# Patient Record
Sex: FEMALE | Race: WHITE | NOT HISPANIC OR LATINO | Employment: OTHER | URBAN - NONMETROPOLITAN AREA
[De-identification: names, ages, dates, MRNs, and addresses within clinical notes are randomized per-mention and may not be internally consistent; named-entity substitution may affect disease eponyms.]

---

## 2017-01-18 ENCOUNTER — OFFICE VISIT (OUTPATIENT)
Dept: FAMILY MEDICINE CLINIC | Facility: CLINIC | Age: 37
End: 2017-01-18

## 2017-01-18 VITALS — OXYGEN SATURATION: 97 % | SYSTOLIC BLOOD PRESSURE: 115 MMHG | HEART RATE: 100 BPM | DIASTOLIC BLOOD PRESSURE: 80 MMHG

## 2017-01-18 DIAGNOSIS — K21.9 GASTROESOPHAGEAL REFLUX DISEASE WITHOUT ESOPHAGITIS: ICD-10-CM

## 2017-01-18 DIAGNOSIS — G80.0 SPASTIC QUADRIPLEGIC CEREBRAL PALSY (HCC): ICD-10-CM

## 2017-01-18 DIAGNOSIS — E55.9 VITAMIN D DEFICIENCY: ICD-10-CM

## 2017-01-18 DIAGNOSIS — Z00.00 ANNUAL PHYSICAL EXAM: ICD-10-CM

## 2017-01-18 DIAGNOSIS — F32.A DEPRESSIVE DISORDER: Primary | ICD-10-CM

## 2017-01-18 PROCEDURE — 99213 OFFICE O/P EST LOW 20 MIN: CPT | Performed by: GENERAL PRACTICE

## 2017-01-18 NOTE — PROGRESS NOTES
Subjective   Kiley Ledesma is a 36 y.o. female.     Chief Complaint   Patient presents with   • Follow-up   • Depression   • Heartburn     History of Present Illness     For review and evaluation of management of chronic medical problems. Has a red bump on forehead, worried might be infected. Depression stable. GERD stable.    Patient Active Problem List   Diagnosis   • Cerebral palsy   • Depressive disorder   • Gastroesophageal reflux disease   • Spastic quadriplegic cerebral palsy      The following portions of the patient's history were reviewed and updated as appropriate: allergies, current medications, past social history and problem list.    Current Outpatient Prescriptions:   •  acetaminophen (TYLENOL) 325 MG tablet, Take 1 or 2 tablet by mouth every 4 hours as needed, Disp: , Rfl:   •  albuterol (PROVENTIL) (2.5 MG/3ML) 0.083% nebulizer solution, Use 1 vial 3 times a day as needed for wheezing, Disp: , Rfl:   •  BACLOFEN IT, Take as directed, Disp: , Rfl:   •  Cholecalciferol 1000 UNITS capsule, Take 1,000 Units by mouth Daily., Disp: , Rfl:   •  cholestyramine light 4 G packet, DISSOLVE ONE PACKET IN 8OZ WATER & GIVE BY MOUTH TWICE DAILY AT 9AM &5PM FOR CONSTIPATION - NO OTHER MEDICATIONS FOR 1 HOUR, Disp: 60 packet, Rfl: 5  •  diphenhydrAMINE (BENADRYL ALLERGY) 25 MG tablet, Take 25 mg by mouth 3 (Three) Times a Day As Needed (for lip swelling)., Disp: , Rfl:   •  estradiol (ESTRACE) 1 MG tablet, Take 1 mg by mouth Daily., Disp: , Rfl:   •  FLUoxetine (PROzac) 40 MG capsule, Take 40 mg by mouth Daily., Disp: , Rfl:   •  fluticasone (FLONASE) 50 MCG/ACT nasal spray, 2 sprays into each nostril. As directed, Disp: , Rfl:   •  guaiFENesin (MUCINEX) 600 MG 12 hr tablet, Take 600 mg by mouth 2 (Two) Times a Day As Needed., Disp: , Rfl:   •  metoclopramide (REGLAN) 10 MG tablet, Take 10 mg by mouth Daily., Disp: , Rfl:   •  Misc. Devices (WHEELCHAIR) misc, As directed, Disp: , Rfl:   •  nystatin (MYCOSTATIN)  289151 UNIT/GM cream, Apply  topically 3 (Three) Times a Day. To affected area(s), Disp: , Rfl:   •  omeprazole (PriLOSEC) 20 MG capsule, Take 20 mg by mouth Daily., Disp: , Rfl:   •  ondansetron ODT (ZOFRAN-ODT) 8 MG disintegrating tablet, Take 8 mg by mouth Every 8 (Eight) Hours As Needed., Disp: , Rfl:   •  solifenacin (VESICARE) 10 MG tablet, Take 10 mg by mouth Daily., Disp: , Rfl:   •  Vitamins A & D (VITAMIN A & D) ointment, Apply  topically As Needed., Disp: , Rfl:     Review of Systems   Constitutional: Negative.  Negative for activity change, appetite change, chills, fatigue, fever and unexpected weight change.   HENT: Negative.  Negative for congestion, ear pain, hearing loss, nosebleeds, rhinorrhea, sinus pressure, sneezing, sore throat, tinnitus and trouble swallowing.    Eyes: Negative.  Negative for pain, discharge, redness, itching and visual disturbance.   Respiratory: Negative.  Negative for apnea, cough, chest tightness, shortness of breath and wheezing.    Cardiovascular: Negative.  Negative for chest pain, palpitations and leg swelling.   Gastrointestinal: Negative.  Negative for abdominal distention, abdominal pain, constipation, diarrhea, nausea and vomiting.   Endocrine: Negative.    Genitourinary: Negative.  Negative for dysuria, frequency and urgency.   Musculoskeletal: Negative.  Negative for arthralgias, back pain, gait problem, joint swelling, myalgias, neck pain and neck stiffness.   Skin: Negative.  Negative for color change and rash.   Allergic/Immunologic: Negative.    Neurological: Negative.  Negative for dizziness, weakness, light-headedness, numbness and headaches.   Hematological: Negative.  Negative for adenopathy.   Psychiatric/Behavioral: Negative.  Negative for dysphoric mood and sleep disturbance. The patient is not nervous/anxious.        Objective                                                                                                                                                                                                                                                                   Visit Vitals   • /80 (BP Location: Left arm, Patient Position: Sitting, Cuff Size: Adult)   • Pulse 100   • SpO2 97%       Physical Exam   Constitutional: She is oriented to person, place, and time. She appears well-developed and well-nourished. No distress.   HENT:   Head: Normocephalic and atraumatic.       Nose: Nose normal.   Mouth/Throat: Oropharynx is clear and moist.   Eyes: Conjunctivae and EOM are normal. Pupils are equal, round, and reactive to light. Right eye exhibits no discharge. Left eye exhibits no discharge.   Neck: No thyromegaly present.   Cardiovascular: Normal rate, regular rhythm, normal heart sounds and intact distal pulses.    Pulmonary/Chest: Effort normal and breath sounds normal.   Lymphadenopathy:     She has no cervical adenopathy.   Neurological: She is alert and oriented to person, place, and time.   Skin: Skin is warm and dry.   Psychiatric: She has a normal mood and affect.   Nursing note and vitals reviewed.    Assessment/Plan     Problem List Items Addressed This Visit        Digestive    Gastroesophageal reflux disease       Nervous and Auditory    Cerebral palsy       Other    Depressive disorder - Primary      Other Visit Diagnoses     Annual physical exam        Relevant Orders    Vitamin D 25 Hydroxy    Comprehensive Metabolic Panel    LDL Cholesterol, Direct    Vitamin D deficiency        Relevant Orders    Vitamin D 25 Hydroxy        Continue current treatment.     No orders of the defined types were placed in this encounter.

## 2017-07-03 ENCOUNTER — OFFICE VISIT (OUTPATIENT)
Dept: OPHTHALMOLOGY | Facility: CLINIC | Age: 37
End: 2017-07-03

## 2017-07-03 DIAGNOSIS — H52.203 ASTIGMATISM, BILATERAL: ICD-10-CM

## 2017-07-03 DIAGNOSIS — H52.13 MYOPIA, BILATERAL: Primary | ICD-10-CM

## 2017-07-03 PROCEDURE — 92014 COMPRE OPH EXAM EST PT 1/>: CPT | Performed by: OPHTHALMOLOGY

## 2017-07-03 NOTE — PROGRESS NOTES
Subjective   Kiley Ledesma is a 36 y.o. female.   Chief Complaint   Patient presents with   • Eye Exam   • Astigmatism   • Myopia     HPI     Eye Exam   Laterality: both eyes       Last edited by Deya Sher MA on 7/3/2017  9:25 AM. (History)          Review of Systems    Objective   Base Eye Exam     Visual Acuity (Snellen - Linear)      Right Left   Near cc J1 J1       Correction:  Glasses      Tonometry (Palpation, 10:15 AM)      Right Left   Pressure soft soft         Pupils      Pupils   Right PERRL   Left PERRL         Visual Fields      Left Right   Result Full Full         Extraocular Movement      Right Left   Result Full Full         Dilation     Both eyes:  1.0% Mydriacyl, 2.5% Jae Synephrine @ 9:44 AM            Slit Lamp and Fundus Exam     External Exam      Right Left    External Normal Normal      Slit Lamp Exam      Right Left    Lids/Lashes Normal Normal    Conjunctiva/Sclera White and quiet White and quiet    Cornea Clear Clear    Anterior Chamber Deep and quiet Deep and quiet    Iris Round and reactive Round and reactive    Lens Clear Clear    Vitreous Normal Normal      Fundus Exam      Right Left    Disc Normal Normal    Macula Normal Normal    Vessels Normal Normal    Periphery Normal Normal            Refraction     Wearing Rx      Sphere Cylinder Axis   Right -5.25 +2.50 120   Left -6.50 +3.25 093         Cycloplegic Refraction     OR pls OU                Assessment/Plan   Diagnoses and all orders for this visit:    Myopia, bilateral    Astigmatism, bilateral      Plan:     Cont current Rx  F/u prn

## 2017-07-07 DIAGNOSIS — N39.0 UTI (URINARY TRACT INFECTION), UNCOMPLICATED: Primary | ICD-10-CM

## 2017-07-07 PROCEDURE — 81001 URINALYSIS AUTO W/SCOPE: CPT | Performed by: GENERAL PRACTICE

## 2017-07-07 PROCEDURE — 87086 URINE CULTURE/COLONY COUNT: CPT | Performed by: GENERAL PRACTICE

## 2017-07-12 ENCOUNTER — TELEPHONE (OUTPATIENT)
Dept: FAMILY MEDICINE CLINIC | Facility: CLINIC | Age: 37
End: 2017-07-12

## 2017-07-12 NOTE — TELEPHONE ENCOUNTER
Pr Dr. Stoddard, Ms. Ledesma's caregiver Kelsey has been called with her recent lab results & recommendations.  Continue her current medications and follow-up as planned or sooner if any problems.      ----- Message from Greta Stoddard MD sent at 7/12/2017  5:21 PM CDT -----  Call and tell no urine infection

## 2017-08-24 ENCOUNTER — OFFICE VISIT (OUTPATIENT)
Dept: FAMILY MEDICINE CLINIC | Facility: CLINIC | Age: 37
End: 2017-08-24

## 2017-08-24 ENCOUNTER — LAB (OUTPATIENT)
Dept: LAB | Facility: HOSPITAL | Age: 37
End: 2017-08-24

## 2017-08-24 VITALS
HEART RATE: 78 BPM | HEIGHT: 60 IN | WEIGHT: 130 LBS | OXYGEN SATURATION: 98 % | DIASTOLIC BLOOD PRESSURE: 78 MMHG | BODY MASS INDEX: 25.52 KG/M2 | SYSTOLIC BLOOD PRESSURE: 104 MMHG

## 2017-08-24 DIAGNOSIS — G80.0 SPASTIC QUADRIPLEGIC CEREBRAL PALSY (HCC): ICD-10-CM

## 2017-08-24 DIAGNOSIS — E55.9 VITAMIN D DEFICIENCY: ICD-10-CM

## 2017-08-24 DIAGNOSIS — E55.9 VITAMIN D DEFICIENCY: Primary | ICD-10-CM

## 2017-08-24 DIAGNOSIS — E78.5 HYPERLIPIDEMIA, UNSPECIFIED HYPERLIPIDEMIA TYPE: ICD-10-CM

## 2017-08-24 DIAGNOSIS — Z00.00 ANNUAL PHYSICAL EXAM: Primary | ICD-10-CM

## 2017-08-24 LAB
25(OH)D3 SERPL-MCNC: 42.4 NG/ML (ref 30–100)
ALBUMIN SERPL-MCNC: 4.3 G/DL (ref 3.4–4.8)
ALBUMIN/GLOB SERPL: 1.4 G/DL (ref 1.1–1.8)
ALP SERPL-CCNC: 72 U/L (ref 38–126)
ALT SERPL W P-5'-P-CCNC: 20 U/L (ref 9–52)
ANION GAP SERPL CALCULATED.3IONS-SCNC: 11 MMOL/L (ref 5–15)
ARTICHOKE IGE QN: 79 MG/DL (ref 1–129)
AST SERPL-CCNC: 39 U/L (ref 14–36)
BILIRUB SERPL-MCNC: 0.4 MG/DL (ref 0.2–1.3)
BUN BLD-MCNC: 16 MG/DL (ref 7–21)
BUN/CREAT SERPL: 30.8 (ref 7–25)
CALCIUM SPEC-SCNC: 9.4 MG/DL (ref 8.4–10.2)
CHLORIDE SERPL-SCNC: 100 MMOL/L (ref 95–110)
CO2 SERPL-SCNC: 25 MMOL/L (ref 22–31)
CREAT BLD-MCNC: 0.52 MG/DL (ref 0.5–1)
GFR SERPL CREATININE-BSD FRML MDRD: 133 ML/MIN/1.73 (ref 64–149)
GLOBULIN UR ELPH-MCNC: 3.1 GM/DL (ref 2.3–3.5)
GLUCOSE BLD-MCNC: 92 MG/DL (ref 60–100)
POTASSIUM BLD-SCNC: 4.2 MMOL/L (ref 3.5–5.1)
PROT SERPL-MCNC: 7.4 G/DL (ref 6.3–8.6)
SODIUM BLD-SCNC: 136 MMOL/L (ref 137–145)

## 2017-08-24 PROCEDURE — 80053 COMPREHEN METABOLIC PANEL: CPT | Performed by: GENERAL PRACTICE

## 2017-08-24 PROCEDURE — 36415 COLL VENOUS BLD VENIPUNCTURE: CPT | Performed by: GENERAL PRACTICE

## 2017-08-24 PROCEDURE — 83721 ASSAY OF BLOOD LIPOPROTEIN: CPT | Performed by: GENERAL PRACTICE

## 2017-08-24 PROCEDURE — 82306 VITAMIN D 25 HYDROXY: CPT | Performed by: GENERAL PRACTICE

## 2017-08-24 PROCEDURE — 99395 PREV VISIT EST AGE 18-39: CPT | Performed by: GENERAL PRACTICE

## 2017-08-24 NOTE — PROGRESS NOTES
Subjective   Kiley Ledesma is a 36 y.o. female.     Chief Complaint   Patient presents with   • Annual Exam   • Depression       History of Present Illness     For annual wellness exam.  Labs pending. No complaints.     The following portions of the patient's history were reviewed and updated as appropriate: allergies, current medications, past family and social history and problem list.    Outpatient Medications Prior to Visit   Medication Sig Dispense Refill   • acetaminophen (TYLENOL) 325 MG tablet Take 1 or 2 tablet by mouth every 4 hours as needed     • albuterol (PROVENTIL) (2.5 MG/3ML) 0.083% nebulizer solution Use 1 vial 3 times a day as needed for wheezing     • BACLOFEN IT Take as directed     • Cholecalciferol 1000 UNITS capsule Take 1,000 Units by mouth Daily.     • cholestyramine light 4 G packet DISSOLVE ONE PACKET IN 8OZ WATER & GIVE BY MOUTH TWICE DAILY AT 9AM &5PM FOR CONSTIPATION - NO OTHER MEDICATIONS FOR 1 HOUR 60 packet 5   • diphenhydrAMINE (BENADRYL ALLERGY) 25 MG tablet Take 25 mg by mouth 3 (Three) Times a Day As Needed (for lip swelling).     • estradiol (ESTRACE) 1 MG tablet Take 1 mg by mouth Daily.     • FLUoxetine (PROzac) 40 MG capsule Take 40 mg by mouth Daily.     • fluticasone (FLONASE) 50 MCG/ACT nasal spray 2 sprays into each nostril. As directed     • guaiFENesin (MUCINEX) 600 MG 12 hr tablet Take 600 mg by mouth 2 (Two) Times a Day As Needed.     • metoclopramide (REGLAN) 10 MG tablet Take 10 mg by mouth Daily.     • Misc. Devices (WHEELCHAIR) misc As directed     • nystatin (MYCOSTATIN) 395193 UNIT/GM cream Apply  topically 3 (Three) Times a Day. To affected area(s)     • omeprazole (PriLOSEC) 20 MG capsule Take 20 mg by mouth Daily.     • ondansetron ODT (ZOFRAN-ODT) 8 MG disintegrating tablet Take 8 mg by mouth Every 8 (Eight) Hours As Needed.     • solifenacin (VESICARE) 10 MG tablet Take 10 mg by mouth Daily.     • Vitamins A & D (VITAMIN A & D) ointment Apply   "topically As Needed.       No facility-administered medications prior to visit.        Review of Systems   Constitutional: Negative for chills, fatigue, fever and unexpected weight change.   HENT: Negative.  Negative for congestion, ear pain, hearing loss, nosebleeds, rhinorrhea, sneezing, sore throat and tinnitus.    Eyes: Negative.  Negative for discharge.   Respiratory: Negative.  Negative for cough, shortness of breath and wheezing.    Cardiovascular: Negative.  Negative for chest pain and palpitations.   Gastrointestinal: Negative.  Negative for abdominal pain, constipation, diarrhea, nausea and vomiting.   Endocrine: Negative.    Genitourinary: Negative.  Negative for dysuria, frequency and urgency.   Musculoskeletal: Negative.  Negative for arthralgias, back pain, joint swelling, myalgias and neck pain.   Skin: Negative.  Negative for rash.   Allergic/Immunologic: Negative.    Neurological: Positive for weakness. Negative for dizziness, numbness and headaches.   Hematological: Negative.  Negative for adenopathy.   Psychiatric/Behavioral: Negative.  Negative for dysphoric mood and sleep disturbance. The patient is not nervous/anxious.        Objective     Visit Vitals   • /78   • Pulse 78   • Ht 60\" (152.4 cm)   • Wt 130 lb (59 kg)   • SpO2 98%   • BMI 25.39 kg/m2     Physical Exam   Constitutional: She is oriented to person, place, and time. She appears well-developed and well-nourished. No distress.   HENT:   Head: Normocephalic and atraumatic.   Nose: Nose normal.   Mouth/Throat: Oropharynx is clear and moist.   Eyes: Conjunctivae and EOM are normal. Pupils are equal, round, and reactive to light. Right eye exhibits no discharge. Left eye exhibits no discharge.   Neck: No tracheal deviation present. No thyromegaly present.   Cardiovascular: Normal rate, regular rhythm, normal heart sounds and intact distal pulses.    No murmur heard.  Pulmonary/Chest: Effort normal and breath sounds normal. No " respiratory distress. She has no wheezes. She has no rales. She exhibits no tenderness. Right breast exhibits no inverted nipple, no mass, no nipple discharge, no skin change and no tenderness. Left breast exhibits no inverted nipple, no mass, no nipple discharge, no skin change and no tenderness.   Abdominal: Soft. Bowel sounds are normal. She exhibits no distension and no mass. There is no tenderness. No hernia.   Musculoskeletal: Normal range of motion. She exhibits no deformity.   Lymphadenopathy:     She has no cervical adenopathy.   Neurological: She is alert and oriented to person, place, and time. No cranial nerve deficit or sensory deficit. She exhibits abnormal muscle tone. Coordination and gait abnormal.   Reflex Scores:       Patellar reflexes are 3+ on the right side and 3+ on the left side.  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   Psychiatric: She has a normal mood and affect. Her behavior is normal. Judgment and thought content normal.   Nursing note and vitals reviewed.     Assessment/Plan   Problem List Items Addressed This Visit     None          Continue current treatment. Will notify regarding results.     No orders of the defined types were placed in this encounter.    Return in about 6 months (around 2/24/2018) for Recheck.

## 2017-08-30 ENCOUNTER — OFFICE VISIT (OUTPATIENT)
Dept: GASTROENTEROLOGY | Facility: CLINIC | Age: 37
End: 2017-08-30

## 2017-08-30 VITALS — SYSTOLIC BLOOD PRESSURE: 148 MMHG | HEART RATE: 77 BPM | DIASTOLIC BLOOD PRESSURE: 87 MMHG

## 2017-08-30 DIAGNOSIS — K52.9 COLITIS: ICD-10-CM

## 2017-08-30 DIAGNOSIS — G89.29 CHRONIC ABDOMINAL PAIN: ICD-10-CM

## 2017-08-30 DIAGNOSIS — K21.00 GASTROESOPHAGEAL REFLUX DISEASE WITH ESOPHAGITIS: Primary | ICD-10-CM

## 2017-08-30 DIAGNOSIS — R10.9 CHRONIC ABDOMINAL PAIN: ICD-10-CM

## 2017-08-30 DIAGNOSIS — R19.7 DIARRHEA, UNSPECIFIED TYPE: ICD-10-CM

## 2017-08-30 PROCEDURE — 99213 OFFICE O/P EST LOW 20 MIN: CPT | Performed by: PHYSICIAN ASSISTANT

## 2017-08-30 RX ORDER — CHOLESTYRAMINE LIGHT 4 G/5.7G
POWDER, FOR SUSPENSION ORAL
Qty: 60 PACKET | Refills: 11 | Status: SHIPPED | OUTPATIENT
Start: 2017-08-30 | End: 2019-03-06 | Stop reason: SDUPTHER

## 2017-10-19 ENCOUNTER — APPOINTMENT (OUTPATIENT)
Dept: LAB | Facility: HOSPITAL | Age: 37
End: 2017-10-19

## 2017-10-19 ENCOUNTER — OFFICE VISIT (OUTPATIENT)
Dept: GASTROENTEROLOGY | Facility: CLINIC | Age: 37
End: 2017-10-19

## 2017-10-19 VITALS — HEART RATE: 85 BPM | DIASTOLIC BLOOD PRESSURE: 82 MMHG | SYSTOLIC BLOOD PRESSURE: 147 MMHG

## 2017-10-19 DIAGNOSIS — G89.29 CHRONIC ABDOMINAL PAIN: ICD-10-CM

## 2017-10-19 DIAGNOSIS — R10.9 CHRONIC ABDOMINAL PAIN: ICD-10-CM

## 2017-10-19 DIAGNOSIS — K21.00 GASTROESOPHAGEAL REFLUX DISEASE WITH ESOPHAGITIS: Primary | ICD-10-CM

## 2017-10-19 DIAGNOSIS — R19.7 DIARRHEA, UNSPECIFIED TYPE: ICD-10-CM

## 2017-10-19 DIAGNOSIS — K52.9 COLITIS: ICD-10-CM

## 2017-10-19 LAB
ALBUMIN SERPL-MCNC: 4.2 G/DL (ref 3.4–4.8)
ALBUMIN/GLOB SERPL: 1.3 G/DL (ref 1.1–1.8)
ALP SERPL-CCNC: 73 U/L (ref 38–126)
ALT SERPL W P-5'-P-CCNC: 22 U/L (ref 9–52)
ANION GAP SERPL CALCULATED.3IONS-SCNC: 13 MMOL/L (ref 5–15)
AST SERPL-CCNC: 23 U/L (ref 14–36)
BASOPHILS # BLD AUTO: 0.03 10*3/MM3 (ref 0–0.2)
BASOPHILS NFR BLD AUTO: 0.4 % (ref 0–2)
BILIRUB SERPL-MCNC: 0.3 MG/DL (ref 0.2–1.3)
BUN BLD-MCNC: 10 MG/DL (ref 7–21)
BUN/CREAT SERPL: 20.4 (ref 7–25)
CALCIUM SPEC-SCNC: 9.5 MG/DL (ref 8.4–10.2)
CHLORIDE SERPL-SCNC: 102 MMOL/L (ref 95–110)
CO2 SERPL-SCNC: 25 MMOL/L (ref 22–31)
CREAT BLD-MCNC: 0.49 MG/DL (ref 0.5–1)
DEPRECATED RDW RBC AUTO: 43.9 FL (ref 36.4–46.3)
EOSINOPHIL # BLD AUTO: 0.34 10*3/MM3 (ref 0–0.7)
EOSINOPHIL NFR BLD AUTO: 5 % (ref 0–7)
ERYTHROCYTE [DISTWIDTH] IN BLOOD BY AUTOMATED COUNT: 13.5 % (ref 11.5–14.5)
ERYTHROCYTE [SEDIMENTATION RATE] IN BLOOD: 1 MM/HR (ref 0–20)
GFR SERPL CREATININE-BSD FRML MDRD: 142 ML/MIN/1.73 (ref 64–149)
GLOBULIN UR ELPH-MCNC: 3.2 GM/DL (ref 2.3–3.5)
GLUCOSE BLD-MCNC: 82 MG/DL (ref 60–100)
HCT VFR BLD AUTO: 40.6 % (ref 35–45)
HGB BLD-MCNC: 12.9 G/DL (ref 12–15.5)
IMM GRANULOCYTES # BLD: 0.01 10*3/MM3 (ref 0–0.02)
IMM GRANULOCYTES NFR BLD: 0.1 % (ref 0–0.5)
LYMPHOCYTES # BLD AUTO: 1.53 10*3/MM3 (ref 0.6–4.2)
LYMPHOCYTES NFR BLD AUTO: 22.3 % (ref 10–50)
MCH RBC QN AUTO: 28.1 PG (ref 26.5–34)
MCHC RBC AUTO-ENTMCNC: 31.8 G/DL (ref 31.4–36)
MCV RBC AUTO: 88.5 FL (ref 80–98)
MONOCYTES # BLD AUTO: 0.72 10*3/MM3 (ref 0–0.9)
MONOCYTES NFR BLD AUTO: 10.5 % (ref 0–12)
NEUTROPHILS # BLD AUTO: 4.23 10*3/MM3 (ref 2–8.6)
NEUTROPHILS NFR BLD AUTO: 61.7 % (ref 37–80)
PLATELET # BLD AUTO: 224 10*3/MM3 (ref 150–450)
PMV BLD AUTO: 9.4 FL (ref 8–12)
POTASSIUM BLD-SCNC: 4 MMOL/L (ref 3.5–5.1)
PROT SERPL-MCNC: 7.4 G/DL (ref 6.3–8.6)
RBC # BLD AUTO: 4.59 10*6/MM3 (ref 3.77–5.16)
SODIUM BLD-SCNC: 140 MMOL/L (ref 137–145)
WBC NRBC COR # BLD: 6.86 10*3/MM3 (ref 3.2–9.8)

## 2017-10-19 PROCEDURE — 86003 ALLG SPEC IGE CRUDE XTRC EA: CPT | Performed by: PHYSICIAN ASSISTANT

## 2017-10-19 PROCEDURE — 83516 IMMUNOASSAY NONANTIBODY: CPT | Performed by: PHYSICIAN ASSISTANT

## 2017-10-19 PROCEDURE — 36415 COLL VENOUS BLD VENIPUNCTURE: CPT | Performed by: PHYSICIAN ASSISTANT

## 2017-10-19 PROCEDURE — 99214 OFFICE O/P EST MOD 30 MIN: CPT | Performed by: PHYSICIAN ASSISTANT

## 2017-10-19 PROCEDURE — 85651 RBC SED RATE NONAUTOMATED: CPT | Performed by: PHYSICIAN ASSISTANT

## 2017-10-19 PROCEDURE — 80053 COMPREHEN METABOLIC PANEL: CPT | Performed by: PHYSICIAN ASSISTANT

## 2017-10-19 PROCEDURE — 85025 COMPLETE CBC W/AUTO DIFF WBC: CPT | Performed by: PHYSICIAN ASSISTANT

## 2017-10-19 RX ORDER — POLYETHYLENE GLYCOL 3350 17 G/17G
POWDER, FOR SOLUTION ORAL
Qty: 255 G | Refills: 0 | Status: SHIPPED | OUTPATIENT
Start: 2017-10-19 | End: 2018-02-26 | Stop reason: ALTCHOICE

## 2017-10-19 RX ORDER — DEXTROSE AND SODIUM CHLORIDE 5; .45 G/100ML; G/100ML
30 INJECTION, SOLUTION INTRAVENOUS CONTINUOUS PRN
Status: CANCELLED | OUTPATIENT
Start: 2017-11-03

## 2017-10-19 NOTE — PROGRESS NOTES
Chief Complaint   Patient presents with   • Abdominal Pain   • Diarrhea       ENDO PROCEDURE ORDERED: COLON, bx, hx colitis    Subjective    Kiley Ledesma is a 36 y.o. female. she is here today for follow-up.    History of Present Illness    Patient seen on a recheck of her history of colitis, abdominal pain, diarrhea.  Last seen 8/30/17, at that time she was doing well.  She went to urgent care for abdominal pain on 10/10/17, they didn't know laboratories her x-ray studies.  She is accompanied by someone from her home, they state she has had much more severe diarrhea it is been going on for several weeks.  She's had no noticeable fever.  She's had a lot of cramping periumbilically.  Stools are very yellow, sometimes white but no mucus.  She complains of 10 out of 10 midabdominal pain today.  She denied nausea, vomiting, dysphagia.  She has not seen any blood in her stools.  Her weight is unable to be obtained because of her motorized wheelchair, but she is not clearly lost a significant amount of weight with this.  She had a previous EGD/colonoscopy on 3/28/12 with gastritis and hemorrhoids.  She has been well controlled for some time on the Questran.  But it has not helped with her current diarrhea and she is still taking the medication.    A/P: Severe acute diarrhea with previous colitis, possible infectious etiology.  She denies any recent antibiotics.  No one else at her home has been ill.  I recommended CBC, CMP, sedimentation rate, amylase lipase, GI pathogen panel.  Given her long-standing history recommend colonoscopy with biopsies as well.  See her in follow-up after the above, further pending clinical course and the results of the above.     The following portions of the patient's history were reviewed and updated as appropriate:   Past Medical History:   Diagnosis Date   • Acute vulvitis     Candida   • Allergy     Unspecified, initial encounter   • Amblyopia     OS   • Astigmatism    • Cerebral  palsy    • Cheilosis    • Common cold    • Contusion    • Depressive disorder    • Diarrhea    • Encounter for general adult medical examination without abnormal findings    • Encounter for routine adult health examination     Adult health examination      • Fever    • Gastroesophageal reflux disease    • Generalized abdominal pain    • Indeterminate colitis    • Infected insect bite    • Myopia    • Spastic quadriplegic cerebral palsy    • Trouble walking     Walking disability      • Urinary incontinence    • Urinary tract infectious disease    • Worried well      Past Surgical History:   Procedure Laterality Date   • COLONOSCOPY N/A 03/28/2012    hemorrhoids   • ENDOSCOPY N/A 03/28/2012    gastritis   • INJECTION OF MEDICATION  02/23/2016    Kenalog   • TOTAL ABDOMINAL HYSTERECTOMY WITH SALPINGO OOPHORECTOMY N/A 03/24/2005   • UPPER GASTROINTESTINAL ENDOSCOPY  03/28/2012     Family History   Problem Relation Age of Onset   • Coronary artery disease Other    • Diabetes Other    • Hypertension Other    • Stroke Other      OB History     No data available        Allergies   Allergen Reactions   • Atropine    • Sulfa Antibiotics Itching     Social History     Social History   • Marital status: Single     Spouse name: N/A   • Number of children: N/A   • Years of education: N/A     Social History Main Topics   • Smoking status: Never Smoker   • Smokeless tobacco: Never Used   • Alcohol use No   • Drug use: No   • Sexual activity: Not Asked     Other Topics Concern   • None     Social History Narrative       Current Outpatient Prescriptions:   •  acetaminophen (TYLENOL) 325 MG tablet, Take 1 or 2 tablet by mouth every 4 hours as needed, Disp: , Rfl:   •  albuterol (PROVENTIL) (2.5 MG/3ML) 0.083% nebulizer solution, Use 1 vial 3 times a day as needed for wheezing, Disp: , Rfl:   •  BACLOFEN IT, Take as directed, Disp: , Rfl:   •  Cholecalciferol 1000 UNITS capsule, Take 1,000 Units by mouth Daily., Disp: , Rfl:   •   cholestyramine light 4 g packet, DISSOLVE ONE PACKET IN 8OZ WATER & GIVE BY MOUTH TWICE DAILY AT 9AM &5PM FOR CONSTIPATION - NO OTHER MEDICATIONS FOR 1 HOUR, Disp: 60 packet, Rfl: 11  •  diphenhydrAMINE (BENADRYL ALLERGY) 25 MG tablet, Take 25 mg by mouth 3 (Three) Times a Day As Needed (for lip swelling)., Disp: , Rfl:   •  estradiol (ESTRACE) 1 MG tablet, Take 1 mg by mouth Daily., Disp: , Rfl:   •  FLUoxetine (PROzac) 40 MG capsule, Take 40 mg by mouth Daily., Disp: , Rfl:   •  fluticasone (FLONASE) 50 MCG/ACT nasal spray, 2 sprays into each nostril. As directed, Disp: , Rfl:   •  guaiFENesin (MUCINEX) 600 MG 12 hr tablet, Take 600 mg by mouth 2 (Two) Times a Day As Needed., Disp: , Rfl:   •  metoclopramide (REGLAN) 10 MG tablet, Take 10 mg by mouth Daily., Disp: , Rfl:   •  Misc. Devices (WHEELCHAIR) mis, As directed, Disp: , Rfl:   •  nystatin (MYCOSTATIN) 457666 UNIT/GM cream, Apply  topically 3 (Three) Times a Day. To affected area(s), Disp: , Rfl:   •  omeprazole (PriLOSEC) 20 MG capsule, Take 20 mg by mouth Daily., Disp: , Rfl:   •  ondansetron ODT (ZOFRAN-ODT) 8 MG disintegrating tablet, Take 8 mg by mouth Every 8 (Eight) Hours As Needed., Disp: , Rfl:   •  solifenacin (VESICARE) 10 MG tablet, Take 10 mg by mouth Daily., Disp: , Rfl:   •  Vitamins A & D (VITAMIN A & D) ointment, Apply  topically As Needed., Disp: , Rfl:   •  polyethylene glycol (MIRALAX) powder, As directed per instruction sheet for colonoscopy, Disp: 255 g, Rfl: 0  Review of Systems  Review of Systems       Objective    /82 (BP Location: Left arm)  Pulse 85  Physical Exam   Constitutional: She is oriented to person, place, and time. She appears well-developed and well-nourished. No distress.   [facility] – [place]   HENT:   Head: Normocephalic and atraumatic.   Eyes: EOM are normal. Pupils are equal, round, and reactive to light.   Neck: Normal range of motion.   Cardiovascular: Normal rate, regular rhythm and normal heart sounds.     Pulmonary/Chest: Effort normal and breath sounds normal.   Abdominal: Soft. Bowel sounds are normal. She exhibits no shifting dullness, no distension, no abdominal bruit, no ascites and no mass. There is no hepatosplenomegaly. There is tenderness. There is no rigidity, no rebound, no guarding and no CVA tenderness. No hernia. Hernia confirmed negative in the ventral area.   Musculoskeletal: Normal range of motion.   Neurological: She is alert and oriented to person, place, and time.   Skin: Skin is warm and dry.   Psychiatric: She has a normal mood and affect. Her behavior is normal. Judgment and thought content normal.   Nursing note and vitals reviewed.    Assessment/Plan      1. Gastroesophageal reflux disease with esophagitis    2. Chronic abdominal pain    3. Colitis    4. Diarrhea, unspecified type    .   Kiley was seen today for abdominal pain and diarrhea.    Diagnoses and all orders for this visit:    Gastroesophageal reflux disease with esophagitis  -     CBC Auto Differential  -     Comprehensive Metabolic Panel  -     Recurrent Gastrointestinal Distress  -     Sedimentation Rate  -     Gastrointestinal Panel, PCR - Stool, Per Rectum  -     Allergens (12) Foods  -     Glia(IgA / G) & TTG(IgA / G)    Chronic abdominal pain  -     CBC Auto Differential  -     Comprehensive Metabolic Panel  -     Recurrent Gastrointestinal Distress  -     Sedimentation Rate  -     Gastrointestinal Panel, PCR - Stool, Per Rectum  -     Case Request; Standing  -     dextrose 5 % and sodium chloride 0.45 % infusion; Infuse 30 mL/hr into a venous catheter Continuous As Needed (Start Prior to Procedure).  -     Case Request  -     Allergens (12) Foods  -     Glia(IgA / G) & TTG(IgA / G)    Colitis  -     CBC Auto Differential  -     Comprehensive Metabolic Panel  -     Recurrent Gastrointestinal Distress  -     Sedimentation Rate  -     Gastrointestinal Panel, PCR - Stool, Per Rectum  -     Case Request; Standing  -      dextrose 5 % and sodium chloride 0.45 % infusion; Infuse 30 mL/hr into a venous catheter Continuous As Needed (Start Prior to Procedure).  -     Case Request  -     Allergens (12) Foods  -     Glia(IgA / G) & TTG(IgA / G)    Diarrhea, unspecified type  -     CBC Auto Differential  -     Comprehensive Metabolic Panel  -     Recurrent Gastrointestinal Distress  -     Sedimentation Rate  -     Gastrointestinal Panel, PCR - Stool, Per Rectum  -     Case Request; Standing  -     dextrose 5 % and sodium chloride 0.45 % infusion; Infuse 30 mL/hr into a venous catheter Continuous As Needed (Start Prior to Procedure).  -     Case Request  -     Allergens (12) Foods  -     Glia(IgA / G) & TTG(IgA / G)    Other orders  -     Obtain Informed Consent; Standing  -     POC Glucose Fingerstick; Standing  -     polyethylene glycol (MIRALAX) powder; As directed per instruction sheet for colonoscopy        Orders placed during this encounter include:  Orders Placed This Encounter   Procedures   • Gastrointestinal Panel, PCR - Stool, Per Rectum   • CBC Auto Differential   • Comprehensive Metabolic Panel   • Recurrent Gastrointestinal Distress   • Sedimentation Rate   • Allergens (12) Foods   • Glia(IgA / G) & TTG(IgA / G)       Medications prescribed:  New Medications Ordered This Visit   Medications   • polyethylene glycol (MIRALAX) powder     Sig: As directed per instruction sheet for colonoscopy     Dispense:  255 g     Refill:  0       Requested Prescriptions     Signed Prescriptions Disp Refills   • polyethylene glycol (MIRALAX) powder 255 g 0     Sig: As directed per instruction sheet for colonoscopy       Review and/or summary of lab tests, radiology, procedures, medications. Review and summary of old records and obtaining of history. The risks and benefits of my recommendations, as well as other treatment options were discussed with the patient today. Questions were answered.    Follow-up: Return if symptoms worsen or fail to  improve, for After the above.     COLONOSCOPY--biopsies (N/A)      This document has been electronically signed by Bro Lott PA-C on October 30, 2017 6:25 PM      Results for orders placed or performed in visit on 10/19/17   Gastrointestinal Panel, PCR - Stool, Per Rectum   Result Value Ref Range    Campylobacter Not Detected Not Detected    Clostridium difficile (toxin A/B) Not Detected Not Detected, NA formed stool, C diff not applicable on patient less than one year of age    Plesiomonas shigelloides Not Detected Not Detected    Salmonella Not Detected Not Detected    Vibrio Not Detected Not Detected    Vibrio cholerae Not Detected Not Detected    Yersinia enterocolitica Not Detected Not Detected    Enteroaggregative E. coli (EAEC) Not Detected Not Detected    Enteropathogenic E. coli (EPEC) Not Detected Not Detected    Enterotoxigenic E. coli (ETEC) lt/st Not Detected Not Detected    Shiga-like toxin-producing E. coli (STEC) stx1/stx2 Not Detected Not Detected    E. coli O157 Not Detected Not Detected    Shigella/Enteroinvasive E. coli (EIEC) Not Detected Not Detected    Cryptosporidium Not Detected Not Detected    Cyclospora cayetanensis Not Detected Not Detected    Entamoeba histolytica Not Detected Not Detected    Giardia lamblia Not Detected Not Detected    Adenovirus F40/41 Not Detected Not Detected    Astrovirus Not Detected Not Detected    Norovirus GI/GII Not Detected Not Detected    Rotavirus A Not Detected Not Detected    Sapovirus (I, II, IV or V) Not Detected Not Detected   Glia(IgA / G) & TTG(IgA / G)   Result Value Ref Range    Gliadin Deamidated Peptide Ab, IgA 3 0 - 19 units    Deaminated Gliadin Ab IgG 4 0 - 19 units    Tissue Transglutaminase IgA <2 0 - 3 U/mL    Tissue Transglutaminase IgG <2 0 - 5 U/mL   Allergens (12) Foods   Result Value Ref Range    Class Description Comment     Egg White <0.10 Class 0 kU/L    Milk, Cow's <0.10 Class 0 kU/L    CodFish <0.10 Class 0 kU/L    [place]  Seed <0.10 Class 0 kU/L    Peanut <0.10 Class 0 kU/L    Soybean <0.10 Class 0 kU/L    Hazelnut <0.10 Class 0 kU/L    Shrimp <0.10 Class 0 kU/L    Scallop <0.10 Class 0 kU/L    Gluten <0.10 Class 0 kU/L    [place] <0.10 Class 0 kU/L    Wheat <0.10 Class 0 kU/L   CBC Auto Differential   Result Value Ref Range    WBC 6.86 3.20 - 9.80 10*3/mm3    RBC 4.59 3.77 - 5.16 10*6/mm3    Hemoglobin 12.9 12.0 - 15.5 g/dL    Hematocrit 40.6 35.0 - 45.0 %    MCV 88.5 80.0 - 98.0 fL    MCH 28.1 26.5 - 34.0 pg    MCHC 31.8 31.4 - 36.0 g/dL    RDW 13.5 11.5 - 14.5 %    RDW-SD 43.9 36.4 - 46.3 fl    MPV 9.4 8.0 - 12.0 fL    Platelets 224 150 - 450 10*3/mm3    Neutrophil % 61.7 37.0 - 80.0 %    Lymphocyte % 22.3 10.0 - 50.0 %    Monocyte % 10.5 0.0 - 12.0 %    Eosinophil % 5.0 0.0 - 7.0 %    Basophil % 0.4 0.0 - 2.0 %    Immature Grans % 0.1 0.0 - 0.5 %    Neutrophils, Absolute 4.23 2.00 - 8.60 10*3/mm3    Lymphocytes, Absolute 1.53 0.60 - 4.20 10*3/mm3    Monocytes, Absolute 0.72 0.00 - 0.90 10*3/mm3    Eosinophils, Absolute 0.34 0.00 - 0.70 10*3/mm3    Basophils, Absolute 0.03 0.00 - 0.20 10*3/mm3    Immature Grans, Absolute 0.01 0.00 - 0.02 10*3/mm3   Sedimentation Rate   Result Value Ref Range    Sed Rate 1 0 - 20 mm/hr   Comprehensive Metabolic Panel   Result Value Ref Range    Glucose 82 60 - 100 mg/dL    BUN 10 7 - 21 mg/dL    Creatinine 0.49 (L) 0.50 - 1.00 mg/dL    Sodium 140 137 - 145 mmol/L    Potassium 4.0 3.5 - 5.1 mmol/L    Chloride 102 95 - 110 mmol/L    CO2 25.0 22.0 - 31.0 mmol/L    Calcium 9.5 8.4 - 10.2 mg/dL    Total Protein 7.4 6.3 - 8.6 g/dL    Albumin 4.20 3.40 - 4.80 g/dL    ALT (SGPT) 22 9 - 52 U/L    AST (SGOT) 23 14 - 36 U/L    Alkaline Phosphatase 73 38 - 126 U/L    Total Bilirubin 0.3 0.2 - 1.3 mg/dL    eGFR Non  Amer 142 64 - 149 mL/min/1.73    Globulin 3.2 2.3 - 3.5 gm/dL    A/G Ratio 1.3 1.1 - 1.8 g/dL    BUN/Creatinine Ratio 20.4 7.0 - 25.0    Anion Gap 13.0 5.0 - 15.0 mmol/L   Results for orders  placed or performed in visit on 08/24/17   LDL Cholesterol, Direct   Result Value Ref Range    LDL Cholesterol  79 1 - 129 mg/dL   Comprehensive Metabolic Panel   Result Value Ref Range    Glucose 92 60 - 100 mg/dL    BUN 16 7 - 21 mg/dL    Creatinine 0.52 0.50 - 1.00 mg/dL    Sodium 136 (L) 137 - 145 mmol/L    Potassium 4.2 3.5 - 5.1 mmol/L    Chloride 100 95 - 110 mmol/L    CO2 25.0 22.0 - 31.0 mmol/L    Calcium 9.4 8.4 - 10.2 mg/dL    Total Protein 7.4 6.3 - 8.6 g/dL    Albumin 4.30 3.40 - 4.80 g/dL    ALT (SGPT) 20 9 - 52 U/L    AST (SGOT) 39 (H) 14 - 36 U/L    Alkaline Phosphatase 72 38 - 126 U/L    Total Bilirubin 0.4 0.2 - 1.3 mg/dL    eGFR Non  Amer 133 64 - 149 mL/min/1.73    Globulin 3.1 2.3 - 3.5 gm/dL    A/G Ratio 1.4 1.1 - 1.8 g/dL    BUN/Creatinine Ratio 30.8 (H) 7.0 - 25.0    Anion Gap 11.0 5.0 - 15.0 mmol/L     *Note: Due to a large number of results and/or encounters for the requested time period, some results have not been displayed. A complete set of results can be found in Results Review.       Some portions of this note have been dictated using voice recognition software and may contain errors and/or omissions.

## 2017-10-20 LAB
GLIADIN PEPTIDE IGA SER-ACNC: 3 UNITS (ref 0–19)
GLIADIN PEPTIDE IGG SER-ACNC: 4 UNITS (ref 0–19)
TTG IGA SER-ACNC: <2 U/ML (ref 0–3)
TTG IGG SER-ACNC: <2 U/ML (ref 0–5)

## 2017-10-22 LAB
CALIF WALNUT POLN IGE QN: <0.1 KU/L
CODFISH IGE QN: <0.1 KU/L
CONV CLASS DESCRIPTION: NORMAL
COW MILK IGE QN: <0.1 KU/L
EGG WHITE IGE QN: <0.1 KU/L
GLUTEN IGE QN: <0.1 KU/L
HAZELNUT IGE QN: <0.1 KU/L
PEANUT IGE QN: <0.1 KU/L
SCALLOP IGE QN: <0.1 KU/L
SESAME SEED IGE: <0.1 KU/L
SHRIMP IGE: <0.1 KU/L
SOYBEAN IGE QN: <0.1 KU/L
WHEAT IGE QN: <0.1 KU/L

## 2017-10-23 ENCOUNTER — APPOINTMENT (OUTPATIENT)
Dept: LAB | Facility: HOSPITAL | Age: 37
End: 2017-10-23

## 2017-10-23 ENCOUNTER — TELEPHONE (OUTPATIENT)
Dept: GASTROENTEROLOGY | Facility: CLINIC | Age: 37
End: 2017-10-23

## 2017-10-23 LAB
ADV 40+41 DNA STL QL NAA+NON-PROBE: NOT DETECTED
ASTRO TYP 1-8 RNA STL QL NAA+NON-PROBE: NOT DETECTED
C CAYETANENSIS DNA STL QL NAA+NON-PROBE: NOT DETECTED
C DIFF TOX GENS STL QL NAA+PROBE: NOT DETECTED
CAMPY SP DNA.DIARRHEA STL QL NAA+PROBE: NOT DETECTED
CRYPTOSP STL CULT: NOT DETECTED
E COLI DNA SPEC QL NAA+PROBE: NOT DETECTED
E HISTOLYT AG STL-ACNC: NOT DETECTED
EAEC PAA PLAS AGGR+AATA ST NAA+NON-PRB: NOT DETECTED
EC STX1+STX2 GENES STL QL NAA+NON-PROBE: NOT DETECTED
EPEC EAE GENE STL QL NAA+NON-PROBE: NOT DETECTED
ETEC LTA+ST1A+ST1B TOX ST NAA+NON-PROBE: NOT DETECTED
G LAMBLIA DNA SPEC QL NAA+PROBE: NOT DETECTED
NOROVIRUS GI+II RNA STL QL NAA+NON-PROBE: NOT DETECTED
P SHIGELLOIDES DNA STL QL NAA+NON-PROBE: NOT DETECTED
RV RNA STL NAA+PROBE: NOT DETECTED
SALMONELLA DNA SPEC QL NAA+PROBE: NOT DETECTED
SAPO I+II+IV+V RNA STL QL NAA+NON-PROBE: NOT DETECTED
SHIGELLA SP+EIEC IPAH ST NAA+NON-PROBE: NOT DETECTED
V CHOLERAE DNA SPEC QL NAA+PROBE: NOT DETECTED
VIBRIO DNA SPEC NAA+PROBE: NOT DETECTED
YERSINIA STL CULT: NOT DETECTED

## 2017-10-23 PROCEDURE — 87507 IADNA-DNA/RNA PROBE TQ 12-25: CPT | Performed by: PHYSICIAN ASSISTANT

## 2017-10-23 NOTE — TELEPHONE ENCOUNTER
----- Message from Elana Cedeno sent at 10/23/2017  8:50 AM CDT -----  Please call ann Galloway at [phone]

## 2017-11-09 ENCOUNTER — APPOINTMENT (OUTPATIENT)
Dept: CT IMAGING | Facility: HOSPITAL | Age: 37
End: 2017-11-09

## 2017-11-09 ENCOUNTER — HOSPITAL ENCOUNTER (EMERGENCY)
Facility: HOSPITAL | Age: 37
Discharge: HOME | End: 2017-11-09
Attending: FAMILY MEDICINE | Admitting: FAMILY MEDICINE

## 2017-11-09 VITALS
HEIGHT: 62 IN | OXYGEN SATURATION: 99 % | SYSTOLIC BLOOD PRESSURE: 132 MMHG | WEIGHT: 130 LBS | HEART RATE: 100 BPM | TEMPERATURE: 98.2 F | RESPIRATION RATE: 18 BRPM | BODY MASS INDEX: 23.92 KG/M2 | DIASTOLIC BLOOD PRESSURE: 86 MMHG
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
## 2017-11-09 DIAGNOSIS — M54.2 NECK PAIN: Primary | ICD-10-CM

## 2017-11-09 PROCEDURE — 72125 CT NECK SPINE W/O DYE: CPT

## 2017-11-09 PROCEDURE — 99283 EMERGENCY DEPT VISIT LOW MDM: CPT

## 2017-11-09 PROCEDURE — 70450 CT HEAD/BRAIN W/O DYE: CPT

## 2017-11-09 RX ORDER — ACETAMINOPHEN 160 MG/5ML
325 SUSPENSION, ORAL (FINAL DOSE FORM) ORAL EVERY 4 HOURS PRN
Qty: 355 ML | Refills: 0 | Status: SHIPPED | OUTPATIENT
Start: 2017-11-09 | End: 2017-11-14

## 2017-11-09 RX ADMIN — IBUPROFEN 600 MG: 100 SUSPENSION ORAL at 11:45

## 2017-11-09 NOTE — ED PROVIDER NOTES
Subjective   HPI Comments: Patient was in her wheel chair going up ramp at [employer] and her wheel ran off the ramp a little and her chair tipped over. She was strapped into her wheel chair so she just hit her head. Patient is complaining of neck pain. No loss of consciousness. Remembers the event and coming to the hospital.     Patient is a 36 y.o. female presenting with fall.   History provided by:  Patient and caregiver   used: No    Fall   Mechanism of injury: fall    Injury location:  Head/neck  Head/neck injury location:  Head and R neck  Incident location: [employer] parking lot.  Time since incident:  1 hour  Arrived directly from scene: yes    Fall:     Impact surface:  Fischer    Point of impact: side of wheel chair took most impact but bumped head on conrete.    Entrapped after fall: no    Suspicion of alcohol use: no    Suspicion of drug use: no    Tetanus status:  Up to date  Prior to arrival data:     Patient ambulatory at scene: patient is wheel chair bound.      Blood loss:  None    Responsiveness at scene:  Alert    Orientation at scene:  Person, place, situation and time    Loss of consciousness: no      Amnesic to event: no      Airway interventions:  None    Breathing interventions:  None    IV access status:  None    IO access:  None    Fluids administered:  None    Cardiac interventions:  None    Medications administered:  None    Immobilization:  C-collar  Associated symptoms: neck pain    Associated symptoms: no abdominal pain, no back pain, no blindness, no chest pain, no difficulty breathing, no headaches, no hearing loss, no loss of consciousness, no nausea, no seizures and no vomiting    Risk factors: no AICD, no anticoagulation therapy, no asthma, no beta blocker therapy, no CABG, no CAD, no CHF, no COPD, no diabetes, no dialysis, no hemophilia, no kidney disease, no pacemaker, no past MI, not pregnant and no steroid use        Review of Systems   Constitutional:  Negative.    HENT: Negative.  Negative for hearing loss.    Eyes: Negative.  Negative for blindness.   Respiratory: Negative.    Cardiovascular: Negative.  Negative for chest pain.   Gastrointestinal: Negative.  Negative for abdominal pain, nausea and vomiting.   Endocrine: Negative.    Genitourinary: Negative.    Musculoskeletal: Positive for neck pain. Negative for arthralgias, back pain, gait problem, joint swelling, myalgias and neck stiffness.   Skin: Negative.    Allergic/Immunologic: Negative.    Neurological: Negative.  Negative for seizures, loss of consciousness and headaches.   Hematological: Negative.    Psychiatric/Behavioral: Negative.        Past Medical History:   Diagnosis Date   • Acute vulvitis     Candida   • Allergy     Unspecified, initial encounter   • Amblyopia     OS   • Astigmatism    • Cerebral palsy    • Cheilosis    • Common cold    • Contusion    • Depressive disorder    • Diarrhea    • Encounter for general adult medical examination without abnormal findings    • Encounter for routine adult health examination     Adult health examination      • Fever    • Gastroesophageal reflux disease    • Generalized abdominal pain    • Indeterminate colitis    • Infected insect bite    • Myopia    • Spastic quadriplegic cerebral palsy    • Trouble walking     Walking disability      • Urinary incontinence    • Urinary tract infectious disease    • Worried well        Allergies   Allergen Reactions   • Atropine    • Sulfa Antibiotics Itching       Past Surgical History:   Procedure Laterality Date   • COLONOSCOPY N/A 03/28/2012    hemorrhoids   • ENDOSCOPY N/A 03/28/2012    gastritis   • INJECTION OF MEDICATION  02/23/2016    Kenalog   • TOTAL ABDOMINAL HYSTERECTOMY WITH SALPINGO OOPHORECTOMY N/A 03/24/2005   • UPPER GASTROINTESTINAL ENDOSCOPY  03/28/2012       Family History   Problem Relation Age of Onset   • Coronary artery disease Other    • Diabetes Other    • Hypertension Other    • Stroke  Other        Social History     Social History   • Marital status: Single     Spouse name: N/A   • Number of children: N/A   • Years of education: N/A     Social History Main Topics   • Smoking status: Never Smoker   • Smokeless tobacco: Never Used   • Alcohol use No   • Drug use: No   • Sexual activity: Not Asked     Other Topics Concern   • None     Social History Narrative           Objective   Physical Exam   Constitutional: She is oriented to person, place, and time. She appears well-developed and well-nourished. No distress.   HENT:   Head: Normocephalic and atraumatic.   Eyes: Conjunctivae and EOM are normal. Pupils are equal, round, and reactive to light. Right eye exhibits no discharge. Left eye exhibits no discharge. No scleral icterus.   Neck: Normal range of motion. Neck supple. No JVD present. No tracheal deviation present. No thyromegaly present.   Cardiovascular: Normal rate, regular rhythm, normal heart sounds and intact distal pulses.  Exam reveals no gallop and no friction rub.    No murmur heard.  Pulmonary/Chest: Effort normal and breath sounds normal. No stridor. No respiratory distress. She has no wheezes. She has no rales. She exhibits no tenderness.   Abdominal: Soft. Bowel sounds are normal. She exhibits no distension and no mass. There is no tenderness. There is no rebound and no guarding. No hernia.   Musculoskeletal: Normal range of motion. She exhibits no edema, tenderness or deformity.   Lymphadenopathy:     She has no cervical adenopathy.   Neurological: She is alert and oriented to person, place, and time. She has normal reflexes.   Skin: Skin is warm and dry. No rash noted. She is not diaphoretic. No erythema. No pallor.   Psychiatric: She has a normal mood and affect. Her behavior is normal. Judgment and thought content normal.   Nursing note and vitals reviewed.      Procedures         ED Course  ED Course      Ct Head Without Contrast    Result Date: 11/9/2017  Narrative: EXAM  DESCRIPTION: CT HEAD WO CONTRAST (accession [id number]) CLINICAL HISTORY:  36-year-old female with history given for status post trauma, falling out of wheelchair striking right side of head.   COMPARISON: 9/26/2013 DOSE LENGTH PRODUCT: 1115.90 CONTRAST: None TECHNIQUE:  Axial images from skull base to vertex.  This exam was performed according to our departmental dose optimization program, which includes automated exposure control, adjustment of the mA and/or KV according to patient size and/or use of iterative reconstruction technique. FINDINGS: The craniovertebral junction is unremarkable. No Chiari malformation. Extra-axial spaces: Within limits of normal.  Intracranial hemorrhage: No evidence of intracranial hemorrhage or suspicious extra-axial fluid collections. Ventricular system: Stable size and morphology. No hydrocephalus. Basal cisterns: Normal. Cerebral parenchyma: No evidence of edema, midline shift, or mass effect. Gray-white differentiation is maintained. There is similar minimal periventricular low attenuation change..  Midline shift: None.  Cerebellum: No acute or suspicious interval change. Brainstem unremarkable CT appearance. Calvarium no calvarial fracture identified.  Vascular system: Unremarkable noncontrast appearance.  Paranasal sinuses and mastoid air cells: Included paranasal sinuses are unremarkable for air-fluid level or significant mucosal thickening. Mastoid air cells and middle ear cavities are clear..  Visualized orbits: No acute findings.  Visualized upper cervical spine: Unremarkable. Sella: Unremarkable.  Skull base: Unremarkable. ADDITIONAL FINDINGS: No significant scalp hematoma identified. Calcified right frontal scalp nodule likely represents a subdermal cyst. EXAM DESCRIPTION: CT CERVICAL SPINE WO CONTRAST (accession [id number]) CLINICAL HISTORY: 36-year-old female with history given for status post trauma, falling out of wheelchair striking right side of head. COMPARISON:  9/26/2013 TECHNIQUE: Multiple contiguous noncontrast axial images are obtained of the cervical spine. Coronal and sagittal multiplanar reformatted images are also reviewed. This exam was performed according to our departmental dose optimization program, which includes automated exposure control, adjustment of the mA and/or KV according to patient size and/or use of iterative reconstruction technique. DLP: 1115.90 FINDINGS: No evidence of prevertebral thickening or suspicious fluid collections. The included upper lung zones are unremarkable. There is anatomic alignment of the cervical and included thoracic vertebra. There appears to be levoscoliotic curvature of the thoracic spine. No compression fracture or subluxation deformity. The mineralization is normal. The craniocervical articulations are intact. No widening of the atlantodental interval. The lateral masses are symmetrically positioned. No fracture of the dens identified. Disc spaces are maintained. Posterior facets demonstrate appropriate alignment without fracture or dislocation. No central spinal canal or foraminal stenosis.     Impression: CT HEAD IMPRESSION: No CT evidence of intracranial hemorrhage, mass effect, acute large vessel distribution infarct, or calvarial fracture. CT CERVICAL SPINE IMPRESSION: No evidence of cervical spine fracture or subluxation. COMMENT: PLEASE NOTE THAT MULTIPLE STUDIES ARE INCLUDED IN THIS REPORT (CT HEAD AND CERVICAL SPINE). Electronically signed by:  Zach Palomino MD  11/9/2017 12:13 PM CST Workstation: [id number]    Ct Cervical Spine Without Contrast    Result Date: 11/9/2017  Narrative: EXAM DESCRIPTION: CT HEAD WO CONTRAST (accession [id number]) CLINICAL HISTORY:  36-year-old female with history given for status post trauma, falling out of wheelchair striking right side of head.   COMPARISON: 9/26/2013 DOSE LENGTH PRODUCT: 1115.90 CONTRAST: None TECHNIQUE:  Axial images from skull base to vertex.  This exam was  performed according to our departmental dose optimization program, which includes automated exposure control, adjustment of the mA and/or KV according to patient size and/or use of iterative reconstruction technique. FINDINGS: The craniovertebral junction is unremarkable. No Chiari malformation. Extra-axial spaces: Within limits of normal.  Intracranial hemorrhage: No evidence of intracranial hemorrhage or suspicious extra-axial fluid collections. Ventricular system: Stable size and morphology. No hydrocephalus. Basal cisterns: Normal. Cerebral parenchyma: No evidence of edema, midline shift, or mass effect. Gray-white differentiation is maintained. There is similar minimal periventricular low attenuation change..  Midline shift: None.  Cerebellum: No acute or suspicious interval change. Brainstem unremarkable CT appearance. Calvarium no calvarial fracture identified.  Vascular system: Unremarkable noncontrast appearance.  Paranasal sinuses and mastoid air cells: Included paranasal sinuses are unremarkable for air-fluid level or significant mucosal thickening. Mastoid air cells and middle ear cavities are clear..  Visualized orbits: No acute findings.  Visualized upper cervical spine: Unremarkable. Sella: Unremarkable.  Skull base: Unremarkable. ADDITIONAL FINDINGS: No significant scalp hematoma identified. Calcified right frontal scalp nodule likely represents a subdermal cyst. EXAM DESCRIPTION: CT CERVICAL SPINE WO CONTRAST (accession [id number]) CLINICAL HISTORY: 36-year-old female with history given for status post trauma, falling out of wheelchair striking right side of head. COMPARISON: 9/26/2013 TECHNIQUE: Multiple contiguous noncontrast axial images are obtained of the cervical spine. Coronal and sagittal multiplanar reformatted images are also reviewed. This exam was performed according to our departmental dose optimization program, which includes automated exposure control, adjustment of the mA and/or KV  according to patient size and/or use of iterative reconstruction technique. DLP: 1115.90 FINDINGS: No evidence of prevertebral thickening or suspicious fluid collections. The included upper lung zones are unremarkable. There is anatomic alignment of the cervical and included thoracic vertebra. There appears to be levoscoliotic curvature of the thoracic spine. No compression fracture or subluxation deformity. The mineralization is normal. The craniocervical articulations are intact. No widening of the atlantodental interval. The lateral masses are symmetrically positioned. No fracture of the dens identified. Disc spaces are maintained. Posterior facets demonstrate appropriate alignment without fracture or dislocation. No central spinal canal or foraminal stenosis.     Impression: CT HEAD IMPRESSION: No CT evidence of intracranial hemorrhage, mass effect, acute large vessel distribution infarct, or calvarial fracture. CT CERVICAL SPINE IMPRESSION: No evidence of cervical spine fracture or subluxation. COMMENT: PLEASE NOTE THAT MULTIPLE STUDIES ARE INCLUDED IN THIS REPORT (CT HEAD AND CERVICAL SPINE). Electronically signed by:  Zach Palomino MD  11/9/2017 12:13 PM [facility] Workstation: [id number]                MDM  Number of Diagnoses or Management Options  Fall (on)(from) sidewalk curb, initial encounter:   Neck pain:       Final diagnoses:   Neck pain   Fall (on)(from) sidewalk curb, initial encounter            GERALDO Forrest  11/09/17 [id number]

## 2017-11-10 ENCOUNTER — OFFICE VISIT (OUTPATIENT)
Dept: FAMILY MEDICINE CLINIC | Facility: CLINIC | Age: 37
End: 2017-11-10

## 2017-11-10 VITALS — DIASTOLIC BLOOD PRESSURE: 82 MMHG | SYSTOLIC BLOOD PRESSURE: 140 MMHG | OXYGEN SATURATION: 99 % | HEART RATE: 86 BPM

## 2017-11-10 DIAGNOSIS — G80.0 SPASTIC QUADRIPLEGIC CEREBRAL PALSY (HCC): Primary | ICD-10-CM

## 2017-11-10 PROCEDURE — 99213 OFFICE O/P EST LOW 20 MIN: CPT | Performed by: GENERAL PRACTICE

## 2017-11-10 NOTE — PROGRESS NOTES
Subjective   Kiley Ledesma is a 37 y.o. female.   Chief Complaint   Patient presents with   • Follow-up     eval for geovanna lift     History of Present Illness     Needs a new Geovanna lift, hers broke. Uses this for all transfers. Does not do any manual transferring. Has cerebral palsy and is non-ambulatory.    The following portions of the patient's history were reviewed and updated as appropriate: allergies, current medications, past social history and problem list.    Outpatient Medications Prior to Visit   Medication Sig Dispense Refill   • acetaminophen (TYLENOL) 160 MG/5ML suspension Take 10.2 mL by mouth Every 4 (Four) Hours As Needed for Mild Pain  for up to 5 days. 355 mL 0   • acetaminophen (TYLENOL) 325 MG tablet Take 1 or 2 tablet by mouth every 4 hours as needed     • albuterol (PROVENTIL) (2.5 MG/3ML) 0.083% nebulizer solution Use 1 vial 3 times a day as needed for wheezing     • baclofen 5 mg/mL in simple syrup Take 0.67 mL by mouth 3 (Three) Times a Day for 3 days. 10 mL 0   • BACLOFEN IT Take as directed     • Cholecalciferol 1000 UNITS capsule Take 1,000 Units by mouth Daily.     • cholestyramine light 4 g packet DISSOLVE ONE PACKET IN 8OZ WATER & GIVE BY MOUTH TWICE DAILY AT 9AM &5PM FOR CONSTIPATION - NO OTHER MEDICATIONS FOR 1 HOUR 60 packet 11   • diphenhydrAMINE (BENADRYL ALLERGY) 25 MG tablet Take 25 mg by mouth 3 (Three) Times a Day As Needed (for lip swelling).     • estradiol (ESTRACE) 1 MG tablet Take 1 mg by mouth Daily.     • FLUoxetine (PROzac) 40 MG capsule Take 40 mg by mouth Daily.     • fluticasone (FLONASE) 50 MCG/ACT nasal spray 2 sprays into each nostril. As directed     • guaiFENesin (MUCINEX) 600 MG 12 hr tablet Take 600 mg by mouth 2 (Two) Times a Day As Needed.     • metoclopramide (REGLAN) 10 MG tablet Take 10 mg by mouth Daily.     • Misc. Devices (WHEELCHAIR) misc As directed     • nystatin (MYCOSTATIN) 802224 UNIT/GM cream Apply  topically 3 (Three) Times a Day. To  affected area(s)     • omeprazole (PriLOSEC) 20 MG capsule Take 20 mg by mouth Daily.     • ondansetron ODT (ZOFRAN-ODT) 8 MG disintegrating tablet Take 8 mg by mouth Every 8 (Eight) Hours As Needed.     • polyethylene glycol (MIRALAX) powder As directed per instruction sheet for colonoscopy 255 g 0   • solifenacin (VESICARE) 10 MG tablet Take 10 mg by mouth Daily.     • Vitamins A & D (VITAMIN A & D) ointment Apply  topically As Needed.       No facility-administered medications prior to visit.        Review of Systems   Constitutional: Negative for chills, fatigue, fever and unexpected weight change.   HENT: Negative.  Negative for congestion, ear pain, hearing loss, nosebleeds, rhinorrhea, sneezing, sore throat and tinnitus.    Eyes: Negative.  Negative for discharge.   Respiratory: Negative.  Negative for cough, shortness of breath and wheezing.    Cardiovascular: Negative.  Negative for chest pain and palpitations.   Gastrointestinal: Negative.  Negative for abdominal pain, constipation, diarrhea, nausea and vomiting.   Endocrine: Negative.    Genitourinary: Negative.  Negative for dysuria, frequency and urgency.   Musculoskeletal: Negative.  Negative for arthralgias, back pain, joint swelling, myalgias and neck pain.   Skin: Negative.  Negative for rash.   Allergic/Immunologic: Negative.    Neurological: Positive for weakness. Negative for dizziness, numbness and headaches.   Hematological: Negative.  Negative for adenopathy.   Psychiatric/Behavioral: Negative.  Negative for dysphoric mood and sleep disturbance. The patient is not nervous/anxious.        Objective   Visit Vitals   • /82   • Pulse 86   • SpO2 99%     Physical Exam   Constitutional: She is oriented to person, place, and time. She appears well-developed and well-nourished. No distress.   HENT:   Head: Normocephalic and atraumatic.   Nose: Nose normal.   Mouth/Throat: Oropharynx is clear and moist.   Eyes: Conjunctivae and EOM are normal.  Pupils are equal, round, and reactive to light. Right eye exhibits no discharge. Left eye exhibits no discharge.   Neck: No tracheal deviation present. No thyromegaly present.   Cardiovascular: Normal rate, regular rhythm, normal heart sounds and intact distal pulses.    No murmur heard.  Pulmonary/Chest: Effort normal and breath sounds normal. No respiratory distress. She has no wheezes. She has no rales. She exhibits no tenderness. Right breast exhibits no inverted nipple, no mass, no nipple discharge, no skin change and no tenderness. Left breast exhibits no inverted nipple, no mass, no nipple discharge, no skin change and no tenderness.   Abdominal: Soft. Bowel sounds are normal. She exhibits no distension and no mass. There is no tenderness. No hernia.   Musculoskeletal: Normal range of motion. She exhibits no deformity.   Lymphadenopathy:     She has no cervical adenopathy.   Neurological: She is alert and oriented to person, place, and time. No cranial nerve deficit or sensory deficit. She exhibits abnormal muscle tone. Coordination and gait abnormal.   Reflex Scores:       Patellar reflexes are 3+ on the right side and 3+ on the left side.  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   Psychiatric: She has a normal mood and affect. Her behavior is normal. Judgment and thought content normal.   Nursing note and vitals reviewed.    Assessment/Plan   Problem List Items Addressed This Visit        Nervous and Auditory    Spastic quadriplegic cerebral palsy - Primary         Prescription sent for Geovanna lift.     No orders of the defined types were placed in this encounter.    Return for Next scheduled follow up.

## 2018-02-06 ENCOUNTER — OFFICE VISIT (OUTPATIENT)
Dept: PODIATRY | Facility: CLINIC | Age: 38
End: 2018-02-06

## 2018-02-06 VITALS — HEART RATE: 89 BPM | HEIGHT: 62 IN | OXYGEN SATURATION: 98 %

## 2018-02-06 DIAGNOSIS — L60.2 ONYCHOGRYPHOSIS: ICD-10-CM

## 2018-02-06 DIAGNOSIS — M79.674 CHRONIC TOE PAIN, BILATERAL: ICD-10-CM

## 2018-02-06 DIAGNOSIS — S99.921A TOE INJURY, RIGHT, INITIAL ENCOUNTER: Primary | ICD-10-CM

## 2018-02-06 DIAGNOSIS — G89.29 CHRONIC TOE PAIN, BILATERAL: ICD-10-CM

## 2018-02-06 DIAGNOSIS — M79.675 CHRONIC TOE PAIN, BILATERAL: ICD-10-CM

## 2018-02-06 DIAGNOSIS — G80.0 SPASTIC QUADRIPLEGIC CEREBRAL PALSY (HCC): ICD-10-CM

## 2018-02-06 PROCEDURE — 99203 OFFICE O/P NEW LOW 30 MIN: CPT | Performed by: PODIATRIST

## 2018-02-06 PROCEDURE — 11721 DEBRIDE NAIL 6 OR MORE: CPT | Performed by: PODIATRIST

## 2018-02-06 NOTE — PROGRESS NOTES
Kiley Ledesma  1980  37 y.o. female     Patient presents today with a caregiver to have her right 5th toe looked at and to have nail care.    02/06/2018    Chief Complaint   Patient presents with   • Left Foot - nail care   • Right Foot - nail care           History of Present Illness    Kiley Ledesma is a 37 y.o.female who presents today accompanied by her caregiver with chief complaint of right fifth toe pain.  She states that approximately 1 week ago she injured her right fifth toe.  It was swollen and had a dark spot.  She was taken to the urgent care by her caregiver where x-rays were taken.  No acute osseous abnormality noted.  Her caregiver has been putting antibiotic cream on it and the toe is improving.  It is less painful today.  She also complains of painful, elongated and thickened toenails.  Her caregiver is  unable to trim nails.  Patient has cerebral palsy and is unable to trim the nails herself.  She has no other pedal complaints.          Past Medical History:   Diagnosis Date   • Acute vulvitis     Candida   • Allergy     Unspecified, initial encounter   • Amblyopia     OS   • Astigmatism    • Cerebral palsy    • Cheilosis    • Common cold    • Contusion    • Depressive disorder    • Diarrhea    • Encounter for general adult medical examination without abnormal findings    • Encounter for routine adult health examination     Adult health examination      • Fever    • Gastroesophageal reflux disease    • Generalized abdominal pain    • Indeterminate colitis    • Infected insect bite    • Myopia    • Spastic quadriplegic cerebral palsy    • Trouble walking     Walking disability      • Urinary incontinence    • Urinary tract infectious disease    • Worried well          Past Surgical History:   Procedure Laterality Date   • COLONOSCOPY N/A 03/28/2012    hemorrhoids   • ENDOSCOPY N/A 03/28/2012    gastritis   • INJECTION OF MEDICATION  02/23/2016    Kenalog   • TOTAL ABDOMINAL  HYSTERECTOMY WITH SALPINGO OOPHORECTOMY N/A 03/24/2005   • UPPER GASTROINTESTINAL ENDOSCOPY  03/28/2012         Family History   Problem Relation Age of Onset   • Coronary artery disease Other    • Diabetes Other    • Hypertension Other    • Stroke Other        Allergies   Allergen Reactions   • Atropine    • Sulfa Antibiotics Itching       Social History     Social History   • Marital status: Single     Spouse name: N/A   • Number of children: N/A   • Years of education: N/A     Occupational History   • Not on file.     Social History Main Topics   • Smoking status: Never Smoker   • Smokeless tobacco: Never Used   • Alcohol use No   • Drug use: No   • Sexual activity: Not on file     Other Topics Concern   • Not on file     Social History Narrative         Current Outpatient Prescriptions   Medication Sig Dispense Refill   • acetaminophen (TYLENOL) 325 MG tablet Take 1 or 2 tablet by mouth every 4 hours as needed     • albuterol (PROVENTIL) (2.5 MG/3ML) 0.083% nebulizer solution Use 1 vial 3 times a day as needed for wheezing     • amoxicillin (AMOXIL) 250 MG/5ML suspension 10ml by mouth 3 times daily for 10 days 300 mL 0   • BACLOFEN IT Take as directed     • Cholecalciferol 1000 UNITS capsule Take 1,000 Units by mouth Daily.     • cholestyramine light 4 g packet DISSOLVE ONE PACKET IN 8OZ WATER & GIVE BY MOUTH TWICE DAILY AT 9AM &5PM FOR CONSTIPATION - NO OTHER MEDICATIONS FOR 1 HOUR 60 packet 11   • estradiol (ESTRACE) 1 MG tablet Take 1 mg by mouth Daily.     • FLUoxetine (PROzac) 40 MG capsule Take 40 mg by mouth Daily.     • guaiFENesin (MUCINEX) 600 MG 12 hr tablet Take 600 mg by mouth 2 (Two) Times a Day As Needed.     • metoclopramide (REGLAN) 10 MG tablet Take 10 mg by mouth Daily.     • Misc. Devices (WHEELCHAIR) misc As directed     • mupirocin (BACTROBAN) 2 % ointment Apply  topically 3 (Three) Times a Day. 22 g 0   • nystatin (MYCOSTATIN) 958158 UNIT/GM cream Apply  topically 3 (Three) Times a Day. To  "affected area(s)     • omeprazole (PriLOSEC) 20 MG capsule Take 20 mg by mouth Daily.     • ondansetron ODT (ZOFRAN-ODT) 8 MG disintegrating tablet Take 8 mg by mouth Every 8 (Eight) Hours As Needed.     • polyethylene glycol (MIRALAX) powder As directed per instruction sheet for colonoscopy 255 g 0   • solifenacin (VESICARE) 10 MG tablet Take 10 mg by mouth Daily.     • Vitamins A & D (VITAMIN A & D) ointment Apply  topically As Needed.       No current facility-administered medications for this visit.          OBJECTIVE    Pulse 89  Ht 157.5 cm (62\")  SpO2 98%      Review of Systems   Constitutional: Negative.  Negative for activity change.   HENT: Negative.  Negative for congestion.    Eyes: Negative.  Negative for discharge.   Respiratory: Negative.  Negative for apnea.    Cardiovascular: Negative.  Negative for chest pain.   Gastrointestinal: Negative.  Negative for abdominal distention.   Endocrine: Negative.  Negative for cold intolerance.   Genitourinary: Negative.  Negative for difficulty urinating.   Musculoskeletal: Negative.  Negative for arthralgias.   Skin:        Right 5th toe scrape   Allergic/Immunologic: Negative.  Negative for environmental allergies.   Neurological: Negative.  Negative for dizziness.   Hematological: Negative.  Negative for adenopathy.   Psychiatric/Behavioral: Negative.  Negative for agitation.         Constitutional: well developed, well nourished    HEENT: Normocephalic and atraumatic, normal hearing    Respiratory: Non labored respirations noted    Cardiovascular:    DP/PT pulses palpable    CFT brisk  to all digits  Skin temp is warm to warm from proximal tibia to distal digits  Pedal hair growth present.   No erythema  noted   Edema noted to b/l LEs    Musculoskeletal:  Muscle strength is 1-2/5 for all muscle groups tested   ROM of the 1st MTP is full without pain or crepitus  ROM of the ankle joint is full without pain or crepitus       Dermatological:   Nails 1-5 are " thickened, discolored and elongated bilateral.  there is tenderness to the nail plates  Skin is warm, dry and intact    Webspaces 1-4 bilateral are clean, dry and intact.   No subcutaneous nodules or masses noted    Small resolving hematoma noted to the distal lateral tip of the right fifth digit    Neurological:   Sensation intact to light touch      Psychiatric: A&O x 3 with normal mood and affect. NAD.         Procedures        ASSESSMENT AND PLAN    Kiley was seen today for nail care and nail care.    Diagnoses and all orders for this visit:    Toe injury, right, initial encounter    Onychogryphosis    Chronic toe pain, bilateral    Spastic quadriplegic cerebral palsy    - Comprehensive foot and ankle exam performed.   - X-rays reviewed taken in urgent care.  No acute osseous abnormalities noted.  - Caregiver can continue to apply antibiotic cream as needed  - Nails 1-5 bilateral were debrided in length and thickness with nail nipper and electric  to decrease fungal load and risk of infection.   - All questions were answered to the patients satisfaction.  - RTC 3 months          This document has been electronically signed by Harpreet Duran DPM on February 6, 2018 1:41 PM     2/6/2018  1:41 PM

## 2018-02-26 ENCOUNTER — OFFICE VISIT (OUTPATIENT)
Dept: GASTROENTEROLOGY | Facility: CLINIC | Age: 38
End: 2018-02-26

## 2018-02-26 ENCOUNTER — OFFICE VISIT (OUTPATIENT)
Dept: FAMILY MEDICINE CLINIC | Facility: CLINIC | Age: 38
End: 2018-02-26

## 2018-02-26 VITALS — DIASTOLIC BLOOD PRESSURE: 78 MMHG | SYSTOLIC BLOOD PRESSURE: 126 MMHG | HEART RATE: 81 BPM

## 2018-02-26 VITALS — DIASTOLIC BLOOD PRESSURE: 70 MMHG | OXYGEN SATURATION: 99 % | HEART RATE: 79 BPM | SYSTOLIC BLOOD PRESSURE: 110 MMHG

## 2018-02-26 DIAGNOSIS — G80.0 SPASTIC QUADRIPLEGIC CEREBRAL PALSY (HCC): Primary | ICD-10-CM

## 2018-02-26 DIAGNOSIS — F32.A DEPRESSIVE DISORDER: ICD-10-CM

## 2018-02-26 DIAGNOSIS — R19.7 DIARRHEA, UNSPECIFIED TYPE: ICD-10-CM

## 2018-02-26 DIAGNOSIS — Z00.00 ANNUAL PHYSICAL EXAM: ICD-10-CM

## 2018-02-26 DIAGNOSIS — G89.29 CHRONIC ABDOMINAL PAIN: ICD-10-CM

## 2018-02-26 DIAGNOSIS — R10.9 CHRONIC ABDOMINAL PAIN: ICD-10-CM

## 2018-02-26 DIAGNOSIS — K52.9 COLITIS: ICD-10-CM

## 2018-02-26 DIAGNOSIS — K21.00 GASTROESOPHAGEAL REFLUX DISEASE WITH ESOPHAGITIS: Primary | ICD-10-CM

## 2018-02-26 PROCEDURE — 99213 OFFICE O/P EST LOW 20 MIN: CPT | Performed by: GENERAL PRACTICE

## 2018-02-26 PROCEDURE — 99214 OFFICE O/P EST MOD 30 MIN: CPT | Performed by: PHYSICIAN ASSISTANT

## 2018-02-26 RX ORDER — SODIUM, POTASSIUM,MAG SULFATES 17.5-3.13G
SOLUTION, RECONSTITUTED, ORAL ORAL
Qty: 1 BOTTLE | Refills: 0 | Status: SHIPPED | OUTPATIENT
Start: 2018-02-26 | End: 2018-04-30

## 2018-02-26 RX ORDER — FLUTICASONE PROPIONATE 50 MCG
2 SPRAY, SUSPENSION (ML) NASAL DAILY
COMMUNITY
End: 2020-03-20 | Stop reason: SDUPTHER

## 2018-02-26 RX ORDER — SODIUM, POTASSIUM,MAG SULFATES 17.5-3.13G
1 SOLUTION, RECONSTITUTED, ORAL ORAL ONCE
Qty: 1 BOTTLE | Refills: 0 | Status: SHIPPED | OUTPATIENT
Start: 2018-02-26 | End: 2018-02-26 | Stop reason: SDUPTHER

## 2018-02-26 RX ORDER — DEXTROSE AND SODIUM CHLORIDE 5; .45 G/100ML; G/100ML
30 INJECTION, SOLUTION INTRAVENOUS CONTINUOUS PRN
Status: CANCELLED | OUTPATIENT
Start: 2018-04-23

## 2018-02-26 NOTE — PROGRESS NOTES
Chief Complaint   Patient presents with   • Heartburn   • Abdominal Pain   • Colitis   • Diarrhea       ENDO PROCEDURE ORDERED: COLON, hx colitis, abd pain, diarrhea    Subjective    Kiley Ledesma is a 37 y.o. female. she is here today for follow-up.    History of Present Illness    HISTORY: Patient is seen on a recheck of her GERD, abdominal pain, previous colitis, and diarrhea. Last seen 10/19/2017. The patient returns. She had been scheduled for a colonoscopy but this was never done. She was having severe diarrhea. She canceled several followups. Laboratory from that visit showed normal CBC, CMP, sedimentation rate, negative recurrent GI distress panel, negative GI pathogen panel. She had been scheduled for the colonoscopy on 11/03/2017 but it was canceled. Prior to this she had EGD/colonoscopy on 03/28/2012 that showed gastritis and hemorrhoids. She has been having a lot of cramping in her abdomen. She has not really tried any medication. She denies much diarrhea. She is on Questran and that has previously controlled her diarrhea. She does take Prilosec and Reglan for chronic GERD. She denied nausea, vomiting, or dysphagia. She complains of 10 out of 10 lower abdominal pain. She denied urinary symptoms.     ASSESSMENT/PLAN: Patient with previous colitis and diarrhea with abdominal pain. Recommend colonoscopy with biopsies given her history of abdominal pain, diarrhea, history of colitis. Would check a urine if her symptoms persist. We will plan followup after the above, further pending clinical course and the results of the above.        The following portions of the patient's history were reviewed and updated as appropriate:   Past Medical History:   Diagnosis Date   • Acute vulvitis     Candida   • Allergy     Unspecified, initial encounter   • Amblyopia     OS   • Astigmatism    • Cerebral palsy    • Cheilosis    • Common cold    • Contusion    • Depressive disorder    • Diarrhea    • Encounter for  general adult medical examination without abnormal findings    • Encounter for routine adult health examination     Adult health examination      • Fever    • Gastroesophageal reflux disease    • Generalized abdominal pain    • Indeterminate colitis    • Infected insect bite    • Myopia    • Spastic quadriplegic cerebral palsy    • Trouble walking     Walking disability      • Urinary incontinence    • Urinary tract infectious disease    • Worried well      Past Surgical History:   Procedure Laterality Date   • COLONOSCOPY N/A 03/28/2012    hemorrhoids   • ENDOSCOPY N/A 03/28/2012    gastritis   • INJECTION OF MEDICATION  02/23/2016    Kenalog   • TOTAL ABDOMINAL HYSTERECTOMY WITH SALPINGO OOPHORECTOMY N/A 03/24/2005   • UPPER GASTROINTESTINAL ENDOSCOPY  03/28/2012     Family History   Problem Relation Age of Onset   • Coronary artery disease Other    • Diabetes Other    • Hypertension Other    • Stroke Other      OB History     No data available        Allergies   Allergen Reactions   • Atropine Unknown (See Comments)     Unknown reaction per patient   • Sulfa Antibiotics Itching     Social History     Social History   • Marital status: Single     Spouse name: N/A   • Number of children: N/A   • Years of education: N/A     Social History Main Topics   • Smoking status: Never Smoker   • Smokeless tobacco: Never Used   • Alcohol use No   • Drug use: No   • Sexual activity: Not Asked     Other Topics Concern   • None     Social History Narrative       Current Outpatient Prescriptions:   •  acetaminophen (TYLENOL) 325 MG tablet, Take 1 or 2 tablet by mouth every 4 hours as needed, Disp: , Rfl:   •  albuterol (PROVENTIL) (2.5 MG/3ML) 0.083% nebulizer solution, Use 1 vial 3 times a day as needed for wheezing, Disp: , Rfl:   •  Cholecalciferol 1000 UNITS capsule, Take 1,000 Units by mouth Daily., Disp: , Rfl:   •  cholestyramine light 4 g packet, DISSOLVE ONE PACKET IN 8OZ WATER & GIVE BY MOUTH TWICE DAILY AT 9AM &5PM FOR  CONSTIPATION - NO OTHER MEDICATIONS FOR 1 HOUR, Disp: 60 packet, Rfl: 11  •  estradiol (ESTRACE) 1 MG tablet, Take 1 mg by mouth Daily., Disp: , Rfl:   •  FLUoxetine (PROzac) 40 MG capsule, Take 40 mg by mouth Daily., Disp: , Rfl:   •  fluticasone (FLONASE) 50 MCG/ACT nasal spray, 2 sprays into each nostril Daily., Disp: , Rfl:   •  guaiFENesin (MUCINEX) 600 MG 12 hr tablet, Take 600 mg by mouth 2 (Two) Times a Day As Needed., Disp: , Rfl:   •  metoclopramide (REGLAN) 10 MG tablet, Take 10 mg by mouth Daily., Disp: , Rfl:   •  Misc. Devices (WHEELCHAIR) misc, As directed, Disp: , Rfl:   •  omeprazole (PriLOSEC) 20 MG capsule, Take 20 mg by mouth Daily., Disp: , Rfl:   •  ondansetron ODT (ZOFRAN-ODT) 8 MG disintegrating tablet, Take 8 mg by mouth Every 8 (Eight) Hours As Needed., Disp: , Rfl:   •  solifenacin (VESICARE) 10 MG tablet, Take 10 mg by mouth Daily., Disp: , Rfl:   •  Vitamins A & D (VITAMIN A & D) ointment, Apply  topically As Needed., Disp: , Rfl:   •  BACLOFEN IT, Take as directed, Disp: , Rfl:   •  mupirocin (BACTROBAN) 2 % ointment, Apply  topically 3 (Three) Times a Day., Disp: 22 g, Rfl: 0  •  nystatin (MYCOSTATIN) 402465 UNIT/GM cream, Apply  topically 3 (Three) Times a Day. To affected area(s), Disp: , Rfl:   •  sodium-potassium-magnesium sulfates (SUPREP BOWEL PREP KIT) 17.5-3.13-1.6 GM/180ML solution oral solution, As directed, Disp: 1 bottle, Rfl: 0  Review of Systems  Review of Systems       Objective    /78 (BP Location: Left arm)  Pulse 81  Physical Exam   Constitutional: She is oriented to person, place, and time. She appears well-developed and well-nourished. No distress.   [facility]   HENT:   Head: Normocephalic and atraumatic.   Eyes: EOM are normal. Pupils are equal, round, and reactive to light.   Neck: Normal range of motion.   Cardiovascular: Normal rate, regular rhythm and normal heart sounds.    Pulmonary/Chest: Effort normal and breath sounds normal.   Abdominal: Soft. Bowel  sounds are normal. She exhibits no shifting dullness, no distension, no abdominal bruit, no ascites and no mass. There is no hepatosplenomegaly. There is tenderness. There is no rigidity, no rebound, no guarding and no CVA tenderness. No hernia. Hernia confirmed negative in the ventral area.   Diffuse, pain pump in RLQ   Musculoskeletal: Normal range of motion.   Neurological: She is alert and oriented to person, place, and time.   Skin: Skin is warm and dry.   Psychiatric: She has a normal mood and affect. Her behavior is normal. Judgment and thought content normal.   Nursing note and vitals reviewed.    Assessment/Plan      1. Gastroesophageal reflux disease with esophagitis    2. Chronic abdominal pain    3. Diarrhea, unspecified type    4. Colitis    .   Kiley was seen today for heartburn, abdominal pain, colitis and diarrhea.    Diagnoses and all orders for this visit:    Gastroesophageal reflux disease with esophagitis  -     Case Request; Standing  -     dextrose 5 % and sodium chloride 0.45 % infusion; Infuse 30 mL/hr into a venous catheter Continuous As Needed (Start Prior to Procedure).  -     Case Request    Chronic abdominal pain  -     Case Request; Standing  -     dextrose 5 % and sodium chloride 0.45 % infusion; Infuse 30 mL/hr into a venous catheter Continuous As Needed (Start Prior to Procedure).  -     Case Request    Diarrhea, unspecified type  -     Case Request; Standing  -     dextrose 5 % and sodium chloride 0.45 % infusion; Infuse 30 mL/hr into a venous catheter Continuous As Needed (Start Prior to Procedure).  -     Case Request    Colitis  -     Case Request; Standing  -     dextrose 5 % and sodium chloride 0.45 % infusion; Infuse 30 mL/hr into a venous catheter Continuous As Needed (Start Prior to Procedure).  -     Case Request    Other orders  -     Obtain Informed Consent; Standing  -     POC Glucose Once; Standing  -     Discontinue: sodium-potassium-magnesium sulfates (SUPREP BOWEL  PREP KIT) 17.5-3.13-1.6 GM/180ML solution oral solution; Take 1 bottle by mouth 1 (One) Time for 1 dose. Take as per instruction sheet for colonoscopy prep.  -     sodium-potassium-magnesium sulfates (SUPREP BOWEL PREP KIT) 17.5-3.13-1.6 GM/180ML solution oral solution; As directed        Orders placed during this encounter include:  No orders of the defined types were placed in this encounter.      Medications prescribed:  New Medications Ordered This Visit   Medications   • sodium-potassium-magnesium sulfates (SUPREP BOWEL PREP KIT) 17.5-3.13-1.6 GM/180ML solution oral solution     Sig: As directed     Dispense:  1 bottle     Refill:  0     Discontinued Medications       Reason for Discontinue    amoxicillin (AMOXIL) 250 MG/5ML suspension Discontinued by another clinician    polyethylene glycol (MIRALAX) powder Discontinued by another clinician        Requested Prescriptions     Signed Prescriptions Disp Refills   • sodium-potassium-magnesium sulfates (SUPREP BOWEL PREP KIT) 17.5-3.13-1.6 GM/180ML solution oral solution 1 bottle 0     Sig: As directed       Review and/or summary of lab tests, radiology, procedures, medications. Review and summary of old records and obtaining of history. The risks and benefits of my recommendations, as well as other treatment options were discussed with the patient today. Questions were answered.    Follow-up: Return if symptoms worsen or fail to improve, for After the above.     COLONOSCOPY--look TI, bx, hx colitis (N/A)      This document has been electronically signed by Bro Lott PA-C on February 26, 2018 6:14 PM      Results for orders placed or performed during the hospital encounter of 12/15/17   POCT Influenza A/B   Result Value Ref Range    Rapid Influenza A Ag neg     Rapid Influenza B Ag neg     Internal Control Passed Passed    Lot Number [id number]     Expiration Date 2/19    Results for orders placed or performed in visit on 10/19/17   Gastrointestinal Panel, PCR -  Stool, Per Rectum   Result Value Ref Range    Campylobacter Not Detected Not Detected    Clostridium difficile (toxin A/B) Not Detected Not Detected, NA formed stool, C diff not applicable on patient less than one year of age    Plesiomonas shigelloides Not Detected Not Detected    Salmonella Not Detected Not Detected    Vibrio Not Detected Not Detected    Vibrio cholerae Not Detected Not Detected    Yersinia enterocolitica Not Detected Not Detected    Enteroaggregative E. coli (EAEC) Not Detected Not Detected    Enteropathogenic E. coli (EPEC) Not Detected Not Detected    Enterotoxigenic E. coli (ETEC) lt/st Not Detected Not Detected    Shiga-like toxin-producing E. coli (STEC) stx1/stx2 Not Detected Not Detected    E. coli O157 Not Detected Not Detected    Shigella/Enteroinvasive E. coli (EIEC) Not Detected Not Detected    Cryptosporidium Not Detected Not Detected    Cyclospora cayetanensis Not Detected Not Detected    Entamoeba histolytica Not Detected Not Detected    Giardia lamblia Not Detected Not Detected    Adenovirus F40/41 Not Detected Not Detected    Astrovirus Not Detected Not Detected    Norovirus GI/GII Not Detected Not Detected    Rotavirus A Not Detected Not Detected    Sapovirus (I, II, IV or V) Not Detected Not Detected   Glia(IgA / G) & TTG(IgA / G)   Result Value Ref Range    Gliadin Deamidated Peptide Ab, IgA 3 0 - 19 units    Deaminated Gliadin Ab IgG 4 0 - 19 units    Tissue Transglutaminase IgA <2 0 - 3 U/mL    Tissue Transglutaminase IgG <2 0 - 5 U/mL   Allergens (12) Foods   Result Value Ref Range    Class Description Comment     Egg White <0.10 Class 0 kU/L    Milk, Cow's <0.10 Class 0 kU/L    CodFish <0.10 Class 0 kU/L    Sesame Seed <0.10 Class 0 kU/L    Peanut <0.10 Class 0 kU/L    Soybean <0.10 Class 0 kU/L    Hazelnut <0.10 Class 0 kU/L    Shrimp <0.10 Class 0 kU/L    Scallop <0.10 Class 0 kU/L    Gluten <0.10 Class 0 kU/L    Balmorhea <0.10 Class 0 kU/L    Wheat <0.10 Class 0 kU/L   CBC  Auto Differential   Result Value Ref Range    WBC 6.86 3.20 - 9.80 10*3/mm3    RBC 4.59 3.77 - 5.16 10*6/mm3    Hemoglobin 12.9 12.0 - 15.5 g/dL    Hematocrit 40.6 35.0 - 45.0 %    MCV 88.5 80.0 - 98.0 fL    MCH 28.1 26.5 - 34.0 pg    MCHC 31.8 31.4 - 36.0 g/dL    RDW 13.5 11.5 - 14.5 %    RDW-SD 43.9 36.4 - 46.3 fl    MPV 9.4 8.0 - 12.0 fL    Platelets 224 150 - 450 10*3/mm3    Neutrophil % 61.7 37.0 - 80.0 %    Lymphocyte % 22.3 10.0 - 50.0 %    Monocyte % 10.5 0.0 - 12.0 %    Eosinophil % 5.0 0.0 - 7.0 %    Basophil % 0.4 0.0 - 2.0 %    Immature Grans % 0.1 0.0 - 0.5 %    Neutrophils, Absolute 4.23 2.00 - 8.60 10*3/mm3    Lymphocytes, Absolute 1.53 0.60 - 4.20 10*3/mm3    Monocytes, Absolute 0.72 0.00 - 0.90 10*3/mm3    Eosinophils, Absolute 0.34 0.00 - 0.70 10*3/mm3    Basophils, Absolute 0.03 0.00 - 0.20 10*3/mm3    Immature Grans, Absolute 0.01 0.00 - 0.02 10*3/mm3   Sedimentation Rate   Result Value Ref Range    Sed Rate 1 0 - 20 mm/hr   Comprehensive Metabolic Panel   Result Value Ref Range    Glucose 82 60 - 100 mg/dL    BUN 10 7 - 21 mg/dL    Creatinine 0.49 (L) 0.50 - 1.00 mg/dL    Sodium 140 137 - 145 mmol/L    Potassium 4.0 3.5 - 5.1 mmol/L    Chloride 102 95 - 110 mmol/L    CO2 25.0 22.0 - 31.0 mmol/L    Calcium 9.5 8.4 - 10.2 mg/dL    Total Protein 7.4 6.3 - 8.6 g/dL    Albumin 4.20 3.40 - 4.80 g/dL    ALT (SGPT) 22 9 - 52 U/L    AST (SGOT) 23 14 - 36 U/L    Alkaline Phosphatase 73 38 - 126 U/L    Total Bilirubin 0.3 0.2 - 1.3 mg/dL    eGFR Non  Amer 142 64 - 149 mL/min/1.73    Globulin 3.2 2.3 - 3.5 gm/dL    A/G Ratio 1.3 1.1 - 1.8 g/dL    BUN/Creatinine Ratio 20.4 7.0 - 25.0    Anion Gap 13.0 5.0 - 15.0 mmol/L   Results for orders placed or performed in visit on 08/24/17   LDL Cholesterol, Direct   Result Value Ref Range    LDL Cholesterol  79 1 - 129 mg/dL   Comprehensive Metabolic Panel   Result Value Ref Range    Glucose 92 60 - 100 mg/dL    BUN 16 7 - 21 mg/dL    Creatinine 0.52 0.50 -  1.00 mg/dL    Sodium 136 (L) 137 - 145 mmol/L    Potassium 4.2 3.5 - 5.1 mmol/L    Chloride 100 95 - 110 mmol/L    CO2 25.0 22.0 - 31.0 mmol/L    Calcium 9.4 8.4 - 10.2 mg/dL    Total Protein 7.4 6.3 - 8.6 g/dL    Albumin 4.30 3.40 - 4.80 g/dL    ALT (SGPT) 20 9 - 52 U/L    AST (SGOT) 39 (H) 14 - 36 U/L    Alkaline Phosphatase 72 38 - 126 U/L    Total Bilirubin 0.4 0.2 - 1.3 mg/dL    eGFR Non  Amer 133 64 - 149 mL/min/1.73    Globulin 3.1 2.3 - 3.5 gm/dL    A/G Ratio 1.4 1.1 - 1.8 g/dL    BUN/Creatinine Ratio 30.8 (H) 7.0 - 25.0    Anion Gap 11.0 5.0 - 15.0 mmol/L     *Note: Due to a large number of results and/or encounters for the requested time period, some results have not been displayed. A complete set of results can be found in Results Review.       Some portions of this note have been dictated using voice recognition software and may contain errors and/or omissions.

## 2018-03-08 RX ORDER — METOCLOPRAMIDE 10 MG/1
TABLET ORAL
Qty: 31 TABLET | Refills: 11 | Status: SHIPPED | OUTPATIENT
Start: 2018-03-08 | End: 2019-03-06 | Stop reason: SDUPTHER

## 2018-03-08 RX ORDER — SOLIFENACIN SUCCINATE 10 MG/1
TABLET, FILM COATED ORAL
Qty: 31 TABLET | Refills: 11 | Status: SHIPPED | OUTPATIENT
Start: 2018-03-08 | End: 2019-03-06 | Stop reason: SDUPTHER

## 2018-03-08 RX ORDER — OMEPRAZOLE 20 MG/1
CAPSULE, DELAYED RELEASE ORAL
Qty: 31 CAPSULE | Refills: 11 | Status: SHIPPED | OUTPATIENT
Start: 2018-03-08 | End: 2019-03-06 | Stop reason: SDUPTHER

## 2018-03-08 RX ORDER — FLUOXETINE HYDROCHLORIDE 40 MG/1
CAPSULE ORAL
Qty: 31 CAPSULE | Refills: 11 | Status: SHIPPED | OUTPATIENT
Start: 2018-03-08 | End: 2019-03-06 | Stop reason: SDUPTHER

## 2018-03-08 RX ORDER — MELATONIN
Qty: 31 TABLET | Refills: 11 | Status: SHIPPED | OUTPATIENT
Start: 2018-03-08 | End: 2019-03-06 | Stop reason: SDUPTHER

## 2018-03-08 RX ORDER — ESTRADIOL 1 MG/1
TABLET ORAL
Qty: 31 TABLET | Refills: 11 | Status: SHIPPED | OUTPATIENT
Start: 2018-03-08 | End: 2019-03-06 | Stop reason: SDUPTHER

## 2018-04-23 ENCOUNTER — ANESTHESIA (OUTPATIENT)
Dept: GASTROENTEROLOGY | Facility: HOSPITAL | Age: 38
End: 2018-04-23

## 2018-04-23 ENCOUNTER — ANESTHESIA EVENT (OUTPATIENT)
Dept: GASTROENTEROLOGY | Facility: HOSPITAL | Age: 38
End: 2018-04-23

## 2018-04-23 ENCOUNTER — HOSPITAL ENCOUNTER (OUTPATIENT)
Facility: HOSPITAL | Age: 38
Setting detail: HOSPITAL OUTPATIENT SURGERY
Discharge: HOME | End: 2018-04-23
Attending: INTERNAL MEDICINE | Admitting: INTERNAL MEDICINE

## 2018-04-23 VITALS
HEART RATE: 75 BPM | RESPIRATION RATE: 20 BRPM | OXYGEN SATURATION: 97 % | DIASTOLIC BLOOD PRESSURE: 54 MMHG | TEMPERATURE: 97 F | SYSTOLIC BLOOD PRESSURE: 95 MMHG

## 2018-04-23 DIAGNOSIS — K21.00 GASTROESOPHAGEAL REFLUX DISEASE WITH ESOPHAGITIS: ICD-10-CM

## 2018-04-23 DIAGNOSIS — R10.9 CHRONIC ABDOMINAL PAIN: ICD-10-CM

## 2018-04-23 DIAGNOSIS — G89.29 CHRONIC ABDOMINAL PAIN: ICD-10-CM

## 2018-04-23 DIAGNOSIS — R19.7 DIARRHEA, UNSPECIFIED TYPE: ICD-10-CM

## 2018-04-23 DIAGNOSIS — K52.9 COLITIS: ICD-10-CM

## 2018-04-23 PROCEDURE — 88305 TISSUE EXAM BY PATHOLOGIST: CPT | Performed by: PATHOLOGY

## 2018-04-23 PROCEDURE — 88305 TISSUE EXAM BY PATHOLOGIST: CPT | Performed by: INTERNAL MEDICINE

## 2018-04-23 PROCEDURE — 25010000002 PROPOFOL 10 MG/ML EMULSION: Performed by: NURSE ANESTHETIST, CERTIFIED REGISTERED

## 2018-04-23 PROCEDURE — 45380 COLONOSCOPY AND BIOPSY: CPT | Performed by: INTERNAL MEDICINE

## 2018-04-23 RX ORDER — DEXTROSE AND SODIUM CHLORIDE 5; .45 G/100ML; G/100ML
30 INJECTION, SOLUTION INTRAVENOUS CONTINUOUS PRN
Status: DISCONTINUED | OUTPATIENT
Start: 2018-04-23 | End: 2018-04-23 | Stop reason: HOSPADM

## 2018-04-23 RX ORDER — LIDOCAINE HYDROCHLORIDE 10 MG/ML
INJECTION, SOLUTION INFILTRATION; PERINEURAL AS NEEDED
Status: DISCONTINUED | OUTPATIENT
Start: 2018-04-23 | End: 2018-04-23 | Stop reason: SURG

## 2018-04-23 RX ORDER — PROPOFOL 10 MG/ML
VIAL (ML) INTRAVENOUS AS NEEDED
Status: DISCONTINUED | OUTPATIENT
Start: 2018-04-23 | End: 2018-04-23 | Stop reason: SURG

## 2018-04-23 RX ORDER — LIDOCAINE HYDROCHLORIDE 10 MG/ML
INJECTION, SOLUTION INFILTRATION; PERINEURAL AS NEEDED
Status: DISCONTINUED | OUTPATIENT
Start: 2018-04-23 | End: 2018-04-23

## 2018-04-23 RX ADMIN — DEXTROSE AND SODIUM CHLORIDE 30 ML/HR: 5; 450 INJECTION, SOLUTION INTRAVENOUS at 13:11

## 2018-04-23 RX ADMIN — PROPOFOL 40 MG: 10 INJECTION, EMULSION INTRAVENOUS at 13:21

## 2018-04-23 RX ADMIN — PROPOFOL 40 MG: 10 INJECTION, EMULSION INTRAVENOUS at 13:23

## 2018-04-23 RX ADMIN — PROPOFOL 70 MG: 10 INJECTION, EMULSION INTRAVENOUS at 13:19

## 2018-04-23 RX ADMIN — LIDOCAINE HYDROCHLORIDE 50 MG: 10 INJECTION, SOLUTION INFILTRATION; PERINEURAL at 13:19

## 2018-04-23 RX ADMIN — PROPOFOL 50 MG: 10 INJECTION, EMULSION INTRAVENOUS at 13:25

## 2018-04-23 RX ADMIN — PROPOFOL 50 MG: 10 INJECTION, EMULSION INTRAVENOUS at 13:28

## 2018-04-23 RX ADMIN — PROPOFOL 50 MG: 10 INJECTION, EMULSION INTRAVENOUS at 13:32

## 2018-04-23 NOTE — H&P
Progress Notes  Encounter Date: 4/23/2018  Sreedhar kc   Gastroenterology   Expand All Collapse All    []Manual[]Template  []Copied      Chief Complaint   Patient presents with   • Heartburn   • Abdominal Pain   • Colitis   • Diarrhea         ENDO PROCEDURE ORDERED: COLON, hx colitis, abd pain, diarrhea        Subjective       Kiley Ledesma is a 37 y.o. female. she is here today for follow-up.     History of Present Illness     HISTORY: Patient is seen on a recheck of her GERD, abdominal pain, previous colitis, and diarrhea. Last seen 10/19/2017. The patient returns. She had been scheduled for a colonoscopy but this was never done. She was having severe diarrhea. She canceled several followups. Laboratory from that visit showed normal CBC, CMP, sedimentation rate, negative recurrent GI distress panel, negative GI pathogen panel. She had been scheduled for the colonoscopy on 11/03/2017 but it was canceled. Prior to this she had EGD/colonoscopy on 03/28/2012 that showed gastritis and hemorrhoids. She has been having a lot of cramping in her abdomen. She has not really tried any medication. She denies much diarrhea. She is on Questran and that has previously controlled her diarrhea. She does take Prilosec and Reglan for chronic GERD. She denied nausea, vomiting, or dysphagia. She complains of 10 out of 10 lower abdominal pain. She denied urinary symptoms.      ASSESSMENT/PLAN: Patient with previous colitis and diarrhea with abdominal pain. Recommend colonoscopy with biopsies given her history of abdominal pain, diarrhea, history of colitis. Would check a urine if her symptoms persist. We will plan followup after the above, further pending clinical course and the results of the above.      The following portions of the patient's history were reviewed and updated as appropriate:   Medical History        Past Medical History:   Diagnosis Date   • Acute vulvitis       Candida   • Allergy       Unspecified, initial  encounter   • Amblyopia       OS   • Astigmatism     • Cerebral palsy     • Cheilosis     • Common cold     • Contusion     • Depressive disorder     • Diarrhea     • Encounter for general adult medical examination without abnormal findings     • Encounter for routine adult health examination       Adult health examination      • Fever     • Gastroesophageal reflux disease     • Generalized abdominal pain     • Indeterminate colitis     • Infected insect bite     • Myopia     • Spastic quadriplegic cerebral palsy     • Trouble walking       Walking disability      • Urinary incontinence     • Urinary tract infectious disease     • Worried well           Surgical History         Past Surgical History:   Procedure Laterality Date   • COLONOSCOPY N/A 03/28/2012     hemorrhoids   • ENDOSCOPY N/A 03/28/2012     gastritis   • INJECTION OF MEDICATION   02/23/2016     Kenalog   • TOTAL ABDOMINAL HYSTERECTOMY WITH SALPINGO OOPHORECTOMY N/A 03/24/2005   • UPPER GASTROINTESTINAL ENDOSCOPY   03/28/2012               Family History   Problem Relation Age of Onset   • Coronary artery disease Other     • Diabetes Other     • Hypertension Other     • Stroke Other            OB History      No data available                Allergies   Allergen Reactions   • Atropine Unknown (See Comments)       Unknown reaction per patient   • Sulfa Antibiotics Itching      Social History   Social History            Social History   • Marital status: Single       Spouse name: N/A   • Number of children: N/A   • Years of education: N/A           Social History Main Topics   • Smoking status: Never Smoker   • Smokeless tobacco: Never Used   • Alcohol use No   • Drug use: No   • Sexual activity: Not Asked           Other Topics Concern   • None      Social History Narrative            Current Outpatient Prescriptions:   •  acetaminophen (TYLENOL) 325 MG tablet, Take 1 or 2 tablet by mouth every 4 hours as needed, Disp: , Rfl:   •  albuterol (PROVENTIL)  (2.5 MG/3ML) 0.083% nebulizer solution, Use 1 vial 3 times a day as needed for wheezing, Disp: , Rfl:   •  Cholecalciferol 1000 UNITS capsule, Take 1,000 Units by mouth Daily., Disp: , Rfl:   •  cholestyramine light 4 g packet, DISSOLVE ONE PACKET IN 8OZ WATER & GIVE BY MOUTH TWICE DAILY AT 9AM &5PM FOR CONSTIPATION - NO OTHER MEDICATIONS FOR 1 HOUR, Disp: 60 packet, Rfl: 11  •  estradiol (ESTRACE) 1 MG tablet, Take 1 mg by mouth Daily., Disp: , Rfl:   •  FLUoxetine (PROzac) 40 MG capsule, Take 40 mg by mouth Daily., Disp: , Rfl:   •  fluticasone (FLONASE) 50 MCG/ACT nasal spray, 2 sprays into each nostril Daily., Disp: , Rfl:   •  guaiFENesin (MUCINEX) 600 MG 12 hr tablet, Take 600 mg by mouth 2 (Two) Times a Day As Needed., Disp: , Rfl:   •  metoclopramide (REGLAN) 10 MG tablet, Take 10 mg by mouth Daily., Disp: , Rfl:   •  Misc. Devices (WHEELCHAIR) misc, As directed, Disp: , Rfl:   •  omeprazole (PriLOSEC) 20 MG capsule, Take 20 mg by mouth Daily., Disp: , Rfl:   •  ondansetron ODT (ZOFRAN-ODT) 8 MG disintegrating tablet, Take 8 mg by mouth Every 8 (Eight) Hours As Needed., Disp: , Rfl:   •  solifenacin (VESICARE) 10 MG tablet, Take 10 mg by mouth Daily., Disp: , Rfl:   •  Vitamins A & D (VITAMIN A & D) ointment, Apply  topically As Needed., Disp: , Rfl:   •  BACLOFEN IT, Take as directed, Disp: , Rfl:   •  mupirocin (BACTROBAN) 2 % ointment, Apply  topically 3 (Three) Times a Day., Disp: 22 g, Rfl: 0  •  nystatin (MYCOSTATIN) 005563 UNIT/GM cream, Apply  topically 3 (Three) Times a Day. To affected area(s), Disp: , Rfl:   •  sodium-potassium-magnesium sulfates (SUPREP BOWEL PREP KIT) 17.5-3.13-1.6 GM/180ML solution oral solution, As directed, Disp: 1 bottle, Rfl: 0  Review of Systems  Review of Systems              Objective       /78 (BP Location: Left arm)  Pulse 81  Physical Exam   Constitutional: She is oriented to person, place, and time. She appears well-developed and well-nourished. No distress.    [place]   HENT:   Head: Normocephalic and atraumatic.   Eyes: EOM are normal. Pupils are equal, round, and reactive to light.   Neck: Normal range of motion.   Cardiovascular: Normal rate, regular rhythm and normal heart sounds.    Pulmonary/Chest: Effort normal and breath sounds normal.   Abdominal: Soft. Bowel sounds are normal. She exhibits no shifting dullness, no distension, no abdominal bruit, no ascites and no mass. There is no hepatosplenomegaly. There is tenderness. There is no rigidity, no rebound, no guarding and no CVA tenderness. No hernia. Hernia confirmed negative in the ventral area.   Diffuse, pain pump in RLQ   Musculoskeletal: Normal range of motion.   Neurological: She is alert and oriented to person, place, and time.   Skin: Skin is warm and dry.   Psychiatric: She has a normal mood and affect. Her behavior is normal. Judgment and thought content normal.   Nursing note and vitals reviewed.        Assessment/Plan          1. Gastroesophageal reflux disease with esophagitis    2. Chronic abdominal pain    3. Diarrhea, unspecified type    4. Colitis    .   Kiley was seen today for heartburn, abdominal pain, colitis and diarrhea.     Diagnoses and all orders for this visit:     Gastroesophageal reflux disease with esophagitis  -     Case Request; Standing  -     dextrose 5 % and sodium chloride 0.45 % infusion; Infuse 30 mL/hr into a venous catheter Continuous As Needed (Start Prior to Procedure).  -     Case Request     Chronic abdominal pain  -     Case Request; Standing  -     dextrose 5 % and sodium chloride 0.45 % infusion; Infuse 30 mL/hr into a venous catheter Continuous As Needed (Start Prior to Procedure).  -     Case Request     Diarrhea, unspecified type  -     Case Request; Standing  -     dextrose 5 % and sodium chloride 0.45 % infusion; Infuse 30 mL/hr into a venous catheter Continuous As Needed (Start Prior to Procedure).  -     Case Request     Colitis  -     Case Request; Standing  -      dextrose 5 % and sodium chloride 0.45 % infusion; Infuse 30 mL/hr into a venous catheter Continuous As Needed (Start Prior to Procedure).  -     Case Request     Other orders  -     Obtain Informed Consent; Standing  -     POC Glucose Once; Standing  -     Discontinue: sodium-potassium-magnesium sulfates (SUPREP BOWEL PREP KIT) 17.5-3.13-1.6 GM/180ML solution oral solution; Take 1 bottle by mouth 1 (One) Time for 1 dose. Take as per instruction sheet for colonoscopy prep.  -     sodium-potassium-magnesium sulfates (SUPREP BOWEL PREP KIT) 17.5-3.13-1.6 GM/180ML solution oral solution; As directed           Orders placed during this encounter include:  No orders of the defined types were placed in this encounter.        Medications prescribed:       New Medications Ordered This Visit   Medications   • sodium-potassium-magnesium sulfates (SUPREP BOWEL PREP KIT) 17.5-3.13-1.6 GM/180ML solution oral solution       Sig: As directed       Dispense:  1 bottle       Refill:  0      Discontinued Medications        Reason for Discontinue     amoxicillin (AMOXIL) 250 MG/5ML suspension Discontinued by another clinician     polyethylene glycol (MIRALAX) powder Discontinued by another clinician          Requested Prescriptions             Signed Prescriptions Disp Refills   • sodium-potassium-magnesium sulfates (SUPREP BOWEL PREP KIT) 17.5-3.13-1.6 GM/180ML solution oral solution 1 bottle 0       Sig: As directed         Review and/or summary of lab tests, radiology, procedures, medications. Review and summary of old records and obtaining of history. The risks and benefits of my recommendations, as well as other treatment options were discussed with the patient today. Questions were answered.     Follow-up: Return if symptoms worsen or fail to improve, for After the above.     COLONOSCOPY--look TI, bx, hx colitis (N/A)        This document has been electronically signed by Sreedhar Kirkpatrick MD on April 23, 2018 12:41 PM             Results for orders placed or performed during the hospital encounter of 12/15/17   POCT Influenza A/B   Result Value Ref Range     Rapid Influenza A Ag neg       Rapid Influenza B Ag neg       Internal Control Passed Passed     Lot Number [id number]       Expiration Date 2/19     Results for orders placed or performed in visit on 10/19/17   Gastrointestinal Panel, PCR - Stool, Per Rectum   Result Value Ref Range     Campylobacter Not Detected Not Detected     Clostridium difficile (toxin A/B) Not Detected Not Detected, NA formed stool, C diff not applicable on patient less than one year of age     Plesiomonas shigelloides Not Detected Not Detected     Salmonella Not Detected Not Detected     Vibrio Not Detected Not Detected     Vibrio cholerae Not Detected Not Detected     Yersinia enterocolitica Not Detected Not Detected     Enteroaggregative E. coli (EAEC) Not Detected Not Detected     Enteropathogenic E. coli (EPEC) Not Detected Not Detected     Enterotoxigenic E. coli (ETEC) lt/st Not Detected Not Detected     Shiga-like toxin-producing E. coli (STEC) stx1/stx2 Not Detected Not Detected     E. coli O157 Not Detected Not Detected     Shigella/Enteroinvasive E. coli (EIEC) Not Detected Not Detected     Cryptosporidium Not Detected Not Detected     Cyclospora cayetanensis Not Detected Not Detected     Entamoeba histolytica Not Detected Not Detected     Giardia lamblia Not Detected Not Detected     Adenovirus F40/41 Not Detected Not Detected     Astrovirus Not Detected Not Detected     Norovirus GI/GII Not Detected Not Detected     Rotavirus A Not Detected Not Detected     Sapovirus (I, II, IV or V) Not Detected Not Detected   Glia(IgA / G) & TTG(IgA / G)   Result Value Ref Range     Gliadin Deamidated Peptide Ab, IgA 3 0 - 19 units     Deaminated Gliadin Ab IgG 4 0 - 19 units     Tissue Transglutaminase IgA <2 0 - 3 U/mL     Tissue Transglutaminase IgG <2 0 - 5 U/mL   Allergens (12) Foods   Result Value Ref Range      Class Description Comment       Egg White <0.10 Class 0 kU/L     Milk, Cow's <0.10 Class 0 kU/L     CodFish <0.10 Class 0 kU/L     Sesame Seed <0.10 Class 0 kU/L     Peanut <0.10 Class 0 kU/L     Soybean <0.10 Class 0 kU/L     Hazelnut <0.10 Class 0 kU/L     Shrimp <0.10 Class 0 kU/L     Scallop <0.10 Class 0 kU/L     Gluten <0.10 Class 0 kU/L     [place] <0.10 Class 0 kU/L     Wheat <0.10 Class 0 kU/L   CBC Auto Differential   Result Value Ref Range     WBC 6.86 3.20 - 9.80 10*3/mm3     RBC 4.59 3.77 - 5.16 10*6/mm3     Hemoglobin 12.9 12.0 - 15.5 g/dL     Hematocrit 40.6 35.0 - 45.0 %     MCV 88.5 80.0 - 98.0 fL     MCH 28.1 26.5 - 34.0 pg     MCHC 31.8 31.4 - 36.0 g/dL     RDW 13.5 11.5 - 14.5 %     RDW-SD 43.9 36.4 - 46.3 fl     MPV 9.4 8.0 - 12.0 fL     Platelets 224 150 - 450 10*3/mm3     Neutrophil % 61.7 37.0 - 80.0 %     Lymphocyte % 22.3 10.0 - 50.0 %     Monocyte % 10.5 0.0 - 12.0 %     Eosinophil % 5.0 0.0 - 7.0 %     Basophil % 0.4 0.0 - 2.0 %     Immature Grans % 0.1 0.0 - 0.5 %     Neutrophils, Absolute 4.23 2.00 - 8.60 10*3/mm3     Lymphocytes, Absolute 1.53 0.60 - 4.20 10*3/mm3     Monocytes, Absolute 0.72 0.00 - 0.90 10*3/mm3     Eosinophils, Absolute 0.34 0.00 - 0.70 10*3/mm3     Basophils, Absolute 0.03 0.00 - 0.20 10*3/mm3     Immature Grans, Absolute 0.01 0.00 - 0.02 10*3/mm3   Sedimentation Rate   Result Value Ref Range     Sed Rate 1 0 - 20 mm/hr   Comprehensive Metabolic Panel   Result Value Ref Range     Glucose 82 60 - 100 mg/dL     BUN 10 7 - 21 mg/dL     Creatinine 0.49 (L) 0.50 - 1.00 mg/dL     Sodium 140 137 - 145 mmol/L     Potassium 4.0 3.5 - 5.1 mmol/L     Chloride 102 95 - 110 mmol/L     CO2 25.0 22.0 - 31.0 mmol/L     Calcium 9.5 8.4 - 10.2 mg/dL     Total Protein 7.4 6.3 - 8.6 g/dL     Albumin 4.20 3.40 - 4.80 g/dL     ALT (SGPT) 22 9 - 52 U/L     AST (SGOT) 23 14 - 36 U/L     Alkaline Phosphatase 73 38 - 126 U/L     Total Bilirubin 0.3 0.2 - 1.3 mg/dL     eGFR Non African Amer  142 64 - 149 mL/min/1.73     Globulin 3.2 2.3 - 3.5 gm/dL     A/G Ratio 1.3 1.1 - 1.8 g/dL     BUN/Creatinine Ratio 20.4 7.0 - 25.0     Anion Gap 13.0 5.0 - 15.0 mmol/L   Results for orders placed or performed in visit on 08/24/17   LDL Cholesterol, Direct   Result Value Ref Range     LDL Cholesterol  79 1 - 129 mg/dL   Comprehensive Metabolic Panel   Result Value Ref Range     Glucose 92 60 - 100 mg/dL     BUN 16 7 - 21 mg/dL     Creatinine 0.52 0.50 - 1.00 mg/dL     Sodium 136 (L) 137 - 145 mmol/L     Potassium 4.2 3.5 - 5.1 mmol/L     Chloride 100 95 - 110 mmol/L     CO2 25.0 22.0 - 31.0 mmol/L     Calcium 9.4 8.4 - 10.2 mg/dL     Total Protein 7.4 6.3 - 8.6 g/dL     Albumin 4.30 3.40 - 4.80 g/dL     ALT (SGPT) 20 9 - 52 U/L     AST (SGOT) 39 (H) 14 - 36 U/L     Alkaline Phosphatase 72 38 - 126 U/L     Total Bilirubin 0.4 0.2 - 1.3 mg/dL     eGFR Non  Amer 133 64 - 149 mL/min/1.73     Globulin 3.1 2.3 - 3.5 gm/dL     A/G Ratio 1.4 1.1 - 1.8 g/dL     BUN/Creatinine Ratio 30.8 (H) 7.0 - 25.0     Anion Gap 11.0 5.0 - 15.0 mmol/L      *Note: Due to a large number of results and/or encounters for the requested time period, some results have not been displayed. A complete set of results can be found in Results Review.         Some portions of this note have been dictated using voice recognition software and may contain errors and/or omissions.           Office Visit on 2/26/2018            Revision History            Detailed Report

## 2018-04-23 NOTE — ANESTHESIA PREPROCEDURE EVALUATION
Anesthesia Evaluation     NPO Solid Status: > 8 hours  NPO Liquid Status: > 4 hours           Airway   Mallampati: II  TM distance: >3 FB  Neck ROM: full  Possible difficult intubation  Dental - normal exam     Pulmonary - normal exam   (+) recent URI,   Cardiovascular - negative cardio ROS and normal exam        Neuro/Psych  (+) psychiatric history Depression,       ROS Comment: Cerebral palsy  GI/Hepatic/Renal/Endo    (+)  GERD,      Musculoskeletal (-) negative ROS    Abdominal    Substance History - negative use     OB/GYN          Other                        Anesthesia Plan    ASA 3     MAC     intravenous induction   Anesthetic plan and risks discussed with patient.

## 2018-04-24 LAB
LAB AP CASE REPORT: NORMAL
Lab: NORMAL
PATH REPORT.FINAL DX SPEC: NORMAL
PATH REPORT.GROSS SPEC: NORMAL

## 2018-04-30 ENCOUNTER — OFFICE VISIT (OUTPATIENT)
Dept: GASTROENTEROLOGY | Facility: CLINIC | Age: 38
End: 2018-04-30

## 2018-04-30 VITALS — HEART RATE: 104 BPM | DIASTOLIC BLOOD PRESSURE: 68 MMHG | SYSTOLIC BLOOD PRESSURE: 124 MMHG

## 2018-04-30 DIAGNOSIS — R10.9 CHRONIC ABDOMINAL PAIN: ICD-10-CM

## 2018-04-30 DIAGNOSIS — R19.7 DIARRHEA, UNSPECIFIED TYPE: ICD-10-CM

## 2018-04-30 DIAGNOSIS — G89.29 CHRONIC ABDOMINAL PAIN: ICD-10-CM

## 2018-04-30 DIAGNOSIS — K21.00 GASTROESOPHAGEAL REFLUX DISEASE WITH ESOPHAGITIS: Primary | ICD-10-CM

## 2018-04-30 DIAGNOSIS — Z87.19 HISTORY OF COLITIS: ICD-10-CM

## 2018-04-30 PROCEDURE — 99213 OFFICE O/P EST LOW 20 MIN: CPT | Performed by: PHYSICIAN ASSISTANT

## 2018-08-15 ENCOUNTER — HOSPITAL ENCOUNTER (EMERGENCY)
Facility: HOSPITAL | Age: 38
Discharge: HOME | End: 2018-08-15
Attending: EMERGENCY MEDICINE | Admitting: EMERGENCY MEDICINE

## 2018-08-15 ENCOUNTER — APPOINTMENT (OUTPATIENT)
Dept: CT IMAGING | Facility: HOSPITAL | Age: 38
End: 2018-08-15

## 2018-08-15 VITALS
DIASTOLIC BLOOD PRESSURE: 70 MMHG | HEART RATE: 96 BPM | TEMPERATURE: 97.3 F | RESPIRATION RATE: 20 BRPM | HEIGHT: 59 IN | OXYGEN SATURATION: 96 % | SYSTOLIC BLOOD PRESSURE: 120 MMHG

## 2018-08-15 DIAGNOSIS — G80.0 SPASTIC QUADRIPLEGIC CEREBRAL PALSY (HCC): ICD-10-CM

## 2018-08-15 DIAGNOSIS — R56.9 SEIZURE (HCC): Primary | ICD-10-CM

## 2018-08-15 LAB
ALBUMIN SERPL-MCNC: 4.2 G/DL (ref 3.4–4.8)
ALBUMIN/GLOB SERPL: 1.3 G/DL (ref 1.1–1.8)
ALP SERPL-CCNC: 88 U/L (ref 38–126)
ALT SERPL W P-5'-P-CCNC: 13 U/L (ref 9–52)
ANION GAP SERPL CALCULATED.3IONS-SCNC: 10 MMOL/L (ref 5–15)
AST SERPL-CCNC: 19 U/L (ref 14–36)
BASOPHILS # BLD AUTO: 0.05 10*3/MM3 (ref 0–0.2)
BASOPHILS NFR BLD AUTO: 0.6 % (ref 0–2)
BILIRUB SERPL-MCNC: 0.3 MG/DL (ref 0.2–1.3)
BUN BLD-MCNC: 11 MG/DL (ref 7–21)
BUN/CREAT SERPL: 26.2 (ref 7–25)
CALCIUM SPEC-SCNC: 8.5 MG/DL (ref 8.4–10.2)
CHLORIDE SERPL-SCNC: 104 MMOL/L (ref 95–110)
CO2 SERPL-SCNC: 25 MMOL/L (ref 22–31)
CREAT BLD-MCNC: 0.42 MG/DL (ref 0.5–1)
DEPRECATED RDW RBC AUTO: 44.6 FL (ref 36.4–46.3)
EOSINOPHIL # BLD AUTO: 0.23 10*3/MM3 (ref 0–0.7)
EOSINOPHIL NFR BLD AUTO: 2.8 % (ref 0–7)
ERYTHROCYTE [DISTWIDTH] IN BLOOD BY AUTOMATED COUNT: 13.3 % (ref 11.5–14.5)
GFR SERPL CREATININE-BSD FRML MDRD: 169 ML/MIN/1.73 (ref 64–149)
GLOBULIN UR ELPH-MCNC: 3.3 GM/DL (ref 2.3–3.5)
GLUCOSE BLD-MCNC: 81 MG/DL (ref 60–100)
HCT VFR BLD AUTO: 39.3 % (ref 35–45)
HGB BLD-MCNC: 12.5 G/DL (ref 12–15.5)
IMM GRANULOCYTES # BLD: 0.03 10*3/MM3 (ref 0–0.02)
IMM GRANULOCYTES NFR BLD: 0.4 % (ref 0–0.5)
LYMPHOCYTES # BLD AUTO: 1.65 10*3/MM3 (ref 0.6–4.2)
LYMPHOCYTES NFR BLD AUTO: 20.2 % (ref 10–50)
MCH RBC QN AUTO: 29.1 PG (ref 26.5–34)
MCHC RBC AUTO-ENTMCNC: 31.8 G/DL (ref 31.4–36)
MCV RBC AUTO: 91.4 FL (ref 80–98)
MONOCYTES # BLD AUTO: 0.64 10*3/MM3 (ref 0–0.9)
MONOCYTES NFR BLD AUTO: 7.8 % (ref 0–12)
NEUTROPHILS # BLD AUTO: 5.57 10*3/MM3 (ref 2–8.6)
NEUTROPHILS NFR BLD AUTO: 68.2 % (ref 37–80)
NRBC BLD MANUAL-RTO: 0 /100 WBC (ref 0–0)
PLATELET # BLD AUTO: 233 10*3/MM3 (ref 150–450)
PMV BLD AUTO: 8.9 FL (ref 8–12)
POTASSIUM BLD-SCNC: 4.5 MMOL/L (ref 3.5–5.1)
PROT SERPL-MCNC: 7.5 G/DL (ref 6.3–8.6)
RBC # BLD AUTO: 4.3 10*6/MM3 (ref 3.77–5.16)
SODIUM BLD-SCNC: 139 MMOL/L (ref 137–145)
WBC NRBC COR # BLD: 8.17 10*3/MM3 (ref 3.2–9.8)

## 2018-08-15 PROCEDURE — 99283 EMERGENCY DEPT VISIT LOW MDM: CPT

## 2018-08-15 PROCEDURE — 80053 COMPREHEN METABOLIC PANEL: CPT | Performed by: PHYSICIAN ASSISTANT

## 2018-08-15 PROCEDURE — 85025 COMPLETE CBC W/AUTO DIFF WBC: CPT | Performed by: PHYSICIAN ASSISTANT

## 2018-08-15 PROCEDURE — 70450 CT HEAD/BRAIN W/O DYE: CPT

## 2018-08-15 RX ORDER — GUAIFENESIN 600 MG/1
600 TABLET, EXTENDED RELEASE ORAL 2 TIMES DAILY PRN
COMMUNITY
End: 2019-06-14

## 2018-08-15 NOTE — ED PROVIDER NOTES
Subjective   Patient presents to emergency department for 1 episode described as staring off and would not respond or answer questions lasting 45 seconds.  No LOC.  Patient brought in by caregiver.          History provided by:  Patient   used: No    Seizures   Seizure activity on arrival: no    Seizure type: absence.  Initial focality:  None  Episode characteristics: unresponsiveness    Postictal symptoms: somnolence    Return to baseline: yes    Duration:  45 seconds  Timing:  Once  Progression:  Resolved  Context: cerebral palsy    Recent head injury:  No recent head injuries  PTA treatment:  None  History of seizures: yes        Review of Systems   Constitutional: Negative for chills and fever.   HENT: Negative for sore throat and trouble swallowing.    Eyes: Negative for visual disturbance.   Respiratory: Negative for shortness of breath.    Cardiovascular: Negative for chest pain.   Gastrointestinal: Negative for abdominal pain.   Genitourinary: Negative for dysuria and flank pain.   Musculoskeletal: Negative for arthralgias.   Skin: Negative for color change.   Allergic/Immunologic: Negative for immunocompromised state.   Neurological: Positive for seizures.   Hematological: Does not bruise/bleed easily.   Psychiatric/Behavioral: Negative for confusion.       Past Medical History:   Diagnosis Date   • Acute vulvitis     Candida   • Allergy     Unspecified, initial encounter   • Amblyopia     OS   • Astigmatism    • Cerebral palsy (CMS/HCC)    • Cheilosis    • Common cold    • Contusion    • Depressive disorder    • Diarrhea    • Encounter for general adult medical examination without abnormal findings    • Encounter for routine adult health examination     Adult health examination      • Fever    • Gastroesophageal reflux disease    • Generalized abdominal pain    • Indeterminate colitis    • Infected insect bite    • Myopia    • Spastic quadriplegic cerebral palsy (CMS/HCC)    • Trouble  "walking     Walking disability      • Urinary incontinence    • Urinary tract infectious disease    • Worried well        Allergies   Allergen Reactions   • Atropine Unknown (See Comments)     Unknown reaction per patient   • Sulfa Antibiotics Itching       Past Surgical History:   Procedure Laterality Date   • COLONOSCOPY N/A 03/28/2012    hemorrhoids   • COLONOSCOPY N/A 4/23/2018    Procedure: COLONOSCOPY--look TI, bx, hx colitis;  Surgeon: Sreedhar Kirkpatrick MD;  Location: [facility] ENDOSCOPY;  Service: Gastroenterology   • ENDOSCOPY N/A 03/28/2012    gastritis   • INJECTION OF MEDICATION  02/23/2016    Kenalog   • TOTAL ABDOMINAL HYSTERECTOMY WITH SALPINGO OOPHORECTOMY N/A 03/24/2005   • UPPER GASTROINTESTINAL ENDOSCOPY  03/28/2012       Family History   Problem Relation Age of Onset   • Coronary artery disease Other    • Diabetes Other    • Hypertension Other    • Stroke Other        Social History     Social History   • Marital status: Single     Social History Main Topics   • Smoking status: Never Smoker   • Smokeless tobacco: Never Used   • Alcohol use No   • Drug use: No     Other Topics Concern   • Not on file           Objective      /70   Pulse 96   Temp 97.3 °F (36.3 °C) (Temporal Artery )   Resp 20   Ht 149.9 cm (59\")   SpO2 96%     Physical Exam   Constitutional: She is oriented to person, place, and time. She appears well-developed and well-nourished. No distress.   Patient has CP with quadriplegia   HENT:   Head: Normocephalic and atraumatic.   Eyes: Conjunctivae are normal.   Cardiovascular: Normal rate, regular rhythm, normal heart sounds and intact distal pulses.    Pulmonary/Chest: Effort normal and breath sounds normal. No respiratory distress. She has no wheezes.   Abdominal: Soft. Bowel sounds are normal. She exhibits no distension. There is no tenderness.   Neurological: She is alert and oriented to person, place, and time.   Skin: Skin is warm. Capillary refill takes less than 2 " seconds.   Psychiatric: She has a normal mood and affect. Her behavior is normal. Thought content normal.   Nursing note and vitals reviewed.      Procedures           ED Course  ED Course as of Aug 15 1756   Wed Aug 15, 2018   1755 No current symptoms.  CT negative for anything acute.  Hx of previous seizures.  Follow up with PCP for further management/referral.    [NAMITA]      ED Course User Index  [NAMITA] Ricardo Smith PA-C      Results for orders placed or performed during the hospital encounter of 08/15/18   Comprehensive Metabolic Panel   Result Value Ref Range    Glucose 81 60 - 100 mg/dL    BUN 11 7 - 21 mg/dL    Creatinine 0.42 (L) 0.50 - 1.00 mg/dL    Sodium 139 137 - 145 mmol/L    Potassium 4.5 3.5 - 5.1 mmol/L    Chloride 104 95 - 110 mmol/L    CO2 25.0 22.0 - 31.0 mmol/L    Calcium 8.5 8.4 - 10.2 mg/dL    Total Protein 7.5 6.3 - 8.6 g/dL    Albumin 4.20 3.40 - 4.80 g/dL    ALT (SGPT) 13 9 - 52 U/L    AST (SGOT) 19 14 - 36 U/L    Alkaline Phosphatase 88 38 - 126 U/L    Total Bilirubin 0.3 0.2 - 1.3 mg/dL    eGFR Non  Amer 169 (H) 64 - 149 mL/min/1.73    Globulin 3.3 2.3 - 3.5 gm/dL    A/G Ratio 1.3 1.1 - 1.8 g/dL    BUN/Creatinine Ratio 26.2 (H) 7.0 - 25.0    Anion Gap 10.0 5.0 - 15.0 mmol/L   CBC Auto Differential   Result Value Ref Range    WBC 8.17 3.20 - 9.80 10*3/mm3    RBC 4.30 3.77 - 5.16 10*6/mm3    Hemoglobin 12.5 12.0 - 15.5 g/dL    Hematocrit 39.3 35.0 - 45.0 %    MCV 91.4 80.0 - 98.0 fL    MCH 29.1 26.5 - 34.0 pg    MCHC 31.8 31.4 - 36.0 g/dL    RDW 13.3 11.5 - 14.5 %    RDW-SD 44.6 36.4 - 46.3 fl    MPV 8.9 8.0 - 12.0 fL    Platelets 233 150 - 450 10*3/mm3    Neutrophil % 68.2 37.0 - 80.0 %    Lymphocyte % 20.2 10.0 - 50.0 %    Monocyte % 7.8 0.0 - 12.0 %    Eosinophil % 2.8 0.0 - 7.0 %    Basophil % 0.6 0.0 - 2.0 %    Immature Grans % 0.4 0.0 - 0.5 %    Neutrophils, Absolute 5.57 2.00 - 8.60 10*3/mm3    Lymphocytes, Absolute 1.65 0.60 - 4.20 10*3/mm3    Monocytes, Absolute 0.64  0.00 - 0.90 10*3/mm3    Eosinophils, Absolute 0.23 0.00 - 0.70 10*3/mm3    Basophils, Absolute 0.05 0.00 - 0.20 10*3/mm3    Immature Grans, Absolute 0.03 (H) 0.00 - 0.02 10*3/mm3    nRBC 0.0 0.0 - 0.0 /100 WBC     Ct Head Without Contrast    Result Date: 8/15/2018  Narrative: .    EXAMINATION:  Computed Tomography    REGION:  Head         INDICATION:  staring off and unresponsive for 45 sec, No LOC, has Cerebral Palsy  HISTORY: CORRELATIVE IMAGING:    none  TECHNIQUE:  iv contrast:  no  This exam was performed according to the departmental dose-optimization program which includes automated exposure control, adjustment of the mA and/or kV according to patient size and/or use of iterative reconstruction technique.          COMMENTS:            - atrophy:              wnl for age   - cortex:                wnl for age   - deep white mat:  wnl for age   - hemorrhage:       none     - fluid collection: no intra/extra axial fluid collection   - mass / lesion:     no focal parenchymal lesion(s)     - gray/white jxn:   borders preserved       - brain stem:         wnl     - cerebellum:        wnl     - globes / retro:     wnl     - ventricles:          normal size / configuration     - midline shift:      no     - sinuses:              wnl     - mastoids:           wnl      - osseous:             wnl     - misc.: .       Impression: CONCLUSION:  1.  Negative examination for acute intracranial pathology.       If signs or symptoms persist beyond reasonable expectations, a MRI examination is suggested as is deemed clinically appropriate.        Electronically signed by:  SAJAN Bueno MD  8/15/2018 2:15 PM CDT Workstation: [id number]                [facility]      Final diagnoses:   Seizure (CMS/HCC)   Spastic quadriplegic cerebral palsy (CMS/HCC)            Ricardo Smith PA-C  08/15/18 [id number]

## 2018-08-20 RX ORDER — ALBUTEROL SULFATE 2.5 MG/3ML
2.5 SOLUTION RESPIRATORY (INHALATION)
Qty: 720 ML | Refills: 3 | Status: SHIPPED | OUTPATIENT
Start: 2018-08-20 | End: 2019-04-25 | Stop reason: SDUPTHER

## 2018-08-20 RX ORDER — ONDANSETRON 8 MG/1
8 TABLET, ORALLY DISINTEGRATING ORAL EVERY 8 HOURS PRN
Qty: 30 TABLET | Refills: 5 | Status: SHIPPED | OUTPATIENT
Start: 2018-08-20 | End: 2019-11-08 | Stop reason: SDUPTHER

## 2018-08-20 RX ORDER — ACETAMINOPHEN 325 MG/1
TABLET ORAL
Qty: 240 TABLET | Refills: 3 | Status: SHIPPED | OUTPATIENT
Start: 2018-08-20 | End: 2019-04-17

## 2018-08-28 ENCOUNTER — OFFICE VISIT (OUTPATIENT)
Dept: FAMILY MEDICINE CLINIC | Facility: CLINIC | Age: 38
End: 2018-08-28

## 2018-08-28 VITALS
DIASTOLIC BLOOD PRESSURE: 80 MMHG | OXYGEN SATURATION: 99 % | SYSTOLIC BLOOD PRESSURE: 115 MMHG | HEART RATE: 85 BPM | HEIGHT: 59 IN

## 2018-08-28 DIAGNOSIS — Z00.00 ANNUAL PHYSICAL EXAM: Primary | ICD-10-CM

## 2018-08-28 DIAGNOSIS — F32.A DEPRESSIVE DISORDER: Chronic | ICD-10-CM

## 2018-08-28 DIAGNOSIS — G80.0 SPASTIC QUADRIPLEGIC CEREBRAL PALSY (HCC): Chronic | ICD-10-CM

## 2018-08-28 PROCEDURE — 99395 PREV VISIT EST AGE 18-39: CPT | Performed by: GENERAL PRACTICE

## 2018-10-08 ENCOUNTER — HOSPITAL ENCOUNTER (EMERGENCY)
Facility: HOSPITAL | Age: 38
Discharge: HOME | End: 2018-10-08
Attending: EMERGENCY MEDICINE | Admitting: EMERGENCY MEDICINE

## 2018-10-08 ENCOUNTER — APPOINTMENT (OUTPATIENT)
Dept: GENERAL RADIOLOGY | Facility: HOSPITAL | Age: 38
End: 2018-10-08

## 2018-10-08 VITALS
HEART RATE: 114 BPM | WEIGHT: 150 LBS | RESPIRATION RATE: 20 BRPM | OXYGEN SATURATION: 98 % | DIASTOLIC BLOOD PRESSURE: 85 MMHG | BODY MASS INDEX: 30.3 KG/M2 | TEMPERATURE: 97.7 F | SYSTOLIC BLOOD PRESSURE: 145 MMHG

## 2018-10-08 DIAGNOSIS — S92.302A MULTIPLE CLOSED FRACTURES OF METATARSAL BONE OF LEFT FOOT, INITIAL ENCOUNTER: Primary | ICD-10-CM

## 2018-10-08 PROCEDURE — 99283 EMERGENCY DEPT VISIT LOW MDM: CPT

## 2018-10-08 PROCEDURE — 73630 X-RAY EXAM OF FOOT: CPT

## 2018-10-08 PROCEDURE — 73610 X-RAY EXAM OF ANKLE: CPT

## 2018-10-09 NOTE — ED PROVIDER NOTES
Subjective   Patient presents to emergency room for left foot/ankle pain after fall from wheelchair.  Wheelchair bound due to cerebral palsy.  States she had a waist restraint on and slipped out of her wheelchair.          History provided by:  Patient   used: No    Fall   Mechanism of injury: fall    Injury location:  Foot  Foot injury location:  L ankle and L foot  Incident location:  Home  Arrived directly from scene: yes    Fall:     Fall occurred: from wheelchair.    Impact surface:  Hard floor  Associated symptoms: no abdominal pain, no back pain, no chest pain, no headaches and no neck pain    Ankle Pain   Location:  Ankle  Ankle location:  L ankle  Pain details:     Quality:  Aching  Associated symptoms: no back pain, no fatigue and no neck pain        Review of Systems   Constitutional: Negative for chills and fatigue.   Eyes: Negative for visual disturbance.   Respiratory: Negative for shortness of breath.    Cardiovascular: Negative for chest pain.   Gastrointestinal: Negative for abdominal pain.   Genitourinary: Negative for dysuria and flank pain.   Musculoskeletal: Positive for arthralgias. Negative for back pain and neck pain.   Skin: Negative for color change.   Allergic/Immunologic: Negative for immunocompromised state.   Neurological: Negative for syncope and headaches.   Hematological: Does not bruise/bleed easily.   Psychiatric/Behavioral: Negative for confusion.       Past Medical History:   Diagnosis Date   • Acute vulvitis     Candida   • Allergy     Unspecified, initial encounter   • Amblyopia     OS   • Astigmatism    • Cerebral palsy (CMS/HCC)    • Cheilosis    • Common cold    • Contusion    • Depressive disorder    • Diarrhea    • Encounter for general adult medical examination without abnormal findings    • Encounter for routine adult health examination     Adult health examination      • Fever    • Gastroesophageal reflux disease    • Generalized abdominal pain    •  Indeterminate colitis    • Infected insect bite    • Myopia    • Spastic quadriplegic cerebral palsy (CMS/HCC)    • Trouble walking     Walking disability      • Urinary incontinence    • Urinary tract infectious disease    • Worried well        Allergies   Allergen Reactions   • Atropine Unknown (See Comments)     Unknown reaction per patient   • Sulfa Antibiotics Itching       Past Surgical History:   Procedure Laterality Date   • COLONOSCOPY N/A 03/28/2012    hemorrhoids   • COLONOSCOPY N/A 4/23/2018    Procedure: COLONOSCOPY--look TI, bx, hx colitis;  Surgeon: Sreedhar Kirkpatrick MD;  Location: [facility] ENDOSCOPY;  Service: Gastroenterology   • ENDOSCOPY N/A 03/28/2012    gastritis   • INJECTION OF MEDICATION  02/23/2016    Kenalog   • TOTAL ABDOMINAL HYSTERECTOMY WITH SALPINGO OOPHORECTOMY N/A 03/24/2005   • UPPER GASTROINTESTINAL ENDOSCOPY  03/28/2012       Family History   Problem Relation Age of Onset   • Coronary artery disease Other    • Diabetes Other    • Hypertension Other    • Stroke Other        Social History     Social History   • Marital status: Single     Social History Main Topics   • Smoking status: Never Smoker   • Smokeless tobacco: Never Used   • Alcohol use No   • Drug use: No     Other Topics Concern   • Not on file           Objective      /92   Pulse 92   Temp 97.7 °F (36.5 °C) (Oral)   Resp 18   Wt 68 kg (150 lb)   SpO2 96%   BMI 30.30 kg/m²       Physical Exam   Constitutional: She appears well-developed and well-nourished. No distress.   HENT:   Head: Normocephalic and atraumatic.   Eyes: Conjunctivae are normal.   Cardiovascular: Normal rate, regular rhythm, normal heart sounds and intact distal pulses.    Pulmonary/Chest: Effort normal and breath sounds normal. No respiratory distress. She has no wheezes.   Musculoskeletal: She exhibits tenderness (bimalleolar TTP left ankle, TTP metatarsals of left foot). She exhibits no edema or deformity.   Neurological: She is alert.    Skin: Skin is warm. Capillary refill takes less than 2 seconds. No erythema.   Psychiatric: She has a normal mood and affect. Her behavior is normal. Thought content normal.   Nursing note and vitals reviewed.      Procedures           ED Course  ED Course as of Oct 08 2119   Mon Oct 08, 2018   2114 No ankle fracture multiple metatarsal head fractures noted by radiologist.  Patient is nonambulatory.  We will put Ace wrap and post op shoe on left foot.  Patient will call podiatry and scheduled appointment.  [NAMITA]      ED Course User Index  [NAMITA] Ricardo Smith PA-C      Xr Ankle 3+ View Left    Result Date: 10/8/2018  Narrative: Left foot three view and left ankle three view on 10/8/2018 CLINICAL INDICATION: Fall from wheelchair, left ankle and foot pain COMPARISON: None FINDINGS: Left foot: There is diffuse osteopenia. Pes planus deformity of the foot is noted. There are acute minimally displaced fractures of the distal second, third and fifth metatarsal necks with slight lateral displacement and lateral angulation of the distal fracture fragments. No other acute fracture line is noted. There is no dislocation. Left ankle: There is diffuse osteopenia. There is no dislocation. There are no fractures.     Impression: Acute fractures of the distal second, third and fifth metatarsal necks as above. Electronically signed by:  Shiva Steele  10/8/2018 9:07 PM CDT Workstation: [id number]    Xr Foot 3+ View Left    Result Date: 10/8/2018  Narrative: Left foot three view and left ankle three view on 10/8/2018 CLINICAL INDICATION: Fall from wheelchair, left ankle and foot pain COMPARISON: None FINDINGS: Left foot: There is diffuse osteopenia. Pes planus deformity of the foot is noted. There are acute minimally displaced fractures of the distal second, third and fifth metatarsal necks with slight lateral displacement and lateral angulation of the distal fracture fragments. No other acute fracture line is noted.  There is no dislocation. Left ankle: There is diffuse osteopenia. There is no dislocation. There are no fractures.     Impression: Acute fractures of the distal second, third and fifth metatarsal necks as above. Electronically signed by:  Shiva Steele  10/8/2018 9:07 PM CDT Workstation: [id number]                [facility]      Final diagnoses:   Multiple closed fractures of metatarsal bone of left foot, initial encounter            Ricardo Smith PA-C  10/08/18 [id number]

## 2018-10-29 ENCOUNTER — OFFICE VISIT (OUTPATIENT)
Dept: GASTROENTEROLOGY | Facility: CLINIC | Age: 38
End: 2018-10-29

## 2018-10-29 VITALS — SYSTOLIC BLOOD PRESSURE: 140 MMHG | DIASTOLIC BLOOD PRESSURE: 80 MMHG | HEIGHT: 59 IN | HEART RATE: 91 BPM

## 2018-10-29 DIAGNOSIS — G89.29 CHRONIC ABDOMINAL PAIN: ICD-10-CM

## 2018-10-29 DIAGNOSIS — R19.7 DIARRHEA, UNSPECIFIED TYPE: ICD-10-CM

## 2018-10-29 DIAGNOSIS — K21.00 GASTROESOPHAGEAL REFLUX DISEASE WITH ESOPHAGITIS: Primary | ICD-10-CM

## 2018-10-29 DIAGNOSIS — R10.9 CHRONIC ABDOMINAL PAIN: ICD-10-CM

## 2018-10-29 PROCEDURE — 99213 OFFICE O/P EST LOW 20 MIN: CPT | Performed by: PHYSICIAN ASSISTANT

## 2018-10-29 NOTE — PATIENT INSTRUCTIONS

## 2018-10-29 NOTE — PROGRESS NOTES
Chief Complaint   Patient presents with   • Diarrhea   • GERD with esophageal       ENDO PROCEDURE ORDERED:    Subjective    Kiley Ledesma is a 37 y.o. female. she is here today for follow-up.    History of Present Illness    Patient is seen on a recheck of her GERD, abdominal pain, IBS, and history of colitis.  Last seen 04/30/2018.  She was accompanied by an aide, but there were not particularly helpful with her history or her medication.  They did not bring any of her medication list from her facility.  She had a previous negative colonoscopy on 04/23/2018.  She has been taking her cholestyramine.  Apparently, she has switched more to having diarrhea now rather than her constipation.  This seems to occur particularly if she has any dairy products or eats spaghetti according to the aide.  Otherwise, her bowel movements are generally fairly regular.  She is on Prilosec 20 mg daily as well as Reglan p.r.n.  She denied nausea, vomiting, or dysphagia.  Her weight appears to be stable.      Patient had mild UTI on the laboratory on 7/30/2018.  More recent laboratories on 08/15/2018 showed fairly normal CBC and  CMP.     A/P:  Patient with chronic GERD and IBS with history of colitis, appears stable on current regimen.  I have suggested that avoid spaghetti and dairy products since these exacerbate her symptoms.  Otherwise may continue current regimen.  Will plan follow up in 6 months, sooner if needed.                   The following portions of the patient's history were reviewed and updated as appropriate:   Past Medical History:   Diagnosis Date   • Acute vulvitis     Candida   • Allergy     Unspecified, initial encounter   • Amblyopia     OS   • Astigmatism    • Cerebral palsy (CMS/HCC)    • Cheilosis    • Common cold    • Contusion    • Depressive disorder    • Diarrhea    • Encounter for general adult medical examination without abnormal findings    • Encounter for routine adult health examination     Adult  health examination      • Fever    • Gastroesophageal reflux disease    • Generalized abdominal pain    • Indeterminate colitis    • Infected insect bite    • Myopia    • Spastic quadriplegic cerebral palsy (CMS/HCC)    • Trouble walking     Walking disability      • Urinary incontinence    • Urinary tract infectious disease    • Worried well      Past Surgical History:   Procedure Laterality Date   • COLONOSCOPY N/A 03/28/2012    hemorrhoids   • COLONOSCOPY N/A 4/23/2018    Procedure: COLONOSCOPY--look TI, bx, hx colitis;  Surgeon: Sreedhar Kirkpatrick MD;  Location: [facility] ENDOSCOPY;  Service: Gastroenterology   • ENDOSCOPY N/A 03/28/2012    gastritis   • INJECTION OF MEDICATION  02/23/2016    Kenalog   • TOTAL ABDOMINAL HYSTERECTOMY WITH SALPINGO OOPHORECTOMY N/A 03/24/2005   • UPPER GASTROINTESTINAL ENDOSCOPY  03/28/2012     Family History   Problem Relation Age of Onset   • Coronary artery disease Other    • Diabetes Other    • Hypertension Other    • Stroke Other      OB History     No data available        Allergies   Allergen Reactions   • Atropine Unknown (See Comments)     Unknown reaction per patient   • Sulfa Antibiotics Itching     Social History     Social History   • Marital status: Single     Social History Main Topics   • Smoking status: Never Smoker   • Smokeless tobacco: Never Used   • Alcohol use No   • Drug use: No   • Sexual activity: Defer     Other Topics Concern   • Not on file       Current Outpatient Prescriptions:   •  acetaminophen (TYLENOL) 325 MG tablet, Take 1 or 2 tablet by mouth every 6 hours as needed for pain. Max of 8 tablets every 24 hours, Disp: 240 tablet, Rfl: 3  •  albuterol (PROVENTIL) (2.5 MG/3ML) 0.083% nebulizer solution, Take 2.5 mg by nebulization Every 3 (Three) Hours As Needed for Wheezing. Use 1 vial 3 times a day as needed for wheezing, Disp: 720 mL, Rfl: 3  •  BACLOFEN IT, Take as directed, Disp: , Rfl:   •  cholecalciferol (VITAMIN D3) 1000 units tablet, TAKE 1 TABLET  "BY MOUTH ONCE DAILY, Disp: 31 tablet, Rfl: 11  •  cholestyramine light 4 g packet, DISSOLVE ONE PACKET IN 8OZ WATER & GIVE BY MOUTH TWICE DAILY AT 9AM &5PM FOR CONSTIPATION - NO OTHER MEDICATIONS FOR 1 HOUR, Disp: 60 packet, Rfl: 11  •  estradiol (ESTRACE) 1 MG tablet, GIVE ONE TABLET BY MOUTH ONCE DAILY FOR HORMONE REPLACEMENT, Disp: 31 tablet, Rfl: 11  •  FLUoxetine (PROzac) 40 MG capsule, GIVE ONE CAPSULE BY MOUTH ONCE DAILY FOR ANXIETY DISORDER, Disp: 31 capsule, Rfl: 11  •  fluticasone (FLONASE) 50 MCG/ACT nasal spray, 2 sprays into each nostril Daily., Disp: , Rfl:   •  guaiFENesin (MUCINEX) 600 MG 12 hr tablet, Take 1,200 mg by mouth 2 (Two) Times a Day., Disp: , Rfl:   •  metoclopramide (REGLAN) 10 MG tablet, GIVE ONE TABLET BY MOUTH ONCE DAILY FOR GASTROESOPHAGEAL REFLUX, Disp: 31 tablet, Rfl: 11  •  Misc. Devices (WHEELCHAIR) misc, As directed, Disp: , Rfl:   •  mupirocin (BACTROBAN) 2 % ointment, Apply  topically 3 (Three) Times a Day., Disp: 22 g, Rfl: 0  •  nystatin (MYCOSTATIN) 969725 UNIT/GM cream, Apply  topically 3 (Three) Times a Day. To affected area(s), Disp: , Rfl:   •  omeprazole (priLOSEC) 20 MG capsule, GIVE ONE CAPSULE BY MOUTH ONCE DAILY FOR STOMACH ACID, Disp: 31 capsule, Rfl: 11  •  ondansetron ODT (ZOFRAN-ODT) 8 MG disintegrating tablet, Take 1 tablet by mouth Every 8 (Eight) Hours As Needed for Nausea., Disp: 30 tablet, Rfl: 5  •  VESICARE 10 MG tablet, GIVE ONE TABLET BY MOUTH ONCE DAILY FOR MUSCLE SPASMS RELATED TO OVERACTIVE BLADDER, Disp: 31 tablet, Rfl: 11  •  Vitamins A & D (VITAMIN A & D) ointment, Apply  topically As Needed., Disp: , Rfl:   Review of Systems  Review of Systems       Objective    /80 (BP Location: Left arm, Patient Position: Sitting)   Pulse 91   Ht 149.9 cm (59\") Comment: est. patient unable to stand  Physical Exam   Constitutional: She is oriented to person, place, and time. She appears well-developed and well-nourished. No distress.   [facility]   HENT: "   Head: Normocephalic and atraumatic.   Eyes: Pupils are equal, round, and reactive to light. EOM are normal.   Neck: Normal range of motion.   Cardiovascular: Normal rate, regular rhythm and normal heart sounds.    Pulmonary/Chest: Effort normal and breath sounds normal.   Abdominal: Soft. Bowel sounds are normal. She exhibits no shifting dullness, no distension, no abdominal bruit, no ascites and no mass. There is no hepatosplenomegaly. There is tenderness. There is no rigidity, no rebound, no guarding and no CVA tenderness. No hernia. Hernia confirmed negative in the ventral area.   Diffuse, pain pump in RLQ   Musculoskeletal: Normal range of motion.   Neurological: She is alert and oriented to person, place, and time.   Skin: Skin is warm and dry.   Psychiatric: She has a normal mood and affect. Her behavior is normal. Judgment and thought content normal.   Nursing note and vitals reviewed.    Assessment/Plan      1. Gastroesophageal reflux disease with esophagitis    2. Chronic abdominal pain    3. Diarrhea, unspecified type    .   Kiley was seen today for diarrhea and gerd with esophageal.    Diagnoses and all orders for this visit:    Gastroesophageal reflux disease with esophagitis    Chronic abdominal pain    Diarrhea, unspecified type        Orders placed during this encounter include:  No orders of the defined types were placed in this encounter.      Medications prescribed:  No orders of the defined types were placed in this encounter.      Requested Prescriptions      No prescriptions requested or ordered in this encounter       Review and/or summary of lab tests, radiology, procedures, medications. Review and summary of old records and obtaining of history. The risks and benefits of my recommendations, as well as other treatment options were discussed with the patient today. Questions were answered.    Follow-up: Return in about 6 months (around 4/29/2019), or if symptoms worsen or fail to improve.      * Surgery not found *      This document has been electronically signed by Bro Lott PA-C on October 29, 2018 6:17 PM      Results for orders placed or performed during the hospital encounter of 08/15/18   CBC Auto Differential   Result Value Ref Range    WBC 8.17 3.20 - 9.80 10*3/mm3    RBC 4.30 3.77 - 5.16 10*6/mm3    Hemoglobin 12.5 12.0 - 15.5 g/dL    Hematocrit 39.3 35.0 - 45.0 %    MCV 91.4 80.0 - 98.0 fL    MCH 29.1 26.5 - 34.0 pg    MCHC 31.8 31.4 - 36.0 g/dL    RDW 13.3 11.5 - 14.5 %    RDW-SD 44.6 36.4 - 46.3 fl    MPV 8.9 8.0 - 12.0 fL    Platelets 233 150 - 450 10*3/mm3    Neutrophil % 68.2 37.0 - 80.0 %    Lymphocyte % 20.2 10.0 - 50.0 %    Monocyte % 7.8 0.0 - 12.0 %    Eosinophil % 2.8 0.0 - 7.0 %    Basophil % 0.6 0.0 - 2.0 %    Immature Grans % 0.4 0.0 - 0.5 %    Neutrophils, Absolute 5.57 2.00 - 8.60 10*3/mm3    Lymphocytes, Absolute 1.65 0.60 - 4.20 10*3/mm3    Monocytes, Absolute 0.64 0.00 - 0.90 10*3/mm3    Eosinophils, Absolute 0.23 0.00 - 0.70 10*3/mm3    Basophils, Absolute 0.05 0.00 - 0.20 10*3/mm3    Immature Grans, Absolute 0.03 (H) 0.00 - 0.02 10*3/mm3    nRBC 0.0 0.0 - 0.0 /100 WBC   Comprehensive Metabolic Panel   Result Value Ref Range    Glucose 81 60 - 100 mg/dL    BUN 11 7 - 21 mg/dL    Creatinine 0.42 (L) 0.50 - 1.00 mg/dL    Sodium 139 137 - 145 mmol/L    Potassium 4.5 3.5 - 5.1 mmol/L    Chloride 104 95 - 110 mmol/L    CO2 25.0 22.0 - 31.0 mmol/L    Calcium 8.5 8.4 - 10.2 mg/dL    Total Protein 7.5 6.3 - 8.6 g/dL    Albumin 4.20 3.40 - 4.80 g/dL    ALT (SGPT) 13 9 - 52 U/L    AST (SGOT) 19 14 - 36 U/L    Alkaline Phosphatase 88 38 - 126 U/L    Total Bilirubin 0.3 0.2 - 1.3 mg/dL    eGFR Non  Amer 169 (H) 64 - 149 mL/min/1.73    Globulin 3.3 2.3 - 3.5 gm/dL    A/G Ratio 1.3 1.1 - 1.8 g/dL    BUN/Creatinine Ratio 26.2 (H) 7.0 - 25.0    Anion Gap 10.0 5.0 - 15.0 mmol/L   Results for orders placed or performed during the hospital encounter of 07/30/18   POCT Urine  Micro   Result Value Ref Range    Leukocytes, UA      Blood, UA  Negative    Bacteria, UA     POC Urinalysis Dipstick, Multipro (Automated dipstick)   Result Value Ref Range    Color Yellow Yellow, Straw, Dark Yellow, Keli    Clarity, UA Cloudy (A) Clear    Glucose, UA Negative Negative, 1000 mg/dL (3+) mg/dL    Bilirubin Negative Negative    Ketones, UA Negative Negative    Specific Gravity  1.015 1.005 - 1.030    Blood, UA Negative Negative    pH, Urine 6.0 5.0 - 8.0    Protein, POC Negative Negative mg/dL    Urobilinogen, UA Normal Normal    Nitrite, UA Negative Negative    Leukocytes Large (3+) (A) Negative   Results for orders placed or performed during the hospital encounter of 04/23/18   Tissue Pathology Exam   Result Value Ref Range    Case Report       Surgical Pathology Report                         Case: [id number]                                  Authorizing Provider:  Sreedhar Kirkpatrick MD         Collected:           04/23/2018 01:39 PM          Ordering Location:     [facility]             Received:            04/23/2018 02:17 PM                                 [place] [facility]                                                     Pathologist:           Yoandy Anand MD                                                         Specimens:   1) - Small Intestine, Ileum, TI                                                                     2) - Large Intestine, colonic mucosa                                                       Final Diagnosis       1.  TERMINAL ILEUM, MUCOSAL BIOPSY:  NO SIGNIFICANT PATHOLOGIC DIAGNOSIS.    2.  COLONIC MUCOSAL BIOPSY, RANDOM:  NO SIGNIFICANT PATHOLOGIC DIAGNOSIS.        Gross Description       In 2 containers, each of these show mucosal biopsies measuring up to 0.3 cm in greatest dimension.  Embedded accordingly.  1A terminal ileum; 2A colonic mucosa.      Embedded Images     Results for orders placed or performed during the hospital encounter of 12/15/17    POCT Influenza A/B   Result Value Ref Range    Rapid Influenza A Ag neg     Rapid Influenza B Ag neg     Internal Control Passed Passed    Lot Number [id number]     Expiration Date 2/19    Results for orders placed or performed in visit on 10/19/17   Gastrointestinal Panel, PCR - Stool, Per Rectum   Result Value Ref Range    Campylobacter Not Detected Not Detected    Clostridium difficile (toxin A/B) Not Detected Not Detected, NA formed stool, C diff not applicable on patient less than one year of age    Plesiomonas shigelloides Not Detected Not Detected    Salmonella Not Detected Not Detected    Vibrio Not Detected Not Detected    Vibrio cholerae Not Detected Not Detected    Yersinia enterocolitica Not Detected Not Detected    Enteroaggregative E. coli (EAEC) Not Detected Not Detected    Enteropathogenic E. coli (EPEC) Not Detected Not Detected    Enterotoxigenic E. coli (ETEC) lt/st Not Detected Not Detected    Shiga-like toxin-producing E. coli (STEC) stx1/stx2 Not Detected Not Detected    E. coli O157 Not Detected Not Detected    Shigella/Enteroinvasive E. coli (EIEC) Not Detected Not Detected    Cryptosporidium Not Detected Not Detected    Cyclospora cayetanensis Not Detected Not Detected    Entamoeba histolytica Not Detected Not Detected    Giardia lamblia Not Detected Not Detected    Adenovirus F40/41 Not Detected Not Detected    Astrovirus Not Detected Not Detected    Norovirus GI/GII Not Detected Not Detected    Rotavirus A Not Detected Not Detected    Sapovirus (I, II, IV or V) Not Detected Not Detected   Glia(IgA / G) & TTG(IgA / G)   Result Value Ref Range    Gliadin Deamidated Peptide Ab, IgA 3 0 - 19 units    Deaminated Gliadin Ab IgG 4 0 - 19 units    Tissue Transglutaminase IgA <2 0 - 3 U/mL    Tissue Transglutaminase IgG <2 0 - 5 U/mL   Allergens (12) Foods   Result Value Ref Range    Class Description Comment     Egg White <0.10 Class 0 kU/L    Milk, Cow's <0.10 Class 0 kU/L    CodFish <0.10 Class 0  kU/L    Sesame Seed <0.10 Class 0 kU/L    Peanut <0.10 Class 0 kU/L    Soybean <0.10 Class 0 kU/L    Hazelnut <0.10 Class 0 kU/L    Shrimp <0.10 Class 0 kU/L    Scallop <0.10 Class 0 kU/L    Gluten <0.10 Class 0 kU/L    Comer <0.10 Class 0 kU/L    Wheat <0.10 Class 0 kU/L     *Note: Due to a large number of results and/or encounters for the requested time period, some results have not been displayed. A complete set of results can be found in Results Review.       Some portions of this note have been dictated using voice recognition software and may contain errors and/or omissions.

## 2019-01-09 ENCOUNTER — TELEPHONE (OUTPATIENT)
Dept: FAMILY MEDICINE CLINIC | Facility: CLINIC | Age: 39
End: 2019-01-09

## 2019-01-09 NOTE — TELEPHONE ENCOUNTER
Bere Archibald with [employer]  Called about this patient-said was seen in Aug -had absent seizure and is to come back in feb for sandra. She said she had another seizure Dec 31 and thinks she needs to be seen sooner. She wants to know if can be seen sometime this week or soon.Bere can be reached at [phone].

## 2019-02-21 ENCOUNTER — APPOINTMENT (OUTPATIENT)
Dept: LAB | Facility: HOSPITAL | Age: 39
End: 2019-02-21

## 2019-02-21 ENCOUNTER — OFFICE VISIT (OUTPATIENT)
Dept: GASTROENTEROLOGY | Facility: CLINIC | Age: 39
End: 2019-02-21

## 2019-02-21 VITALS — DIASTOLIC BLOOD PRESSURE: 68 MMHG | SYSTOLIC BLOOD PRESSURE: 112 MMHG | HEART RATE: 83 BPM

## 2019-02-21 DIAGNOSIS — R15.9 INCONTINENCE OF FECES WITH FECAL URGENCY: ICD-10-CM

## 2019-02-21 DIAGNOSIS — K21.00 GASTROESOPHAGEAL REFLUX DISEASE WITH ESOPHAGITIS: ICD-10-CM

## 2019-02-21 DIAGNOSIS — R10.9 CHRONIC ABDOMINAL PAIN: ICD-10-CM

## 2019-02-21 DIAGNOSIS — Z87.19 HISTORY OF COLITIS: ICD-10-CM

## 2019-02-21 DIAGNOSIS — R19.7 DIARRHEA, UNSPECIFIED TYPE: Primary | ICD-10-CM

## 2019-02-21 DIAGNOSIS — R15.2 INCONTINENCE OF FECES WITH FECAL URGENCY: ICD-10-CM

## 2019-02-21 DIAGNOSIS — G89.29 CHRONIC ABDOMINAL PAIN: ICD-10-CM

## 2019-02-21 LAB
ALBUMIN SERPL-MCNC: 4.3 G/DL (ref 3.4–4.8)
ALBUMIN/GLOB SERPL: 1.3 G/DL (ref 1.1–1.8)
ALP SERPL-CCNC: 86 U/L (ref 38–126)
ALT SERPL W P-5'-P-CCNC: 18 U/L (ref 9–52)
ANION GAP SERPL CALCULATED.3IONS-SCNC: 7 MMOL/L (ref 5–15)
AST SERPL-CCNC: 21 U/L (ref 14–36)
BACTERIA UR QL AUTO: ABNORMAL /HPF
BASOPHILS # BLD AUTO: 0.05 10*3/MM3 (ref 0–0.2)
BASOPHILS NFR BLD AUTO: 0.8 % (ref 0–1.5)
BILIRUB SERPL-MCNC: 0.3 MG/DL (ref 0.2–1.3)
BILIRUB UR QL STRIP: NEGATIVE
BUN BLD-MCNC: 11 MG/DL (ref 7–21)
BUN/CREAT SERPL: 27.5 (ref 7–25)
CALCIUM SPEC-SCNC: 8.9 MG/DL (ref 8.4–10.2)
CHLORIDE SERPL-SCNC: 99 MMOL/L (ref 95–110)
CLARITY UR: CLEAR
CO2 SERPL-SCNC: 29 MMOL/L (ref 22–31)
COLOR UR: YELLOW
CREAT BLD-MCNC: 0.4 MG/DL (ref 0.5–1)
DEPRECATED RDW RBC AUTO: 42.5 FL (ref 37–54)
EOSINOPHIL # BLD AUTO: 0.23 10*3/MM3 (ref 0–0.4)
EOSINOPHIL NFR BLD AUTO: 3.5 % (ref 0.3–6.2)
ERYTHROCYTE [DISTWIDTH] IN BLOOD BY AUTOMATED COUNT: 12.9 % (ref 12.3–15.4)
GFR SERPL CREATININE-BSD FRML MDRD: 178 ML/MIN/1.73 (ref 64–149)
GLOBULIN UR ELPH-MCNC: 3.2 GM/DL (ref 2.3–3.5)
GLUCOSE BLD-MCNC: 82 MG/DL (ref 60–100)
GLUCOSE UR STRIP-MCNC: NEGATIVE MG/DL
HCT VFR BLD AUTO: 41.1 % (ref 34–46.6)
HGB BLD-MCNC: 13 G/DL (ref 12–15.9)
HGB UR QL STRIP.AUTO: NEGATIVE
HYALINE CASTS UR QL AUTO: ABNORMAL /LPF
IMM GRANULOCYTES # BLD AUTO: 0.02 10*3/MM3 (ref 0–0.05)
IMM GRANULOCYTES NFR BLD AUTO: 0.3 % (ref 0–0.5)
KETONES UR QL STRIP: NEGATIVE
LEUKOCYTE ESTERASE UR QL STRIP.AUTO: ABNORMAL
LYMPHOCYTES # BLD AUTO: 1.49 10*3/MM3 (ref 0.7–3.1)
LYMPHOCYTES NFR BLD AUTO: 22.4 % (ref 19.6–45.3)
MCH RBC QN AUTO: 28.6 PG (ref 26.6–33)
MCHC RBC AUTO-ENTMCNC: 31.6 G/DL (ref 31.5–35.7)
MCV RBC AUTO: 90.5 FL (ref 79–97)
MONOCYTES # BLD AUTO: 0.56 10*3/MM3 (ref 0.1–0.9)
MONOCYTES NFR BLD AUTO: 8.4 % (ref 5–12)
NEUTROPHILS # BLD AUTO: 4.29 10*3/MM3 (ref 1.4–7)
NEUTROPHILS NFR BLD AUTO: 64.6 % (ref 42.7–76)
NITRITE UR QL STRIP: NEGATIVE
NRBC BLD AUTO-RTO: 0 /100 WBC (ref 0–0)
PH UR STRIP.AUTO: 6.5 [PH] (ref 5–9)
PLATELET # BLD AUTO: 237 10*3/MM3 (ref 140–450)
PMV BLD AUTO: 9.3 FL (ref 6–12)
POTASSIUM BLD-SCNC: 3.9 MMOL/L (ref 3.5–5.1)
PROT SERPL-MCNC: 7.5 G/DL (ref 6.3–8.6)
PROT UR QL STRIP: NEGATIVE
RBC # BLD AUTO: 4.54 10*6/MM3 (ref 3.77–5.28)
RBC # UR: ABNORMAL /HPF
REF LAB TEST METHOD: ABNORMAL
SODIUM BLD-SCNC: 135 MMOL/L (ref 137–145)
SP GR UR STRIP: 1.01 (ref 1–1.03)
SQUAMOUS #/AREA URNS HPF: ABNORMAL /HPF
UROBILINOGEN UR QL STRIP: ABNORMAL
WBC NRBC COR # BLD: 6.64 10*3/MM3 (ref 3.4–10.8)
WBC UR QL AUTO: ABNORMAL /HPF

## 2019-02-21 PROCEDURE — 85025 COMPLETE CBC W/AUTO DIFF WBC: CPT | Performed by: PHYSICIAN ASSISTANT

## 2019-02-21 PROCEDURE — 99214 OFFICE O/P EST MOD 30 MIN: CPT | Performed by: PHYSICIAN ASSISTANT

## 2019-02-21 PROCEDURE — 81001 URINALYSIS AUTO W/SCOPE: CPT | Performed by: PHYSICIAN ASSISTANT

## 2019-02-21 PROCEDURE — 36415 COLL VENOUS BLD VENIPUNCTURE: CPT | Performed by: PHYSICIAN ASSISTANT

## 2019-02-21 PROCEDURE — 80053 COMPREHEN METABOLIC PANEL: CPT | Performed by: PHYSICIAN ASSISTANT

## 2019-02-21 PROCEDURE — 87086 URINE CULTURE/COLONY COUNT: CPT | Performed by: PHYSICIAN ASSISTANT

## 2019-02-21 RX ORDER — DICYCLOMINE HCL 20 MG
20 TABLET ORAL 2 TIMES DAILY
Qty: 60 TABLET | Refills: 5 | Status: SHIPPED | OUTPATIENT
Start: 2019-02-21 | End: 2019-03-25

## 2019-02-21 NOTE — PROGRESS NOTES
Chief Complaint   Patient presents with   • Diarrhea       ENDO PROCEDURE ORDERED:    Subjective    Kiley Ledesma is a 38 y.o. female. she is here today for follow-up.    History of Present Illness    Patient presents for evaluation for increasing fecal incontinence.  Last seen 10/29/18.  She is accompanied by a caretaker.  For the past at least month, she will wake up from sleep with an urge to have a bowel movement.  She states she cannot hold the stool before she is incontinent in the bed, as she cannot get help to get to the commode fast enough.  She is a paraplegic with limited motion due to cerebral palsy.  She states she is having incontinence of urine as well.  She will have to strain before she moves her urine or bowel, but states she is not having a hard time passing these things.  She was put on cholestyramine some time ago because of persistent diarrhea.  She states the stools are formed, but they are not watery, they are not loose, but she cannot hold them.  She is on Reglan and Prilosec for chronic heartburn.  She denies nausea, vomiting, or dysphagia.  Weight appears to be stable, although she cannot stand on the scale.  She has a history of colitis with last colonoscopy on 04/23/18.                    A/P:  Patient with increasing abdominal discomfort, history of colitis, fecal incontinence, possibly related to pelvic floor dysfunction, possible rectal outlet dysfunction.  Would consider recurrent colitis.  I did suggest a trial on Bentyl 20 mg b.i.d. to help with her urgency.  Will also check a urinalysis with micro and culture to make sure she does not have a urinary tract infection with her urinary incontinence.  Will check CBC and CMP.  I suggested physical therapy evaluation and treat at [facility] for possible pelvic floor dysfunction.  Will see her in follow-up after the above, further pending clinical course and the results of the above.            The following portions of the  patient's history were reviewed and updated as appropriate:   Past Medical History:   Diagnosis Date   • Acute vulvitis     Candida   • Allergy     Unspecified, initial encounter   • Amblyopia     OS   • Astigmatism    • Cerebral palsy (CMS/HCC)    • Cheilosis    • Common cold    • Contusion    • Depressive disorder    • Diarrhea    • Encounter for general adult medical examination without abnormal findings    • Encounter for routine adult health examination     Adult health examination      • Fever    • Gastroesophageal reflux disease    • Generalized abdominal pain    • Indeterminate colitis    • Infected insect bite    • Myopia    • Spastic quadriplegic cerebral palsy (CMS/HCC)    • Trouble walking     Walking disability      • Urinary incontinence    • Urinary tract infectious disease    • Worried well      Past Surgical History:   Procedure Laterality Date   • COLONOSCOPY N/A 03/28/2012    hemorrhoids   • COLONOSCOPY N/A 4/23/2018    Procedure: COLONOSCOPY--look TI, bx, hx colitis;  Surgeon: Sreedhar Kirkpatrick MD;  Location: [facility] ENDOSCOPY;  Service: Gastroenterology   • ENDOSCOPY N/A 03/28/2012    gastritis   • INJECTION OF MEDICATION  02/23/2016    Kenalog   • TOTAL ABDOMINAL HYSTERECTOMY WITH SALPINGO OOPHORECTOMY N/A 03/24/2005   • UPPER GASTROINTESTINAL ENDOSCOPY  03/28/2012     Family History   Problem Relation Age of Onset   • Coronary artery disease Other    • Diabetes Other    • Hypertension Other    • Stroke Other      OB History     No data available        Allergies   Allergen Reactions   • Atropine Unknown (See Comments)     Unknown reaction per patient   • Sulfa Antibiotics Itching     Social History     Socioeconomic History   • Marital status: Single     Spouse name: Not on file   • Number of children: Not on file   • Years of education: Not on file   • Highest education level: Not on file   Tobacco Use   • Smoking status: Never Smoker   • Smokeless tobacco: Never Used   Substance and Sexual  Activity   • Alcohol use: No   • Drug use: No   • Sexual activity: Defer       Current Outpatient Medications:   •  acetaminophen (TYLENOL) 325 MG tablet, Take 1 or 2 tablet by mouth every 6 hours as needed for pain. Max of 8 tablets every 24 hours, Disp: 240 tablet, Rfl: 3  •  albuterol (PROVENTIL) (2.5 MG/3ML) 0.083% nebulizer solution, Take 2.5 mg by nebulization Every 3 (Three) Hours As Needed for Wheezing. Use 1 vial 3 times a day as needed for wheezing, Disp: 720 mL, Rfl: 3  •  cholecalciferol (VITAMIN D3) 1000 units tablet, TAKE 1 TABLET BY MOUTH ONCE DAILY, Disp: 31 tablet, Rfl: 11  •  cholestyramine light 4 g packet, DISSOLVE ONE PACKET IN 8OZ WATER & GIVE BY MOUTH TWICE DAILY AT 9AM &5PM FOR CONSTIPATION - NO OTHER MEDICATIONS FOR 1 HOUR, Disp: 60 packet, Rfl: 11  •  estradiol (ESTRACE) 1 MG tablet, GIVE ONE TABLET BY MOUTH ONCE DAILY FOR HORMONE REPLACEMENT, Disp: 31 tablet, Rfl: 11  •  FLUoxetine (PROzac) 40 MG capsule, GIVE ONE CAPSULE BY MOUTH ONCE DAILY FOR ANXIETY DISORDER, Disp: 31 capsule, Rfl: 11  •  fluticasone (FLONASE) 50 MCG/ACT nasal spray, 2 sprays into each nostril Daily., Disp: , Rfl:   •  guaiFENesin (MUCINEX) 600 MG 12 hr tablet, Take 1,200 mg by mouth 2 (Two) Times a Day., Disp: , Rfl:   •  metoclopramide (REGLAN) 10 MG tablet, GIVE ONE TABLET BY MOUTH ONCE DAILY FOR GASTROESOPHAGEAL REFLUX, Disp: 31 tablet, Rfl: 11  •  Misc. Devices (WHEELCHAIR) mis, As directed, Disp: , Rfl:   •  mupirocin (BACTROBAN) 2 % ointment, Apply  topically 3 (Three) Times a Day., Disp: 22 g, Rfl: 0  •  nystatin (MYCOSTATIN) 327634 UNIT/GM cream, Apply  topically 3 (Three) Times a Day. To affected area(s), Disp: , Rfl:   •  omeprazole (priLOSEC) 20 MG capsule, GIVE ONE CAPSULE BY MOUTH ONCE DAILY FOR STOMACH ACID, Disp: 31 capsule, Rfl: 11  •  ondansetron ODT (ZOFRAN-ODT) 8 MG disintegrating tablet, Take 1 tablet by mouth Every 8 (Eight) Hours As Needed for Nausea., Disp: 30 tablet, Rfl: 5  •  VESICARE 10 MG  tablet, GIVE ONE TABLET BY MOUTH ONCE DAILY FOR MUSCLE SPASMS RELATED TO OVERACTIVE BLADDER, Disp: 31 tablet, Rfl: 11  •  Vitamins A & D (VITAMIN A & D) ointment, Apply  topically As Needed., Disp: , Rfl:   •  BACLOFEN IT, Take as directed, Disp: , Rfl:   •  benzonatate (TESSALON) 200 MG capsule, Take 1 capsule by mouth 3 (Three) Times a Day As Needed for Cough., Disp: 30 capsule, Rfl: 0  •  dicyclomine (BENTYL) 20 MG tablet, Take 1 tablet by mouth 2 (Two) Times a Day., Disp: 60 tablet, Rfl: 5  Review of Systems  Review of Systems       Objective    /68 (BP Location: Right arm)   Pulse 83   LMP  (LMP Unknown)   Physical Exam   Constitutional: She is oriented to person, place, and time. She appears well-developed and well-nourished. No distress.   [facility] [place]:   Head: Normocephalic and atraumatic.   Eyes: EOM are normal. Pupils are equal, round, and reactive to light.   Neck: Normal range of motion.   Cardiovascular: Normal rate, regular rhythm and normal heart sounds.   Pulmonary/Chest: Effort normal and breath sounds normal.   Abdominal: Soft. Bowel sounds are normal. She exhibits no shifting dullness, no distension, no abdominal bruit, no ascites and no mass. There is no hepatosplenomegaly. There is tenderness. There is no rigidity, no rebound, no guarding and no CVA tenderness. No hernia. Hernia confirmed negative in the ventral area.   Diffuse, pain pump in RLQ   Musculoskeletal: Normal range of motion.   Neurological: She is alert and oriented to person, place, and time.   Skin: Skin is warm and dry.   Psychiatric: She has a normal mood and affect. Her behavior is normal. Judgment and thought content normal.   Nursing note and vitals reviewed.    Assessment/Plan      1. Diarrhea, unspecified type    2. Chronic abdominal pain    3. History of colitis    4. Gastroesophageal reflux disease with esophagitis    5. Incontinence of feces with fecal urgency    .   Kiley was seen today for diarrhea.    Diagnoses  and all orders for this visit:    Diarrhea, unspecified type  -     CBC & Differential  -     Comprehensive Metabolic Panel  -     Urine Culture - Urine, Urine, Clean Catch  -     Urinalysis With Microscopic - Urine, Clean Catch  -     CBC Auto Differential  -     Urinalysis without microscopic (no culture) - Urine, Clean Catch  -     Urinalysis, Microscopic Only - Urine, Clean Catch    Chronic abdominal pain  -     CBC & Differential  -     Comprehensive Metabolic Panel  -     Urine Culture - Urine, Urine, Clean Catch  -     Urinalysis With Microscopic - Urine, Clean Catch  -     CBC Auto Differential  -     Urinalysis without microscopic (no culture) - Urine, Clean Catch  -     Urinalysis, Microscopic Only - Urine, Clean Catch    History of colitis  -     CBC & Differential  -     Comprehensive Metabolic Panel  -     Urine Culture - Urine, Urine, Clean Catch  -     Urinalysis With Microscopic - Urine, Clean Catch  -     CBC Auto Differential  -     Urinalysis without microscopic (no culture) - Urine, Clean Catch  -     Urinalysis, Microscopic Only - Urine, Clean Catch    Gastroesophageal reflux disease with esophagitis    Incontinence of feces with fecal urgency  -     CBC & Differential  -     Comprehensive Metabolic Panel  -     Urine Culture - Urine, Urine, Clean Catch  -     Urinalysis With Microscopic - Urine, Clean Catch  -     Ambulatory Referral to Physical Therapy Evaluate and treat, [facility], Pelvic Floor  -     CBC Auto Differential  -     Urinalysis without microscopic (no culture) - Urine, Clean Catch  -     Urinalysis, Microscopic Only - Urine, Clean Catch    Other orders  -     dicyclomine (BENTYL) 20 MG tablet; Take 1 tablet by mouth 2 (Two) Times a Day.        Orders placed during this encounter include:  Orders Placed This Encounter   Procedures   • Urine Culture - Urine, Urine, Clean Catch   • Comprehensive Metabolic Panel   • CBC Auto Differential   • Urinalysis without microscopic (no  culture) - Urine, Clean Catch   • Urinalysis, Microscopic Only - Urine, Clean Catch   • Ambulatory Referral to Physical Therapy Evaluate and treat, [facility], Pelvic Floor     Referral Priority:   Routine     Referral Type:   Therapy     Referral Reason:   Specialty Services Required     Requested Specialty:   Physical Therapy     Number of Visits Requested:   1   • CBC & Differential     Order Specific Question:   Manual Differential     Answer:   No   • Urinalysis With Microscopic - Urine, Clean Catch       Medications prescribed:  New Medications Ordered This Visit   Medications   • dicyclomine (BENTYL) 20 MG tablet     Sig: Take 1 tablet by mouth 2 (Two) Times a Day.     Dispense:  60 tablet     Refill:  5       Requested Prescriptions     Signed Prescriptions Disp Refills   • dicyclomine (BENTYL) 20 MG tablet 60 tablet 5     Sig: Take 1 tablet by mouth 2 (Two) Times a Day.       Review and/or summary of lab tests, radiology, procedures, medications. Review and summary of old records and obtaining of history. The risks and benefits of my recommendations, as well as other treatment options were discussed with the patient today. Questions were answered.    Follow-up: Return in about 1 month (around 3/21/2019), or if symptoms worsen or fail to improve.     * Surgery not found *      This document has been electronically signed by Bro Lott PA-C on February 25, 2019 5:02 PM      Results for orders placed or performed in visit on 02/21/19   Urinalysis, Microscopic Only - Urine, Clean Catch   Result Value Ref Range    RBC, UA None Seen None Seen /HPF    WBC, UA 6-12 (A) None Seen, 0-2, 3-5 /HPF    Bacteria, UA None Seen None Seen /HPF    Squamous Epithelial Cells, UA 6-12 (A) None Seen, 0-2 /HPF    Hyaline Casts, UA 3-6 None Seen /LPF    Methodology Automated Microscopy    CBC Auto Differential   Result Value Ref Range    WBC 6.64 3.40 - 10.80 10*3/mm3    RBC 4.54 3.77 - 5.28 10*6/mm3    Hemoglobin 13.0  12.0 - 15.9 g/dL    Hematocrit 41.1 34.0 - 46.6 %    MCV 90.5 79.0 - 97.0 fL    MCH 28.6 26.6 - 33.0 pg    MCHC 31.6 31.5 - 35.7 g/dL    RDW 12.9 12.3 - 15.4 %    RDW-SD 42.5 37.0 - 54.0 fl    MPV 9.3 6.0 - 12.0 fL    Platelets 237 140 - 450 10*3/mm3    Neutrophil % 64.6 42.7 - 76.0 %    Lymphocyte % 22.4 19.6 - 45.3 %    Monocyte % 8.4 5.0 - 12.0 %    Eosinophil % 3.5 0.3 - 6.2 %    Basophil % 0.8 0.0 - 1.5 %    Immature Grans % 0.3 0.0 - 0.5 %    Neutrophils, Absolute 4.29 1.40 - 7.00 10*3/mm3    Lymphocytes, Absolute 1.49 0.70 - 3.10 10*3/mm3    Monocytes, Absolute 0.56 0.10 - 0.90 10*3/mm3    Eosinophils, Absolute 0.23 0.00 - 0.40 10*3/mm3    Basophils, Absolute 0.05 0.00 - 0.20 10*3/mm3    Immature Grans, Absolute 0.02 0.00 - 0.05 10*3/mm3    nRBC 0.0 0.0 - 0.0 /100 WBC   Urinalysis without microscopic (no culture) - Urine, Clean Catch   Result Value Ref Range    Color, UA Yellow Yellow, Straw, Dark Yellow, Keli    Appearance, UA Clear Clear    pH, UA 6.5 5.0 - 9.0    Specific Gravity, UA 1.010 1.003 - 1.030    Glucose, UA Negative Negative    Ketones, UA Negative Negative    Bilirubin, UA Negative Negative    Blood, UA Negative Negative    Protein, UA Negative Negative    Leuk Esterase, UA Small (1+) (A) Negative    Nitrite, UA Negative Negative    Urobilinogen, UA 0.2 E.U./dL 0.2 - 1.0 E.U./dL   Urine Culture - Urine, Urine, Clean Catch   Result Value Ref Range    Urine Culture Mixed Lizbeth Isolated    Comprehensive Metabolic Panel   Result Value Ref Range    Glucose 82 60 - 100 mg/dL    BUN 11 7 - 21 mg/dL    Creatinine 0.40 (L) 0.50 - 1.00 mg/dL    Sodium 135 (L) 137 - 145 mmol/L    Potassium 3.9 3.5 - 5.1 mmol/L    Chloride 99 95 - 110 mmol/L    CO2 29.0 22.0 - 31.0 mmol/L    Calcium 8.9 8.4 - 10.2 mg/dL    Total Protein 7.5 6.3 - 8.6 g/dL    Albumin 4.30 3.40 - 4.80 g/dL    ALT (SGPT) 18 9 - 52 U/L    AST (SGOT) 21 14 - 36 U/L    Alkaline Phosphatase 86 38 - 126 U/L    Total Bilirubin 0.3 0.2 - 1.3 mg/dL     eGFR Non  Amer 178 (H) 64 - 149 mL/min/1.73    Globulin 3.2 2.3 - 3.5 gm/dL    A/G Ratio 1.3 1.1 - 1.8 g/dL    BUN/Creatinine Ratio 27.5 (H) 7.0 - 25.0    Anion Gap 7.0 5.0 - 15.0 mmol/L   Results for orders placed or performed during the hospital encounter of 08/15/18   CBC Auto Differential   Result Value Ref Range    WBC 8.17 3.20 - 9.80 10*3/mm3    RBC 4.30 3.77 - 5.16 10*6/mm3    Hemoglobin 12.5 12.0 - 15.5 g/dL    Hematocrit 39.3 35.0 - 45.0 %    MCV 91.4 80.0 - 98.0 fL    MCH 29.1 26.5 - 34.0 pg    MCHC 31.8 31.4 - 36.0 g/dL    RDW 13.3 11.5 - 14.5 %    RDW-SD 44.6 36.4 - 46.3 fl    MPV 8.9 8.0 - 12.0 fL    Platelets 233 150 - 450 10*3/mm3    Neutrophil % 68.2 37.0 - 80.0 %    Lymphocyte % 20.2 10.0 - 50.0 %    Monocyte % 7.8 0.0 - 12.0 %    Eosinophil % 2.8 0.0 - 7.0 %    Basophil % 0.6 0.0 - 2.0 %    Immature Grans % 0.4 0.0 - 0.5 %    Neutrophils, Absolute 5.57 2.00 - 8.60 10*3/mm3    Lymphocytes, Absolute 1.65 0.60 - 4.20 10*3/mm3    Monocytes, Absolute 0.64 0.00 - 0.90 10*3/mm3    Eosinophils, Absolute 0.23 0.00 - 0.70 10*3/mm3    Basophils, Absolute 0.05 0.00 - 0.20 10*3/mm3    Immature Grans, Absolute 0.03 (H) 0.00 - 0.02 10*3/mm3    nRBC 0.0 0.0 - 0.0 /100 WBC   Comprehensive Metabolic Panel   Result Value Ref Range    Glucose 81 60 - 100 mg/dL    BUN 11 7 - 21 mg/dL    Creatinine 0.42 (L) 0.50 - 1.00 mg/dL    Sodium 139 137 - 145 mmol/L    Potassium 4.5 3.5 - 5.1 mmol/L    Chloride 104 95 - 110 mmol/L    CO2 25.0 22.0 - 31.0 mmol/L    Calcium 8.5 8.4 - 10.2 mg/dL    Total Protein 7.5 6.3 - 8.6 g/dL    Albumin 4.20 3.40 - 4.80 g/dL    ALT (SGPT) 13 9 - 52 U/L    AST (SGOT) 19 14 - 36 U/L    Alkaline Phosphatase 88 38 - 126 U/L    Total Bilirubin 0.3 0.2 - 1.3 mg/dL    eGFR Non  Amer 169 (H) 64 - 149 mL/min/1.73    Globulin 3.3 2.3 - 3.5 gm/dL    A/G Ratio 1.3 1.1 - 1.8 g/dL    BUN/Creatinine Ratio 26.2 (H) 7.0 - 25.0    Anion Gap 10.0 5.0 - 15.0 mmol/L     *Note: Due to a large number  of results and/or encounters for the requested time period, some results have not been displayed. A complete set of results can be found in Results Review.       Some portions of this note have been dictated using voice recognition software and may contain errors and/or omissions.

## 2019-02-21 NOTE — PATIENT INSTRUCTIONS

## 2019-02-22 LAB — BACTERIA SPEC AEROBE CULT: NORMAL

## 2019-02-28 ENCOUNTER — OFFICE VISIT (OUTPATIENT)
Dept: FAMILY MEDICINE CLINIC | Facility: CLINIC | Age: 39
End: 2019-02-28

## 2019-02-28 VITALS — DIASTOLIC BLOOD PRESSURE: 70 MMHG | HEART RATE: 71 BPM | SYSTOLIC BLOOD PRESSURE: 122 MMHG | OXYGEN SATURATION: 99 %

## 2019-02-28 DIAGNOSIS — F32.5 MAJOR DEPRESSIVE DISORDER WITH SINGLE EPISODE, IN FULL REMISSION (HCC): ICD-10-CM

## 2019-02-28 DIAGNOSIS — G80.0 SPASTIC QUADRIPLEGIC CEREBRAL PALSY (HCC): Primary | Chronic | ICD-10-CM

## 2019-02-28 PROCEDURE — 99213 OFFICE O/P EST LOW 20 MIN: CPT | Performed by: GENERAL PRACTICE

## 2019-02-28 NOTE — PROGRESS NOTES
Subjective   Kiley Ledesma is a 38 y.o. female.   Chief Complaint   Patient presents with   • Follow-up     cerebral palsy   • Depression     History of Present Illness     For review and evaluation of management of chronic medical problems. Cerebral palsy is stable. Depression stable. Was having some trouble with fecal incontinence but bentyl has helped this. Has a pain pump in the RLQ which is managed by Dr. Saldaña. Pain is controlled with medications. No side effects.      The following portions of the patient's history were reviewed and updated as appropriate: allergies, current medications, past social history and problem list.    Outpatient Medications Prior to Visit   Medication Sig Dispense Refill   • acetaminophen (TYLENOL) 325 MG tablet Take 1 or 2 tablet by mouth every 6 hours as needed for pain. Max of 8 tablets every 24 hours 240 tablet 3   • albuterol (PROVENTIL) (2.5 MG/3ML) 0.083% nebulizer solution Take 2.5 mg by nebulization Every 3 (Three) Hours As Needed for Wheezing. Use 1 vial 3 times a day as needed for wheezing 720 mL 3   • BACLOFEN IT Take as directed     • cholecalciferol (VITAMIN D3) 1000 units tablet TAKE 1 TABLET BY MOUTH ONCE DAILY 31 tablet 11   • cholestyramine light 4 g packet DISSOLVE ONE PACKET IN 8OZ WATER & GIVE BY MOUTH TWICE DAILY AT 9AM &5PM FOR CONSTIPATION - NO OTHER MEDICATIONS FOR 1 HOUR 60 packet 11   • dicyclomine (BENTYL) 20 MG tablet Take 1 tablet by mouth 2 (Two) Times a Day. 60 tablet 5   • estradiol (ESTRACE) 1 MG tablet GIVE ONE TABLET BY MOUTH ONCE DAILY FOR HORMONE REPLACEMENT 31 tablet 11   • FLUoxetine (PROzac) 40 MG capsule GIVE ONE CAPSULE BY MOUTH ONCE DAILY FOR ANXIETY DISORDER 31 capsule 11   • fluticasone (FLONASE) 50 MCG/ACT nasal spray 2 sprays into each nostril Daily.     • guaiFENesin (MUCINEX) 600 MG 12 hr tablet Take 1,200 mg by mouth 2 (Two) Times a Day.     • metoclopramide (REGLAN) 10 MG tablet GIVE ONE TABLET BY MOUTH ONCE DAILY FOR  GASTROESOPHAGEAL REFLUX 31 tablet 11   • Misc. Devices (WHEELCHAIR) misc As directed     • mupirocin (BACTROBAN) 2 % ointment Apply  topically 3 (Three) Times a Day. 22 g 0   • nystatin (MYCOSTATIN) [id number] UNIT/GM cream Apply  topically 3 (Three) Times a Day. To affected area(s)     • omeprazole (priLOSEC) 20 MG capsule GIVE ONE CAPSULE BY MOUTH ONCE DAILY FOR STOMACH ACID 31 capsule 11   • ondansetron ODT (ZOFRAN-ODT) 8 MG disintegrating tablet Take 1 tablet by mouth Every 8 (Eight) Hours As Needed for Nausea. 30 tablet 5   • VESICARE 10 MG tablet GIVE ONE TABLET BY MOUTH ONCE DAILY FOR MUSCLE SPASMS RELATED TO OVERACTIVE BLADDER 31 tablet 11   • Vitamins A & D (VITAMIN A & D) ointment Apply  topically As Needed.     • benzonatate (TESSALON) 200 MG capsule Take 1 capsule by mouth 3 (Three) Times a Day As Needed for Cough. 30 capsule 0     No facility-administered medications prior to visit.        Review of Systems   Constitutional: Negative for chills, fatigue, fever and unexpected weight change.   HENT: Negative.  Negative for congestion, ear pain, hearing loss, nosebleeds, rhinorrhea, sneezing, sore throat and tinnitus.    Eyes: Negative.  Negative for discharge.   Respiratory: Negative.  Negative for cough, shortness of breath and wheezing.    Cardiovascular: Negative.  Negative for chest pain and palpitations.   Gastrointestinal: Negative.  Negative for abdominal pain, constipation, diarrhea, nausea and vomiting.   Endocrine: Negative.    Genitourinary: Negative.  Negative for dysuria, frequency and urgency.   Musculoskeletal: Negative.  Negative for arthralgias, back pain, joint swelling, myalgias and neck pain.   Skin: Negative.  Negative for rash.   Allergic/Immunologic: Negative.    Neurological: Positive for weakness. Negative for dizziness, numbness and headaches.   Hematological: Negative.  Negative for adenopathy.   Psychiatric/Behavioral: Negative.  Negative for dysphoric mood and sleep disturbance.  The patient is not nervous/anxious.        Objective   Visit Vitals  /70   Pulse 71   LMP  (LMP Unknown)   SpO2 99%     Physical Exam   Constitutional: She is oriented to person, place, and time. She appears well-developed and well-nourished. No distress.   HENT:   Head: Normocephalic and atraumatic.   Nose: Nose normal.   Mouth/Throat: Oropharynx is clear and moist.   Eyes: Conjunctivae and EOM are normal. Pupils are equal, round, and reactive to light. Right eye exhibits no discharge. Left eye exhibits no discharge.   Neck: No thyromegaly present.   Cardiovascular: Normal rate, regular rhythm, normal heart sounds and intact distal pulses.   Pulmonary/Chest: Effort normal and breath sounds normal.   Lymphadenopathy:     She has no cervical adenopathy.   Neurological: She is alert and oriented to person, place, and time. She exhibits abnormal muscle tone. Gait abnormal.   Cerebral palsy with spastic quadrplegia   Skin: Skin is warm and dry.   Psychiatric: She has a normal mood and affect.   Nursing note and vitals reviewed.      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     No orders of the defined types were placed in this encounter.    Return in about 6 months (around 8/29/2019) for Annual physical.

## 2019-03-06 DIAGNOSIS — K52.9 COLITIS: ICD-10-CM

## 2019-03-06 DIAGNOSIS — R10.9 CHRONIC ABDOMINAL PAIN: ICD-10-CM

## 2019-03-06 DIAGNOSIS — G89.29 CHRONIC ABDOMINAL PAIN: ICD-10-CM

## 2019-03-06 DIAGNOSIS — R19.7 DIARRHEA, UNSPECIFIED TYPE: ICD-10-CM

## 2019-03-06 RX ORDER — OMEPRAZOLE 20 MG/1
CAPSULE, DELAYED RELEASE ORAL
Qty: 31 CAPSULE | Refills: 11 | Status: SHIPPED | OUTPATIENT
Start: 2019-03-06 | End: 2019-04-17

## 2019-03-06 RX ORDER — ESTRADIOL 1 MG/1
TABLET ORAL
Qty: 31 TABLET | Refills: 11 | Status: SHIPPED | OUTPATIENT
Start: 2019-03-06 | End: 2019-04-17

## 2019-03-06 RX ORDER — METOCLOPRAMIDE 10 MG/1
TABLET ORAL
Qty: 31 TABLET | Refills: 11 | Status: SHIPPED | OUTPATIENT
Start: 2019-03-06 | End: 2019-04-17

## 2019-03-06 RX ORDER — MELATONIN
Qty: 31 TABLET | Refills: 11 | Status: SHIPPED | OUTPATIENT
Start: 2019-03-06 | End: 2019-04-17

## 2019-03-06 RX ORDER — CHOLESTYRAMINE LIGHT 4 G/5.7G
POWDER, FOR SUSPENSION ORAL
Qty: 60 PACKET | Refills: 11 | Status: SHIPPED | OUTPATIENT
Start: 2019-03-06 | End: 2019-04-24

## 2019-03-06 RX ORDER — SOLIFENACIN SUCCINATE 10 MG/1
TABLET, FILM COATED ORAL
Qty: 31 TABLET | Refills: 11 | Status: SHIPPED | OUTPATIENT
Start: 2019-03-06 | End: 2019-04-17

## 2019-03-06 RX ORDER — FLUOXETINE HYDROCHLORIDE 40 MG/1
CAPSULE ORAL
Qty: 31 CAPSULE | Refills: 11 | Status: SHIPPED | OUTPATIENT
Start: 2019-03-06 | End: 2019-04-17

## 2019-03-25 ENCOUNTER — OFFICE VISIT (OUTPATIENT)
Dept: GASTROENTEROLOGY | Facility: CLINIC | Age: 39
End: 2019-03-25

## 2019-03-25 VITALS — DIASTOLIC BLOOD PRESSURE: 76 MMHG | SYSTOLIC BLOOD PRESSURE: 122 MMHG | HEART RATE: 82 BPM

## 2019-03-25 DIAGNOSIS — R19.7 DIARRHEA, UNSPECIFIED TYPE: ICD-10-CM

## 2019-03-25 DIAGNOSIS — R10.9 CHRONIC ABDOMINAL PAIN: ICD-10-CM

## 2019-03-25 DIAGNOSIS — R15.2 INCONTINENCE OF FECES WITH FECAL URGENCY: Primary | ICD-10-CM

## 2019-03-25 DIAGNOSIS — G89.29 CHRONIC ABDOMINAL PAIN: ICD-10-CM

## 2019-03-25 DIAGNOSIS — Z87.19 HISTORY OF COLITIS: ICD-10-CM

## 2019-03-25 DIAGNOSIS — R15.9 INCONTINENCE OF FECES WITH FECAL URGENCY: Primary | ICD-10-CM

## 2019-03-25 PROCEDURE — 99213 OFFICE O/P EST LOW 20 MIN: CPT | Performed by: PHYSICIAN ASSISTANT

## 2019-04-09 ENCOUNTER — HOSPITAL ENCOUNTER (EMERGENCY)
Facility: HOSPITAL | Age: 39
Discharge: HOME | End: 2019-04-09
Attending: EMERGENCY MEDICINE | Admitting: EMERGENCY MEDICINE

## 2019-04-09 ENCOUNTER — APPOINTMENT (OUTPATIENT)
Dept: GENERAL RADIOLOGY | Facility: HOSPITAL | Age: 39
End: 2019-04-09

## 2019-04-09 ENCOUNTER — APPOINTMENT (OUTPATIENT)
Dept: CT IMAGING | Facility: HOSPITAL | Age: 39
End: 2019-04-09

## 2019-04-09 VITALS
HEART RATE: 99 BPM | BODY MASS INDEX: 29.23 KG/M2 | OXYGEN SATURATION: 98 % | DIASTOLIC BLOOD PRESSURE: 72 MMHG | HEIGHT: 59 IN | WEIGHT: 145 LBS | SYSTOLIC BLOOD PRESSURE: 120 MMHG | RESPIRATION RATE: 20 BRPM | TEMPERATURE: 97.3 F

## 2019-04-09 DIAGNOSIS — R10.84 GENERALIZED ABDOMINAL PAIN: ICD-10-CM

## 2019-04-09 DIAGNOSIS — N31.9 NEUROGENIC BLADDER: Primary | ICD-10-CM

## 2019-04-09 LAB
ALBUMIN SERPL-MCNC: 4.2 G/DL (ref 3.5–5.2)
ALBUMIN/GLOB SERPL: 1.3 G/DL
ALP SERPL-CCNC: 99 U/L (ref 39–117)
ALT SERPL W P-5'-P-CCNC: 11 U/L (ref 1–33)
ANION GAP SERPL CALCULATED.3IONS-SCNC: 13 MMOL/L
AST SERPL-CCNC: 14 U/L (ref 1–32)
BASOPHILS # BLD AUTO: 0.04 10*3/MM3 (ref 0–0.2)
BASOPHILS NFR BLD AUTO: 0.6 % (ref 0–1.5)
BILIRUB SERPL-MCNC: 0.2 MG/DL (ref 0.2–1.2)
BUN BLD-MCNC: 8 MG/DL (ref 6–20)
BUN/CREAT SERPL: 17 (ref 7–25)
CALCIUM SPEC-SCNC: 9.5 MG/DL (ref 8.6–10.5)
CHLORIDE SERPL-SCNC: 102 MMOL/L (ref 98–107)
CO2 SERPL-SCNC: 24 MMOL/L (ref 22–29)
CREAT BLD-MCNC: 0.47 MG/DL (ref 0.57–1)
DEPRECATED RDW RBC AUTO: 41.8 FL (ref 37–54)
EOSINOPHIL # BLD AUTO: 0.22 10*3/MM3 (ref 0–0.4)
EOSINOPHIL NFR BLD AUTO: 3.2 % (ref 0.3–6.2)
ERYTHROCYTE [DISTWIDTH] IN BLOOD BY AUTOMATED COUNT: 12.8 % (ref 12.3–15.4)
GFR SERPL CREATININE-BSD FRML MDRD: 148 ML/MIN/1.73
GLOBULIN UR ELPH-MCNC: 3.3 GM/DL
GLUCOSE BLD-MCNC: 83 MG/DL (ref 65–99)
HCT VFR BLD AUTO: 40 % (ref 34–46.6)
HGB BLD-MCNC: 12.9 G/DL (ref 12–15.9)
HOLD SPECIMEN: NORMAL
IMM GRANULOCYTES # BLD AUTO: 0.01 10*3/MM3 (ref 0–0.05)
IMM GRANULOCYTES NFR BLD AUTO: 0.1 % (ref 0–0.5)
LIPASE SERPL-CCNC: 32 U/L (ref 13–60)
LYMPHOCYTES # BLD AUTO: 1.57 10*3/MM3 (ref 0.7–3.1)
LYMPHOCYTES NFR BLD AUTO: 22.8 % (ref 19.6–45.3)
MCH RBC QN AUTO: 28.7 PG (ref 26.6–33)
MCHC RBC AUTO-ENTMCNC: 32.3 G/DL (ref 31.5–35.7)
MCV RBC AUTO: 89.1 FL (ref 79–97)
MONOCYTES # BLD AUTO: 0.56 10*3/MM3 (ref 0.1–0.9)
MONOCYTES NFR BLD AUTO: 8.1 % (ref 5–12)
NEUTROPHILS # BLD AUTO: 4.48 10*3/MM3 (ref 1.4–7)
NEUTROPHILS NFR BLD AUTO: 65.2 % (ref 42.7–76)
NRBC BLD AUTO-RTO: 0 /100 WBC (ref 0–0)
PLATELET # BLD AUTO: 238 10*3/MM3 (ref 140–450)
PMV BLD AUTO: 9.2 FL (ref 6–12)
POTASSIUM BLD-SCNC: 3.9 MMOL/L (ref 3.5–5.2)
PROT SERPL-MCNC: 7.5 G/DL (ref 6–8.5)
RBC # BLD AUTO: 4.49 10*6/MM3 (ref 3.77–5.28)
SODIUM BLD-SCNC: 139 MMOL/L (ref 136–145)
WBC NRBC COR # BLD: 6.88 10*3/MM3 (ref 3.4–10.8)
WHOLE BLOOD HOLD SPECIMEN: NORMAL
WHOLE BLOOD HOLD SPECIMEN: NORMAL

## 2019-04-09 PROCEDURE — 51702 INSERT TEMP BLADDER CATH: CPT

## 2019-04-09 PROCEDURE — 93005 ELECTROCARDIOGRAM TRACING: CPT | Performed by: EMERGENCY MEDICINE

## 2019-04-09 PROCEDURE — 96360 HYDRATION IV INFUSION INIT: CPT

## 2019-04-09 PROCEDURE — 93010 ELECTROCARDIOGRAM REPORT: CPT | Performed by: INTERNAL MEDICINE

## 2019-04-09 PROCEDURE — 80053 COMPREHEN METABOLIC PANEL: CPT | Performed by: STUDENT IN AN ORGANIZED HEALTH CARE EDUCATION/TRAINING PROGRAM

## 2019-04-09 PROCEDURE — 85025 COMPLETE CBC W/AUTO DIFF WBC: CPT | Performed by: STUDENT IN AN ORGANIZED HEALTH CARE EDUCATION/TRAINING PROGRAM

## 2019-04-09 PROCEDURE — 0 DIATRIZOATE MEGLUMINE & SODIUM PER 1 ML: Performed by: EMERGENCY MEDICINE

## 2019-04-09 PROCEDURE — 99284 EMERGENCY DEPT VISIT MOD MDM: CPT

## 2019-04-09 PROCEDURE — 74018 RADEX ABDOMEN 1 VIEW: CPT

## 2019-04-09 PROCEDURE — 74177 CT ABD & PELVIS W/CONTRAST: CPT

## 2019-04-09 PROCEDURE — 25010000002 IOPAMIDOL 61 % SOLUTION: Performed by: EMERGENCY MEDICINE

## 2019-04-09 PROCEDURE — 83690 ASSAY OF LIPASE: CPT | Performed by: STUDENT IN AN ORGANIZED HEALTH CARE EDUCATION/TRAINING PROGRAM

## 2019-04-09 PROCEDURE — 51798 US URINE CAPACITY MEASURE: CPT

## 2019-04-09 RX ORDER — SODIUM CHLORIDE 0.9 % (FLUSH) 0.9 %
10 SYRINGE (ML) INJECTION AS NEEDED
Status: DISCONTINUED | OUTPATIENT
Start: 2019-04-09 | End: 2019-04-09 | Stop reason: HOSPADM

## 2019-04-09 RX ADMIN — IOPAMIDOL 80 ML: 612 INJECTION, SOLUTION INTRAVENOUS at 17:20

## 2019-04-09 RX ADMIN — SODIUM CHLORIDE 1000 ML: 900 INJECTION, SOLUTION INTRAVENOUS at 15:02

## 2019-04-09 RX ADMIN — DIATRIZOATE MEGLUMINE AND DIATRIZOATE SODIUM 30 ML: 660; 100 LIQUID ORAL; RECTAL at 15:15

## 2019-04-09 NOTE — DISCHARGE INSTRUCTIONS
Please call Dr. Acuña tomorrow to set up appointment to be seen outpatient for evaluation of urinary retention.

## 2019-04-09 NOTE — ED PROVIDER NOTES
Subjective   38 year-old F with PMHx significant for cerebral palsy presents from [facility] for evaluation of abdominal pain. She reports diffuse abdominal pain. She has no had a bowel movement in a week. She usually has a BM daily. She denies any rectal pain. She has a inplanted Baclofen pump. She is a resident of [facility].            Review of Systems   Constitutional: Positive for appetite change. Negative for chills, diaphoresis, fatigue and fever.   HENT: Negative for congestion, ear discharge, ear pain, postnasal drip, rhinorrhea and trouble swallowing.    Eyes: Negative for redness and visual disturbance.   Respiratory: Negative for cough, chest tightness and shortness of breath.    Cardiovascular: Negative for chest pain, palpitations and leg swelling.   Gastrointestinal: Positive for abdominal distention, abdominal pain and constipation. Negative for diarrhea, nausea and vomiting.   Genitourinary: Negative for difficulty urinating and dysuria.   Musculoskeletal: Negative.    Skin: Negative.    Neurological: Negative for syncope, weakness, light-headedness and headaches.       Past Medical History:   Diagnosis Date   • Acute vulvitis     Candida   • Allergy     Unspecified, initial encounter   • Amblyopia     OS   • Astigmatism    • Cerebral palsy (CMS/HCC)    • Cheilosis    • Common cold    • Contusion    • Depressive disorder    • Diarrhea    • Encounter for general adult medical examination without abnormal findings    • Encounter for routine adult health examination     Adult health examination      • Fever    • Gastroesophageal reflux disease    • Generalized abdominal pain    • Indeterminate colitis    • Infected insect bite    • Myopia    • Spastic quadriplegic cerebral palsy (CMS/HCC)    • Trouble walking     Walking disability      • Urinary incontinence    • Urinary tract infectious disease    • Worried well        Allergies   Allergen Reactions   • Atropine Unknown (See Comments)      Unknown reaction per patient   • Sulfa Antibiotics Itching       Past Surgical History:   Procedure Laterality Date   • COLONOSCOPY N/A 03/28/2012    hemorrhoids   • COLONOSCOPY N/A 4/23/2018    Procedure: COLONOSCOPY--look TI, bx, hx colitis;  Surgeon: Sreedhar Kirkpatrick MD;  Location: [facility] ENDOSCOPY;  Service: Gastroenterology   • ENDOSCOPY N/A 03/28/2012    gastritis   • INJECTION OF MEDICATION  02/23/2016    Kenalog   • TOTAL ABDOMINAL HYSTERECTOMY WITH SALPINGO OOPHORECTOMY N/A 03/24/2005   • UPPER GASTROINTESTINAL ENDOSCOPY  03/28/2012       Family History   Problem Relation Age of Onset   • Coronary artery disease Other    • Diabetes Other    • Hypertension Other    • Stroke Other        Social History     Socioeconomic History   • Marital status: Single     Spouse name: Not on file   • Number of children: Not on file   • Years of education: Not on file   • Highest education level: Not on file   Tobacco Use   • Smoking status: Never Smoker   • Smokeless tobacco: Never Used   Substance and Sexual Activity   • Alcohol use: No   • Drug use: No   • Sexual activity: Defer           Objective   Physical Exam   Constitutional: She is oriented to person, place, and time. She appears well-developed and well-nourished. She does not appear ill. No distress.   HENT:   Head: Normocephalic and atraumatic.   Mouth/Throat: Oropharynx is clear and moist.   Eyes: EOM are normal. Pupils are equal, round, and reactive to light.   Cardiovascular: Normal rate, regular rhythm, normal heart sounds and intact distal pulses.   Pulmonary/Chest: Effort normal and breath sounds normal.   Abdominal: She exhibits distension. Bowel sounds are increased. There is generalized tenderness.   Abdomen tense, has bulging in RLQ reportedly Baclofen pump.    Neurological: She is alert and oriented to person, place, and time.   Skin: Skin is warm. Capillary refill takes less than 2 seconds.       Procedures           ED Course  ED Course as of Apr  09 1836   Tue Apr 09, 2019   1829 CBC CMP unremarkable. XR abdomen showing multiple loops of bowel with gas but no distention. CT abdomen with oral and IV contrast showing: The urinary bladder is markedly dilated probably secondary to  neurogenic bladder. The bladder is more severely dilated in comparison to prior CT exam. There are also now bilateral hydronephrotic changes in both kidneys secondary to the dilated urinary bladder. Numerous small nonobstructive calculi both kidneys.  [CZ]   1829 CBC CMP unremarkable. XR abdomen showing multiple loops of bowel with gas but no distention. CT abdomen with oral and IV contrast showing:   [CZ]   1831 Nassar placed and relieved of 1100 mL of urine.   [CZ]   1831 Called Dr. Acuña who is expecting call tomorrow for appointment to see outpatient.   [CZ]      ED Course User Index  [CZ] Glynn Wilkerson MD                  [facility]  Number of Diagnoses or Management Options  Generalized abdominal pain:   Neurogenic bladder:      Amount and/or Complexity of Data Reviewed  Clinical lab tests: ordered  Tests in the radiology section of CPT®: reviewed and ordered  Tests in the medicine section of CPT®: reviewed  Obtain history from someone other than the patient: yes  Discuss the patient with other providers: yes  Independent visualization of images, tracings, or specimens: yes    Risk of Complications, Morbidity, and/or Mortality  Presenting problems: moderate  Diagnostic procedures: moderate  Management options: moderate    Patient Progress  Patient progress: stable        Final diagnoses:   Generalized abdominal pain   Neurogenic bladder            Glynn Wilkerson MD  Resident  04/09/19 [id number]

## 2019-04-09 NOTE — ED NOTES
Urinary catheter placed, approximate 1100ml of urine drained from bladder to drainage system.     Marge Gómez, AUTUMN  04/09/19 6929

## 2019-04-10 NOTE — ED NOTES
Patient left with mccormack catheter, went over catheter care with caregiver.  Patient is to follow up with dr rodríguez.     Marge Gómez, RN  04/09/19 [id number]

## 2019-04-12 ENCOUNTER — PREP FOR SURGERY (OUTPATIENT)
Dept: OTHER | Facility: HOSPITAL | Age: 39
End: 2019-04-12

## 2019-04-12 DIAGNOSIS — N13.30 HYDRONEPHROSIS: Primary | ICD-10-CM

## 2019-04-12 RX ORDER — LEVOFLOXACIN 5 MG/ML
500 INJECTION, SOLUTION INTRAVENOUS ONCE
Status: CANCELLED | OUTPATIENT
Start: 2019-04-19

## 2019-04-16 ENCOUNTER — HOSPITAL ENCOUNTER (EMERGENCY)
Facility: HOSPITAL | Age: 39
Discharge: HOME | End: 2019-04-16
Attending: FAMILY MEDICINE | Admitting: FAMILY MEDICINE

## 2019-04-16 VITALS
SYSTOLIC BLOOD PRESSURE: 137 MMHG | OXYGEN SATURATION: 100 % | DIASTOLIC BLOOD PRESSURE: 84 MMHG | TEMPERATURE: 98 F | HEART RATE: 79 BPM | RESPIRATION RATE: 18 BRPM

## 2019-04-16 DIAGNOSIS — N30.01 ACUTE CYSTITIS WITH HEMATURIA: Primary | ICD-10-CM

## 2019-04-16 LAB
BACTERIA UR QL AUTO: ABNORMAL /HPF
BILIRUB UR QL STRIP: NEGATIVE
CLARITY UR: ABNORMAL
COLOR UR: YELLOW
GLUCOSE UR STRIP-MCNC: NEGATIVE MG/DL
HGB UR QL STRIP.AUTO: ABNORMAL
HYALINE CASTS UR QL AUTO: ABNORMAL /LPF
KETONES UR QL STRIP: NEGATIVE
LEUKOCYTE ESTERASE UR QL STRIP.AUTO: ABNORMAL
NITRITE UR QL STRIP: POSITIVE
PH UR STRIP.AUTO: 6 [PH] (ref 5–9)
PROT UR QL STRIP: ABNORMAL
RBC # UR: ABNORMAL /HPF
REF LAB TEST METHOD: ABNORMAL
SP GR UR STRIP: 1.03 (ref 1–1.03)
SQUAMOUS #/AREA URNS HPF: ABNORMAL /HPF
UROBILINOGEN UR QL STRIP: ABNORMAL
WBC UR QL AUTO: ABNORMAL /HPF

## 2019-04-16 PROCEDURE — 87186 SC STD MICRODIL/AGAR DIL: CPT | Performed by: FAMILY MEDICINE

## 2019-04-16 PROCEDURE — 81001 URINALYSIS AUTO W/SCOPE: CPT

## 2019-04-16 PROCEDURE — 51702 INSERT TEMP BLADDER CATH: CPT

## 2019-04-16 PROCEDURE — 87086 URINE CULTURE/COLONY COUNT: CPT | Performed by: FAMILY MEDICINE

## 2019-04-16 PROCEDURE — 99283 EMERGENCY DEPT VISIT LOW MDM: CPT

## 2019-04-16 PROCEDURE — 87077 CULTURE AEROBIC IDENTIFY: CPT | Performed by: FAMILY MEDICINE

## 2019-04-16 RX ORDER — CEPHALEXIN 500 MG/1
500 CAPSULE ORAL 4 TIMES DAILY
Qty: 28 CAPSULE | Refills: 0 | Status: SHIPPED | OUTPATIENT
Start: 2019-04-16 | End: 2019-04-17

## 2019-04-16 RX ORDER — CEPHALEXIN 500 MG/1
500 CAPSULE ORAL ONCE
Status: COMPLETED | OUTPATIENT
Start: 2019-04-16 | End: 2019-04-16

## 2019-04-16 RX ORDER — ONDANSETRON 4 MG/1
4 TABLET, ORALLY DISINTEGRATING ORAL EVERY 6 HOURS PRN
Qty: 10 TABLET | Refills: 0 | Status: SHIPPED | OUTPATIENT
Start: 2019-04-16 | End: 2019-04-17

## 2019-04-16 RX ADMIN — CEPHALEXIN 500 MG: 500 CAPSULE ORAL at 23:11

## 2019-04-17 ENCOUNTER — APPOINTMENT (OUTPATIENT)
Dept: PREADMISSION TESTING | Facility: HOSPITAL | Age: 39
End: 2019-04-17

## 2019-04-17 VITALS
SYSTOLIC BLOOD PRESSURE: 126 MMHG | WEIGHT: 145.06 LBS | RESPIRATION RATE: 12 BRPM | HEART RATE: 91 BPM | OXYGEN SATURATION: 99 % | DIASTOLIC BLOOD PRESSURE: 70 MMHG | BODY MASS INDEX: 29.3 KG/M2

## 2019-04-17 RX ORDER — METOCLOPRAMIDE 10 MG/1
10 TABLET ORAL DAILY
COMMUNITY
End: 2020-03-04

## 2019-04-17 RX ORDER — SOLIFENACIN SUCCINATE 10 MG/1
10 TABLET, FILM COATED ORAL DAILY
COMMUNITY
End: 2020-02-06

## 2019-04-17 RX ORDER — CEPHALEXIN 500 MG/1
500 CAPSULE ORAL 4 TIMES DAILY
COMMUNITY
End: 2019-04-24

## 2019-04-17 RX ORDER — IBUPROFEN 200 MG
TABLET ORAL
COMMUNITY
End: 2020-01-22 | Stop reason: ALTCHOICE

## 2019-04-17 RX ORDER — SODIUM CHLORIDE, SODIUM GLUCONATE, SODIUM ACETATE, POTASSIUM CHLORIDE, AND MAGNESIUM CHLORIDE 526; 502; 368; 37; 30 MG/100ML; MG/100ML; MG/100ML; MG/100ML; MG/100ML
1000 INJECTION, SOLUTION INTRAVENOUS CONTINUOUS
Status: CANCELLED | OUTPATIENT
Start: 2019-04-19

## 2019-04-17 RX ORDER — FLUOXETINE HYDROCHLORIDE 40 MG/1
40 CAPSULE ORAL DAILY
COMMUNITY
End: 2020-03-04

## 2019-04-17 RX ORDER — ACETAMINOPHEN 325 MG/1
325 TABLET ORAL EVERY 6 HOURS PRN
COMMUNITY
End: 2019-07-31 | Stop reason: SDUPTHER

## 2019-04-18 LAB — BACTERIA SPEC AEROBE CULT: ABNORMAL

## 2019-04-19 ENCOUNTER — ANESTHESIA (OUTPATIENT)
Dept: PERIOP | Facility: HOSPITAL | Age: 39
End: 2019-04-19

## 2019-04-19 ENCOUNTER — ANESTHESIA EVENT (OUTPATIENT)
Dept: PERIOP | Facility: HOSPITAL | Age: 39
End: 2019-04-19

## 2019-04-19 ENCOUNTER — HOSPITAL ENCOUNTER (OUTPATIENT)
Facility: HOSPITAL | Age: 39
Setting detail: HOSPITAL OUTPATIENT SURGERY
Discharge: HOME | End: 2019-04-19
Attending: UROLOGY | Admitting: UROLOGY

## 2019-04-19 VITALS
OXYGEN SATURATION: 97 % | BODY MASS INDEX: 29.3 KG/M2 | TEMPERATURE: 97.1 F | WEIGHT: 145.06 LBS | RESPIRATION RATE: 18 BRPM | SYSTOLIC BLOOD PRESSURE: 123 MMHG | DIASTOLIC BLOOD PRESSURE: 82 MMHG | HEART RATE: 109 BPM

## 2019-04-19 DIAGNOSIS — N13.30 HYDRONEPHROSIS: ICD-10-CM

## 2019-04-19 PROCEDURE — 25010000002 LEVOFLOXACIN PER 250 MG: Performed by: UROLOGY

## 2019-04-19 PROCEDURE — 25010000002 PROPOFOL 10 MG/ML EMULSION: Performed by: NURSE ANESTHETIST, CERTIFIED REGISTERED

## 2019-04-19 RX ORDER — LEVOFLOXACIN 5 MG/ML
500 INJECTION, SOLUTION INTRAVENOUS ONCE
Status: COMPLETED | OUTPATIENT
Start: 2019-04-19 | End: 2019-04-19

## 2019-04-19 RX ORDER — SODIUM CHLORIDE, SODIUM GLUCONATE, SODIUM ACETATE, POTASSIUM CHLORIDE, AND MAGNESIUM CHLORIDE 526; 502; 368; 37; 30 MG/100ML; MG/100ML; MG/100ML; MG/100ML; MG/100ML
1000 INJECTION, SOLUTION INTRAVENOUS CONTINUOUS
Status: DISCONTINUED | OUTPATIENT
Start: 2019-04-19 | End: 2019-04-19 | Stop reason: HOSPADM

## 2019-04-19 RX ORDER — ONDANSETRON 2 MG/ML
4 INJECTION INTRAMUSCULAR; INTRAVENOUS ONCE AS NEEDED
Status: DISCONTINUED | OUTPATIENT
Start: 2019-04-19 | End: 2019-04-19 | Stop reason: HOSPADM

## 2019-04-19 RX ORDER — OXYCODONE AND ACETAMINOPHEN 7.5; 325 MG/1; MG/1
1-2 TABLET ORAL EVERY 4 HOURS PRN
Qty: 10 TABLET | Refills: 0 | Status: SHIPPED | OUTPATIENT
Start: 2019-04-19 | End: 2019-05-22

## 2019-04-19 RX ORDER — PROPOFOL 10 MG/ML
VIAL (ML) INTRAVENOUS AS NEEDED
Status: DISCONTINUED | OUTPATIENT
Start: 2019-04-19 | End: 2019-04-19 | Stop reason: SURG

## 2019-04-19 RX ORDER — LIDOCAINE HYDROCHLORIDE 20 MG/ML
INJECTION, SOLUTION INFILTRATION; PERINEURAL AS NEEDED
Status: DISCONTINUED | OUTPATIENT
Start: 2019-04-19 | End: 2019-04-19 | Stop reason: SURG

## 2019-04-19 RX ADMIN — PROPOFOL 30 MG: 10 INJECTION, EMULSION INTRAVENOUS at 15:29

## 2019-04-19 RX ADMIN — LEVOFLOXACIN 500 MG: 5 INJECTION, SOLUTION INTRAVENOUS at 15:27

## 2019-04-19 RX ADMIN — PROPOFOL 10 MG: 10 INJECTION, EMULSION INTRAVENOUS at 15:38

## 2019-04-19 RX ADMIN — PROPOFOL 30 MG: 10 INJECTION, EMULSION INTRAVENOUS at 15:35

## 2019-04-19 RX ADMIN — PROPOFOL 30 MG: 10 INJECTION, EMULSION INTRAVENOUS at 15:32

## 2019-04-19 RX ADMIN — LIDOCAINE HYDROCHLORIDE 60 MG: 20 INJECTION, SOLUTION INFILTRATION; PERINEURAL at 15:26

## 2019-04-19 RX ADMIN — PROPOFOL 40 MG: 10 INJECTION, EMULSION INTRAVENOUS at 15:26

## 2019-04-19 RX ADMIN — PROPOFOL 20 MG: 10 INJECTION, EMULSION INTRAVENOUS at 15:44

## 2019-04-19 RX ADMIN — SODIUM CHLORIDE, SODIUM GLUCONATE, SODIUM ACETATE, POTASSIUM CHLORIDE, AND MAGNESIUM CHLORIDE 1000 ML: 526; 502; 368; 37; 30 INJECTION, SOLUTION INTRAVENOUS at 13:46

## 2019-04-19 RX ADMIN — PROPOFOL 10 MG: 10 INJECTION, EMULSION INTRAVENOUS at 15:41

## 2019-04-19 NOTE — H&P
UROLOGY [facility] History and Physical  BEAU CARDONA  38-year-old; :1980;single;female  [address];[place] [address]  Ins: 1) MEDICARE  MRN:[id number]  KAREN HUMPHREY MD  UROLOGY [facility]  2019 11:16 AM  Allergies  sulfa drug Unknown  Current Medications  acetaminophen 325 mg oral tablet  Reglan 10 mg oral tablet  Vitamin D3 10,000 intl units oral capsule  Cholestyramine Light 4 g/5.7 g oral powder for  reconstitution  Zofran 4 mg oral tablet  omeprazole 20 mg oral delayed release capsule  albuterol 1.25 mg/3 mL (0.042%) inhalation  solution  estradiol 1 mg oral tablet  Mucinex 600 mg oral tablet, extended release  VESIcare 10 mg oral tablet  FLUoxetine 40 mg oral capsule  * Medications Reconciled  Medical History  None  Has not had colonoscopy.  Surgical History  NONE NOTED  Obstetric/Gynecologic History  She no longer has menstrual periods.  Psychiatric History  Patient is generally satisfied with life. No  depression, anxiety or thoughts of suicide.  Family History  None  Mother Alive  Social History  Patient has never smoked and doesn't drink.  Patient is disabled and single.  Imm/Inj/Office Meds History Comments  Patient has had influenza vaccination for this  season.  Has not had pneumonia vaccine.  Chief Complaint  retention of urine  History of Present Illness  BEAU CARDONA is a 38-year-old female here for evaluation. She has a history of CP,  complains of abdominal pain, constipation, difficulty voiding. She often voids 1-2 times  only per day and does not feels she fully empties. Nassar in place now for retention of  urine.  Location: bladder  Severity: moderate  Onset / Duration: greater than 1 year  Timing: daily  Context: constipation  Modifying factors: Nassar anchored  Associated signs and symptoms: difficulty voiding is chronic worsened by constipation  Review of Systems  Eyes: Denies: blurry vision, pain in the eyes and double vision  ENMT:  Denies: ear pain, sore throat and sinus problems  Cardiovascular: Denies: chest pain, varicose veins, angina and syncope  Respiratory: Denies: shortness of breath, wheezing and frequent cough  Gastrointestinal: Reports: abdominal pain; Denies: nausea, vomiting, indigestion and  heartburn  Genitourinary: Reports: other symptoms (see HPI)  Musculoskeletal: Denies: joint pain, neck pain and back pain  Integumentary: Denies: skin rash, boils and persistent itch  Neurological: Denies: tremors, dizzy spells and numbness / tingling  Psychiatric: Reports: generally satisfied with life; Denies: depression, suicidal  ideation and anxiety  Endocrine: Denies: Excessive thirst, cold intolerance, heat intolerance and  tired/sluggish  Hematologic/Lymphatic: Denies: swollen glands and blood clotting problem  Allergic/Immunologic: Denies: hayfever  Constitutional: Denies: fever, chills and weight loss  Vital Signs  PATIENT UNABLE TO STAND ON SCALE. UNSURE OF HEIGHT.  4/12/2019 11:16:00 AM  BP: 125/96 (mmHg) Heart Rate: 86 (/min)  Resp Rate: 18 (/min) Never smoker  Exam  General appearance: The patient appears well developed and well nourished, in no  apparent acute distress.  Examination of pupils and irises: No redness or drainage noted.  Assessment of hearing: Responds to verbal command.  Examination of neck: Neck appears supple. No masses noted.  Assessment of respiratory effort: Respiratory effort appears normal. No apparent  distress.  Auscultation of lungs: Chest exam is clear to auscultation.  Auscultation of heart: Normal rate and rhythm noted.  Examination of Extremities for edema and/or varicosities: none  Examination of gait and station: Normal gait and stature.  Head and neck (Assessment of range of motion): Adequate ROM noted in all extremities.  Head and neck (Assessment of stability): Wheelchair bound.  Head and neck (Assessment of muscle strength and tone): Muscle strength and tone  normal for age.  Inspection of  skin and subcutaneous tissue: Exam of the skin is within normal limits.  The color and skin turgor are normal.  No lesions, bruises, rashes, or jaundice.  Orientation to time, place and person: Oriented to person, place, and time.  Mood and affect: Normal mood and affect.  Data Review  Medications and chart reviewed. History and physical form/ROS reviewed and new  form completed today.  Assessment - Retention of urine  DX:  Retention of urine  SNO: [id number], ICD-9: 788.20, ICD-10: R33.9  Assessment Notes:  suspect NGB  Plan  Plan Notes:  Cystoscopy.   Continue bowel regimen  Discussion:  Discussed my findings and plan of action and reasoning behind decision making. All  questions were answered.RISKS AND BENEFITS OF PROCEDURE DISCUSSED WITH PATIENT WHO WISEHS TO PROCEED.  Instructions:   Patient is instructed to call with any problems. Patient is instructed to call if the  condition worsens. Patient is instructed to call with any changes in condition. Patient  is instructed to call if she has any intractable pain, fever, chills, nausea, or vomiting.

## 2019-04-19 NOTE — OP NOTE
CYSTOSCOPY  Procedure Note    Kiley Ledesma  4/19/2019    Pre-op Diagnosis:   Hydronephrosis [N13.30]    Post-op Diagnosis:     Post-Op Diagnosis Codes:     * Hydronephrosis [N13.30]      Procedure(s):  CYSTOSCOPY    Surgeon(s):  Cecilio Acuña MD    Anesthesia: Choice    Staff:   Circulator: Glenny Fraser RN; Kiley Harris RN  Scrub Person: Faith Mcelroy  Assistant: Hawa Shaikh CSA    Estimated Blood Loss: minimal    Specimens:                None      Drains:   Urethral Catheter Double-lumen 16 Fr. (Active)       Findings: Catheter cystitis    Complications: None    Indications: Hematuria and infection    Description of Procedure: Patient brought to cystoscopy placed in dorsolithotomy position.  Sterile prep and drape genitalia.  Passed a 22 cystoscope in the bladder.  Hemorrhagic cystitis was noted no stones or tumors.  Bladder was drained scope was removed Nassar catheter re-anchored 16 [place] 10 cc placed in balloon    Cecilio Acuña MD     Date: 4/19/2019  Time: 6:27 PM

## 2019-04-19 NOTE — ANESTHESIA PREPROCEDURE EVALUATION
Anesthesia Evaluation     Patient summary reviewed   NPO Solid Status: > 8 hours  NPO Liquid Status: > 8 hours           Airway   Mallampati: II  TM distance: >3 FB  Neck ROM: full  Possible difficult intubation  Dental - normal exam     Pulmonary - normal exam   (+) recent URI,   Cardiovascular - normal exam  Exercise tolerance: poor (<4 METS)    NYHA Classification: IV        Neuro/Psych  (+) psychiatric history Depression,       ROS Comment: Cerebral palsy noted in chart.  GI/Hepatic/Renal/Endo    (+)  GERD,      Musculoskeletal (-) negative ROS    Abdominal    Substance History - negative use     OB/GYN          Other                          Anesthesia Plan    ASA 3     MAC     intravenous induction   Anesthetic plan, all risks, benefits, and alternatives have been provided, discussed and informed consent has been obtained with: patient.

## 2019-04-19 NOTE — ANESTHESIA POSTPROCEDURE EVALUATION
Patient: Kiley Ledesma    Procedure Summary     Date:  04/19/19 Room / Location:  [facility] OR 06 / [facility] OR    Anesthesia Start:  1522 Anesthesia Stop:  1556    Procedure:  CYSTOSCOPY (N/A Urethra) Diagnosis:       Hydronephrosis      (Hydronephrosis [N13.30])    Surgeon:  Ceciilo Acuña MD Provider:  Fly Carey MD    Anesthesia Type:  MAC ASA Status:  3          Anesthesia Type: MAC  Last vitals  BP   134/83 (04/19/19 1334)   Temp   98.2 °F (36.8 °C) (04/19/19 1334)   Pulse   92 (04/19/19 1334)   Resp   14 (04/19/19 1334)     SpO2   95 % (04/19/19 1334)     Post Anesthesia Care and Evaluation    Patient location during evaluation: PHASE II  Level of consciousness: awake  Pain score: 0  Pain management: adequate  Airway patency: patent  Anesthetic complications: No anesthetic complications  PONV Status: none  Cardiovascular status: acceptable and hemodynamically stable  Respiratory status: acceptable and spontaneous ventilation  Hydration status: acceptable

## 2019-04-24 ENCOUNTER — OFFICE VISIT (OUTPATIENT)
Dept: FAMILY MEDICINE CLINIC | Facility: CLINIC | Age: 39
End: 2019-04-24

## 2019-04-24 VITALS
SYSTOLIC BLOOD PRESSURE: 110 MMHG | HEART RATE: 75 BPM | BODY MASS INDEX: 29.23 KG/M2 | DIASTOLIC BLOOD PRESSURE: 68 MMHG | HEIGHT: 59 IN | WEIGHT: 145 LBS | OXYGEN SATURATION: 99 %

## 2019-04-24 DIAGNOSIS — R33.9 URINARY RETENTION: Primary | ICD-10-CM

## 2019-04-24 DIAGNOSIS — G80.0 SPASTIC QUADRIPLEGIC CEREBRAL PALSY (HCC): Chronic | ICD-10-CM

## 2019-04-24 DIAGNOSIS — K59.09 OTHER CONSTIPATION: ICD-10-CM

## 2019-04-24 PROCEDURE — 99214 OFFICE O/P EST MOD 30 MIN: CPT | Performed by: GENERAL PRACTICE

## 2019-04-24 RX ORDER — CEPHALEXIN 250 MG/1
250 CAPSULE ORAL 2 TIMES DAILY
COMMUNITY
End: 2019-05-06

## 2019-04-24 RX ORDER — DOCUSATE SODIUM 100 MG/1
100 CAPSULE, LIQUID FILLED ORAL 2 TIMES DAILY
Qty: 180 CAPSULE | Refills: 3 | Status: SHIPPED | OUTPATIENT
Start: 2019-04-24 | End: 2019-08-01 | Stop reason: SDUPTHER

## 2019-04-25 RX ORDER — ALBUTEROL SULFATE 2.5 MG/3ML
2.5 SOLUTION RESPIRATORY (INHALATION) EVERY 4 HOURS PRN
Qty: 720 ML | Refills: 3 | Status: SHIPPED | OUTPATIENT
Start: 2019-04-25 | End: 2019-07-31 | Stop reason: SDUPTHER

## 2019-04-29 ENCOUNTER — HOSPITAL ENCOUNTER (EMERGENCY)
Facility: HOSPITAL | Age: 39
Discharge: HOME | End: 2019-04-29
Attending: EMERGENCY MEDICINE | Admitting: EMERGENCY MEDICINE

## 2019-04-29 VITALS
SYSTOLIC BLOOD PRESSURE: 122 MMHG | BODY MASS INDEX: 26.69 KG/M2 | WEIGHT: 145.06 LBS | HEIGHT: 62 IN | TEMPERATURE: 98 F | HEART RATE: 84 BPM | RESPIRATION RATE: 18 BRPM | OXYGEN SATURATION: 99 % | DIASTOLIC BLOOD PRESSURE: 78 MMHG

## 2019-04-29 DIAGNOSIS — H10.9 CONJUNCTIVITIS OF RIGHT EYE, UNSPECIFIED CONJUNCTIVITIS TYPE: ICD-10-CM

## 2019-04-29 DIAGNOSIS — T83.9XXA FOLEY CATHETER PROBLEM, INITIAL ENCOUNTER (HCC): Primary | ICD-10-CM

## 2019-04-29 PROCEDURE — 99283 EMERGENCY DEPT VISIT LOW MDM: CPT

## 2019-04-29 PROCEDURE — 51798 US URINE CAPACITY MEASURE: CPT

## 2019-04-29 RX ORDER — POLYMYXIN B SULFATE AND TRIMETHOPRIM 1; 10000 MG/ML; [USP'U]/ML
1 SOLUTION OPHTHALMIC
Status: DISCONTINUED | OUTPATIENT
Start: 2019-04-30 | End: 2019-04-30 | Stop reason: HOSPADM

## 2019-04-29 RX ADMIN — POLYMYXIN B SULFATE AND TRIMETHOPRIM SULFATE 1 DROP: 10000; 1 SOLUTION/ DROPS OPHTHALMIC at 23:12

## 2019-05-03 ENCOUNTER — TELEPHONE (OUTPATIENT)
Dept: FAMILY MEDICINE CLINIC | Facility: CLINIC | Age: 39
End: 2019-05-03

## 2019-05-03 DIAGNOSIS — N39.0 UTI (URINARY TRACT INFECTION), UNCOMPLICATED: Primary | ICD-10-CM

## 2019-05-06 ENCOUNTER — OFFICE VISIT (OUTPATIENT)
Dept: GASTROENTEROLOGY | Facility: CLINIC | Age: 39
End: 2019-05-06

## 2019-05-06 VITALS — DIASTOLIC BLOOD PRESSURE: 62 MMHG | SYSTOLIC BLOOD PRESSURE: 118 MMHG | HEART RATE: 75 BPM

## 2019-05-06 DIAGNOSIS — R15.9 INCONTINENCE OF FECES WITH FECAL URGENCY: Primary | ICD-10-CM

## 2019-05-06 DIAGNOSIS — K59.00 CONSTIPATION, UNSPECIFIED CONSTIPATION TYPE: ICD-10-CM

## 2019-05-06 DIAGNOSIS — R15.2 INCONTINENCE OF FECES WITH FECAL URGENCY: Primary | ICD-10-CM

## 2019-05-06 DIAGNOSIS — K21.00 GASTROESOPHAGEAL REFLUX DISEASE WITH ESOPHAGITIS: ICD-10-CM

## 2019-05-06 DIAGNOSIS — R93.3 ABNORMAL FINDING ON GI TRACT IMAGING: ICD-10-CM

## 2019-05-06 DIAGNOSIS — Z87.19 HISTORY OF COLITIS: ICD-10-CM

## 2019-05-06 PROCEDURE — 99214 OFFICE O/P EST MOD 30 MIN: CPT | Performed by: PHYSICIAN ASSISTANT

## 2019-05-06 RX ORDER — DEXTROSE AND SODIUM CHLORIDE 5; .45 G/100ML; G/100ML
30 INJECTION, SOLUTION INTRAVENOUS CONTINUOUS PRN
Status: CANCELLED | OUTPATIENT
Start: 2019-06-07

## 2019-05-06 NOTE — PATIENT INSTRUCTIONS

## 2019-05-06 NOTE — PROGRESS NOTES
Chief Complaint   Patient presents with   • Diarrhea   • Hx Of Colitis       ENDO PROCEDURE ORDERED: COLON change bowel, now severe constipation, hx colitis    Subjective    Kiley Ledesma is a 38 y.o. female. she is here today for follow-up.    History of Present Illness    The patient is seen on a recheck of her abdominal pain, diarrhea, GERD, history of colitis. Last seen 03/25/2019. At that time I discontinued the Bentyl because they did not feel it was helping. She has still not had an evaluation with [facility] for possible pelvic floor dysfunction. Now, apparently, she is having much more constipation. She had seen Dr. Stoddard who discontinued her Questran and gave her Linzess. Apparently it is not particularly helping. She does have Reglan and Prilosec. She denied nausea or vomiting, she states she does not have a lot of sensation in her lower abdomen. She is accompanied by an aid. Her weight appears to be stable but she cannot stand on the scale. Last colonoscopy was on 04/23/2018.    The patient had studies on 04/09/2019. CT scan of the abdomen and pelvis with contrast showed normal appearing liver and gallbladder, bilateral renal findings, fecal impaction with dilated large bowel loops proximal to the distal colon. A KUB showed pain pump, she had a lot of air in her colon, normal lipase, CBC, CMP. She had a UTI with Pseudomonas and white cells on 04/16/2019.    ASSESSMENT/PLAN: Patient with chronic GERD, previous history of colitis with diarrhea and fecal incontinence, now she is having constipation with impaction. She is scheduled for physical therapy evaluation in 2 weeks. Recommend she continue to have that evaluation. Because of her significant change in symptoms and previous colitis, and the risk for possible stricture I recommended a colonoscopy to evaluate. I did review the films of her imaging with the patient in the room as well as the aid. We will see her in follow up  after the above, further pending clinical course and the results of the above.               The following portions of the patient's history were reviewed and updated as appropriate:   Past Medical History:   Diagnosis Date   • Acute vulvitis     Candida   • Allergy     Unspecified, initial encounter   • Amblyopia     OS   • Astigmatism    • Cerebral palsy (CMS/HCC)    • Cheilosis    • Common cold    • Contusion    • Depressive disorder    • Diarrhea    • Encounter for general adult medical examination without abnormal findings    • Encounter for routine adult health examination     Adult health examination      • Fever    • Gastroesophageal reflux disease    • Generalized abdominal pain    • Indeterminate colitis    • Infected insect bite    • Myopia    • Spastic quadriplegic cerebral palsy (CMS/HCC)    • Trouble walking     Walking disability      • Urinary incontinence    • Urinary tract infectious disease    • Wears glasses    • Worried well      Past Surgical History:   Procedure Laterality Date   • COLONOSCOPY N/A 03/28/2012    hemorrhoids   • COLONOSCOPY N/A 4/23/2018    Procedure: COLONOSCOPY--look TI, bx, hx colitis;  Surgeon: Sreedhar Kirkpatrick MD;  Location: [facility] ENDOSCOPY;  Service: Gastroenterology   • CYSTOSCOPY N/A 4/19/2019    Procedure: CYSTOSCOPY;  Surgeon: Cecilio Acuña MD;  Location: [facility] OR;  Service: Urology   • ENDOSCOPY N/A 03/28/2012    gastritis   • INJECTION OF MEDICATION  02/23/2016    Kenalog   • TOTAL ABDOMINAL HYSTERECTOMY WITH SALPINGO OOPHORECTOMY N/A 03/24/2005   • UPPER GASTROINTESTINAL ENDOSCOPY  03/28/2012     Family History   Problem Relation Age of Onset   • Coronary artery disease Other    • Diabetes Other    • Hypertension Other    • Stroke Other      OB History     No data available        Allergies   Allergen Reactions   • Atropine Unknown (See Comments)     Unknown reaction per patient   • Ciprofloxacin Other (See Comments)     seizure   • Sulfa Antibiotics Itching      Social History     Socioeconomic History   • Marital status: Single     Spouse name: Not on file   • Number of children: Not on file   • Years of education: Not on file   • Highest education level: Not on file   Tobacco Use   • Smoking status: Never Smoker   • Smokeless tobacco: Never Used   Substance and Sexual Activity   • Alcohol use: No   • Drug use: No   • Sexual activity: Defer       Current Outpatient Medications:   •  acetaminophen (TYLENOL) 325 MG tablet, Take 325 mg by mouth Every 6 (Six) Hours As Needed for Mild Pain  (1-2 tabs)., Disp: , Rfl:   •  albuterol (PROVENTIL) (2.5 MG/3ML) 0.083% nebulizer solution, Take 2.5 mg by nebulization Every 4 (Four) Hours As Needed for Wheezing., Disp: 720 mL, Rfl: 3  •  BACLOFEN IT, Has a baclofen pump, Disp: , Rfl:   •  Cholecalciferol 1000 units capsule, Take 1,000 Units by mouth Daily., Disp: , Rfl:   •  docusate sodium (COLACE) 100 MG capsule, Take 1 capsule by mouth 2 (Two) Times a Day., Disp: 180 capsule, Rfl: 3  •  ESTRADIOL PO, Take 1 mg by mouth Daily., Disp: , Rfl:   •  FLUoxetine (PROZAC) 40 MG capsule, Take 40 mg by mouth Daily., Disp: , Rfl:   •  fluticasone (FLONASE) 50 MCG/ACT nasal spray, 2 sprays into each nostril Daily., Disp: , Rfl:   •  guaiFENesin (MUCINEX) 600 MG 12 hr tablet, Take 600 mg by mouth 2 (Two) Times a Day As Needed., Disp: , Rfl:   •  linaclotide (LINZESS) 145 MCG capsule capsule, Take 1 capsule by mouth Every Morning Before Breakfast., Disp: 30 capsule, Rfl: 5  •  metoclopramide (REGLAN) 10 MG tablet, Take 10 mg by mouth Daily., Disp: , Rfl:   •  Misc. Devices (WHEELCHAIR) misc, As directed, Disp: , Rfl:   •  mupirocin (BACTROBAN) 2 % ointment, Apply  topically 3 (Three) Times a Day., Disp: 22 g, Rfl: 0  •  neomycin-bacitracin-polymyxin (NEOSPORIN) 5-400-5000 ointment, Apply  topically to the appropriate area as directed. EVERY 8 HOURS PRN, Disp: , Rfl:   •  NON FORMULARY, Daily As Needed. Nyst/bacit/Zno/hc 1%, Disp: , Rfl:   •   Omeprazole (PRILOSEC PO), Take 20 mg by mouth Daily. ON HOLD TILL KEFLEX COMPLETED, Disp: , Rfl:   •  ondansetron ODT (ZOFRAN-ODT) 8 MG disintegrating tablet, Take 1 tablet by mouth Every 8 (Eight) Hours As Needed for Nausea., Disp: 30 tablet, Rfl: 5  •  oxyCODONE-acetaminophen (PERCOCET) 7.5-325 MG per tablet, Take 1-2 tablets by mouth Every 4 (Four) Hours As Needed (Pain)., Disp: 10 tablet, Rfl: 0  •  solifenacin (VESICARE) 10 MG tablet, Take 10 mg by mouth Daily., Disp: , Rfl:   •  polyethylene glycol (GoLYTELY) 236 g solution, As directed per instruction sheet for colonoscopy, Disp: 4000 mL, Rfl: 0  Review of Systems  Review of Systems       Objective    /62 (BP Location: Left arm)   Pulse 75   LMP  (LMP Unknown)   Physical Exam   Constitutional: She is oriented to person, place, and time. She appears well-developed and well-nourished. No distress.   [facility] [place]   HENT:   Head: Normocephalic and atraumatic.   Eyes: EOM are normal. Pupils are equal, round, and reactive to light.   Neck: Normal range of motion.   Cardiovascular: Normal rate, regular rhythm and normal heart sounds.   Pulmonary/Chest: Effort normal and breath sounds normal.   Abdominal: Soft. Bowel sounds are normal. She exhibits no shifting dullness, no distension, no abdominal bruit, no ascites and no mass. There is no hepatosplenomegaly. There is tenderness. There is no rigidity, no rebound, no guarding and no CVA tenderness. No hernia. Hernia confirmed negative in the ventral area.   Diffuse, pain pump in RLQ   Musculoskeletal: Normal range of motion.   Neurological: She is alert and oriented to person, place, and time.   Skin: Skin is warm and dry.   Psychiatric: She has a normal mood and affect. Her behavior is normal. Judgment and thought content normal.   Nursing note and vitals reviewed.    Assessment/Plan      1. Incontinence of feces with fecal urgency    2. Constipation, unspecified constipation type    3. Abnormal finding on GI tract  imaging    4. Gastroesophageal reflux disease with esophagitis    5. History of colitis    .   Kiley was seen today for diarrhea and hx of colitis.    Diagnoses and all orders for this visit:    Incontinence of feces with fecal urgency  -     Case Request; Standing  -     dextrose 5 % and sodium chloride 0.45 % infusion  -     Case Request    Constipation, unspecified constipation type  -     Case Request; Standing  -     dextrose 5 % and sodium chloride 0.45 % infusion  -     Case Request    Abnormal finding on GI tract imaging  -     Case Request; Standing  -     dextrose 5 % and sodium chloride 0.45 % infusion  -     Case Request    Gastroesophageal reflux disease with esophagitis    History of colitis  -     Case Request; Standing  -     dextrose 5 % and sodium chloride 0.45 % infusion  -     Case Request    Other orders  -     Follow Anesthesia Guidelines / Standing Orders; Future  -     Obtain Informed Consent; Future  -     Obtain Informed Consent; Standing  -     POC Glucose Once; Standing  -     Pregnancy, Urine -; Standing  -     polyethylene glycol (GoLYTELY) 236 g solution; As directed per instruction sheet for colonoscopy        Orders placed during this encounter include:  Orders Placed This Encounter   Procedures   • Follow Anesthesia Guidelines / Standing Orders     Standing Status:   Future   • Obtain Informed Consent     Standing Status:   Future     Order Specific Question:   Informed Consent Given For     Answer:   COLONOSCOPY       Medications prescribed:  New Medications Ordered This Visit   Medications   • polyethylene glycol (GoLYTELY) 236 g solution     Sig: As directed per instruction sheet for colonoscopy     Dispense:  4000 mL     Refill:  0     Discontinued Medications       Reason for Discontinue    cephalexin (KEFLEX) 250 MG capsule *Therapy completed        Requested Prescriptions     Signed Prescriptions Disp Refills   • polyethylene glycol (GoLYTELY) 236 g solution 4000 mL 0      Sig: As directed per instruction sheet for colonoscopy       Review and/or summary of lab tests, radiology, procedures, medications. Review and summary of old records and obtaining of history. The risks and benefits of my recommendations, as well as other treatment options were discussed with the patient today. Questions were answered.    Follow-up: No Follow-up on file.     COLONOSCOPY (N/A)      This document has been electronically signed by Bro Lott PA-C on May 8, 2019 6:08 PM      Results for orders placed or performed during the hospital encounter of 04/16/19   Urinalysis, Microscopic Only - Urine, Catheter   Result Value Ref Range    RBC, UA Too Numerous to Count (A) None Seen /HPF    WBC, UA 21-30 (A) None Seen, 0-2, 3-5 /HPF    Bacteria, UA 1+ (A) None Seen /HPF    Squamous Epithelial Cells, UA 0-2 None Seen, 0-2 /HPF    Hyaline Casts, UA None Seen None Seen /LPF    Methodology Automated Microscopy    Urinalysis With Microscopic If Indicated (No Culture) - Urine, Catheter   Result Value Ref Range    Color, UA Yellow Yellow, Straw, Dark Yellow, Keli    Appearance, UA Cloudy (A) Clear    pH, UA 6.0 5.0 - 9.0    Specific Gravity, UA 1.029 1.003 - 1.030    Glucose, UA Negative Negative    Ketones, UA Negative Negative    Bilirubin, UA Negative Negative    Blood, UA Large (3+) (A) Negative    Protein, UA >=300 mg/dL (3+) (A) Negative    Leuk Esterase, UA Small (1+) (A) Negative    Nitrite, UA Positive (A) Negative    Urobilinogen, UA 0.2 E.U./dL 0.2 - 1.0 E.U./dL   Urine Culture - Urine, Urine, Clean Catch   Result Value Ref Range    Urine Culture >100,000 CFU/mL Pseudomonas aeruginosa (A)        Susceptibility    Pseudomonas aeruginosa - EVE     Cefepime 2 Susceptible ug/ml     Ceftazidime 4 Susceptible ug/ml     Ciprofloxacin <=0.25 Susceptible ug/ml     Gentamicin <=1 Susceptible ug/ml     Levofloxacin 1 Susceptible ug/ml     Piperacillin + Tazobactam 8 Susceptible ug/ml   Results for orders placed  or performed during the hospital encounter of 04/09/19   Gold Top - SST   Result Value Ref Range    Extra Tube Hold for add-ons.    Gold Top - SST   Result Value Ref Range    Extra Tube Hold for add-ons.    Green Top (Gel)   Result Value Ref Range    Extra Tube Hold for add-ons.    CBC Auto Differential   Result Value Ref Range    WBC 6.88 3.40 - 10.80 10*3/mm3    RBC 4.49 3.77 - 5.28 10*6/mm3    Hemoglobin 12.9 12.0 - 15.9 g/dL    Hematocrit 40.0 34.0 - 46.6 %    MCV 89.1 79.0 - 97.0 fL    MCH 28.7 26.6 - 33.0 pg    MCHC 32.3 31.5 - 35.7 g/dL    RDW 12.8 12.3 - 15.4 %    RDW-SD 41.8 37.0 - 54.0 fl    MPV 9.2 6.0 - 12.0 fL    Platelets 238 140 - 450 10*3/mm3    Neutrophil % 65.2 42.7 - 76.0 %    Lymphocyte % 22.8 19.6 - 45.3 %    Monocyte % 8.1 5.0 - 12.0 %    Eosinophil % 3.2 0.3 - 6.2 %    Basophil % 0.6 0.0 - 1.5 %    Immature Grans % 0.1 0.0 - 0.5 %    Neutrophils, Absolute 4.48 1.40 - 7.00 10*3/mm3    Lymphocytes, Absolute 1.57 0.70 - 3.10 10*3/mm3    Monocytes, Absolute 0.56 0.10 - 0.90 10*3/mm3    Eosinophils, Absolute 0.22 0.00 - 0.40 10*3/mm3    Basophils, Absolute 0.04 0.00 - 0.20 10*3/mm3    Immature Grans, Absolute 0.01 0.00 - 0.05 10*3/mm3    nRBC 0.0 0.0 - 0.0 /100 WBC   Lavender Top   Result Value Ref Range    Extra Tube hold for add-on    Light Blue Top   Result Value Ref Range    Extra Tube hold for add-on    Lipase   Result Value Ref Range    Lipase 32 13 - 60 U/L   Comprehensive Metabolic Panel   Result Value Ref Range    Glucose 83 65 - 99 mg/dL    BUN 8 6 - 20 mg/dL    Creatinine 0.47 (L) 0.57 - 1.00 mg/dL    Sodium 139 136 - 145 mmol/L    Potassium 3.9 3.5 - 5.2 mmol/L    Chloride 102 98 - 107 mmol/L    CO2 24.0 22.0 - 29.0 mmol/L    Calcium 9.5 8.6 - 10.5 mg/dL    Total Protein 7.5 6.0 - 8.5 g/dL    Albumin 4.20 3.50 - 5.20 g/dL    ALT (SGPT) 11 1 - 33 U/L    AST (SGOT) 14 1 - 32 U/L    Alkaline Phosphatase 99 39 - 117 U/L    Total Bilirubin 0.2 0.2 - 1.2 mg/dL    eGFR Non African Amer 148 >60  mL/min/1.73    Globulin 3.3 gm/dL    A/G Ratio 1.3 g/dL    BUN/Creatinine Ratio 17.0 7.0 - 25.0    Anion Gap 13.0 mmol/L   Results for orders placed or performed during the hospital encounter of 04/09/19   POCT Urine Micro   Result Value Ref Range    RBC, UA None Seen None Seen /HPF    WBC, UA 0-2 (A) None Seen /HPF    Bacteria, UA Trace (A) None Seen /HPF   POC Urinalysis Dipstick, Multipro (Automated dipstick)   Result Value Ref Range    Color Straw Yellow, Straw, Dark Yellow, Keli    Clarity, UA Hazy (A) Clear    Glucose, UA Negative Negative, 1000 mg/dL (3+) mg/dL    Bilirubin Negative Negative    Ketones, UA Negative Negative    Specific Gravity  1.015 1.005 - 1.030    Blood, UA Negative Negative    pH, Urine 6.0 5.0 - 8.0    Protein, POC Negative Negative mg/dL    Urobilinogen, UA Normal Normal    Nitrite, UA Negative Negative    Leukocytes Small (1+) (A) Negative   Results for orders placed or performed in visit on 02/21/19   Urinalysis, Microscopic Only - Urine, Clean Catch   Result Value Ref Range    RBC, UA None Seen None Seen /HPF    WBC, UA 6-12 (A) None Seen, 0-2, 3-5 /HPF    Bacteria, UA None Seen None Seen /HPF    Squamous Epithelial Cells, UA 6-12 (A) None Seen, 0-2 /HPF    Hyaline Casts, UA 3-6 None Seen /LPF    Methodology Automated Microscopy    CBC Auto Differential   Result Value Ref Range    WBC 6.64 3.40 - 10.80 10*3/mm3    RBC 4.54 3.77 - 5.28 10*6/mm3    Hemoglobin 13.0 12.0 - 15.9 g/dL    Hematocrit 41.1 34.0 - 46.6 %    MCV 90.5 79.0 - 97.0 fL    MCH 28.6 26.6 - 33.0 pg    MCHC 31.6 31.5 - 35.7 g/dL    RDW 12.9 12.3 - 15.4 %    RDW-SD 42.5 37.0 - 54.0 fl    MPV 9.3 6.0 - 12.0 fL    Platelets 237 140 - 450 10*3/mm3    Neutrophil % 64.6 42.7 - 76.0 %    Lymphocyte % 22.4 19.6 - 45.3 %    Monocyte % 8.4 5.0 - 12.0 %    Eosinophil % 3.5 0.3 - 6.2 %    Basophil % 0.8 0.0 - 1.5 %    Immature Grans % 0.3 0.0 - 0.5 %    Neutrophils, Absolute 4.29 1.40 - 7.00 10*3/mm3    Lymphocytes, Absolute 1.49  0.70 - 3.10 10*3/mm3    Monocytes, Absolute 0.56 0.10 - 0.90 10*3/mm3    Eosinophils, Absolute 0.23 0.00 - 0.40 10*3/mm3    Basophils, Absolute 0.05 0.00 - 0.20 10*3/mm3    Immature Grans, Absolute 0.02 0.00 - 0.05 10*3/mm3    nRBC 0.0 0.0 - 0.0 /100 WBC     *Note: Due to a large number of results and/or encounters for the requested time period, some results have not been displayed. A complete set of results can be found in Results Review.       Some portions of this note have been dictated using voice recognition software and may contain errors and/or omissions.

## 2019-05-13 ENCOUNTER — HOSPITAL ENCOUNTER (OUTPATIENT)
Dept: PHYSICAL THERAPY | Facility: HOSPITAL | Age: 39
Setting detail: THERAPIES SERIES
Discharge: HOME | End: 2019-05-13

## 2019-05-13 DIAGNOSIS — G80.0 SPASTIC QUADRIPLEGIC CEREBRAL PALSY (HCC): Chronic | ICD-10-CM

## 2019-05-13 DIAGNOSIS — R15.9 INCONTINENCE OF FECES WITH FECAL URGENCY: Primary | ICD-10-CM

## 2019-05-13 DIAGNOSIS — K59.00 CONSTIPATION, UNSPECIFIED CONSTIPATION TYPE: ICD-10-CM

## 2019-05-13 DIAGNOSIS — R15.2 INCONTINENCE OF FECES WITH FECAL URGENCY: Primary | ICD-10-CM

## 2019-05-13 PROCEDURE — 97162 PT EVAL MOD COMPLEX 30 MIN: CPT | Performed by: PHYSICAL THERAPIST

## 2019-05-13 NOTE — THERAPY EVALUATION
Outpatient Physical Therapy Pelvic Health Initial Evaluation  [facility]     Patient Name: Kiley Ledesma  : 1980  MRN: [id number]  Today's Date: 2019        Visit Date: 2019  Visit Number:   Recheck: NA    Patient Active Problem List   Diagnosis   • Depressive disorder   • Gastroesophageal reflux disease   • Spastic quadriplegic cerebral palsy (CMS/HCC)   • Colitis   • Chronic abdominal pain   • Diarrhea   • Gastroesophageal reflux disease with esophagitis   • Hydronephrosis   • Incontinence of feces with fecal urgency   • Constipation   • Abnormal finding on GI tract imaging   • History of colitis        Past Medical History:   Diagnosis Date   • Acute vulvitis     Candida   • Allergy     Unspecified, initial encounter   • Amblyopia     OS   • Astigmatism    • Cerebral palsy (CMS/HCC)    • Cheilosis    • Common cold    • Contusion    • Depressive disorder    • Diarrhea    • Encounter for general adult medical examination without abnormal findings    • Encounter for routine adult health examination     Adult health examination      • Fever    • Gastroesophageal reflux disease    • Generalized abdominal pain    • Indeterminate colitis    • Infected insect bite    • Myopia    • Spastic quadriplegic cerebral palsy (CMS/HCC)    • Trouble walking     Walking disability      • Urinary incontinence    • Urinary tract infectious disease    • Wears glasses    • Worried well         Past Surgical History:   Procedure Laterality Date   • COLONOSCOPY N/A 2012    hemorrhoids   • COLONOSCOPY N/A 2018    Procedure: COLONOSCOPY--look TI, bx, hx colitis;  Surgeon: Sreedhar Kirkpatrick MD;  Location: [facility] ENDOSCOPY;  Service: Gastroenterology   • CYSTOSCOPY N/A 2019    Procedure: CYSTOSCOPY;  Surgeon: Cecilio Acuña MD;  Location: [facility] OR;  Service: Urology   • ENDOSCOPY N/A 2012    gastritis   • INJECTION OF MEDICATION  2016    Kenalog   • TOTAL ABDOMINAL  HYSTERECTOMY WITH SALPINGO OOPHORECTOMY N/A 03/24/2005   • UPPER GASTROINTESTINAL ENDOSCOPY  03/28/2012         Visit Dx:    ICD-10-CM ICD-9-CM   1. Incontinence of feces with fecal urgency R15.9 787.63    R15.2    2. Constipation, unspecified constipation type K59.00 564.00   3. Spastic quadriplegic cerebral palsy (CMS/[facility]) G80.0 343.2           Pelvic Health     Row Name 05/13/19 1500             Subjective Comments    Subjective Comments  Patient is a long-term resident of [facility] in [place], [place].  She has the diagnosis of spastic quadriplegic CP where she suffers from alternating retention and bladder leakage.  Patient reports she initially started with bladder problems then began to have bowel issues as well.  Having more accidents than typical days.  Patient's caregiver Pato Arreola reports the probable likelihood that the two are related.  April 11/12 c/o of stomach pains and sent to ER.  Holding 1100 cc of urine without urge to bladder.  Placed catheter and recommended FU with [place] on 4/16.  Urology FU completed. Cystoscopy completed on 4/19 which was normal.  However, the entire time the catheter was in place, patient experienced blood in catheter, including multiple clots.  Blood in urine believed to be due to catheter placement and irritating the lining of the walls with placement.  Catheter was removed and antibiotic issued for UTI, but still has been having accidents with intermittent retention of bladder.  Caregiver reports prior level of urge detection prior to this occurence. She has received some medication to assist with constipation, but was removed once bowel activity produced.  Caregiver reports that her bowel and bladder are just no longer under her control.  She loses urine and feces for a day of two then swings to retention mode.  They are concerned with return to catheterization secondary to results from last occurence with blood found in urine.  Also fears that  intermittent catheterization will be a difficulty option due to spasticity of extremities.  Patient reports that she has not urinated since the early morning.   -SW         Pregnancy Questions    Number of Pregnancies  0  -SW      Number of Children  0  -SW         Pelvic Floor Muscle    Interior Muscle Comments  Pelvic floor assessment was difficult from vaginal view due to spasticity of LE into flexed and adducted position.  Therefore, positioned patient on R side to expose rectal cavity where analysis of PF control was viewed.  Patient was given verbal cues to recruit PF with tactile stimulus without response to stimulus.  Patient moves entire body to recruit PF and has the inability to respond accurately.  When stabilizing body, patient continued with compensatory movements of trunk without ability to fire correctly PF muscles.  Therefore, internal assessment was deferred.  Without proper recruitment and finding PF, internal tactile cues was not warranted.  Patient was able to sense pressure to rectal cavity externally, but unable to locate PF for faciliation of recruitment.   -SW         Perineal Observation    Perineal Observation Performed?  Yes  -SW         Observation of Contraction in Perineum    Anal [place]  Absent  -SW      Perineal Body Lift  Absent  -SW         Pelvic Floor    Ability to Isolate Contraction of Pelvic Floor  No compensates with full body movements in attempts to recruit  -SW         SEMG Evaluation    SEMG Evaluation Comments  declined due to inability to locate PF muscles despite training with verbal and tactile cues.   -SW        User Key  (r) = Recorded By, (t) = Taken By, (c) = Cosigned By    Initials Name Provider Type    Janeen Mary PT Physical Therapist        PT Ortho     Row Name 05/13/19 1500       Subjective Pain    Able to rate subjective pain?  yes  -SW    Pre-Treatment Pain Level  5  -SW    Post-Treatment Pain Level  5  -SW    Subjective Pain Comment  reports  hoping to get medication soon as she feels tight all over.   -SW       Posture/Observations    Posture/Observations Comments  Patient presents in an electric w/c with caregiver Pato Arreola into clinic.  Patient self-mobilizes herself into clinic but requires dependent lift from w/c to high-low table. When positioned on back, knees and arms are drawn into flexed position. Knees are held in valgus position with knees touching with adductor tone identified.  Patient is verbal.  Able to participate in historical recall of events leading up to condition.   -SW      User Key  (r) = Recorded By, (t) = Taken By, (c) = Cosigned By    Initials Name Provider Type    SW Janeen Mullins, PT Physical Therapist                     PT Assessment/Plan     Row Name 05/13/19 1700          PT Assessment    Functional Limitations  Performance in self-care ADL;Performance in leisure activities;Limitations in community activities;Limitation in home management  -SW     Impairments  Impaired neuromotor development;Muscle strength;Impaired postural alignment;Impaired reflex integrity;Coordination bladder retention/incontinence; constipation  -SW     Assessment Comments  Patient is a 39 yo female presenting to [facility] with spastic quadriplegia and secondary urinary retention and UI along with constipation.  Patient presents with neurogenic bladder that fluxes between retention and leaking, whereby she has completely lost the sense of urgency.  She is not a candidate for PF rehab due to neuromuscular impairments associated with improper recruitment of PF muscles.  Due to lack of PF recruitment due to neuromusuclar condition of CP, she would not be eligible for coordination training through SEMG to PF.  Additionally, she is completely dependent upon nursing staff to assist with toileting and position. Feel she is a good candidate for intermittent and/or long term catheterization with assist for gastro intestinal motility for bowel.  This has  been discussed with caregiver and encouraged FU with urologist to discuss long term retention.    -SW     Rehab Potential  Poor not candidate for skilled therapy intervention  -     Patient/caregiver participated in establishment of treatment plan and goals  Yes  -SW     Patient would benefit from skilled therapy intervention  Yes  -SW        PT Plan    PT Frequency  -- No FU indicated  -     PT Plan Comments  Patient is not a candidate for skilled therapy intervention with regards to SEMG training for bowel and bladder.  She needs consultation with urologist re: long term management of Urinary retention/UI due to what clinically presents as neurogenic bladder.   -       User Key  (r) = Recorded By, (t) = Taken By, (c) = Cosigned By    Initials Name Provider Type    Janeen Mary, PT Physical Therapist            Exercises     Row Name 05/13/19 1500             Subjective Comments    Subjective Comments  Patient is a long-term resident of [facility] in [place], [place].  She has the diagnosis of spastic quadriplegic CP where she suffers from alternating retention and bladder leakage.  Patient reports she initially started with bladder problems then began to have bowel issues as well.  Having more accidents than typical days.  Patient's caregiver Pato Arreola reports the probable likelihood that the two are related.  April 11/12 c/o of stomach pains and sent to ER.  Holding 1100 cc of urine without urge to bladder.  Placed catheter and recommended FU with [place] on 4/16.  Urology FU completed. Cystoscopy completed on 4/19 which was normal.  However, the entire time the catheter was in place, patient experienced blood in catheter, including multiple clots.  Blood in urine believed to be due to catheter placement and irritating the lining of the walls with placement.  Catheter was removed and antibiotic issued for UTI, but still has been having accidents with intermittent retention of  bladder.  Caregiver reports prior level of urge detection prior to this occurence. She has received some medication to assist with constipation, but was removed once bowel activity produced.  Caregiver reports that her bowel and bladder are just no longer under her control.  She loses urine and feces for a day of two then swings to retention mode.  They are concerned with return to catheterization secondary to results from last occurence with blood found in urine.  Also fears that intermittent catheterization will be a difficulty option due to spasticity of extremities.  Patient reports that she has not urinated since the early morning.   -         Subjective Pain    Able to rate subjective pain?  yes  -SW      Pre-Treatment Pain Level  5  -SW      Post-Treatment Pain Level  5  -SW      Subjective Pain Comment  reports hoping to get medication soon as she feels tight all over.   -         Exercise 1    Exercise Name 1  discussed proper hydration  -      Additional Comments  with body weight calculations, gave idea of hydration recommendations with all things considered for daily intake as to improve gastro motility.  However, due to bladder not effectively emptying, this poses a challenge.  Dietary modifications given for bowel stimulus including fruits and veggies etc with less intake of breads, carbs and crackers.   -         Exercise 2    Exercise Name 2  attempts of isolated PF contraction  -      Additional Comments  tactile and verbalization of cues for contractile recruitment of PF not eligible.  Patient unable to isolate and find muscles accurately despite efforts.  -        User Key  (r) = Recorded By, (t) = Taken By, (c) = Cosigned By    Initials Name Provider Type    Janeen Mary, PT Physical Therapist                      PT OP Goals     Row Name 05/13/19 1700          Time Calculation    PT Goal Re-Cert Due Date  -- NO formal goals set due to no return appt.  -       User Key   (r) = Recorded By, (t) = Taken By, (c) = Cosigned By    Initials Name Provider Type    Janeen Mary, PT Physical Therapist          Therapy Education  Given: Symptoms/condition management  Program: New  How Provided: Verbal  Provided to: Patient, Caregiver  Level of Understanding: Teach back education performed, Verbalized               Time Calculation:   Start Time: 1505  Stop Time: 1607  Time Calculation (min): 62 min  Therapy Charges for Today     Code Description Service Date Service Provider Modifiers Qty    [id number]  PT EVAL MOD COMPLEXITY 4 5/13/2019 Janeen Mullins, PT GP 1    [id number]  PT THER SUPP EA 15 MIN 5/13/2019 Janeen Mullins, PT GP 1                  Janeen Mullins PT  5/13/2019

## 2019-05-22 ENCOUNTER — OFFICE VISIT (OUTPATIENT)
Dept: FAMILY MEDICINE CLINIC | Facility: CLINIC | Age: 39
End: 2019-05-22

## 2019-05-22 VITALS
SYSTOLIC BLOOD PRESSURE: 110 MMHG | DIASTOLIC BLOOD PRESSURE: 70 MMHG | BODY MASS INDEX: 26.53 KG/M2 | HEIGHT: 62 IN | OXYGEN SATURATION: 98 % | HEART RATE: 85 BPM

## 2019-05-22 DIAGNOSIS — G47.09 OTHER INSOMNIA: ICD-10-CM

## 2019-05-22 DIAGNOSIS — G40.A09 ABSENCE SEIZURE (HCC): Primary | ICD-10-CM

## 2019-05-22 DIAGNOSIS — J30.1 SEASONAL ALLERGIC RHINITIS DUE TO POLLEN: ICD-10-CM

## 2019-05-22 PROCEDURE — 99214 OFFICE O/P EST MOD 30 MIN: CPT | Performed by: GENERAL PRACTICE

## 2019-05-22 RX ORDER — DIPHENHYDRAMINE HCL 25 MG
25 TABLET ORAL NIGHTLY PRN
Qty: 30 TABLET | Refills: 2 | Status: SHIPPED | OUTPATIENT
Start: 2019-05-22 | End: 2020-01-22 | Stop reason: ALTCHOICE

## 2019-05-22 NOTE — PROGRESS NOTES
Subjective   Kiley Ledesma is a 38 y.o. female.   Chief Complaint   Patient presents with   • Seizures   • Insomnia     For review and evaluation of management of chronic medical problems.  Has a remote history of absence seizures.  Was on Cipro for a urinary tract infection and had two episodes of being unaware of her surroundings consistent with an absence seizure.  Cipro was discontinued and she had no further problem.  She is having some difficulty with sleep although does have a procedure coming up that she is a little worried about.  She is also having some allergy symptoms including sinus congestion and postnasal drainage.  Seizures    This is a new problem. The current episode started more than 1 week ago. The problem has been resolved. There were 2 to 3 seizures. The most recent episode lasted 30 to 120 seconds. Pertinent negatives include no headaches, no sore throat, no chest pain, no cough, no nausea, no vomiting and no diarrhea. absence The episode was witnessed. The seizure(s) had no focality. Possible causes include medication or dosage change. There has been no fever. There were no medications administered prior to arrival.   Insomnia   This is a new problem. The current episode started 1 to 4 weeks ago. The problem occurs constantly. The problem has been unchanged. Associated symptoms include congestion. Pertinent negatives include no abdominal pain, arthralgias, chest pain, chills, coughing, fatigue, fever, headaches, joint swelling, myalgias, nausea, neck pain, numbness, rash, sore throat, vomiting or weakness. The symptoms are aggravated by stress. She has tried nothing for the symptoms.      The following portions of the patient's history were reviewed and updated as appropriate: allergies, current medications, past social history and problem list.    Outpatient Medications Prior to Visit   Medication Sig Dispense Refill   • acetaminophen (TYLENOL) 325 MG tablet Take 325 mg by mouth Every 6  (Six) Hours As Needed for Mild Pain  (1-2 tabs).     • albuterol (PROVENTIL) (2.5 MG/3ML) 0.083% nebulizer solution Take 2.5 mg by nebulization Every 4 (Four) Hours As Needed for Wheezing. 720 mL 3   • BACLOFEN IT Has a baclofen pump     • Cholecalciferol 1000 units capsule Take 1,000 Units by mouth Daily.     • docusate sodium (COLACE) 100 MG capsule Take 1 capsule by mouth 2 (Two) Times a Day. 180 capsule 3   • ESTRADIOL PO Take 1 mg by mouth Daily.     • FLUoxetine (PROZAC) 40 MG capsule Take 40 mg by mouth Daily.     • fluticasone (FLONASE) 50 MCG/ACT nasal spray 2 sprays into each nostril Daily.     • guaiFENesin (MUCINEX) 600 MG 12 hr tablet Take 600 mg by mouth 2 (Two) Times a Day As Needed.     • linaclotide (LINZESS) 145 MCG capsule capsule Take 1 capsule by mouth Every Morning Before Breakfast. 30 capsule 5   • metoclopramide (REGLAN) 10 MG tablet Take 10 mg by mouth Daily.     • Misc. Devices (WHEELCHAIR) misc As directed     • mupirocin (BACTROBAN) 2 % ointment Apply  topically 3 (Three) Times a Day. 22 g 0   • neomycin-bacitracin-polymyxin (NEOSPORIN) 5-400-5000 ointment Apply  topically to the appropriate area as directed. EVERY 8 HOURS PRN     • NON FORMULARY Daily As Needed. Nyst/bacit/Zno/hc 1%     • Omeprazole (PRILOSEC PO) Take 20 mg by mouth Daily. ON HOLD TILL KEFLEX COMPLETED     • ondansetron ODT (ZOFRAN-ODT) 8 MG disintegrating tablet Take 1 tablet by mouth Every 8 (Eight) Hours As Needed for Nausea. 30 tablet 5   • polyethylene glycol (GoLYTELY) 236 g solution As directed per instruction sheet for colonoscopy 4000 mL 0   • solifenacin (VESICARE) 10 MG tablet Take 10 mg by mouth Daily.     • oxyCODONE-acetaminophen (PERCOCET) 7.5-325 MG per tablet Take 1-2 tablets by mouth Every 4 (Four) Hours As Needed (Pain). 10 tablet 0     No facility-administered medications prior to visit.        Review of Systems   Constitutional: Negative.  Negative for chills, fatigue, fever and unexpected weight  "change.   HENT: Positive for congestion. Negative for ear pain, hearing loss, nosebleeds, rhinorrhea, sneezing, sore throat and tinnitus.    Eyes: Negative.  Negative for discharge.   Respiratory: Negative.  Negative for cough, shortness of breath and wheezing.    Cardiovascular: Negative.  Negative for chest pain and palpitations.   Gastrointestinal: Negative.  Negative for abdominal pain, constipation, diarrhea, nausea and vomiting.   Endocrine: Negative.    Genitourinary: Negative.  Negative for dysuria, frequency and urgency.   Musculoskeletal: Negative.  Negative for arthralgias, back pain, joint swelling, myalgias and neck pain.   Skin: Negative.  Negative for rash.   Allergic/Immunologic: Negative.    Neurological: Positive for seizures. Negative for dizziness, weakness, numbness and headaches.   Hematological: Negative.  Negative for adenopathy.   Psychiatric/Behavioral: Negative for dysphoric mood and sleep disturbance. The patient has insomnia. The patient is not nervous/anxious.        Objective   Visit Vitals  /70 (BP Location: Left arm)   Pulse 85   Ht 157.5 cm (62\")   LMP  (LMP Unknown)   SpO2 98%   BMI 26.53 kg/m²     Physical Exam   Constitutional: She is oriented to person, place, and time. She appears well-developed and well-nourished. No distress.   HENT:   Head: Normocephalic and atraumatic.   Nose: Mucosal edema present.   Mouth/Throat: Oropharynx is clear and moist.   Eyes: Conjunctivae and EOM are normal. Pupils are equal, round, and reactive to light. Right eye exhibits no discharge. Left eye exhibits no discharge.   Neck: No thyromegaly present.   Cardiovascular: Normal rate, regular rhythm, normal heart sounds and intact distal pulses.   Pulmonary/Chest: Effort normal and breath sounds normal.   Lymphadenopathy:     She has no cervical adenopathy.   Neurological: She is alert and oriented to person, place, and time. She exhibits abnormal muscle tone. Gait abnormal.   Cerebral palsy " with spastic quadrplegia   Skin: Skin is warm and dry.   Psychiatric: She has a normal mood and affect.   Nursing note and vitals reviewed.      Assessment/Plan   Problem List Items Addressed This Visit     None      Visit Diagnoses     Absence seizure (CMS/HCC)    -  Primary    Probably related to Cipro    Other insomnia        Seasonal allergic rhinitis due to pollen             Try Benadryl at night to see if this will help with her sleep, hopefully will help with her allergies also.  Recheck if not improving.  Cipro has been placed as an allergy for her.    New Medications Ordered This Visit   Medications   • diphenhydrAMINE (BENADRYL ALLERGY) 25 MG tablet     Sig: Take 1 tablet by mouth At Night As Needed for Allergies or Sleep.     Dispense:  30 tablet     Refill:  2     No Follow-up on file.

## 2019-06-07 ENCOUNTER — ANESTHESIA EVENT (OUTPATIENT)
Dept: GASTROENTEROLOGY | Facility: HOSPITAL | Age: 39
End: 2019-06-07

## 2019-06-07 ENCOUNTER — ANESTHESIA (OUTPATIENT)
Dept: GASTROENTEROLOGY | Facility: HOSPITAL | Age: 39
End: 2019-06-07

## 2019-06-07 ENCOUNTER — HOSPITAL ENCOUNTER (OUTPATIENT)
Facility: HOSPITAL | Age: 39
Setting detail: HOSPITAL OUTPATIENT SURGERY
Discharge: HOME | End: 2019-06-07
Attending: INTERNAL MEDICINE | Admitting: INTERNAL MEDICINE

## 2019-06-07 VITALS
RESPIRATION RATE: 20 BRPM | TEMPERATURE: 97 F | BODY MASS INDEX: 24.9 KG/M2 | HEART RATE: 74 BPM | HEIGHT: 64 IN | DIASTOLIC BLOOD PRESSURE: 80 MMHG | SYSTOLIC BLOOD PRESSURE: 120 MMHG | OXYGEN SATURATION: 97 %

## 2019-06-07 DIAGNOSIS — Z87.19 HISTORY OF COLITIS: ICD-10-CM

## 2019-06-07 DIAGNOSIS — R93.3 ABNORMAL FINDING ON GI TRACT IMAGING: ICD-10-CM

## 2019-06-07 DIAGNOSIS — R15.2 INCONTINENCE OF FECES WITH FECAL URGENCY: ICD-10-CM

## 2019-06-07 DIAGNOSIS — R15.9 INCONTINENCE OF FECES WITH FECAL URGENCY: ICD-10-CM

## 2019-06-07 DIAGNOSIS — K59.00 CONSTIPATION, UNSPECIFIED CONSTIPATION TYPE: ICD-10-CM

## 2019-06-07 PROCEDURE — 25010000002 MIDAZOLAM PER 1 MG: Performed by: NURSE ANESTHETIST, CERTIFIED REGISTERED

## 2019-06-07 PROCEDURE — 88305 TISSUE EXAM BY PATHOLOGIST: CPT | Performed by: PATHOLOGY

## 2019-06-07 PROCEDURE — 88305 TISSUE EXAM BY PATHOLOGIST: CPT | Performed by: INTERNAL MEDICINE

## 2019-06-07 PROCEDURE — 45380 COLONOSCOPY AND BIOPSY: CPT | Performed by: INTERNAL MEDICINE

## 2019-06-07 PROCEDURE — 25010000002 PROPOFOL 10 MG/ML EMULSION: Performed by: NURSE ANESTHETIST, CERTIFIED REGISTERED

## 2019-06-07 RX ORDER — LIDOCAINE HYDROCHLORIDE 20 MG/ML
INJECTION, SOLUTION INTRAVENOUS AS NEEDED
Status: DISCONTINUED | OUTPATIENT
Start: 2019-06-07 | End: 2019-06-07 | Stop reason: SURG

## 2019-06-07 RX ORDER — PROPOFOL 10 MG/ML
VIAL (ML) INTRAVENOUS AS NEEDED
Status: DISCONTINUED | OUTPATIENT
Start: 2019-06-07 | End: 2019-06-07 | Stop reason: SURG

## 2019-06-07 RX ORDER — DEXTROSE AND SODIUM CHLORIDE 5; .45 G/100ML; G/100ML
30 INJECTION, SOLUTION INTRAVENOUS CONTINUOUS PRN
Status: DISCONTINUED | OUTPATIENT
Start: 2019-06-07 | End: 2019-06-07 | Stop reason: HOSPADM

## 2019-06-07 RX ORDER — MIDAZOLAM HYDROCHLORIDE 1 MG/ML
INJECTION INTRAMUSCULAR; INTRAVENOUS AS NEEDED
Status: DISCONTINUED | OUTPATIENT
Start: 2019-06-07 | End: 2019-06-07 | Stop reason: SURG

## 2019-06-07 RX ADMIN — MIDAZOLAM HYDROCHLORIDE 2 MG: 2 INJECTION, SOLUTION INTRAMUSCULAR; INTRAVENOUS at 16:15

## 2019-06-07 RX ADMIN — LIDOCAINE HYDROCHLORIDE 60 MG: 20 INJECTION, SOLUTION INTRAVENOUS at 16:22

## 2019-06-07 RX ADMIN — PROPOFOL 20 MG: 10 INJECTION, EMULSION INTRAVENOUS at 16:30

## 2019-06-07 RX ADMIN — PROPOFOL 20 MG: 10 INJECTION, EMULSION INTRAVENOUS at 16:24

## 2019-06-07 RX ADMIN — DEXTROSE AND SODIUM CHLORIDE: 5; 450 INJECTION, SOLUTION INTRAVENOUS at 16:19

## 2019-06-07 RX ADMIN — DEXTROSE AND SODIUM CHLORIDE 30 ML/HR: 5; 450 INJECTION, SOLUTION INTRAVENOUS at 15:03

## 2019-06-07 RX ADMIN — PROPOFOL 60 MG: 10 INJECTION, EMULSION INTRAVENOUS at 16:22

## 2019-06-07 NOTE — ANESTHESIA PREPROCEDURE EVALUATION
Anesthesia Evaluation     Patient summary reviewed and Nursing notes reviewed   NPO Solid Status: > 8 hours  NPO Liquid Status: > 6 hours           Airway   Mallampati: II  TM distance: >3 FB  Neck ROM: full  Possible difficult intubation  Dental - normal exam     Pulmonary - normal exam   (+) recent URI,   Cardiovascular - normal exam  Exercise tolerance: poor (<4 METS)    NYHA Classification: IV        Neuro/Psych  (+) psychiatric history Depression,       ROS Comment: Cerebral palsy noted in chart.  GI/Hepatic/Renal/Endo    (+)  GERD,      Musculoskeletal (-) negative ROS    Abdominal  - normal exam   Substance History - negative use     OB/GYN negative ob/gyn ROS   (-)  Pregnant        Other - negative ROS                         Anesthesia Plan    ASA 3     MAC     intravenous induction   Anesthetic plan, all risks, benefits, and alternatives have been provided, discussed and informed consent has been obtained with: patient.

## 2019-06-07 NOTE — H&P
No chief complaint on file.      ENDO PROCEDURE ORDERED: COLON change bowel, now severe constipation, hx colitis    Subjective    Kiley Ledesma is a 38 y.o. female. she is here today for follow-up.    History of Present Illness    The patient is seen on a recheck of her abdominal pain, diarrhea, GERD, history of colitis. Last seen 03/25/2019. At that time I discontinued the Bentyl because they did not feel it was helping. She has still not had an evaluation with [facility] for possible pelvic floor dysfunction. Now, apparently, she is having much more constipation. She had seen Dr. Stoddard who discontinued her Questran and gave her Linzess. Apparently it is not particularly helping. She does have Reglan and Prilosec. She denied nausea or vomiting, she states she does not have a lot of sensation in her lower abdomen. She is accompanied by an aid. Her weight appears to be stable but she cannot stand on the scale. Last colonoscopy was on 04/23/2018.    The patient had studies on 04/09/2019. CT scan of the abdomen and pelvis with contrast showed normal appearing liver and gallbladder, bilateral renal findings, fecal impaction with dilated large bowel loops proximal to the distal colon. A KUB showed pain pump, she had a lot of air in her colon, normal lipase, CBC, CMP. She had a UTI with Pseudomonas and white cells on 04/16/2019.    ASSESSMENT/PLAN: Patient with chronic GERD, previous history of colitis with diarrhea and fecal incontinence, now she is having constipation with impaction. She is scheduled for physical therapy evaluation in 2 weeks. Recommend she continue to have that evaluation. Because of her significant change in symptoms and previous colitis, and the risk for possible stricture I recommended a colonoscopy to evaluate. I did review the films of her imaging with the patient in the room as well as the aid. We will see her in follow up after the above, further pending clinical  course and the results of the above.             Review of Systems   Constitutional: Negative for activity change, appetite change, chills, diaphoresis, fatigue, fever and unexpected weight change.   HENT: Negative for congestion, sore throat and trouble swallowing.    Respiratory: Negative for apnea, cough, choking, chest tightness, shortness of breath, wheezing and stridor.    Cardiovascular: Negative for chest pain, palpitations and leg swelling.   Gastrointestinal: Negative for abdominal distention, abdominal pain, anal bleeding, blood in stool, constipation, diarrhea, nausea, rectal pain and vomiting.   Musculoskeletal: Negative for arthralgias.   Skin: Negative for color change, pallor, rash and wound.   Neurological: Negative for dizziness, syncope, weakness, light-headedness and headaches.   Psychiatric/Behavioral: Negative for agitation, behavioral problems, confusion and decreased concentration.     The following portions of the patient's history were reviewed and updated as appropriate:   Past Medical History:   Diagnosis Date   • Acute vulvitis     Candida   • Allergy     Unspecified, initial encounter   • Amblyopia     OS   • Astigmatism    • Cerebral palsy (CMS/HCC)    • Cheilosis    • Common cold    • Contusion    • Depressive disorder    • Diarrhea    • Encounter for general adult medical examination without abnormal findings    • Encounter for routine adult health examination     Adult health examination      • Fever    • Gastroesophageal reflux disease    • Generalized abdominal pain    • Indeterminate colitis    • Infected insect bite    • Myopia    • Spastic quadriplegic cerebral palsy (CMS/HCC)    • Trouble walking     Walking disability      • Urinary incontinence    • Urinary tract infectious disease    • Wears glasses    • Worried well      Past Surgical History:   Procedure Laterality Date   • COLONOSCOPY N/A 03/28/2012    hemorrhoids   • COLONOSCOPY N/A 4/23/2018    Procedure:  COLONOSCOPY--look TI, bx, hx colitis;  Surgeon: Sreedhar Kirkpatrick MD;  Location: [facility] ENDOSCOPY;  Service: Gastroenterology   • CYSTOSCOPY N/A 4/19/2019    Procedure: CYSTOSCOPY;  Surgeon: Cecilio Acuña MD;  Location: [facility] OR;  Service: Urology   • ENDOSCOPY N/A 03/28/2012    gastritis   • INJECTION OF MEDICATION  02/23/2016    Kenalog   • TOTAL ABDOMINAL HYSTERECTOMY WITH SALPINGO OOPHORECTOMY N/A 03/24/2005   • UPPER GASTROINTESTINAL ENDOSCOPY  03/28/2012     Family History   Problem Relation Age of Onset   • Coronary artery disease Other    • Diabetes Other    • Hypertension Other    • Stroke Other      OB History     No data available        Allergies   Allergen Reactions   • Atropine Unknown (See Comments)     Unknown reaction per patient   • Ciprofloxacin Other (See Comments)     seizure   • Sulfa Antibiotics Itching     Social History     Socioeconomic History   • Marital status: Single     Spouse name: Not on file   • Number of children: Not on file   • Years of education: Not on file   • Highest education level: Not on file   Tobacco Use   • Smoking status: Never Smoker   • Smokeless tobacco: Never Used   Substance and Sexual Activity   • Alcohol use: No   • Drug use: No   • Sexual activity: Defer       Current Facility-Administered Medications:   •  dextrose 5 % and sodium chloride 0.45 % infusion, 30 mL/hr, Intravenous, Continuous PRN, Bro Lott PA-C  Review of Systems  Review of Systems       Objective    LMP  (LMP Unknown)   Physical Exam   Constitutional: She is oriented to person, place, and time. She appears well-developed and well-nourished. No distress.   [facility] [place]   HENT:   Head: Normocephalic and atraumatic.   Eyes: EOM are normal. Pupils are equal, round, and reactive to light.   Neck: Normal range of motion.   Cardiovascular: Normal rate, regular rhythm and normal heart sounds.   Pulmonary/Chest: Effort normal and breath sounds normal.   Abdominal: Soft. Bowel sounds are normal. She  exhibits no shifting dullness, no distension, no abdominal bruit, no ascites and no mass. There is no hepatosplenomegaly. There is tenderness. There is no rigidity, no rebound, no guarding and no CVA tenderness. No hernia. Hernia confirmed negative in the ventral area.   Diffuse, pain pump in RLQ   Musculoskeletal: Normal range of motion.   Neurological: She is alert and oriented to person, place, and time.   Skin: Skin is warm and dry.   Psychiatric: She has a normal mood and affect. Her behavior is normal. Judgment and thought content normal.   Nursing note and vitals reviewed.    Assessment/Plan      1. Incontinence of feces with fecal urgency    2. Constipation, unspecified constipation type    3. Abnormal finding on GI tract imaging    4. History of colitis    .   Kiley was seen today for diarrhea and hx of colitis.    Diagnoses and all orders for this visit:    Incontinence of feces with fecal urgency  -     Case Request; Standing  -     dextrose 5 % and sodium chloride 0.45 % infusion  -     Case Request    Constipation, unspecified constipation type  -     Case Request; Standing  -     dextrose 5 % and sodium chloride 0.45 % infusion  -     Case Request    Abnormal finding on GI tract imaging  -     Case Request; Standing  -     dextrose 5 % and sodium chloride 0.45 % infusion  -     Case Request    Gastroesophageal reflux disease with esophagitis    History of colitis  -     Case Request; Standing  -     dextrose 5 % and sodium chloride 0.45 % infusion  -     Case Request    Other orders  -     Follow Anesthesia Guidelines / Standing Orders; Future  -     Obtain Informed Consent; Future  -     Obtain Informed Consent; Standing  -     POC Glucose Once; Standing  -     Pregnancy, Urine -; Standing  -     polyethylene glycol (GoLYTELY) 236 g solution; As directed per instruction sheet for colonoscopy        Orders placed during this encounter include:  Orders Placed This Encounter   Procedures   • Pregnancy,  Urine -     For females of child bearing age, 10-55 years old, who have not had a hysterectomy-urine for hCG.  If unable to obtain urine specimen, obtain serum for hCG.     Standing Status:   Standing     Number of Occurrences:   1   • Obtain Informed Consent     Standing Status:   Standing     Number of Occurrences:   1     Order Specific Question:   Informed Consent Given For     Answer:   COLONOSCOPY   • POC Glucose Once     Prior to Procedure on ALL Diabetic Patients     Standing Status:   Standing     Number of Occurrences:   1   • Insert Peripheral IV     Standing Status:   Standing     Number of Occurrences:   1       Medications prescribed:  New Medications Ordered This Visit   Medications   • dextrose 5 % and sodium chloride 0.45 % infusion     Discontinued Medications       Reason for Discontinue    cephalexin (KEFLEX) 250 MG capsule *Therapy completed        Requested Prescriptions     Signed Prescriptions Disp Refills   • polyethylene glycol (GoLYTELY) 236 g solution 4000 mL 0     Sig: As directed per instruction sheet for colonoscopy       Review and/or summary of lab tests, radiology, procedures, medications. Review and summary of old records and obtaining of history. The risks and benefits of my recommendations, as well as other treatment options were discussed with the patient today. Questions were answered.    Follow-up: No Follow-up on file.     COLONOSCOPY (N/A)      This document has been electronically signed by Sreedhar Kirkpatrick MD on June 7, 2019 2:53 PM      Results for orders placed or performed during the hospital encounter of 04/16/19   Urinalysis, Microscopic Only - Urine, Catheter   Result Value Ref Range    RBC, UA Too Numerous to Count (A) None Seen /HPF    WBC, UA 21-30 (A) None Seen, 0-2, 3-5 /HPF    Bacteria, UA 1+ (A) None Seen /HPF    Squamous Epithelial Cells, UA 0-2 None Seen, 0-2 /HPF    Hyaline Casts, UA None Seen None Seen /LPF    Methodology Automated Microscopy    Urinalysis  With Microscopic If Indicated (No Culture) - Urine, Catheter   Result Value Ref Range    Color, UA Yellow Yellow, Straw, Dark Yellow, Keli    Appearance, UA Cloudy (A) Clear    pH, UA 6.0 5.0 - 9.0    Specific Gravity, UA 1.029 1.003 - 1.030    Glucose, UA Negative Negative    Ketones, UA Negative Negative    Bilirubin, UA Negative Negative    Blood, UA Large (3+) (A) Negative    Protein, UA >=300 mg/dL (3+) (A) Negative    Leuk Esterase, UA Small (1+) (A) Negative    Nitrite, UA Positive (A) Negative    Urobilinogen, UA 0.2 E.U./dL 0.2 - 1.0 E.U./dL   Urine Culture - Urine, Urine, Clean Catch   Result Value Ref Range    Urine Culture >100,000 CFU/mL Pseudomonas aeruginosa (A)        Susceptibility    Pseudomonas aeruginosa - EVE     Cefepime 2 Susceptible ug/ml     Ceftazidime 4 Susceptible ug/ml     Ciprofloxacin <=0.25 Susceptible ug/ml     Gentamicin <=1 Susceptible ug/ml     Levofloxacin 1 Susceptible ug/ml     Piperacillin + Tazobactam 8 Susceptible ug/ml   Results for orders placed or performed during the hospital encounter of 04/09/19   Gold Top - SST   Result Value Ref Range    Extra Tube Hold for add-ons.    Gold Top - SST   Result Value Ref Range    Extra Tube Hold for add-ons.    Green Top (Gel)   Result Value Ref Range    Extra Tube Hold for add-ons.    CBC Auto Differential   Result Value Ref Range    WBC 6.88 3.40 - 10.80 10*3/mm3    RBC 4.49 3.77 - 5.28 10*6/mm3    Hemoglobin 12.9 12.0 - 15.9 g/dL    Hematocrit 40.0 34.0 - 46.6 %    MCV 89.1 79.0 - 97.0 fL    MCH 28.7 26.6 - 33.0 pg    MCHC 32.3 31.5 - 35.7 g/dL    RDW 12.8 12.3 - 15.4 %    RDW-SD 41.8 37.0 - 54.0 fl    MPV 9.2 6.0 - 12.0 fL    Platelets 238 140 - 450 10*3/mm3    Neutrophil % 65.2 42.7 - 76.0 %    Lymphocyte % 22.8 19.6 - 45.3 %    Monocyte % 8.1 5.0 - 12.0 %    Eosinophil % 3.2 0.3 - 6.2 %    Basophil % 0.6 0.0 - 1.5 %    Immature Grans % 0.1 0.0 - 0.5 %    Neutrophils, Absolute 4.48 1.40 - 7.00 10*3/mm3    Lymphocytes, Absolute  1.57 0.70 - 3.10 10*3/mm3    Monocytes, Absolute 0.56 0.10 - 0.90 10*3/mm3    Eosinophils, Absolute 0.22 0.00 - 0.40 10*3/mm3    Basophils, Absolute 0.04 0.00 - 0.20 10*3/mm3    Immature Grans, Absolute 0.01 0.00 - 0.05 10*3/mm3    nRBC 0.0 0.0 - 0.0 /100 WBC   Lavender Top   Result Value Ref Range    Extra Tube hold for add-on    Light Blue Top   Result Value Ref Range    Extra Tube hold for add-on    Lipase   Result Value Ref Range    Lipase 32 13 - 60 U/L   Comprehensive Metabolic Panel   Result Value Ref Range    Glucose 83 65 - 99 mg/dL    BUN 8 6 - 20 mg/dL    Creatinine 0.47 (L) 0.57 - 1.00 mg/dL    Sodium 139 136 - 145 mmol/L    Potassium 3.9 3.5 - 5.2 mmol/L    Chloride 102 98 - 107 mmol/L    CO2 24.0 22.0 - 29.0 mmol/L    Calcium 9.5 8.6 - 10.5 mg/dL    Total Protein 7.5 6.0 - 8.5 g/dL    Albumin 4.20 3.50 - 5.20 g/dL    ALT (SGPT) 11 1 - 33 U/L    AST (SGOT) 14 1 - 32 U/L    Alkaline Phosphatase 99 39 - 117 U/L    Total Bilirubin 0.2 0.2 - 1.2 mg/dL    eGFR Non African Amer 148 >60 mL/min/1.73    Globulin 3.3 gm/dL    A/G Ratio 1.3 g/dL    BUN/Creatinine Ratio 17.0 7.0 - 25.0    Anion Gap 13.0 mmol/L   Results for orders placed or performed during the hospital encounter of 04/09/19   POCT Urine Micro   Result Value Ref Range    RBC, UA None Seen None Seen /HPF    WBC, UA 0-2 (A) None Seen /HPF    Bacteria, UA Trace (A) None Seen /HPF   POC Urinalysis Dipstick, Multipro (Automated dipstick)   Result Value Ref Range    Color Straw Yellow, Straw, Dark Yellow, Keli    Clarity, UA Hazy (A) Clear    Glucose, UA Negative Negative, 1000 mg/dL (3+) mg/dL    Bilirubin Negative Negative    Ketones, UA Negative Negative    Specific Gravity  1.015 1.005 - 1.030    Blood, UA Negative Negative    pH, Urine 6.0 5.0 - 8.0    Protein, POC Negative Negative mg/dL    Urobilinogen, UA Normal Normal    Nitrite, UA Negative Negative    Leukocytes Small (1+) (A) Negative   Results for orders placed or performed in visit on  02/21/19   Urinalysis, Microscopic Only - Urine, Clean Catch   Result Value Ref Range    RBC, UA None Seen None Seen /HPF    WBC, UA 6-12 (A) None Seen, 0-2, 3-5 /HPF    Bacteria, UA None Seen None Seen /HPF    Squamous Epithelial Cells, UA 6-12 (A) None Seen, 0-2 /HPF    Hyaline Casts, UA 3-6 None Seen /LPF    Methodology Automated Microscopy    CBC Auto Differential   Result Value Ref Range    WBC 6.64 3.40 - 10.80 10*3/mm3    RBC 4.54 3.77 - 5.28 10*6/mm3    Hemoglobin 13.0 12.0 - 15.9 g/dL    Hematocrit 41.1 34.0 - 46.6 %    MCV 90.5 79.0 - 97.0 fL    MCH 28.6 26.6 - 33.0 pg    MCHC 31.6 31.5 - 35.7 g/dL    RDW 12.9 12.3 - 15.4 %    RDW-SD 42.5 37.0 - 54.0 fl    MPV 9.3 6.0 - 12.0 fL    Platelets 237 140 - 450 10*3/mm3    Neutrophil % 64.6 42.7 - 76.0 %    Lymphocyte % 22.4 19.6 - 45.3 %    Monocyte % 8.4 5.0 - 12.0 %    Eosinophil % 3.5 0.3 - 6.2 %    Basophil % 0.8 0.0 - 1.5 %    Immature Grans % 0.3 0.0 - 0.5 %    Neutrophils, Absolute 4.29 1.40 - 7.00 10*3/mm3    Lymphocytes, Absolute 1.49 0.70 - 3.10 10*3/mm3    Monocytes, Absolute 0.56 0.10 - 0.90 10*3/mm3    Eosinophils, Absolute 0.23 0.00 - 0.40 10*3/mm3    Basophils, Absolute 0.05 0.00 - 0.20 10*3/mm3    Immature Grans, Absolute 0.02 0.00 - 0.05 10*3/mm3    nRBC 0.0 0.0 - 0.0 /100 WBC     *Note: Due to a large number of results and/or encounters for the requested time period, some results have not been displayed. A complete set of results can be found in Results Review.       Some portions of this note have been dictated using voice recognition software and may contain errors and/or omissions.

## 2019-06-07 NOTE — ANESTHESIA POSTPROCEDURE EVALUATION
Patient: Kiley Ledesma    Procedure Summary     Date:  06/07/19 Room / Location:  [facility] ENDOSCOPY 1 / [facility] ENDOSCOPY    Anesthesia Start:  1622 Anesthesia Stop:  1634    Procedure:  COLONOSCOPY (N/A ) Diagnosis:       Incontinence of feces with fecal urgency      Constipation, unspecified constipation type      Abnormal finding on GI tract imaging      History of colitis      (Incontinence of feces with fecal urgency [R15.9, R15.2])      (Constipation, unspecified constipation type [K59.00])      (Abnormal finding on GI tract imaging [R93.3])      (History of colitis [Z87.19])    Surgeon:  Sreedhar Kirkpatrick MD Provider:  Dalia Quach CRNA    Anesthesia Type:  MAC ASA Status:  3          Anesthesia Type: MAC  Last vitals  BP   135/87 (06/07/19 1502)   Temp   99 °F (37.2 °C) (06/07/19 1502)   Pulse   84 (06/07/19 1502)   Resp   18 (06/07/19 1502)     SpO2   95 % (06/07/19 1502)     Post Anesthesia Care and Evaluation    Patient location during evaluation: bedside  Patient participation: complete - patient participated  Level of consciousness: sleepy but conscious  Pain score: 0  Pain management: adequate  Airway patency: patent  Anesthetic complications: No anesthetic complications  PONV Status: none  Cardiovascular status: acceptable  Respiratory status: acceptable  Hydration status: acceptable

## 2019-06-10 LAB
LAB AP CASE REPORT: NORMAL
PATH REPORT.FINAL DX SPEC: NORMAL
PATH REPORT.GROSS SPEC: NORMAL

## 2019-06-14 ENCOUNTER — APPOINTMENT (OUTPATIENT)
Dept: CT IMAGING | Facility: HOSPITAL | Age: 39
End: 2019-06-14

## 2019-06-14 ENCOUNTER — HOSPITAL ENCOUNTER (EMERGENCY)
Facility: HOSPITAL | Age: 39
Discharge: HOME | End: 2019-06-14
Attending: EMERGENCY MEDICINE | Admitting: EMERGENCY MEDICINE

## 2019-06-14 VITALS
TEMPERATURE: 98.4 F | BODY MASS INDEX: 25.78 KG/M2 | HEIGHT: 59 IN | RESPIRATION RATE: 20 BRPM | SYSTOLIC BLOOD PRESSURE: 119 MMHG | WEIGHT: 127.9 LBS | HEART RATE: 66 BPM | DIASTOLIC BLOOD PRESSURE: 70 MMHG | OXYGEN SATURATION: 95 %

## 2019-06-14 DIAGNOSIS — R19.7 DIARRHEA, UNSPECIFIED TYPE: ICD-10-CM

## 2019-06-14 DIAGNOSIS — R10.9 ABDOMINAL PAIN, UNSPECIFIED ABDOMINAL LOCATION: Primary | ICD-10-CM

## 2019-06-14 LAB
ALBUMIN SERPL-MCNC: 4 G/DL (ref 3.5–5.2)
ALBUMIN/GLOB SERPL: 1.3 G/DL
ALP SERPL-CCNC: 94 U/L (ref 39–117)
ALT SERPL W P-5'-P-CCNC: 8 U/L (ref 1–33)
ANION GAP SERPL CALCULATED.3IONS-SCNC: 11 MMOL/L
AST SERPL-CCNC: 14 U/L (ref 1–32)
BACTERIA UR QL AUTO: ABNORMAL /HPF
BASOPHILS # BLD AUTO: 0.04 10*3/MM3 (ref 0–0.2)
BASOPHILS NFR BLD AUTO: 0.5 % (ref 0–1.5)
BILIRUB SERPL-MCNC: 0.2 MG/DL (ref 0.2–1.2)
BILIRUB UR QL STRIP: NEGATIVE
BUN BLD-MCNC: 12 MG/DL (ref 6–20)
BUN/CREAT SERPL: 30 (ref 7–25)
C DIFF TOX GENS STL QL NAA+PROBE: NEGATIVE
CALCIUM SPEC-SCNC: 9 MG/DL (ref 8.6–10.5)
CHLORIDE SERPL-SCNC: 103 MMOL/L (ref 98–107)
CLARITY UR: ABNORMAL
CO2 SERPL-SCNC: 26 MMOL/L (ref 22–29)
COD CRY URNS QL: ABNORMAL /HPF
COLOR UR: YELLOW
CREAT BLD-MCNC: 0.4 MG/DL (ref 0.57–1)
DEPRECATED RDW RBC AUTO: 42.2 FL (ref 37–54)
EOSINOPHIL # BLD AUTO: 0.49 10*3/MM3 (ref 0–0.4)
EOSINOPHIL NFR BLD AUTO: 6.6 % (ref 0.3–6.2)
ERYTHROCYTE [DISTWIDTH] IN BLOOD BY AUTOMATED COUNT: 12.8 % (ref 12.3–15.4)
GFR SERPL CREATININE-BSD FRML MDRD: >150 ML/MIN/1.73
GLOBULIN UR ELPH-MCNC: 3 GM/DL
GLUCOSE BLD-MCNC: 80 MG/DL (ref 65–99)
GLUCOSE UR STRIP-MCNC: NEGATIVE MG/DL
HCT VFR BLD AUTO: 39.1 % (ref 34–46.6)
HGB BLD-MCNC: 12.5 G/DL (ref 12–15.9)
HGB UR QL STRIP.AUTO: ABNORMAL
HOLD SPECIMEN: NORMAL
HYALINE CASTS UR QL AUTO: ABNORMAL /LPF
IMM GRANULOCYTES # BLD AUTO: 0.01 10*3/MM3 (ref 0–0.05)
IMM GRANULOCYTES NFR BLD AUTO: 0.1 % (ref 0–0.5)
KETONES UR QL STRIP: NEGATIVE
LEUKOCYTE ESTERASE UR QL STRIP.AUTO: ABNORMAL
LIPASE SERPL-CCNC: 30 U/L (ref 13–60)
LYMPHOCYTES # BLD AUTO: 1.95 10*3/MM3 (ref 0.7–3.1)
LYMPHOCYTES NFR BLD AUTO: 26.2 % (ref 19.6–45.3)
MCH RBC QN AUTO: 28.5 PG (ref 26.6–33)
MCHC RBC AUTO-ENTMCNC: 32 G/DL (ref 31.5–35.7)
MCV RBC AUTO: 89.3 FL (ref 79–97)
MONOCYTES # BLD AUTO: 0.7 10*3/MM3 (ref 0.1–0.9)
MONOCYTES NFR BLD AUTO: 9.4 % (ref 5–12)
MUCOUS THREADS URNS QL MICRO: ABNORMAL /HPF
NEUTROPHILS # BLD AUTO: 4.24 10*3/MM3 (ref 1.7–7)
NEUTROPHILS NFR BLD AUTO: 57.2 % (ref 42.7–76)
NITRITE UR QL STRIP: NEGATIVE
NRBC BLD AUTO-RTO: 0 /100 WBC (ref 0–0.2)
PH UR STRIP.AUTO: 6 [PH] (ref 5–9)
PLATELET # BLD AUTO: 220 10*3/MM3 (ref 140–450)
PMV BLD AUTO: 10 FL (ref 6–12)
POTASSIUM BLD-SCNC: 3.4 MMOL/L (ref 3.5–5.2)
PROT SERPL-MCNC: 7 G/DL (ref 6–8.5)
PROT UR QL STRIP: NEGATIVE
RBC # BLD AUTO: 4.38 10*6/MM3 (ref 3.77–5.28)
RBC # UR: ABNORMAL /HPF
REF LAB TEST METHOD: ABNORMAL
SODIUM BLD-SCNC: 140 MMOL/L (ref 136–145)
SP GR UR STRIP: 1.02 (ref 1–1.03)
SQUAMOUS #/AREA URNS HPF: ABNORMAL /HPF
UROBILINOGEN UR QL STRIP: ABNORMAL
WBC NRBC COR # BLD: 7.43 10*3/MM3 (ref 3.4–10.8)
WBC UR QL AUTO: ABNORMAL /HPF
WHOLE BLOOD HOLD SPECIMEN: NORMAL

## 2019-06-14 PROCEDURE — 87493 C DIFF AMPLIFIED PROBE: CPT | Performed by: EMERGENCY MEDICINE

## 2019-06-14 PROCEDURE — 25010000002 IOPAMIDOL 61 % SOLUTION: Performed by: EMERGENCY MEDICINE

## 2019-06-14 PROCEDURE — 74177 CT ABD & PELVIS W/CONTRAST: CPT

## 2019-06-14 PROCEDURE — 83690 ASSAY OF LIPASE: CPT | Performed by: EMERGENCY MEDICINE

## 2019-06-14 PROCEDURE — 80053 COMPREHEN METABOLIC PANEL: CPT | Performed by: EMERGENCY MEDICINE

## 2019-06-14 PROCEDURE — 85025 COMPLETE CBC W/AUTO DIFF WBC: CPT | Performed by: EMERGENCY MEDICINE

## 2019-06-14 PROCEDURE — 81001 URINALYSIS AUTO W/SCOPE: CPT | Performed by: EMERGENCY MEDICINE

## 2019-06-14 PROCEDURE — 99284 EMERGENCY DEPT VISIT MOD MDM: CPT

## 2019-06-14 RX ORDER — POTASSIUM CHLORIDE 750 MG/1
20 CAPSULE, EXTENDED RELEASE ORAL ONCE
Status: COMPLETED | OUTPATIENT
Start: 2019-06-14 | End: 2019-06-14

## 2019-06-14 RX ORDER — SODIUM CHLORIDE 0.9 % (FLUSH) 0.9 %
10 SYRINGE (ML) INJECTION AS NEEDED
Status: DISCONTINUED | OUTPATIENT
Start: 2019-06-14 | End: 2019-06-15 | Stop reason: HOSPADM

## 2019-06-14 RX ADMIN — SODIUM CHLORIDE 500 ML: 9 INJECTION, SOLUTION INTRAVENOUS at 20:54

## 2019-06-14 RX ADMIN — POTASSIUM CHLORIDE 20 MEQ: 750 CAPSULE, EXTENDED RELEASE ORAL at 22:51

## 2019-06-14 RX ADMIN — Medication 10 ML: at 21:56

## 2019-06-14 RX ADMIN — IOPAMIDOL 85 ML: 612 INJECTION, SOLUTION INTRAVENOUS at 22:25

## 2019-06-17 ENCOUNTER — TELEPHONE (OUTPATIENT)
Dept: FAMILY MEDICINE CLINIC | Facility: CLINIC | Age: 39
End: 2019-06-17

## 2019-06-17 NOTE — TELEPHONE ENCOUNTER
Pato called from [facility] and said, that Kiley is having diarrhea 4 to 5 times a day and has been since she had her Colonoscopy.  Said, she is taking Lynzess and wondering if they need to stop it.  Her number is [phone]

## 2019-06-18 ENCOUNTER — TELEPHONE (OUTPATIENT)
Dept: FAMILY MEDICINE CLINIC | Facility: CLINIC | Age: 39
End: 2019-06-18

## 2019-06-18 NOTE — TELEPHONE ENCOUNTER
Pato needs a note faxed stating Kiley needs to stop taking Linzess for extreme diarrhea and note in there about how long she needs to be off it for their records.      Fax [phone]   [phone]

## 2019-06-19 ENCOUNTER — OFFICE VISIT (OUTPATIENT)
Dept: GASTROENTEROLOGY | Facility: CLINIC | Age: 39
End: 2019-06-19

## 2019-06-19 VITALS — DIASTOLIC BLOOD PRESSURE: 72 MMHG | SYSTOLIC BLOOD PRESSURE: 124 MMHG | HEART RATE: 74 BPM

## 2019-06-19 DIAGNOSIS — G89.29 CHRONIC ABDOMINAL PAIN: ICD-10-CM

## 2019-06-19 DIAGNOSIS — R19.7 DIARRHEA, UNSPECIFIED TYPE: Primary | ICD-10-CM

## 2019-06-19 DIAGNOSIS — K59.00 CONSTIPATION, UNSPECIFIED CONSTIPATION TYPE: ICD-10-CM

## 2019-06-19 DIAGNOSIS — R10.9 CHRONIC ABDOMINAL PAIN: ICD-10-CM

## 2019-06-19 PROCEDURE — 99213 OFFICE O/P EST LOW 20 MIN: CPT | Performed by: PHYSICIAN ASSISTANT

## 2019-06-19 NOTE — PATIENT INSTRUCTIONS

## 2019-06-19 NOTE — PROGRESS NOTES
Chief Complaint   Patient presents with   • Constipation   • Heartburn   • Hx Of Colitis       ENDO PROCEDURE ORDERED:    Subjective    Kiley Ledesma is a 38 y.o. female. she is here today for follow-up.    History of Present Illness    Patient seen on a recheck of her constipation, GERD, previous colitis. Last seen 05/06/2019. Patient underwent colonoscopy on 06/07/2017, showed internal and external hemorrhoids. Negative biopsies of the terminal ileum, random colon and rectum. Was recommended to have another colonoscopy in 10 years. Patient is accompanied by an aide from her facility. They are still having difficulty with her bowels. She is able to get to the bathroom and indicate she needs to have a bowel movement, generally in enough time to get her to the bathroom, but is still having some variability. She is on Prilosec and Reglan for chronic heartburn. Denied nausea, vomiting, dysphagia. Weight appears stable, but she cannot stand on the scale.    Patient was evaluated for possible pelvic floor dysfunction by Janeen Mullins 05/13/2019. She was felt she was not a candidate because of her neurological dysfunction. She could not coordinate to have a bowel movement and did not feel she would be able to help her with physical therapy to improve her stooling.     Patient went to the emergency room with abdominal pain and diarrhea on 06/14/2019. CT scan abdomen and pelvis with contrast showed fluid in the colon, nephrolithiasis with renal cysts. Normal lipase and CBC. C. difficile was negative. Urinalysis showed trace abnormalities. CMP showed potassium 3.4, otherwise normal.     ASSESSMENT/PLAN: Patient with normal colonoscopy with no evidence for colitis on recent endoscopy or biopsies. Consider small bowel evaluation if her symptoms remain persistent. The patient and her aide state that she is doing fairly well on current regimen. We will plan followup in 6 months, sooner if needed. Further pending clinical  course and the results of the above.        The following portions of the patient's history were reviewed and updated as appropriate:   Past Medical History:   Diagnosis Date   • Acute vulvitis     Candida   • Allergy     Unspecified, initial encounter   • Amblyopia     OS   • Astigmatism    • Cerebral palsy (CMS/HCC)    • Cheilosis    • Common cold    • Contusion    • Depressive disorder    • Diarrhea    • Encounter for general adult medical examination without abnormal findings    • Encounter for routine adult health examination     Adult health examination      • Fever    • Gastroesophageal reflux disease    • Generalized abdominal pain    • Indeterminate colitis    • Infected insect bite    • Myopia    • Spastic quadriplegic cerebral palsy (CMS/HCC)    • Trouble walking     Walking disability      • Urinary incontinence    • Urinary tract infectious disease    • Wears glasses    • Worried well      Past Surgical History:   Procedure Laterality Date   • COLONOSCOPY N/A 03/28/2012    hemorrhoids   • COLONOSCOPY N/A 4/23/2018    Procedure: COLONOSCOPY--look TI, bx, hx colitis;  Surgeon: Sreedhar Kirkpatrick MD;  Location: [facility] ENDOSCOPY;  Service: Gastroenterology   • COLONOSCOPY N/A 6/7/2019    Procedure: COLONOSCOPY;  Surgeon: Sreedhar Kirkpatrick MD;  Location: [facility] ENDOSCOPY;  Service: Gastroenterology   • CYSTOSCOPY N/A 4/19/2019    Procedure: CYSTOSCOPY;  Surgeon: Cecilio Acuña MD;  Location: [facility] OR;  Service: Urology   • ENDOSCOPY N/A 03/28/2012    gastritis   • INJECTION OF MEDICATION  02/23/2016    Kenalog   • TOTAL ABDOMINAL HYSTERECTOMY WITH SALPINGO OOPHORECTOMY N/A 03/24/2005   • UPPER GASTROINTESTINAL ENDOSCOPY  03/28/2012     Family History   Problem Relation Age of Onset   • Coronary artery disease Other    • Diabetes Other    • Hypertension Other    • Stroke Other      OB History     No data available        Allergies   Allergen Reactions   • Atropine Unknown (See Comments)     Unknown reaction  per patient   • Ciprofloxacin Other (See Comments)     seizure   • Sulfa Antibiotics Itching   • Avelox [Moxifloxacin Hcl] Other (See Comments)     Concerned may cause seizure   • Factive [Gemifloxacin] Other (See Comments)     Concerned may cause seizure   • Floxin Otic [Ofloxacin] Other (See Comments)     Concerned may cause seizure   • Levaquin [Levofloxacin] Other (See Comments)     Concerned may cause seizure     Social History     Socioeconomic History   • Marital status: Single     Spouse name: Not on file   • Number of children: Not on file   • Years of education: Not on file   • Highest education level: Not on file   Tobacco Use   • Smoking status: Never Smoker   • Smokeless tobacco: Never Used   Substance and Sexual Activity   • Alcohol use: No   • Drug use: No   • Sexual activity: Defer     Current Medications:  Prior to Admission medications    Medication Sig Start Date End Date Taking? Authorizing Provider   acetaminophen (TYLENOL) 325 MG tablet Take 325 mg by mouth Every 6 (Six) Hours As Needed for Mild Pain  (1-2 tabs).   Yes ProviderCandy MD   albuterol (PROVENTIL) (2.5 MG/3ML) 0.083% nebulizer solution Take 2.5 mg by nebulization Every 4 (Four) Hours As Needed for Wheezing. 4/25/19  Yes Greta Stoddard MD   BACLOFEN IT Has a baclofen pump   Yes ProviderCandy MD   Cholecalciferol 1000 units capsule Take 1,000 Units by mouth Daily.   Yes Candy Thomas MD   diphenhydrAMINE (BENADRYL ALLERGY) 25 MG tablet Take 1 tablet by mouth At Night As Needed for Allergies or Sleep. 5/22/19  Yes Greta Stoddard MD   docusate sodium (COLACE) 100 MG capsule Take 1 capsule by mouth 2 (Two) Times a Day. 4/24/19  Yes Greta Stoddard MD   ESTRADIOL PO Take 1 mg by mouth Daily.   Yes ProviderCandy MD   FLUoxetine (PROZAC) 40 MG capsule Take 40 mg by mouth Daily.   Yes Candy Thomas MD   fluticasone (FLONASE) 50 MCG/ACT nasal spray 2 sprays into each nostril Daily.   Yes  Candy Thomas MD   metoclopramide (REGLAN) 10 MG tablet Take 10 mg by mouth Daily.   Yes Candy Thomas MD   Misc. Devices (WHEELCHAIR) misc As directed   Yes Candy Thomas MD   mupirocin (BACTROBAN) 2 % ointment Apply  topically 3 (Three) Times a Day. 1/29/18  Yes Ponce Caldwell MD   neomycin-bacitracin-polymyxin (NEOSPORIN) 5-400-5000 ointment Apply  topically to the appropriate area as directed. EVERY 8 HOURS PRN   Yes Candy Thomas MD   NON FORMULARY Daily As Needed. Nyst/bacit/Zno/hc 1%   Yes Candy Thomas MD   Omeprazole (PRILOSEC PO) Take 20 mg by mouth Daily. ON HOLD TILL KEFLEX COMPLETED   Yes Candy Thomas MD   ondansetron ODT (ZOFRAN-ODT) 8 MG disintegrating tablet Take 1 tablet by mouth Every 8 (Eight) Hours As Needed for Nausea. 8/20/18  Yes Greta Stoddard MD   solifenacin (VESICARE) 10 MG tablet Take 10 mg by mouth Daily.   Yes Candy Thomas MD     Review of Systems  Review of Systems       Objective    /72 (BP Location: Left arm)   Pulse 74   LMP  (LMP Unknown)   Physical Exam   Constitutional: She is oriented to person, place, and time. She appears well-developed and well-nourished. No distress.   [facility] [place]:   Head: Normocephalic and atraumatic.   Eyes: EOM are normal. Pupils are equal, round, and reactive to light.   Neck: Normal range of motion.   Cardiovascular: Normal rate, regular rhythm and normal heart sounds.   Pulmonary/Chest: Effort normal and breath sounds normal.   Abdominal: Soft. Bowel sounds are normal. She exhibits no shifting dullness, no distension, no abdominal bruit, no ascites and no mass. There is no hepatosplenomegaly. There is tenderness. There is no rigidity, no rebound, no guarding and no CVA tenderness. No hernia. Hernia confirmed negative in the ventral area.   Diffuse, pain pump in RLQ   Musculoskeletal: Normal range of motion.   Neurological: She is alert and oriented to person, place, and time.    Skin: Skin is warm and dry.   Psychiatric: She has a normal mood and affect. Her behavior is normal. Judgment and thought content normal.   Nursing note and vitals reviewed.    Assessment/Plan      1. Diarrhea, unspecified type    2. Constipation, unspecified constipation type    3. Chronic abdominal pain    .   Kiley was seen today for constipation, heartburn and hx of colitis.    Diagnoses and all orders for this visit:    Diarrhea, unspecified type    Constipation, unspecified constipation type    Chronic abdominal pain        Orders placed during this encounter include:  No orders of the defined types were placed in this encounter.      Medications prescribed:  No orders of the defined types were placed in this encounter.      Requested Prescriptions      No prescriptions requested or ordered in this encounter       Review and/or summary of lab tests, radiology, procedures, medications. Review and summary of old records and obtaining of history. The risks and benefits of my recommendations, as well as other treatment options were discussed with the patient today. Questions were answered.    Follow-up: Return in about 6 months (around 12/19/2019), or if symptoms worsen or fail to improve.     * Surgery not found *      This document has been electronically signed by Bro Lott PA-C on June 21, 2019 1:57 PM      Results for orders placed or performed during the hospital encounter of 06/14/19   Gold Top - SST   Result Value Ref Range    Extra Tube Hold for add-ons.    Urinalysis, Microscopic Only - Urine, Clean Catch   Result Value Ref Range    RBC, UA 3-5 (A) None Seen /HPF    WBC, UA 3-5 None Seen, 0-2, 3-5 /HPF    Bacteria, UA 2+ (A) None Seen /HPF    Squamous Epithelial Cells, UA 3-5 (A) None Seen, 0-2 /HPF    Hyaline Casts, UA 0-2 None Seen /LPF    Calcium Oxalate Crystals, UA Moderate/2+ None Seen /HPF    Mucus, UA Small/1+ (A) None Seen, Trace /HPF    Methodology Manual Light Microscopy     Urinalysis With Microscopic If Indicated (No Culture) - Urine, Clean Catch   Result Value Ref Range    Color, UA Yellow Yellow, Straw, Dark Yellow, Keli    Appearance, UA Cloudy (A) Clear    pH, UA 6.0 5.0 - 9.0    Specific Gravity, UA 1.017 1.003 - 1.030    Glucose, UA Negative Negative    Ketones, UA Negative Negative    Bilirubin, UA Negative Negative    Blood, UA Trace (A) Negative    Protein, UA Negative Negative    Leuk Esterase, UA Small (1+) (A) Negative    Nitrite, UA Negative Negative    Urobilinogen, UA 0.2 E.U./dL 0.2 - 1.0 E.U./dL   CBC Auto Differential   Result Value Ref Range    WBC 7.43 3.40 - 10.80 10*3/mm3    RBC 4.38 3.77 - 5.28 10*6/mm3    Hemoglobin 12.5 12.0 - 15.9 g/dL    Hematocrit 39.1 34.0 - 46.6 %    MCV 89.3 79.0 - 97.0 fL    MCH 28.5 26.6 - 33.0 pg    MCHC 32.0 31.5 - 35.7 g/dL    RDW 12.8 12.3 - 15.4 %    RDW-SD 42.2 37.0 - 54.0 fl    MPV 10.0 6.0 - 12.0 fL    Platelets 220 140 - 450 10*3/mm3    Neutrophil % 57.2 42.7 - 76.0 %    Lymphocyte % 26.2 19.6 - 45.3 %    Monocyte % 9.4 5.0 - 12.0 %    Eosinophil % 6.6 (H) 0.3 - 6.2 %    Basophil % 0.5 0.0 - 1.5 %    Immature Grans % 0.1 0.0 - 0.5 %    Neutrophils, Absolute 4.24 1.70 - 7.00 10*3/mm3    Lymphocytes, Absolute 1.95 0.70 - 3.10 10*3/mm3    Monocytes, Absolute 0.70 0.10 - 0.90 10*3/mm3    Eosinophils, Absolute 0.49 (H) 0.00 - 0.40 10*3/mm3    Basophils, Absolute 0.04 0.00 - 0.20 10*3/mm3    Immature Grans, Absolute 0.01 0.00 - 0.05 10*3/mm3    nRBC 0.0 0.0 - 0.2 /100 WBC   Light Blue Top   Result Value Ref Range    Extra Tube hold for add-on    Clostridium Difficile Toxin, PCR - Stool, Per Rectum   Result Value Ref Range    C. Difficile Toxins by PCR Negative Negative   Lipase   Result Value Ref Range    Lipase 30 13 - 60 U/L   Comprehensive Metabolic Panel   Result Value Ref Range    Glucose 80 65 - 99 mg/dL    BUN 12 6 - 20 mg/dL    Creatinine 0.40 (L) 0.57 - 1.00 mg/dL    Sodium 140 136 - 145 mmol/L    Potassium 3.4 (L) 3.5 -  5.2 mmol/L    Chloride 103 98 - 107 mmol/L    CO2 26.0 22.0 - 29.0 mmol/L    Calcium 9.0 8.6 - 10.5 mg/dL    Total Protein 7.0 6.0 - 8.5 g/dL    Albumin 4.00 3.50 - 5.20 g/dL    ALT (SGPT) 8 1 - 33 U/L    AST (SGOT) 14 1 - 32 U/L    Alkaline Phosphatase 94 39 - 117 U/L    Total Bilirubin 0.2 0.2 - 1.2 mg/dL    eGFR Non African Amer >150 >60 mL/min/1.73    Globulin 3.0 gm/dL    A/G Ratio 1.3 g/dL    BUN/Creatinine Ratio 30.0 (H) 7.0 - 25.0    Anion Gap 11.0 mmol/L   Results for orders placed or performed during the hospital encounter of 06/07/19   Tissue Pathology Exam   Result Value Ref Range    Case Report       Surgical Pathology Report                         Case: [id number]                                  Authorizing Provider:  Sreedhar Kirkpatrick MD        Collected:           06/07/2019 04:35 PM          Ordering Location:     [facility]             Received:            06/08/2019 08:39 AM                                 [place] [facility]                                                     Pathologist:           Tad Voss MD                                                         Specimens:   1) - Small Intestine, Ileum, terminal ileum                                                         2) - Large Intestine, colonic mucosa                                                                3) - Large Intestine, Rectum                                                               Final Diagnosis       1.  MUCOSA, TERMINAL ILEUM:   NO SIGNIFICANT HISTOLOGIC ABNORMALITY.     2.  MUCOSA, COLON:   NO SIGNIFICANT HISTOLOGIC ABNORMALITY.     3.  MUCOSA, RECTUM:   NO SIGNIFICANT HISTOLOGIC ABNORMALITY.       Gross Description       Received for examination are 3 containers, each of which have nodular bits of white soft tissue measuring 0.3-0.5 cc in aggregate.  All specimens are embedded as labeled:  1A terminal ileum; 2A mucosa of colon; 3A rectum.      Results for orders placed or performed during  the hospital encounter of 04/16/19   Urinalysis, Microscopic Only - Urine, Catheter   Result Value Ref Range    RBC, UA Too Numerous to Count (A) None Seen /HPF    WBC, UA 21-30 (A) None Seen, 0-2, 3-5 /HPF    Bacteria, UA 1+ (A) None Seen /HPF    Squamous Epithelial Cells, UA 0-2 None Seen, 0-2 /HPF    Hyaline Casts, UA None Seen None Seen /LPF    Methodology Automated Microscopy    Urinalysis With Microscopic If Indicated (No Culture) - Urine, Catheter   Result Value Ref Range    Color, UA Yellow Yellow, Straw, Dark Yellow, Keli    Appearance, UA Cloudy (A) Clear    pH, UA 6.0 5.0 - 9.0    Specific Gravity, UA 1.029 1.003 - 1.030    Glucose, UA Negative Negative    Ketones, UA Negative Negative    Bilirubin, UA Negative Negative    Blood, UA Large (3+) (A) Negative    Protein, UA >=300 mg/dL (3+) (A) Negative    Leuk Esterase, UA Small (1+) (A) Negative    Nitrite, UA Positive (A) Negative    Urobilinogen, UA 0.2 E.U./dL 0.2 - 1.0 E.U./dL   Urine Culture - Urine, Urine, Clean Catch   Result Value Ref Range    Urine Culture >100,000 CFU/mL Pseudomonas aeruginosa (A)        Susceptibility    Pseudomonas aeruginosa - EVE     Cefepime 2 Susceptible ug/ml     Ceftazidime 4 Susceptible ug/ml     Ciprofloxacin <=0.25 Susceptible ug/ml     Gentamicin <=1 Susceptible ug/ml     Levofloxacin 1 Susceptible ug/ml     Piperacillin + Tazobactam 8 Susceptible ug/ml   Results for orders placed or performed during the hospital encounter of 04/09/19   Gold Top - SST   Result Value Ref Range    Extra Tube Hold for add-ons.    Gold Top - SST   Result Value Ref Range    Extra Tube Hold for add-ons.    Green Top (Gel)   Result Value Ref Range    Extra Tube Hold for add-ons.    CBC Auto Differential   Result Value Ref Range    WBC 6.88 3.40 - 10.80 10*3/mm3    RBC 4.49 3.77 - 5.28 10*6/mm3    Hemoglobin 12.9 12.0 - 15.9 g/dL    Hematocrit 40.0 34.0 - 46.6 %    MCV 89.1 79.0 - 97.0 fL    MCH 28.7 26.6 - 33.0 pg    MCHC 32.3 31.5 - 35.7  g/dL    RDW 12.8 12.3 - 15.4 %    RDW-SD 41.8 37.0 - 54.0 fl    MPV 9.2 6.0 - 12.0 fL    Platelets 238 140 - 450 10*3/mm3    Neutrophil % 65.2 42.7 - 76.0 %    Lymphocyte % 22.8 19.6 - 45.3 %    Monocyte % 8.1 5.0 - 12.0 %    Eosinophil % 3.2 0.3 - 6.2 %    Basophil % 0.6 0.0 - 1.5 %    Immature Grans % 0.1 0.0 - 0.5 %    Neutrophils, Absolute 4.48 1.40 - 7.00 10*3/mm3    Lymphocytes, Absolute 1.57 0.70 - 3.10 10*3/mm3    Monocytes, Absolute 0.56 0.10 - 0.90 10*3/mm3    Eosinophils, Absolute 0.22 0.00 - 0.40 10*3/mm3    Basophils, Absolute 0.04 0.00 - 0.20 10*3/mm3    Immature Grans, Absolute 0.01 0.00 - 0.05 10*3/mm3    nRBC 0.0 0.0 - 0.0 /100 WBC     *Note: Due to a large number of results and/or encounters for the requested time period, some results have not been displayed. A complete set of results can be found in Results Review.       Some portions of this note have been dictated using voice recognition software and may contain errors and/or omissions.

## 2019-06-21 ENCOUNTER — OFFICE VISIT (OUTPATIENT)
Dept: FAMILY MEDICINE CLINIC | Facility: CLINIC | Age: 39
End: 2019-06-21

## 2019-06-21 VITALS
HEART RATE: 74 BPM | SYSTOLIC BLOOD PRESSURE: 110 MMHG | DIASTOLIC BLOOD PRESSURE: 70 MMHG | BODY MASS INDEX: 25.83 KG/M2 | OXYGEN SATURATION: 99 % | HEIGHT: 59 IN

## 2019-06-21 DIAGNOSIS — R19.7 DIARRHEA, UNSPECIFIED TYPE: Primary | ICD-10-CM

## 2019-06-21 DIAGNOSIS — L29.0 PERIANAL IRRITATION: ICD-10-CM

## 2019-06-21 PROCEDURE — 99213 OFFICE O/P EST LOW 20 MIN: CPT | Performed by: GENERAL PRACTICE

## 2019-06-21 RX ORDER — TRIAMCINOLONE ACETONIDE 0.25 MG/G
OINTMENT TOPICAL 2 TIMES DAILY
Qty: 15 G | Refills: 1 | Status: SHIPPED | OUTPATIENT
Start: 2019-06-21 | End: 2020-01-22 | Stop reason: ALTCHOICE

## 2019-06-21 NOTE — PROGRESS NOTES
Subjective   Kiley Ledesma is a 38 y.o. female.   Chief Complaint   Patient presents with   • Rectal Pain     Had colonoscopy and since then has had diarrhea. Improving but now has a very sore bottom from wiping so much.   Diarrhea    This is a new problem. The current episode started in the past 7 days. The problem occurs 5 to 10 times per day. The problem has been rapidly improving. The stool consistency is described as watery. The patient states that diarrhea does not awaken her from sleep. Pertinent negatives include no abdominal pain, arthralgias, chills, coughing, fever, headaches, myalgias or vomiting. Nothing (stool softener and linzess stopped) aggravates the symptoms. Treatments tried: stool softener and linzess stopped. The treatment provided significant relief.      The following portions of the patient's history were reviewed and updated as appropriate: allergies, current medications, past social history and problem list.    Outpatient Medications Prior to Visit   Medication Sig Dispense Refill   • acetaminophen (TYLENOL) 325 MG tablet Take 325 mg by mouth Every 6 (Six) Hours As Needed for Mild Pain  (1-2 tabs).     • albuterol (PROVENTIL) (2.5 MG/3ML) 0.083% nebulizer solution Take 2.5 mg by nebulization Every 4 (Four) Hours As Needed for Wheezing. 720 mL 3   • BACLOFEN IT Has a baclofen pump     • Cholecalciferol 1000 units capsule Take 1,000 Units by mouth Daily.     • diphenhydrAMINE (BENADRYL ALLERGY) 25 MG tablet Take 1 tablet by mouth At Night As Needed for Allergies or Sleep. 30 tablet 2   • docusate sodium (COLACE) 100 MG capsule Take 1 capsule by mouth 2 (Two) Times a Day. 180 capsule 3   • ESTRADIOL PO Take 1 mg by mouth Daily.     • FLUoxetine (PROZAC) 40 MG capsule Take 40 mg by mouth Daily.     • fluticasone (FLONASE) 50 MCG/ACT nasal spray 2 sprays into each nostril Daily.     • metoclopramide (REGLAN) 10 MG tablet Take 10 mg by mouth Daily.     • Misc. Devices (WHEELCHAIR) misc As  "directed     • mupirocin (BACTROBAN) 2 % ointment Apply  topically 3 (Three) Times a Day. 22 g 0   • neomycin-bacitracin-polymyxin (NEOSPORIN) 5-400-5000 ointment Apply  topically to the appropriate area as directed. EVERY 8 HOURS PRN     • NON FORMULARY Daily As Needed. Nyst/bacit/Zno/hc 1%     • Omeprazole (PRILOSEC PO) Take 20 mg by mouth Daily. ON HOLD TILL KEFLEX COMPLETED     • ondansetron ODT (ZOFRAN-ODT) 8 MG disintegrating tablet Take 1 tablet by mouth Every 8 (Eight) Hours As Needed for Nausea. 30 tablet 5   • solifenacin (VESICARE) 10 MG tablet Take 10 mg by mouth Daily.       No facility-administered medications prior to visit.        Review of Systems   Constitutional: Negative.  Negative for chills, fatigue, fever and unexpected weight change.   HENT: Negative.  Negative for congestion, ear pain, hearing loss, nosebleeds, rhinorrhea, sneezing, sore throat and tinnitus.    Eyes: Negative.  Negative for discharge.   Respiratory: Negative.  Negative for cough, shortness of breath and wheezing.    Cardiovascular: Negative.  Negative for chest pain and palpitations.   Gastrointestinal: Positive for diarrhea. Negative for abdominal pain, constipation, nausea and vomiting.   Endocrine: Negative.    Genitourinary: Negative.  Negative for dysuria, frequency and urgency.   Musculoskeletal: Negative.  Negative for arthralgias, back pain, joint swelling, myalgias and neck pain.   Skin: Negative.  Negative for rash.   Allergic/Immunologic: Negative.    Neurological: Negative.  Negative for dizziness, weakness, numbness and headaches.   Hematological: Negative.  Negative for adenopathy.   Psychiatric/Behavioral: Negative.  Negative for dysphoric mood and sleep disturbance. The patient is not nervous/anxious.        Objective   Visit Vitals  /70 (BP Location: Left arm)   Pulse 74   Ht 149.9 cm (59\")   LMP  (LMP Unknown)   SpO2 99%   BMI 25.83 kg/m²     Physical Exam   Constitutional: She is oriented to person, " place, and time. She appears well-developed and well-nourished. No distress.   HENT:   Head: Normocephalic and atraumatic.   Nose: Nose normal.   Mouth/Throat: Oropharynx is clear and moist.   Eyes: Conjunctivae and EOM are normal. Pupils are equal, round, and reactive to light. Right eye exhibits no discharge. Left eye exhibits no discharge.   Neck: No thyromegaly present.   Cardiovascular: Normal rate, regular rhythm, normal heart sounds and intact distal pulses.   Pulmonary/Chest: Effort normal and breath sounds normal.   Abdominal: Soft. Normal appearance and bowel sounds are normal. She exhibits no distension and no mass.       Difficult to exam bottom due to cerebral palsy, treated based on report from caregiver   Lymphadenopathy:     She has no cervical adenopathy.   Neurological: She is alert and oriented to person, place, and time. She exhibits abnormal muscle tone. Gait abnormal.   Cerebral palsy with spastic quadrplegia   Skin: Skin is warm and dry.   Psychiatric: She has a normal mood and affect.   Nursing note and vitals reviewed.    Assessment/Plan   Problem List Items Addressed This Visit        Digestive    Diarrhea - Primary      Other Visit Diagnoses     Perianal irritation        Relevant Medications    triamcinolone (KENALOG) 0.025 % ointment         Recheck if not improving.      New Medications Ordered This Visit   Medications   • triamcinolone (KENALOG) 0.025 % ointment     Sig: Apply  topically to the appropriate area as directed 2 (Two) Times a Day.     Dispense:  15 g     Refill:  1     Return if symptoms worsen or fail to improve, for Next scheduled follow up.

## 2019-07-14 ENCOUNTER — HOSPITAL ENCOUNTER (EMERGENCY)
Facility: HOSPITAL | Age: 39
Discharge: HOME | End: 2019-07-15
Attending: EMERGENCY MEDICINE | Admitting: EMERGENCY MEDICINE

## 2019-07-14 DIAGNOSIS — G40.A09 NONINTRACTABLE ABSENCE EPILEPSY WITHOUT STATUS EPILEPTICUS (HCC): Primary | ICD-10-CM

## 2019-07-14 LAB
HOLD SPECIMEN: NORMAL
HOLD SPECIMEN: NORMAL
WHOLE BLOOD HOLD SPECIMEN: NORMAL
WHOLE BLOOD HOLD SPECIMEN: NORMAL

## 2019-07-14 PROCEDURE — 85025 COMPLETE CBC W/AUTO DIFF WBC: CPT | Performed by: EMERGENCY MEDICINE

## 2019-07-14 PROCEDURE — P9612 CATHETERIZE FOR URINE SPEC: HCPCS

## 2019-07-14 PROCEDURE — 99284 EMERGENCY DEPT VISIT MOD MDM: CPT

## 2019-07-14 PROCEDURE — 80053 COMPREHEN METABOLIC PANEL: CPT | Performed by: EMERGENCY MEDICINE

## 2019-07-14 RX ORDER — SODIUM CHLORIDE 0.9 % (FLUSH) 0.9 %
10 SYRINGE (ML) INJECTION AS NEEDED
Status: DISCONTINUED | OUTPATIENT
Start: 2019-07-14 | End: 2019-07-15 | Stop reason: HOSPADM

## 2019-07-14 RX ADMIN — SODIUM CHLORIDE, PRESERVATIVE FREE 10 ML: 5 INJECTION INTRAVENOUS at 22:36

## 2019-07-15 VITALS
WEIGHT: 119.7 LBS | BODY MASS INDEX: 21.21 KG/M2 | RESPIRATION RATE: 20 BRPM | HEIGHT: 63 IN | HEART RATE: 70 BPM | DIASTOLIC BLOOD PRESSURE: 81 MMHG | TEMPERATURE: 98.2 F | OXYGEN SATURATION: 98 % | SYSTOLIC BLOOD PRESSURE: 130 MMHG

## 2019-07-15 LAB
ALBUMIN SERPL-MCNC: 4 G/DL (ref 3.5–5.2)
ALBUMIN/GLOB SERPL: 1.4 G/DL
ALP SERPL-CCNC: 86 U/L (ref 39–117)
ALT SERPL W P-5'-P-CCNC: 11 U/L (ref 1–33)
ANION GAP SERPL CALCULATED.3IONS-SCNC: 13 MMOL/L (ref 5–15)
AST SERPL-CCNC: 16 U/L (ref 1–32)
BASOPHILS # BLD AUTO: 0.06 10*3/MM3 (ref 0–0.2)
BASOPHILS NFR BLD AUTO: 0.7 % (ref 0–1.5)
BILIRUB SERPL-MCNC: 0.2 MG/DL (ref 0.2–1.2)
BILIRUB UR QL STRIP: NEGATIVE
BUN BLD-MCNC: 16 MG/DL (ref 6–20)
BUN/CREAT SERPL: 39 (ref 7–25)
CALCIUM SPEC-SCNC: 8.8 MG/DL (ref 8.6–10.5)
CHLORIDE SERPL-SCNC: 100 MMOL/L (ref 98–107)
CLARITY UR: CLEAR
CO2 SERPL-SCNC: 26 MMOL/L (ref 22–29)
COLOR UR: YELLOW
CREAT BLD-MCNC: 0.41 MG/DL (ref 0.57–1)
DEPRECATED RDW RBC AUTO: 43.6 FL (ref 37–54)
EOSINOPHIL # BLD AUTO: 0.22 10*3/MM3 (ref 0–0.4)
EOSINOPHIL NFR BLD AUTO: 2.7 % (ref 0.3–6.2)
ERYTHROCYTE [DISTWIDTH] IN BLOOD BY AUTOMATED COUNT: 13.2 % (ref 12.3–15.4)
GFR SERPL CREATININE-BSD FRML MDRD: >150 ML/MIN/1.73
GLOBULIN UR ELPH-MCNC: 2.9 GM/DL
GLUCOSE BLD-MCNC: 103 MG/DL (ref 65–99)
GLUCOSE UR STRIP-MCNC: NEGATIVE MG/DL
HCT VFR BLD AUTO: 39.2 % (ref 34–46.6)
HGB BLD-MCNC: 12.5 G/DL (ref 12–15.9)
HGB UR QL STRIP.AUTO: NEGATIVE
IMM GRANULOCYTES # BLD AUTO: 0.01 10*3/MM3 (ref 0–0.05)
IMM GRANULOCYTES NFR BLD AUTO: 0.1 % (ref 0–0.5)
KETONES UR QL STRIP: NEGATIVE
LEUKOCYTE ESTERASE UR QL STRIP.AUTO: NEGATIVE
LYMPHOCYTES # BLD AUTO: 1.36 10*3/MM3 (ref 0.7–3.1)
LYMPHOCYTES NFR BLD AUTO: 16.5 % (ref 19.6–45.3)
MCH RBC QN AUTO: 28.5 PG (ref 26.6–33)
MCHC RBC AUTO-ENTMCNC: 31.9 G/DL (ref 31.5–35.7)
MCV RBC AUTO: 89.3 FL (ref 79–97)
MONOCYTES # BLD AUTO: 0.79 10*3/MM3 (ref 0.1–0.9)
MONOCYTES NFR BLD AUTO: 9.6 % (ref 5–12)
NEUTROPHILS # BLD AUTO: 5.82 10*3/MM3 (ref 1.7–7)
NEUTROPHILS NFR BLD AUTO: 70.4 % (ref 42.7–76)
NITRITE UR QL STRIP: NEGATIVE
NRBC BLD AUTO-RTO: 0 /100 WBC (ref 0–0.2)
PH UR STRIP.AUTO: 6.5 [PH] (ref 5–9)
PLATELET # BLD AUTO: 245 10*3/MM3 (ref 140–450)
PMV BLD AUTO: 10.1 FL (ref 6–12)
POTASSIUM BLD-SCNC: 4.3 MMOL/L (ref 3.5–5.2)
PROT SERPL-MCNC: 6.9 G/DL (ref 6–8.5)
PROT UR QL STRIP: NEGATIVE
RBC # BLD AUTO: 4.39 10*6/MM3 (ref 3.77–5.28)
SODIUM BLD-SCNC: 139 MMOL/L (ref 136–145)
SP GR UR STRIP: 1.02 (ref 1–1.03)
UROBILINOGEN UR QL STRIP: NORMAL
WBC NRBC COR # BLD: 8.26 10*3/MM3 (ref 3.4–10.8)

## 2019-07-15 PROCEDURE — 81003 URINALYSIS AUTO W/O SCOPE: CPT | Performed by: EMERGENCY MEDICINE

## 2019-07-15 RX ORDER — ACETAMINOPHEN 500 MG
1000 TABLET ORAL ONCE
Status: DISCONTINUED | OUTPATIENT
Start: 2019-07-15 | End: 2019-07-15 | Stop reason: HOSPADM

## 2019-07-19 ENCOUNTER — OFFICE VISIT (OUTPATIENT)
Dept: FAMILY MEDICINE CLINIC | Facility: CLINIC | Age: 39
End: 2019-07-19

## 2019-07-19 VITALS
HEART RATE: 89 BPM | OXYGEN SATURATION: 99 % | SYSTOLIC BLOOD PRESSURE: 115 MMHG | DIASTOLIC BLOOD PRESSURE: 70 MMHG | BODY MASS INDEX: 21.2 KG/M2 | HEIGHT: 63 IN

## 2019-07-19 DIAGNOSIS — G80.0 SPASTIC QUADRIPLEGIC CEREBRAL PALSY (HCC): Chronic | ICD-10-CM

## 2019-07-19 DIAGNOSIS — G40.A09 ABSENCE SEIZURE (HCC): Primary | ICD-10-CM

## 2019-07-19 PROCEDURE — 99213 OFFICE O/P EST LOW 20 MIN: CPT | Performed by: GENERAL PRACTICE

## 2019-07-19 NOTE — PROGRESS NOTES
Subjective   Kiley Ledesma is a 38 y.o. female.   Chief Complaint   Patient presents with   • Seizures     Was seen in the emergency room recently as she had another absence seizure.  She had had one in May and possibly one last August.  She did have these when she was a child but has not had them since.  She has not had any since.  Her symptoms include staring off into space and therefore being somewhat unresponsive as well as some smacking of her lips.  Her caregiver states that it lasted less than a minute and she had very minimal sedation afterwards.  She has seen Dr. Saldaña in the past as her neurologist. Labs and xrays reviewed. Medications reviewed and reconciled.  Kiley continues to use and needs her power wheelchair for mobility due to her spastic cerebral palsy.  Seizures    This is a recurrent problem. The current episode started more than 1 week ago. The problem has not changed since onset.There were 2 to 3 seizures. The most recent episode lasted 30 to 120 seconds. Pertinent negatives include no headaches, no sore throat, no chest pain, no cough, no nausea, no vomiting and no diarrhea. Characteristics do not include bowel incontinence, bladder incontinence, rhythmic jerking or loss of consciousness. The episode was witnessed. The seizure(s) had no focality. There has been no fever.      The following portions of the patient's history were reviewed and updated as appropriate: allergies, current medications, past social history and problem list.    Outpatient Medications Prior to Visit   Medication Sig Dispense Refill   • acetaminophen (TYLENOL) 325 MG tablet Take 325 mg by mouth Every 6 (Six) Hours As Needed for Mild Pain  (1-2 tabs).     • albuterol (PROVENTIL) (2.5 MG/3ML) 0.083% nebulizer solution Take 2.5 mg by nebulization Every 4 (Four) Hours As Needed for Wheezing. 720 mL 3   • BACLOFEN IT Has a baclofen pump     • Cholecalciferol 1000 units capsule Take 1,000 Units by mouth Daily.     •  diphenhydrAMINE (BENADRYL ALLERGY) 25 MG tablet Take 1 tablet by mouth At Night As Needed for Allergies or Sleep. 30 tablet 2   • docusate sodium (COLACE) 100 MG capsule Take 1 capsule by mouth 2 (Two) Times a Day. 180 capsule 3   • ESTRADIOL PO Take 1 mg by mouth Daily.     • FLUoxetine (PROZAC) 40 MG capsule Take 40 mg by mouth Daily.     • fluticasone (FLONASE) 50 MCG/ACT nasal spray 2 sprays into each nostril Daily.     • metoclopramide (REGLAN) 10 MG tablet Take 10 mg by mouth Daily.     • Misc. Devices (WHEELCHAIR) misc As directed     • mupirocin (BACTROBAN) 2 % ointment Apply  topically 3 (Three) Times a Day. 22 g 0   • neomycin-bacitracin-polymyxin (NEOSPORIN) 5-400-5000 ointment Apply  topically to the appropriate area as directed. EVERY 8 HOURS PRN     • NON FORMULARY Daily As Needed. Nyst/bacit/Zno/hc 1%     • Omeprazole (PRILOSEC PO) Take 20 mg by mouth Daily. ON HOLD TILL KEFLEX COMPLETED     • ondansetron ODT (ZOFRAN-ODT) 8 MG disintegrating tablet Take 1 tablet by mouth Every 8 (Eight) Hours As Needed for Nausea. 30 tablet 5   • solifenacin (VESICARE) 10 MG tablet Take 10 mg by mouth Daily.     • triamcinolone (KENALOG) 0.025 % ointment Apply  topically to the appropriate area as directed 2 (Two) Times a Day. 15 g 1     No facility-administered medications prior to visit.        Review of Systems   Constitutional: Negative.  Negative for chills, fatigue, fever and unexpected weight change.   HENT: Negative.  Negative for congestion, ear pain, hearing loss, nosebleeds, rhinorrhea, sneezing, sore throat and tinnitus.    Eyes: Negative.  Negative for discharge.   Respiratory: Negative.  Negative for cough, shortness of breath and wheezing.    Cardiovascular: Negative.  Negative for chest pain and palpitations.   Gastrointestinal: Negative.  Negative for abdominal pain, bowel incontinence, constipation, diarrhea, nausea and vomiting.   Endocrine: Negative.    Genitourinary: Negative.  Negative for  "bladder incontinence, dysuria, frequency and urgency.   Musculoskeletal: Negative.  Negative for arthralgias, back pain, joint swelling, myalgias and neck pain.   Skin: Negative.  Negative for rash.   Allergic/Immunologic: Negative.    Neurological: Positive for seizures. Negative for dizziness, loss of consciousness, weakness, numbness and headaches.   Hematological: Negative.  Negative for adenopathy.   Psychiatric/Behavioral: Negative.  Negative for dysphoric mood and sleep disturbance. The patient is not nervous/anxious.      Objective   Visit Vitals  /70 (BP Location: Left arm)   Pulse 89   Ht 160 cm (63\")   LMP  (LMP Unknown)   SpO2 99%   BMI 21.20 kg/m²     Physical Exam   Constitutional: She is oriented to person, place, and time. She appears well-developed and well-nourished. No distress.   HENT:   Head: Normocephalic and atraumatic.   Nose: Nose normal.   Mouth/Throat: Oropharynx is clear and moist.   Eyes: Conjunctivae and EOM are normal. Pupils are equal, round, and reactive to light. Right eye exhibits no discharge. Left eye exhibits no discharge.   Neck: No thyromegaly present.   Cardiovascular: Normal rate, regular rhythm, normal heart sounds and intact distal pulses.   Pulmonary/Chest: Effort normal and breath sounds normal.   Abdominal: Soft. Normal appearance and bowel sounds are normal. She exhibits no distension and no mass.       Difficult to exam bottom due to cerebral palsy, treated based on report from caregiver   Lymphadenopathy:     She has no cervical adenopathy.   Neurological: She is alert and oriented to person, place, and time. She exhibits abnormal muscle tone. Gait abnormal.   Cerebral palsy with spastic quadrplegia   Skin: Skin is warm and dry.   Psychiatric: She has a normal mood and affect.   Nursing note and vitals reviewed.    Assessment/Plan   Problem List Items Addressed This Visit        Nervous and Auditory    Spastic quadriplegic cerebral palsy (CMS/HCC) (Chronic)    " Relevant Orders    Ambulatory Referral to Neurology (Completed)      Other Visit Diagnoses     Absence seizure (CMS/HCC)    -  Primary    Relevant Orders    Ambulatory Referral to Neurology (Completed)         Referral to Dr. Soni.  Encourage good hydration.  Continue to use power wheelchair for mobility.    No orders of the defined types were placed in this encounter.    Return for Next scheduled follow up.

## 2019-07-25 ENCOUNTER — TELEPHONE (OUTPATIENT)
Dept: FAMILY MEDICINE CLINIC | Facility: CLINIC | Age: 39
End: 2019-07-25

## 2019-07-31 ENCOUNTER — TELEPHONE (OUTPATIENT)
Dept: FAMILY MEDICINE CLINIC | Facility: CLINIC | Age: 39
End: 2019-07-31

## 2019-07-31 RX ORDER — ALBUTEROL SULFATE 2.5 MG/3ML
2.5 SOLUTION RESPIRATORY (INHALATION) EVERY 4 HOURS PRN
Qty: 720 ML | Refills: 3 | Status: SHIPPED | OUTPATIENT
Start: 2019-07-31 | End: 2019-07-31 | Stop reason: SDUPTHER

## 2019-07-31 RX ORDER — ALBUTEROL SULFATE 2.5 MG/3ML
2.5 SOLUTION RESPIRATORY (INHALATION) EVERY 4 HOURS PRN
Qty: 720 ML | Refills: 3 | Status: SHIPPED | OUTPATIENT
Start: 2019-07-31 | End: 2020-02-05 | Stop reason: SDUPTHER

## 2019-07-31 RX ORDER — ACETAMINOPHEN 325 MG/1
650 TABLET ORAL EVERY 6 HOURS PRN
Qty: 60 TABLET | Refills: 0 | Status: SHIPPED | OUTPATIENT
Start: 2019-07-31 | End: 2020-02-05 | Stop reason: SDUPTHER

## 2019-08-01 RX ORDER — DOCUSATE SODIUM 100 MG/1
CAPSULE, LIQUID FILLED ORAL
Qty: 62 CAPSULE | Refills: 11 | Status: SHIPPED | OUTPATIENT
Start: 2019-08-01 | End: 2020-02-11 | Stop reason: SDUPTHER

## 2019-08-12 ENCOUNTER — TELEPHONE (OUTPATIENT)
Dept: FAMILY MEDICINE CLINIC | Facility: CLINIC | Age: 39
End: 2019-08-12

## 2019-08-12 NOTE — TELEPHONE ENCOUNTER
Pato from [facility] called and needs a discontinued script for Cholestyramine on around 4-24? Fax is [phone]. Any questions call her at [phone].

## 2019-08-23 ENCOUNTER — LAB (OUTPATIENT)
Dept: LAB | Facility: HOSPITAL | Age: 39
End: 2019-08-23

## 2019-08-23 DIAGNOSIS — E78.5 HYPERLIPIDEMIA, UNSPECIFIED HYPERLIPIDEMIA TYPE: Primary | ICD-10-CM

## 2019-08-23 DIAGNOSIS — E78.5 HYPERLIPIDEMIA, UNSPECIFIED HYPERLIPIDEMIA TYPE: ICD-10-CM

## 2019-08-23 LAB
CHOLEST SERPL-MCNC: 146 MG/DL (ref 0–200)
HDLC SERPL-MCNC: 57 MG/DL (ref 40–60)
LDLC SERPL CALC-MCNC: 75 MG/DL (ref 0–100)
LDLC/HDLC SERPL: 1.31 {RATIO}
TRIGL SERPL-MCNC: 72 MG/DL (ref 0–150)
VLDLC SERPL-MCNC: 14.4 MG/DL (ref 5–40)

## 2019-08-23 PROCEDURE — 36415 COLL VENOUS BLD VENIPUNCTURE: CPT

## 2019-08-23 PROCEDURE — 80061 LIPID PANEL: CPT

## 2019-09-06 ENCOUNTER — OFFICE VISIT (OUTPATIENT)
Dept: FAMILY MEDICINE CLINIC | Facility: CLINIC | Age: 39
End: 2019-09-06

## 2019-09-06 VITALS
DIASTOLIC BLOOD PRESSURE: 70 MMHG | HEART RATE: 88 BPM | BODY MASS INDEX: 21.2 KG/M2 | SYSTOLIC BLOOD PRESSURE: 102 MMHG | HEIGHT: 63 IN | OXYGEN SATURATION: 99 %

## 2019-09-06 DIAGNOSIS — G40.A09 ABSENCE SEIZURE (HCC): Chronic | ICD-10-CM
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
## 2019-09-06 DIAGNOSIS — G80.0 SPASTIC QUADRIPLEGIC CEREBRAL PALSY (HCC): Chronic | ICD-10-CM

## 2019-09-06 DIAGNOSIS — Z00.00 ANNUAL PHYSICAL EXAM: Primary | ICD-10-CM

## 2019-09-06 PROCEDURE — 99395 PREV VISIT EST AGE 18-39: CPT | Performed by: GENERAL PRACTICE

## 2019-09-06 RX ORDER — CHOLESTYRAMINE 4 G/9G
4 POWDER, FOR SUSPENSION ORAL 2 TIMES DAILY WITH MEALS
COMMUNITY
End: 2020-01-22 | Stop reason: ALTCHOICE

## 2019-09-06 RX ORDER — LINACLOTIDE 145 UG/1
CAPSULE, GELATIN COATED ORAL
COMMUNITY
Start: 2019-07-29 | End: 2020-01-22 | Stop reason: ALTCHOICE

## 2019-09-06 RX ORDER — DOXYCYCLINE HYCLATE 100 MG/1
100 CAPSULE ORAL DAILY
COMMUNITY
End: 2020-02-25

## 2019-09-06 NOTE — PROGRESS NOTES
Subjective   Kiley Ledesma is a 38 y.o. female.     Chief Complaint   Patient presents with   • Annual Exam   • Seizures   • Insect Bite     rt leg     For annual wellness exam. Labs reviewed. Depression stable. Has been having more absence seizures, has an appt with neuro in 10 days.   Seizures    This is a recurrent problem. The current episode started more than 1 week ago. The problem has been gradually worsening. There were 4 to 5 (in last 2 weeks) seizures. Pertinent negatives include no headaches, no sore throat, no chest pain, no cough, no nausea, no vomiting and no diarrhea. absence The episode was witnessed. There has been no fever.   Insect Bite   Associated symptoms include weakness. Pertinent negatives include no abdominal pain, arthralgias, chest pain, chills, congestion, coughing, fatigue, fever, headaches, joint swelling, myalgias, nausea, neck pain, numbness, rash, sore throat or vomiting.      The following portions of the patient's history were reviewed and updated as appropriate: allergies, current medications, past family and social history and problem list.    Outpatient Medications Prior to Visit   Medication Sig Dispense Refill   • acetaminophen (TYLENOL) 325 MG tablet Take 2 tablets by mouth Every 6 (Six) Hours As Needed for Mild Pain . 60 tablet 0   • albuterol (PROVENTIL) (2.5 MG/3ML) 0.083% nebulizer solution Take 2.5 mg by nebulization Every 4 (Four) Hours As Needed for Wheezing. 720 mL 3   • BACLOFEN IT Has a baclofen pump     • Cholecalciferol 1000 units capsule Take 1,000 Units by mouth Daily.     • cholestyramine (QUESTRAN) 4 GM/DOSE powder Take 4 g by mouth 2 (Two) Times a Day With Meals.     • diphenhydrAMINE (BENADRYL ALLERGY) 25 MG tablet Take 1 tablet by mouth At Night As Needed for Allergies or Sleep. 30 tablet 2   •  MG capsule GIVE ONE CAPSULE BY MOUTH TWICE DAILY 62 capsule 11   • doxycycline (VIBRAMYCIN) 100 MG capsule Take 100 mg by mouth Daily.     • ESTRADIOL  PO Take 1 mg by mouth Daily.     • FLUoxetine (PROZAC) 40 MG capsule Take 40 mg by mouth Daily.     • fluticasone (FLONASE) 50 MCG/ACT nasal spray 2 sprays into each nostril Daily.     • LINZESS 145 MCG capsule capsule      • metoclopramide (REGLAN) 10 MG tablet Take 10 mg by mouth Daily.     • Misc. Devices (WHEELCHAIR) misc As directed     • mupirocin (BACTROBAN) 2 % ointment Apply  topically 3 (Three) Times a Day. 22 g 0   • neomycin-bacitracin-polymyxin (NEOSPORIN) 5-400-5000 ointment Apply  topically to the appropriate area as directed. EVERY 8 HOURS PRN     • NON FORMULARY Daily As Needed. Nyst/bacit/Zno/hc 1%     • Omeprazole (PRILOSEC PO) Take 20 mg by mouth Daily. ON HOLD TILL KEFLEX COMPLETED     • ondansetron ODT (ZOFRAN-ODT) 8 MG disintegrating tablet Take 1 tablet by mouth Every 8 (Eight) Hours As Needed for Nausea. 30 tablet 5   • solifenacin (VESICARE) 10 MG tablet Take 10 mg by mouth Daily.     • triamcinolone (KENALOG) 0.025 % ointment Apply  topically to the appropriate area as directed 2 (Two) Times a Day. 15 g 1     No facility-administered medications prior to visit.        Review of Systems   Constitutional: Negative for chills, fatigue, fever and unexpected weight change.   HENT: Negative.  Negative for congestion, ear pain, hearing loss, nosebleeds, rhinorrhea, sneezing, sore throat and tinnitus.    Eyes: Negative.  Negative for discharge.   Respiratory: Negative.  Negative for cough, shortness of breath and wheezing.    Cardiovascular: Negative.  Negative for chest pain and palpitations.   Gastrointestinal: Negative.  Negative for abdominal pain, constipation, diarrhea, nausea and vomiting.   Endocrine: Negative.    Genitourinary: Negative.  Negative for dysuria, frequency and urgency.   Musculoskeletal: Negative.  Negative for arthralgias, back pain, joint swelling, myalgias and neck pain.   Skin: Negative.  Negative for rash.   Allergic/Immunologic: Negative.    Neurological: Positive for  "seizures and weakness. Negative for dizziness, numbness and headaches.   Hematological: Negative.  Negative for adenopathy.   Psychiatric/Behavioral: Negative.  Negative for dysphoric mood and sleep disturbance. The patient is not nervous/anxious.        Objective     Visit Vitals  /70 (BP Location: Left arm)   Pulse 88   Ht 160 cm (63\")   LMP  (LMP Unknown)   SpO2 99%   BMI 21.20 kg/m²     Physical Exam   Constitutional: She is oriented to person, place, and time. She appears well-developed and well-nourished. No distress.   HENT:   Head: Normocephalic and atraumatic.   Nose: Nose normal.   Mouth/Throat: Oropharynx is clear and moist.   Eyes: Conjunctivae and EOM are normal. Pupils are equal, round, and reactive to light. Right eye exhibits no discharge. Left eye exhibits no discharge.   Neck: No tracheal deviation present. No thyromegaly present.   Cardiovascular: Normal rate, regular rhythm, normal heart sounds and intact distal pulses.   No murmur heard.  Pulmonary/Chest: Effort normal and breath sounds normal. No respiratory distress. She has no wheezes. She has no rales. She exhibits no tenderness. Right breast exhibits no inverted nipple, no mass, no nipple discharge, no skin change and no tenderness. Left breast exhibits no inverted nipple, no mass, no nipple discharge, no skin change and no tenderness.   Abdominal: Soft. Bowel sounds are normal. She exhibits no distension and no mass. There is no tenderness. No hernia.   Musculoskeletal: Normal range of motion. She exhibits no deformity.   Lymphadenopathy:     She has no cervical adenopathy.   Neurological: She is alert and oriented to person, place, and time. No cranial nerve deficit or sensory deficit. She exhibits abnormal muscle tone. Coordination and gait abnormal.   Reflex Scores:       Patellar reflexes are 3+ on the right side and 3+ on the left side.  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        Psychiatric: " She has a normal mood and affect. Her behavior is normal. Judgment and thought content normal.   Nursing note and vitals reviewed.     Results for orders placed or performed in visit on 08/23/19   Lipid Panel   Result Value Ref Range    Total Cholesterol 146 0 - 200 mg/dL    Triglycerides 72 0 - 150 mg/dL    HDL Cholesterol 57 40 - 60 mg/dL    LDL Cholesterol  75 0 - 100 mg/dL    VLDL Cholesterol 14.4 5 - 40 mg/dL    LDL/HDL Ratio 1.31        Assessment/Plan   Problem List Items Addressed This Visit        Nervous and Auditory    Spastic quadriplegic cerebral palsy (CMS/HCC) (Chronic)    Absence seizure (CMS/HCC) (Chronic)      Other Visit Diagnoses     Annual physical exam    -  Primary    Insect bite, initial encounter             Continue current treatment. See neuro as scheduled. Bendaryl and cool compresses to bite. Recheck if not improving.      No orders of the defined types were placed in this encounter.    Return in about 6 months (around 3/6/2020) for Recheck.

## 2019-09-09 ENCOUNTER — TELEPHONE (OUTPATIENT)
Dept: FAMILY MEDICINE CLINIC | Facility: CLINIC | Age: 39
End: 2019-09-09

## 2019-09-09 NOTE — TELEPHONE ENCOUNTER
Pato from [facility] called and they need a discontinued script for Triamcinolone ointment faxed to them at [phone]. It was discontinued on 9-1-19.

## 2019-10-01 ENCOUNTER — APPOINTMENT (OUTPATIENT)
Dept: CT IMAGING | Facility: HOSPITAL | Age: 39
End: 2019-10-01

## 2019-10-10 ENCOUNTER — HOSPITAL ENCOUNTER (OUTPATIENT)
Dept: CT IMAGING | Facility: HOSPITAL | Age: 39
End: 2019-10-10

## 2019-10-10 ENCOUNTER — HOSPITAL ENCOUNTER (OUTPATIENT)
Dept: CT IMAGING | Facility: HOSPITAL | Age: 39
Discharge: HOME | End: 2019-10-10
Admitting: PSYCHIATRY & NEUROLOGY

## 2019-10-10 ENCOUNTER — TELEPHONE (OUTPATIENT)
Dept: GENERAL RADIOLOGY | Facility: HOSPITAL | Age: 39
End: 2019-10-10

## 2019-10-10 DIAGNOSIS — G40.209 PARTIAL SEIZURES OF TEMPORAL LOBE WITH IMPAIRMENT OF CONSCIOUSNESS (HCC): ICD-10-CM

## 2019-10-10 PROCEDURE — 25010000002 IOPAMIDOL 61 % SOLUTION: Performed by: PSYCHIATRY & NEUROLOGY

## 2019-10-10 PROCEDURE — 70470 CT HEAD/BRAIN W/O & W/DYE: CPT

## 2019-10-10 RX ADMIN — IOPAMIDOL 70 ML: 612 INJECTION, SOLUTION INTRAVENOUS at 10:40

## 2019-10-10 NOTE — TELEPHONE ENCOUNTER
Called doctors office to let them know the patient was a no show for appointment  On 10/10/19 ay 9:30 am for a CT Head w wo contrast

## 2019-10-28 ENCOUNTER — HOSPITAL ENCOUNTER (EMERGENCY)
Facility: HOSPITAL | Age: 39
Discharge: HOME | End: 2019-10-28
Attending: FAMILY MEDICINE | Admitting: FAMILY MEDICINE

## 2019-10-28 VITALS
DIASTOLIC BLOOD PRESSURE: 77 MMHG | HEIGHT: 63 IN | TEMPERATURE: 97.4 F | RESPIRATION RATE: 18 BRPM | BODY MASS INDEX: 18.84 KG/M2 | WEIGHT: 106.3 LBS | OXYGEN SATURATION: 99 % | HEART RATE: 78 BPM | SYSTOLIC BLOOD PRESSURE: 149 MMHG

## 2019-10-28 DIAGNOSIS — R56.9 SEIZURE (HCC): Primary | ICD-10-CM

## 2019-10-28 LAB
ALBUMIN SERPL-MCNC: 4.2 G/DL (ref 3.5–5.2)
ALBUMIN/GLOB SERPL: 1.6 G/DL
ALP SERPL-CCNC: 87 U/L (ref 39–117)
ALT SERPL W P-5'-P-CCNC: 16 U/L (ref 1–33)
ANION GAP SERPL CALCULATED.3IONS-SCNC: 12 MMOL/L (ref 5–15)
AST SERPL-CCNC: 19 U/L (ref 1–32)
BACTERIA UR QL AUTO: ABNORMAL /HPF
BASOPHILS # BLD AUTO: 0.03 10*3/MM3 (ref 0–0.2)
BASOPHILS NFR BLD AUTO: 0.7 % (ref 0–1.5)
BILIRUB SERPL-MCNC: 0.2 MG/DL (ref 0.2–1.2)
BILIRUB UR QL STRIP: NEGATIVE
BUN BLD-MCNC: 12 MG/DL (ref 6–20)
BUN/CREAT SERPL: 30.8 (ref 7–25)
CALCIUM SPEC-SCNC: 9.2 MG/DL (ref 8.6–10.5)
CHLORIDE SERPL-SCNC: 102 MMOL/L (ref 98–107)
CK SERPL-CCNC: 61 U/L (ref 20–180)
CLARITY UR: ABNORMAL
CO2 SERPL-SCNC: 27 MMOL/L (ref 22–29)
COLOR UR: YELLOW
CREAT BLD-MCNC: 0.39 MG/DL (ref 0.57–1)
DEPRECATED RDW RBC AUTO: 43.3 FL (ref 37–54)
EOSINOPHIL # BLD AUTO: 0.09 10*3/MM3 (ref 0–0.4)
EOSINOPHIL NFR BLD AUTO: 2.1 % (ref 0.3–6.2)
ERYTHROCYTE [DISTWIDTH] IN BLOOD BY AUTOMATED COUNT: 13.2 % (ref 12.3–15.4)
FLUAV AG NPH QL: NEGATIVE
FLUBV AG NPH QL IA: NEGATIVE
GFR SERPL CREATININE-BSD FRML MDRD: >150 ML/MIN/1.73
GLOBULIN UR ELPH-MCNC: 2.7 GM/DL
GLUCOSE BLD-MCNC: 81 MG/DL (ref 65–99)
GLUCOSE UR STRIP-MCNC: NEGATIVE MG/DL
HCT VFR BLD AUTO: 37.2 % (ref 34–46.6)
HGB BLD-MCNC: 11.8 G/DL (ref 12–15.9)
HGB UR QL STRIP.AUTO: ABNORMAL
HOLD SPECIMEN: NORMAL
HYALINE CASTS UR QL AUTO: ABNORMAL /LPF
IMM GRANULOCYTES # BLD AUTO: 0.01 10*3/MM3 (ref 0–0.05)
IMM GRANULOCYTES NFR BLD AUTO: 0.2 % (ref 0–0.5)
KETONES UR QL STRIP: ABNORMAL
LEUKOCYTE ESTERASE UR QL STRIP.AUTO: ABNORMAL
LYMPHOCYTES # BLD AUTO: 1.03 10*3/MM3 (ref 0.7–3.1)
LYMPHOCYTES NFR BLD AUTO: 24.3 % (ref 19.6–45.3)
MAGNESIUM SERPL-MCNC: 1.9 MG/DL (ref 1.6–2.6)
MCH RBC QN AUTO: 28.4 PG (ref 26.6–33)
MCHC RBC AUTO-ENTMCNC: 31.7 G/DL (ref 31.5–35.7)
MCV RBC AUTO: 89.4 FL (ref 79–97)
MONOCYTES # BLD AUTO: 0.57 10*3/MM3 (ref 0.1–0.9)
MONOCYTES NFR BLD AUTO: 13.4 % (ref 5–12)
NEUTROPHILS # BLD AUTO: 2.51 10*3/MM3 (ref 1.7–7)
NEUTROPHILS NFR BLD AUTO: 59.3 % (ref 42.7–76)
NITRITE UR QL STRIP: NEGATIVE
NRBC BLD AUTO-RTO: 0 /100 WBC (ref 0–0.2)
PH UR STRIP.AUTO: 6.5 [PH] (ref 5–9)
PLATELET # BLD AUTO: 182 10*3/MM3 (ref 140–450)
PMV BLD AUTO: 9.7 FL (ref 6–12)
POTASSIUM BLD-SCNC: 3.6 MMOL/L (ref 3.5–5.2)
PROT SERPL-MCNC: 6.9 G/DL (ref 6–8.5)
PROT UR QL STRIP: ABNORMAL
RBC # BLD AUTO: 4.16 10*6/MM3 (ref 3.77–5.28)
RBC # UR: ABNORMAL /HPF
REF LAB TEST METHOD: ABNORMAL
SODIUM BLD-SCNC: 141 MMOL/L (ref 136–145)
SP GR UR STRIP: 1.02 (ref 1–1.03)
SQUAMOUS #/AREA URNS HPF: ABNORMAL /HPF
UROBILINOGEN UR QL STRIP: ABNORMAL
WBC NRBC COR # BLD: 4.24 10*3/MM3 (ref 3.4–10.8)
WBC UR QL AUTO: ABNORMAL /HPF
WHOLE BLOOD HOLD SPECIMEN: NORMAL

## 2019-10-28 PROCEDURE — 96374 THER/PROPH/DIAG INJ IV PUSH: CPT

## 2019-10-28 PROCEDURE — 82550 ASSAY OF CK (CPK): CPT | Performed by: FAMILY MEDICINE

## 2019-10-28 PROCEDURE — 80053 COMPREHEN METABOLIC PANEL: CPT | Performed by: FAMILY MEDICINE

## 2019-10-28 PROCEDURE — 25010000002 KETOROLAC TROMETHAMINE PER 15 MG: Performed by: FAMILY MEDICINE

## 2019-10-28 PROCEDURE — 83735 ASSAY OF MAGNESIUM: CPT | Performed by: FAMILY MEDICINE

## 2019-10-28 PROCEDURE — 81001 URINALYSIS AUTO W/SCOPE: CPT | Performed by: FAMILY MEDICINE

## 2019-10-28 PROCEDURE — 85025 COMPLETE CBC W/AUTO DIFF WBC: CPT | Performed by: FAMILY MEDICINE

## 2019-10-28 PROCEDURE — 87804 INFLUENZA ASSAY W/OPTIC: CPT | Performed by: FAMILY MEDICINE

## 2019-10-28 PROCEDURE — 99285 EMERGENCY DEPT VISIT HI MDM: CPT

## 2019-10-28 RX ORDER — DIAZEPAM 2 MG/1
2 TABLET ORAL EVERY 6 HOURS PRN
Status: DISCONTINUED | OUTPATIENT
Start: 2019-10-28 | End: 2019-10-28 | Stop reason: HOSPADM

## 2019-10-28 RX ORDER — KETOROLAC TROMETHAMINE 15 MG/ML
15 INJECTION, SOLUTION INTRAMUSCULAR; INTRAVENOUS ONCE
Status: COMPLETED | OUTPATIENT
Start: 2019-10-28 | End: 2019-10-28

## 2019-10-28 RX ADMIN — KETOROLAC TROMETHAMINE 15 MG: 15 INJECTION, SOLUTION INTRAMUSCULAR; INTRAVENOUS at 09:54

## 2019-10-28 RX ADMIN — DIAZEPAM 2 MG: 2 TABLET ORAL at 10:46

## 2019-10-30 ENCOUNTER — EPISODE CHANGES (OUTPATIENT)
Dept: CASE MANAGEMENT | Facility: OTHER | Age: 39
End: 2019-10-30

## 2019-11-07 ENCOUNTER — TELEPHONE (OUTPATIENT)
Dept: FAMILY MEDICINE CLINIC | Facility: CLINIC | Age: 39
End: 2019-11-07

## 2019-11-07 DIAGNOSIS — M25.649 HAND JOINT STIFF, UNSPECIFIED LATERALITY: Primary | ICD-10-CM

## 2019-11-07 NOTE — TELEPHONE ENCOUNTER
Pato left a phone message asking for a script for P/T for her hands, range of motion.     Pato [phone]

## 2019-11-08 ENCOUNTER — APPOINTMENT (OUTPATIENT)
Dept: PHYSICAL THERAPY | Facility: HOSPITAL | Age: 39
End: 2019-11-08

## 2019-11-08 ENCOUNTER — TELEPHONE (OUTPATIENT)
Dept: FAMILY MEDICINE CLINIC | Facility: CLINIC | Age: 39
End: 2019-11-08

## 2019-11-08 RX ORDER — ONDANSETRON 8 MG/1
8 TABLET, ORALLY DISINTEGRATING ORAL EVERY 8 HOURS PRN
Qty: 30 TABLET | Refills: 5 | Status: SHIPPED | OUTPATIENT
Start: 2019-11-08 | End: 2020-01-22 | Stop reason: ALTCHOICE

## 2019-11-08 NOTE — TELEPHONE ENCOUNTER
Pato wanted to know if Kiley could get a refill for Zofran to [facility] for her stomach.

## 2019-11-18 ENCOUNTER — TELEPHONE (OUTPATIENT)
Dept: FAMILY MEDICINE CLINIC | Facility: CLINIC | Age: 39
End: 2019-11-18

## 2019-11-18 ENCOUNTER — HOSPITAL ENCOUNTER (OUTPATIENT)
Dept: PHYSICAL THERAPY | Facility: HOSPITAL | Age: 39
Setting detail: THERAPIES SERIES
Discharge: HOME | End: 2019-11-18

## 2019-11-18 DIAGNOSIS — G80.0 SPASTIC QUADRIPLEGIC CEREBRAL PALSY (HCC): ICD-10-CM

## 2019-11-18 DIAGNOSIS — M25.649 HAND JOINT STIFF, UNSPECIFIED LATERALITY: Primary | ICD-10-CM

## 2019-11-18 PROCEDURE — 97530 THERAPEUTIC ACTIVITIES: CPT

## 2019-11-18 PROCEDURE — 97161 PT EVAL LOW COMPLEX 20 MIN: CPT

## 2019-11-18 PROCEDURE — 97535 SELF CARE MNGMENT TRAINING: CPT

## 2019-11-18 NOTE — TELEPHONE ENCOUNTER
Pato ([facility]) called and said, you had ordered PT for [place], but they want her to have OT.  Said, they require a script on [place] ordering this.  Pato's # [phone]

## 2019-11-18 NOTE — THERAPY EVALUATION
Outpatient Physical Therapy Ortho Initial Evaluation  [facility]     Patient Name: Kiley Ledesma  : 1980  MRN: [id number]  Today's Date: 2019      Visit Date: 2019    Patient Active Problem List   Diagnosis   • Depressive disorder   • Gastroesophageal reflux disease   • Spastic quadriplegic cerebral palsy (CMS/HCC)   • Colitis   • Chronic abdominal pain   • Diarrhea   • Gastroesophageal reflux disease with esophagitis   • Hydronephrosis   • Incontinence of feces with fecal urgency   • Constipation   • Abnormal finding on GI tract imaging   • History of colitis   • Absence seizure (CMS/HCC)        Past Medical History:   Diagnosis Date   • Acute vulvitis     Candida   • Allergy     Unspecified, initial encounter   • Amblyopia     OS   • Astigmatism    • Cerebral palsy (CMS/HCC)    • Cheilosis    • Common cold    • Contusion    • Depressive disorder    • Diarrhea    • Encounter for general adult medical examination without abnormal findings    • Encounter for routine adult health examination     Adult health examination      • Fever    • Gastroesophageal reflux disease    • Generalized abdominal pain    • Indeterminate colitis    • Infected insect bite    • Myopia    • Spastic quadriplegic cerebral palsy (CMS/HCC)    • Trouble walking     Walking disability      • Urinary incontinence    • Urinary tract infectious disease    • Wears glasses    • Worried well         Past Surgical History:   Procedure Laterality Date   • COLONOSCOPY N/A 2012    hemorrhoids   • COLONOSCOPY N/A 2018    Procedure: COLONOSCOPY--look TI, bx, hx colitis;  Surgeon: Sreedhar Kirkpatrick MD;  Location: [facility] ENDOSCOPY;  Service: Gastroenterology   • COLONOSCOPY N/A 2019    Procedure: COLONOSCOPY;  Surgeon: Sreedhar Kirkpatrick MD;  Location: [facility] ENDOSCOPY;  Service: Gastroenterology   • CYSTOSCOPY N/A 2019    Procedure: CYSTOSCOPY;  Surgeon: Cecilio cAuña MD;  Location: [facility] OR;  Service:  "Urology   • ENDOSCOPY N/A 03/28/2012    gastritis   • INJECTION OF MEDICATION  02/23/2016    Kenalog   • TOTAL ABDOMINAL HYSTERECTOMY WITH SALPINGO OOPHORECTOMY N/A 03/24/2005   • UPPER GASTROINTESTINAL ENDOSCOPY  03/28/2012       Visit Dx:     ICD-10-CM ICD-9-CM   1. Hand joint stiff, unspecified laterality M25.649 719.54   2. Spastic quadriplegic cerebral palsy (CMS/HCC) G80.0 343.2         Patient History     Row Name 11/18/19 1000             History    Chief Complaint  Tightness  -      Date Current Problem(s) Began  11/07/19  -      Patient's Rating of General Health  Fair  -      Hand Dominance  left-handed  -         Daily Activities    Primary Language  English  -      Are you able to read  Yes  -      Are you able to write  Other (comment) can make \"a sterling, but highly difficult to write\"  -      Pt Participated in POC and Goals  Yes  -         Safety    Are you being hurt, hit, or frightened by anyone at home or in your life?  No  -      Are you being neglected by a caregiver  No  -        User Key  (r) = Recorded By, (t) = Taken By, (c) = Cosigned By    Initials Name Provider Type     Laz Claudio, PT Physical Therapist          PT Ortho     Row Name 11/18/19 1000       Precautions and Contraindications    Precautions/Limitations  other (see comments)  (Significant)  Pain pump (baclofen mm relaxer) R side - no e-stim/modalitie  -       Subjective Pain    Able to rate subjective pain?  yes  -    Pre-Treatment Pain Level  0  -    Post-Treatment Pain Level  0  -       Posture/Observations    Posture/Observations Comments  R UE presents in resting postion of decordicate posturing c R hand resting in flexed fist position.  -       Special Tests/Palpation    Special Tests/Palpation  Wrist/Hand Increasd moisture noted palmar, no breakdown present  -       Digit ROM    Digit ROM  Right WFL  -       General ROM    GENERAL ROM COMMENTS  Gross limitation R UE all planes 25% " primarily due to spasticity.  -       MMT (Manual Muscle Testing)    General MMT Comments  R UE demo's 2+/5 to 3-/5 grossly all planes c some ability to posture R UE out of decordicate posture, but inconsistent ability depending spasticity intensity.  -       Sensation    Sensation WNL?  WFL  -       Gait/Stairs Assessment/Training    Comment (Gait/Stairs)  Pt locomotes c power w/c controlled by L hand, demo's min-mod difficulty c fwd propulsion & obstacle avoidance due to spasticity in upper body/neck limiting field of view, and high difficulty c reverse propulsion due to inability to see behind her.  Pt received assistance of cues and joystick assistance from caregiver.  -      User Key  (r) = Recorded By, (t) = Taken By, (c) = Cosigned By    Initials Name Provider Type    Laz Romero, PT Physical Therapist                      Therapy Education  Education Details: HEP, POC, Symptomology, skin care  Given: HEP, Symptoms/condition management, Pain management, Posture/body mechanics, Other (comment)(skin care breakdown protection)  Program: New  How Provided: Verbal, Demonstration, Written  Provided to: Patient, Caregiver  Level of Understanding: Teach back education performed, Verbalized  64387 - PT Self Care/Mgmt Minutes: 15     PT OP Goals     Row Name 11/18/19 1200          PT Short Term Goals    STG Date to Achieve  12/13/19  -     STG 1  Pt to report/demo 50% improved resting posture ability/comfort via reported improved (reduced) tightness of R hand at rest.  -     STG 1 Progress  New  [facility]     STG 2  Pt to improve QDASH item #9 from 3 to 1.  -     STG 2 Progress  New  [facility]     STG 3  Pt/pt's caregiver to be independent c HEP for stretching & skin care program to improve overall health of R UE & prevent integumentary breakdown risk/issues.  -     STG 3 Progress  New  -AH        Time Calculation    PT Goal Re-Cert Due Date  12/13/19  -       User Key  (r) = Recorded By, (t) = Taken By,  (c) = Cosigned By    Initials Name Provider Type     Laz Claudio, PT Physical Therapist          PT Assessment/Plan     Row Name 11/18/19 1200          PT Assessment    Functional Limitations  Decreased safety during functional activities;Impaired locomotion;Limitation in home management;Limitations in community activities;Limitations in functional capacity and performance;Performance in leisure activities;Performance in self-care ADL  -     Impairments  Coordination;Impaired flexibility;Locomotion;Motor function;Muscle strength;Pain;Poor body mechanics;Posture;Range of motion;Integumentary integrity (integumentary integrity - risk)  -     Assessment Comments  38 y/o female presents to PT c caregiver present.  Pt is a resident of [facility] and is dependent for self cares and transport/transfering.  Pt had a lion lift sling in w/c indicating dependent for all transfers.  Pt is capable of answering questions c some assist of information, but is able to answer independently, if she has the knowledge to answer.  This is a chronic condition and pt's caregiver reports that the rounding physician placed the order for PT services due to functional decline in R UE posturing.  Pt and caregiver responded well to interventions, activities, and educations this session and pt noted an improvement in resting posture comfort, per pt report.  This PT highly recommends an OT referral for evaluation and treatment for UE dysfunctions & care.  -     Please refer to paper survey for additional self-reported information  Yes  -     Rehab Potential  Fair  -     Patient/caregiver participated in establishment of treatment plan and goals  Yes  -     Patient would benefit from skilled therapy intervention  Yes  -        PT Plan    PT Frequency  1x/week;2x/week  -     Predicted Duration of Therapy Intervention (Therapy Eval)  3-4 wks  -     Planned CPT's?  PT EVAL LOW COMPLEXITY: 18068;PT RE-EVAL:  93570;PT THER PROC EA 15 MIN: 15811;PT THER ACT EA 15 MIN: 42698;PT MANUAL THERAPY EA 15 MIN: 72480;PT NEUROMUSC RE-EDUCATION EA 15 MIN: 39390;PT SELF CARE/HOME MGMT/TRAIN EA 15: 53156;PT HOT OR COLD PACK TREAT MCARE;PT THER SUPP EA 15 MIN  -     Physical Therapy Interventions (Optional Details)  gross motor skills;fine motor skills;home exercise program;manual therapy techniques;neuromuscular re-education;patient/family education;postural re-education;ROM (Range of Motion);stretching;strengthening;wheelchair management/propulsion training;other (see comments)  -       User Key  (r) = Recorded By, (t) = Taken By, (c) = Cosigned By    Initials Name Provider Type    Laz Romero, PT Physical Therapist            OP Exercises     Row Name 11/18/19 1030 11/18/19 1000          Subjective Pain    Able to rate subjective pain?  --  yes  -     Pre-Treatment Pain Level  --  0  -     Post-Treatment Pain Level  --  0  -        Total Minutes    59635 - PT Therapeutic Activity Minutes  10  -AH  --  -        Exercise 1    Exercise Name 1  --  Passive R UE/hand prolonged low load stretching into multiplanes  -     Cueing 1  --  Verbal;Demo  -     Sets 1  --  1  -     Time 1  --  8'  -     Additional Comments  --  HEP training for caregiver  -        Exercise 2    Exercise Name 2  --  cloth roll into R hand  -     Cueing 2  --  Verbal;Demo  -     Time 2  --  2'  -     Additional Comments  --  HEP training for caregiver for skin breakdown risk  -       User Key  (r) = Recorded By, (t) = Taken By, (c) = Cosigned By    Initials Name Provider Type    Laz Romero, PT Physical Therapist                        Outcome Measure Options: Quick DASH  Quick DASH  Open a tight or new jar.: Unable  Do heavy household chores (e.g., wash walls, wash floors): Unable  Carry a shopping bag or briefcase: Unable  Wash your back: Unable  Use a knife to cut food: Unable  Recreational activities in which you  take some force or impact through your arm, should or hand (e.g. golf, hammering, tennis, etc.): Unable  During the past week, to what extent has your arm, shoulder, or hand problem interfered with your normal social activites with family, friends, neighbors or groups?: Extremely  During the past week, were you limited in your work or other regular daily activities as a result of your arm, shoulder or hand problem?: Unable  Arm, Shoulder, or hand pain: Moderate  Tingling (pins and needles) in your arm, shoulder, or hand: None  During the past week, how much difficulty have you had sleeping because of the pain in your arm, shoulder or hand?: No difficulty  Number of Questions Answered: 11  Quick DASH Score: 77.27         Time Calculation:     Start Time: 1025  Stop Time: 1100  Time Calculation (min): 35 min  Total Timed Code Minutes- PT: 25 minute(s)     Therapy Charges for Today     Code Description Service Date Service Provider Modifiers Qty    44616814337  PT THERAPEUTIC ACT EA 15 MIN 11/18/2019 Laz Claudio, PT GP 1    93933604834 HC PT SELF CARE/MGMT/TRAIN EA 15 MIN 11/18/2019 Laz Claudio, PT GP 1    [id number]  PT EVAL LOW COMPLEXITY 1 11/18/2019 Laz Claudio, PT GP 1          PT G-Codes  Outcome Measure Options: Quick DASH  Quick DASH Score: 77.27         Laz Claudio PT  11/18/2019

## 2019-11-21 ENCOUNTER — APPOINTMENT (OUTPATIENT)
Dept: PHYSICAL THERAPY | Facility: HOSPITAL | Age: 39
End: 2019-11-21

## 2019-11-22 ENCOUNTER — CLINICAL SUPPORT (OUTPATIENT)
Dept: FAMILY MEDICINE CLINIC | Facility: CLINIC | Age: 39
End: 2019-11-22

## 2019-11-22 DIAGNOSIS — Z23 NEED FOR INFLUENZA VACCINATION: Primary | ICD-10-CM

## 2019-11-22 PROCEDURE — 90674 CCIIV4 VAC NO PRSV 0.5 ML IM: CPT | Performed by: GENERAL PRACTICE

## 2019-11-22 PROCEDURE — G0008 ADMIN INFLUENZA VIRUS VAC: HCPCS | Performed by: GENERAL PRACTICE

## 2019-11-26 ENCOUNTER — HOSPITAL ENCOUNTER (OUTPATIENT)
Dept: PHYSICAL THERAPY | Facility: HOSPITAL | Age: 39
Setting detail: THERAPIES SERIES
Discharge: HOME | End: 2019-11-26

## 2019-11-26 DIAGNOSIS — M25.649 HAND JOINT STIFF, UNSPECIFIED LATERALITY: Primary | ICD-10-CM

## 2019-11-26 PROCEDURE — 97110 THERAPEUTIC EXERCISES: CPT

## 2019-11-26 NOTE — THERAPY TREATMENT NOTE
Outpatient Physical Therapy Ortho Treatment Note  [facility]     Patient Name: Kiley Ledesma  : 1980  MRN: [id number]  Today's Date: 2019      Visit Date: 2019     Subjective Improvement 0  Visits 2/2  Visits approved 15 per year  RTMD PRN  Recert [id number]    Hand stiffness right    Visit Dx:    ICD-10-CM ICD-9-CM   1. Hand joint stiff, unspecified laterality M25.649 719.54       Patient Active Problem List   Diagnosis   • Depressive disorder   • Gastroesophageal reflux disease   • Spastic quadriplegic cerebral palsy (CMS/HCC)   • Colitis   • Chronic abdominal pain   • Diarrhea   • Gastroesophageal reflux disease with esophagitis   • Hydronephrosis   • Incontinence of feces with fecal urgency   • Constipation   • Abnormal finding on GI tract imaging   • History of colitis   • Absence seizure (CMS/HCC)        Past Medical History:   Diagnosis Date   • Acute vulvitis     Candida   • Allergy     Unspecified, initial encounter   • Amblyopia     OS   • Astigmatism    • Cerebral palsy (CMS/HCC)    • Cheilosis    • Common cold    • Contusion    • Depressive disorder    • Diarrhea    • Encounter for general adult medical examination without abnormal findings    • Encounter for routine adult health examination     Adult health examination      • Fever    • Gastroesophageal reflux disease    • Generalized abdominal pain    • Indeterminate colitis    • Infected insect bite    • Myopia    • Spastic quadriplegic cerebral palsy (CMS/HCC)    • Trouble walking     Walking disability      • Urinary incontinence    • Urinary tract infectious disease    • Wears glasses    • Worried well         Past Surgical History:   Procedure Laterality Date   • COLONOSCOPY N/A 2012    hemorrhoids   • COLONOSCOPY N/A 2018    Procedure: COLONOSCOPY--look TI, bx, hx colitis;  Surgeon: Sreedhar Kirkpatrick MD;  Location: [facility] ENDOSCOPY;  Service: Gastroenterology   • COLONOSCOPY N/A 2019     Procedure: COLONOSCOPY;  Surgeon: Sreedhar Kirkpatrick MD;  Location: [facility] ENDOSCOPY;  Service: Gastroenterology   • CYSTOSCOPY N/A 4/19/2019    Procedure: CYSTOSCOPY;  Surgeon: Cecilio Acuña MD;  Location: [facility] OR;  Service: Urology   • ENDOSCOPY N/A 03/28/2012    gastritis   • INJECTION OF MEDICATION  02/23/2016    Kenalog   • TOTAL ABDOMINAL HYSTERECTOMY WITH SALPINGO OOPHORECTOMY N/A 03/24/2005   • UPPER GASTROINTESTINAL ENDOSCOPY  03/28/2012       PT Ortho     Row Name 11/26/19 1100       Subjective Comments    Subjective Comments  Patient and caregiver states that at one time, patient did have a hand splint.  they believe that it may have gotten lost during the move.  Patients care giver states that they have not received an order for OT yet.   -CP       Precautions and Contraindications    Contraindications   NO IFC  (Significant)   -CP       Subjective Pain    Able to rate subjective pain?  yes  -CP    Pre-Treatment Pain Level  0  -CP    Post-Treatment Pain Level  0  -CP       Posture/Observations    Posture/Observations Comments  R UE presents in resting postion of decordicate posturing c R hand resting in flexed fist position.  -CP       Special Tests/Palpation    Special Tests/Palpation  Wrist/Hand Increasd moisture noted palmar, no breakdown present  -CP       Sensation    Sensation WNL?  WFL  -CP      User Key  (r) = Recorded By, (t) = Taken By, (c) = Cosigned By    Initials Name Provider Type    Saniya Bolivar, PTA Physical Therapy Assistant                      PT Assessment/Plan     Row Name 11/26/19 5362          PT Assessment    Assessment Comments  Patient often looks to caregiver when answering questions.  Patient appears to be dependent for most transfers.  patients referring MD was contacted concerning OT order.  they will send another OT order to [facility].  -CP        PT Plan    PT Frequency  2x/week;1x/week  -CP     Predicted Duration of Therapy Intervention (Therapy  Eval)  3-4 weeks  -CP     PT Plan Comments  Patient was scheduled next week for OT eval only.  check to see if [facility] has received OT order, if not may have to contact referring MD again.  -CP       User Key  (r) = Recorded By, (t) = Taken By, (c) = Cosigned By    Initials Name Provider Type    Saniya Bolivar PTA Physical Therapy Assistant            OP Exercises     Row Name 11/26/19 1100             Subjective Comments    Subjective Comments  Patient and caregiver states that at one time, patient did have a hand splint.  they believe that it may have gotten lost during the move.  Patients care giver states that they have not received an order for OT yet.   -CP         Subjective Pain    Able to rate subjective pain?  yes  -CP      Pre-Treatment Pain Level  0  -CP      Post-Treatment Pain Level  0  -CP         Exercise 1    Exercise Name 1  MHP to right hand  -CP      Time 1  10  -CP         Exercise 2    Exercise Name 2  MFR to right biceps for increase elbow fl  -CP      Time 2  5  -CP         Exercise 3    Exercise Name 3  PROM right elbow, wrist and fingers  -CP      Time 3  10  -CP         Exercise 4    Exercise Name 4  AAROM right wrist with fingers fl and slightly ext  -CP      Reps 4  20  -CP         Exercise 5    Exercise Name 5  finger fl and ext blocking MP  -CP      Reps 5  20  -CP        User Key  (r) = Recorded By, (t) = Taken By, (c) = Cosigned By    Initials Name Provider Type    Saniya Bolivar PTA Physical Therapy Assistant                       PT OP Goals     Row Name 11/26/19 1100          PT Short Term Goals    STG Date to Achieve  12/13/19  -CP     STG 1  Pt to report/demo 50% improved resting posture ability/comfort via reported improved (reduced) tightness of R hand at rest.  -CP     STG 1 Progress  Not Met  -CP     STG 2  Pt to improve QDASH item #9 from 3 to 1.  -CP     STG 2 Progress  Not Met  -CP     STG 3  Pt/pt's caregiver to be independent c HEP for stretching & skin  care program to improve overall health of R UE & prevent integumentary breakdown risk/issues.  -CP     STG 3 Progress  Not Met  -CP        Time Calculation    PT Goal Re-Cert Due Date  12/13/19  -CP       User Key  (r) = Recorded By, (t) = Taken By, (c) = Cosigned By    Initials Name Provider Type    CP Saniya Lockett, PTA Physical Therapy Assistant          Therapy Education  Given: HEP  Program: Reinforced  How Provided: Verbal  Provided to: Patient, Caregiver  Level of Understanding: Verbalized              Time Calculation:   Start Time: 1020  Stop Time: 1100  Time Calculation (min): 40 min  Total Timed Code Minutes- PT: 25 minute(s)  Therapy Charges for Today     Code Description Service Date Service Provider Modifiers Qty    13717419127 HC PT THER SUPP EA 15 MIN 11/26/2019 Saniya Lockett PTA GP 1    89040832007 HC PT THER PROC EA 15 MIN 11/26/2019 Saniya Lockett PTA GP 2                    Saniya Lockett PTA  11/26/2019

## 2019-11-27 ENCOUNTER — APPOINTMENT (OUTPATIENT)
Dept: PHYSICAL THERAPY | Facility: HOSPITAL | Age: 39
End: 2019-11-27

## 2019-12-05 ENCOUNTER — HOSPITAL ENCOUNTER (OUTPATIENT)
Dept: OCCUPATIONAL THERAPY | Facility: HOSPITAL | Age: 39
Setting detail: THERAPIES SERIES
Discharge: HOME | End: 2019-12-05

## 2019-12-05 DIAGNOSIS — G80.0 CP (CEREBRAL PALSY), SPASTIC, QUADRIPLEGIC (HCC): ICD-10-CM

## 2019-12-05 DIAGNOSIS — M25.641 HAND JOINT STIFF, RIGHT: ICD-10-CM

## 2019-12-05 DIAGNOSIS — R29.898 WEAKNESS OF RIGHT HAND: Primary | ICD-10-CM

## 2019-12-05 PROCEDURE — 97167 OT EVAL HIGH COMPLEX 60 MIN: CPT

## 2019-12-06 ENCOUNTER — DOCUMENTATION (OUTPATIENT)
Dept: PHYSICAL THERAPY | Facility: HOSPITAL | Age: 39
End: 2019-12-06

## 2019-12-06 DIAGNOSIS — G80.0 SPASTIC QUADRIPLEGIC CEREBRAL PALSY (HCC): ICD-10-CM

## 2019-12-06 DIAGNOSIS — M25.649 HAND JOINT STIFF, UNSPECIFIED LATERALITY: Primary | ICD-10-CM

## 2019-12-06 NOTE — THERAPY EVALUATION
Outpatient Occupational Therapy Hand Initial Evaluation   [facility]     Patient Name: Kiley Ledesma  : 1980  MRN: [id number]  Today's Date: 2019       Visit Date: 2019   Visits:; initial eval  % Improvement: TBD  Recert date: 19  MD appointment: TBD    Therapy Diagnosis: impaired ADLs; impaired RUE/hand functioning, impaired gross/fine motor coordination; increased spasticity      Patient Active Problem List   Diagnosis   • Depressive disorder   • Gastroesophageal reflux disease   • Spastic quadriplegic cerebral palsy (CMS/HCC)   • Colitis   • Chronic abdominal pain   • Diarrhea   • Gastroesophageal reflux disease with esophagitis   • Hydronephrosis   • Incontinence of feces with fecal urgency   • Constipation   • Abnormal finding on GI tract imaging   • History of colitis   • Absence seizure (CMS/HCC)        Past Medical History:   Diagnosis Date   • Acute vulvitis     Candida   • Allergy     Unspecified, initial encounter   • Amblyopia     OS   • Astigmatism    • Cerebral palsy (CMS/HCC)    • Cheilosis    • Common cold    • Contusion    • Depressive disorder    • Diarrhea    • Encounter for general adult medical examination without abnormal findings    • Encounter for routine adult health examination     Adult health examination      • Fever    • Gastroesophageal reflux disease    • Generalized abdominal pain    • Indeterminate colitis    • Infected insect bite    • Myopia    • Spastic quadriplegic cerebral palsy (CMS/HCC)    • Trouble walking     Walking disability      • Urinary incontinence    • Urinary tract infectious disease    • Wears glasses    • Worried well         Past Surgical History:   Procedure Laterality Date   • COLONOSCOPY N/A 2012    hemorrhoids   • COLONOSCOPY N/A 2018    Procedure: COLONOSCOPY--look TI, bx, hx colitis;  Surgeon: Sreedhar Kirkpatrick MD;  Location: [facility] ENDOSCOPY;  Service: Gastroenterology   • COLONOSCOPY N/A 2019     "Procedure: COLONOSCOPY;  Surgeon: Sreedhar Kirkpatrick MD;  Location: [facility] ENDOSCOPY;  Service: Gastroenterology   • CYSTOSCOPY N/A 4/19/2019    Procedure: CYSTOSCOPY;  Surgeon: Cecilio Acuña MD;  Location: [facility] OR;  Service: Urology   • ENDOSCOPY N/A 03/28/2012    gastritis   • INJECTION OF MEDICATION  02/23/2016    Kenalog   • TOTAL ABDOMINAL HYSTERECTOMY WITH SALPINGO OOPHORECTOMY N/A 03/24/2005   • UPPER GASTROINTESTINAL ENDOSCOPY  03/28/2012         Visit Dx:    ICD-10-CM ICD-9-CM   1. Weakness of right hand R29.898 728.87   2. Hand joint stiff, right M25.641 719.54   3. CP (cerebral palsy), spastic, quadriplegic (CMS/formerly [facility]) G80.0 343.2       Patient History     Row Name 12/05/19 1400             History    Chief Complaint  Joint stiffness;Tightness R arm and hand  -AS      Date Current Problem(s) Began  -- reports ongoing since she was yound  -AS      Brief Description of Current Complaint  Pt is a 40 yo F referred to OT services for stiffness and decreased function of RUE. Pt had spastic quadriplegic CP and reports has been experiencing such tightness since a child. Pt reports she would like to improve her R hand functioning and begin to use it in functional task such as self feeding. Pt currently resides at [place] and has caregivers that assist with transfers, bathing, and dressing. Pt is able to brush teeth with electric toothbrush using L hand after setup by caregivers. Pt is also able to perform self feeding with L hand after inital setup. Pt is a lion lift transfer at baseline and uses a power w/c with hoystick control for mobility. Pt reports she had a splint for RUE \"years ago,\" but does not remember type.   -AS      Previous treatment for THIS PROBLEM  -- reports did therapy years ago  -AS      Patient/Caregiver Goals  Improve mobility decreased tightness  -AS      Smoking Status  no  -AS      Patient's Rating of General Health  Very good  -AS      Hand Dominance  left-handed  -AS      " "Occupation/sports/leisure activities  likes listening to country music and Spiritism on the radio, likes crafts, and likes to get out in the community  -AS      Patient seeing anyone else for problem(s)?  just started Physical Therapy  -AS      How has patient tried to help current problem?  Pt has been attempting to perform active stretches  -AS         Pain     Pain at Present  0  -AS      Azevedo-Burns FACES Pain Rating   4  -AS      Pain Frequency  Other (Comment) \"occasionally\"  -AS      Pain Description  Aching  -AS      What Performance Factors Make the Current Problem(s) WORSE?  positioning arm behind head at night   -AS      What Performance Factors Make the Current Problem(s) BETTER?  stretching  -AS      Is your sleep disturbed?  No  -AS      Is medication used to assist with sleep?  No  -AS      What position do you sleep in?  Left sidelying  -AS         Fall Risk Assessment    Any falls in the past year:  No  -AS         Daily Activities    Primary Language  English  -AS      Are you able to read  Other (comment) pt reports she is currently working on reading and writing  -AS      Are you able to write  Other (comment) pt reports she is currently working on reading and writing  -AS      How does patient learn best?  Listening;Other (comment) verbal explanation  -AS      Patient is concerned about/has problems with  Flexibility;Grasping objects lifting  -AS      Barriers to learning  Cognitive  -AS      Pt Participated in POC and Goals  Yes  -AS         Safety    Are you being hurt, hit, or frightened by anyone at home or in your life?  No  -AS      Are you being neglected by a caregiver  No  -AS        User Key  (r) = Recorded By, (t) = Taken By, (c) = Cosigned By    Initials Name Provider Type    AS Joan Valdes OT Occupational Therapist             Hand Therapy (last 24 hours)      Hand Eval     Row Name 12/05/19 1400             Subjective Comments    Subjective Comments  Pt often answers OT questions " but looks to caregiver when answering for further confirmation.   -AS         Subjective Pain    Able to rate subjective pain?  yes  -AS      Pre-Treatment Pain Level  0  -AS      Post-Treatment Pain Level  0  -AS         Hand ROM Tested?    Hand ROM Tested?  Right Extension- AROM;Right Flexion- AROM;Right Thumb- AROM able to achieve full extension and grasp  -AS         Hand  Strength     Strength Affected Side  Right  -AS          Strength Right    Right  Test 1  12  -AS      Right  Test 2  10  -AS      Right  Test 3  10  -AS       Strength Average Right  10.67  -AS         Pinch Strength    Affected Side  Right  -AS         Right Hand Strength - Pinch (lbs)    R Hand Pinch Strength  unable to oppose fingers to thumb or get in position for tip pinch or three jaw yadiel; Pt demo hypextension in PIPs with swan neck like positining; pt able to make fist and extend hand/digit fully open. Pt thumb contracted in abduction.  -AS      Lateral  2.67 lbs (4, 2, 2)  -AS        User Key  (r) = Recorded By, (t) = Taken By, (c) = Cosigned By    Initials Name Provider Type    AS Joan Valdes OT Occupational Therapist        OT Ortho     Row Name 12/05/19 1400             Precautions and Contraindications    Precautions/Limitations  other (see comments)  (Significant)  Pain pump (baclofen mm relaxer) R side - no e-stim/modalitie  -AS         Posture/Observations    Posture/Observations Comments  upright postioning in w/c; (D) for postural control via back support; RUE presents in resting position of decodicate posturing in flexed fist position; pt is able to actively break out of this pattern with increased time   -AS         Sensation    Sensation WNL?  WFL  -AS         General ROM    RT Upper Ext  Rt Shoulder ABduction;Rt Shoulder Flexion;Rt Shoulder External Rotation;Rt Elbow Extension/Flexion;Rt Elbow Supination;Rt Elbow Pronation;Rt Wrist Flexion;Rt Wrist Extension  -AS      GENERAL ROM  COMMENTS  Gross limiations approx 25% in all planes due to spasticity  -AS         Right Upper Ext    Rt Shoulder Abduction AROM  130  -AS      Rt Shoulder Flexion AROM  150  -AS      Rt Elbow Extension/Flexion AROM  lacks 5-10 deg ext with hard end feel  -AS      Rt Elbow Supination AROM  approx 25% active; has difficulty obtaining this postion  -AS      Rt Elbow Supination PROM  approx 75% of range  -AS      Rt Elbow Pronation AROM  WNL  -AS      Rt Elbow Pronation PROM  WNL  -AS      Rt Wrist Flexion AROM  75 deg  -AS      Rt Wrist Flexion PROM  80 deg  -AS      Rt Wrist Extension AROM  20 deg  -AS      Rt Wrist Extension PROM  45 deg  -AS      Rt Upper Extremity Comments   fluctuates with intensity of spasticity and control  -AS         MMT (Manual Muscle Testing)    Rt Upper Ext  --  -AS      General MMT Comments  grossly 3-/5; able to sustain min R in biceps/triceps  -AS        User Key  (r) = Recorded By, (t) = Taken By, (c) = Cosigned By    Initials Name Provider Type    AS Joan Valdes OT Occupational Therapist                       OT Goals     Row Name 12/05/19 1819 12/05/19 1400       OT Short Term Goals    STG Date to Achieve  --  12/26/19  -AS    STG 1  --  Pt will participate in HEP focuses on RUE/hand ROM/stretching, strengthening, FMC, and functional use with mod vc for completion  -AS    STG 1 Progress  --  New  -AS    STG 2  --  Pt will utilize R hand to pick-up/place 5/5 bean bags in 3 minutes or less to increase gross R hand strength and coordination to benefit feeding and functional tasks.   -AS    STG 2 Progress  --  New  -AS    STG 3  --  Pt will utilize built up utensil with R hand to self feed meals with min A.   -AS       Long Term Goals    LTG Date to Achieve  --  -- until d/c  -AS    LTG 1  --  Pt and caregiver will demonstrate complaince of all HEPs as indicated 3/3 sessions.  -AS    LTG 1 Progress  --  New  -AS    LTG 2  --  Pt will utilize built up utensil with R hand to eat 50%  of meal/snack.   -AS    LTG 2 Progress  --  New  -AS    LTG 3  --  Pt will utilize R hand to pick-up/place 5/5 bean bags in 1 min 30 sec or less to increase gross R hand strength and coordination to benefit feeding and functional tasks.   -AS    LTG 3 Progress  --  New  -AS    LTG 4  --  Pt will utilize R UE/hand for gross stabilization of different size containers, while removing caps with L hand  5/5 times to increase coordination to benefit desired functional activities.   -AS    LTG 4 Progress  --  New  -AS       Time Calculation    OT Goal Re-Cert Due Date  12/26/19  -AS  12/26/19  -AS      User Key  (r) = Recorded By, (t) = Taken By, (c) = Cosigned By    Initials Name Provider Type    AS Joan Valdes OT Occupational Therapist          OT Assessment/Plan     Row Name 12/05/19 1808          OT Assessment    Functional Limitations  Limitations in functional capacity and performance;Performance in self-care ADL;Performance in leisure activities  -AS     Impairments  Muscle strength;Impaired flexibility;Impaired postural alignment;Joint integrity;Pain;Coordination;Dexterity;Joint mobility;Motor function;Range of motion  -AS     Assessment Comments  Pt is a 40 yo F with spastic quadriplegic CP who was referred to OT services for stiffness and decreased function of RUE/hand. Pt resides at [facility] and is dependent for transfers and bathing/dressing tasks. Pt is able to perform self feeding and teeth brushing with inital s/u from caregivers using L hand. Pt uses a power w/c with joystick for mobility. Pt holds RUE in decorticate flexed posture, however is able to break out of this position with increased time. Pt spasticity seems to fluctuate restricting pt ability to mobilize in various planes. Pt is able to perform full extension of R hand and gross grasp (with hyperflexion of wrist), however demonstrates significant difficulty with isolated digit movements and functional grasp/reach. Pt reports  goals to improve R hand/UE function so that she can use it in functional tasks such as self feeding and holding items in R hand instead of alway using L hand. Pt would benefit from continued skilled OT services to increase  RUE/hand ROM, motor function, coordination, and dexterity to improve performance in ADLs and desired functional activities. Without skilled intervention, pt is at risk for contractures, reduced skin integrity, decline in functional activites, and decreased quality of life.     -AS     OT Diagnosis  impaired ADLs, impaired RUE/hand functioning, decreased fine/gross motor coordination, increased spasticity  -AS        OT Plan    OT Frequency  1x/week;2x/week  -AS     Predicted Duration of Therapy Intervention (Therapy Eval)  4-6 weeks  -AS     Planned CPT's?  OT EVAL HIGH COMPLEXITY: 04428;OT THER PROC EA 15 MIN: 15839RU;OT THER ACT EA 15 MIN: 06350FW;OT SELF CARE/MGMT/TRAIN 15 MIN: 92701;OT THER SUPP EA 15 MIN:;OT ORTHOTIC MGMT/TRAIN EA 15 MIN: 85957;OT SENS INTEGRATIVE TECH EACH 15 MIN: 71435;OT NEUROMUSC RE EDUCATION EA 15 MIN: 86353  -AS     Planned Therapy Interventions (Optional Details)  home exercise program;joint mobilization;motor coordination training;strengthening;ROM (Range of Motion);patient/family education;orthotic fitting/training;neuromuscular re-education;stretching  -AS     OT Plan Comments  Pt would benefit from continued skillled OT services to address deficits mentioned above, increase function in ADLs, and improve overall quality of life.  -AS       User Key  (r) = Recorded By, (t) = Taken By, (c) = Cosigned By    Initials Name Provider Type    AS Joan Valdes, OT Occupational Therapist              Outcome Measure Options: Quick DASH  Quick DASH  Open a tight or new jar.: Unable  Do heavy household chores (e.g., wash walls, wash floors): Unable  Carry a shopping bag or briefcase: Unable  Wash your back: Unable  Use a knife to cut food: Unable  Recreational activities in  which you take some force or impact through your arm, should or hand (e.g. golf, hammering, tennis, etc.): Unable  During the past week, to what extent has your arm, shoulder, or hand problem interfered with your normal social activites with family, friends, neighbors or groups?: Extremely  During the past week, were you limited in your work or other regular daily activities as a result of your arm, shoulder or hand problem?: Unable  Arm, Shoulder, or hand pain: Moderate  Tingling (pins and needles) in your arm, shoulder, or hand: None  During the past week, how much difficulty have you had sleeping because of the pain in your arm, shoulder or hand?: No difficulty  Number of Questions Answered: 11  Quick DASH Score: 77.27         Time Calculation:   OT Start Time: 1400  OT Stop Time: 1500  OT Time Calculation (min): 60 min     Therapy Charges for Today     Code Description Service Date Service Provider Modifiers Qty    [id number]  OT EVAL HIGH COMPLEXITY 4 12/5/2019 Joan Valdes, OT GO 1                 Joan Valdes OT  12/5/2019

## 2019-12-06 NOTE — THERAPY DISCHARGE NOTE
Outpatient Physical Therapy Ortho Progress Note/Discharge Summary       Patient Name: Kiley Ledesma  : 1980  MRN: [id number]  Today's Date: 2019      Visit Date: 2019    Visit Dx:    ICD-10-CM ICD-9-CM   1. Hand joint stiff, unspecified laterality M25.649 719.54   2. Spastic quadriplegic cerebral palsy (CMS/HCC) G80.0 343.2       Patient Active Problem List   Diagnosis   • Depressive disorder   • Gastroesophageal reflux disease   • Spastic quadriplegic cerebral palsy (CMS/HCC)   • Colitis   • Chronic abdominal pain   • Diarrhea   • Gastroesophageal reflux disease with esophagitis   • Hydronephrosis   • Incontinence of feces with fecal urgency   • Constipation   • Abnormal finding on GI tract imaging   • History of colitis   • Absence seizure (CMS/HCC)        Past Medical History:   Diagnosis Date   • Acute vulvitis     Candida   • Allergy     Unspecified, initial encounter   • Amblyopia     OS   • Astigmatism    • Cerebral palsy (CMS/HCC)    • Cheilosis    • Common cold    • Contusion    • Depressive disorder    • Diarrhea    • Encounter for general adult medical examination without abnormal findings    • Encounter for routine adult health examination     Adult health examination      • Fever    • Gastroesophageal reflux disease    • Generalized abdominal pain    • Indeterminate colitis    • Infected insect bite    • Myopia    • Spastic quadriplegic cerebral palsy (CMS/HCC)    • Trouble walking     Walking disability      • Urinary incontinence    • Urinary tract infectious disease    • Wears glasses    • Worried well         Past Surgical History:   Procedure Laterality Date   • COLONOSCOPY N/A 2012    hemorrhoids   • COLONOSCOPY N/A 2018    Procedure: COLONOSCOPY--look TI, bx, hx colitis;  Surgeon: Sreedhar Kirkpatrick MD;  Location: [facility] ENDOSCOPY;  Service: Gastroenterology   • COLONOSCOPY N/A 2019    Procedure: COLONOSCOPY;  Surgeon: Sreedhar Kirkpatrick MD;  Location:   [facility] ENDOSCOPY;  Service: Gastroenterology   • CYSTOSCOPY N/A 4/19/2019    Procedure: CYSTOSCOPY;  Surgeon: Cecilio Acuña MD;  Location: [facility] OR;  Service: Urology   • ENDOSCOPY N/A 03/28/2012    gastritis   • INJECTION OF MEDICATION  02/23/2016    Kenalog   • TOTAL ABDOMINAL HYSTERECTOMY WITH SALPINGO OOPHORECTOMY N/A 03/24/2005   • UPPER GASTROINTESTINAL ENDOSCOPY  03/28/2012                                                  PT OP Goals     Row Name 12/06/19 1400          PT Short Term Goals    STG Date to Achieve  12/13/19  -     STG 1  Pt to report/demo 50% improved resting posture ability/comfort via reported improved (reduced) tightness of R hand at rest.  -     STG 1 Progress  Not Met  -     STG 2  Pt to improve QDASH item #9 from 3 to 1.  -     STG 2 Progress  Not Met  -[facility] 3  Pt/pt's caregiver to be independent c HEP for stretching & skin care program to improve overall health of R UE & prevent integumentary breakdown risk/issues.  -     STG 3 Progress  Not Met;Progressing;Ongoing  -       User Key  (r) = Recorded By, (t) = Taken By, (c) = Cosigned By    Initials Name Provider Type    Laz Romero, PT Physical Therapist                         Time Calculation:                OP PT Discharge Summary  Date of Discharge: 12/06/19  Reason for Discharge: Maximum functional potential achieved(Max PT potential achieved, transfer to OT services)  Outcomes Achieved: Unable to make functional progress toward goals at this time  Discharge Destination: Outpatient services(Occupational Therapy Services)  Discharge Instructions/Additional Comments: Pt/caregiver to continue c HEP for stretching and postural activities for improved mobility and skin integrity cares.      Laz Claudio, PT  12/6/2019

## 2020-01-14 DIAGNOSIS — G80.0 SPASTIC QUADRIPLEGIC CEREBRAL PALSY (HCC): Primary | Chronic | ICD-10-CM

## 2020-01-15 ENCOUNTER — APPOINTMENT (OUTPATIENT)
Dept: PHYSICAL THERAPY | Facility: HOSPITAL | Age: 40
End: 2020-01-15

## 2020-01-15 ENCOUNTER — APPOINTMENT (OUTPATIENT)
Dept: OCCUPATIONAL THERAPY | Facility: HOSPITAL | Age: 40
End: 2020-01-15

## 2020-01-15 ENCOUNTER — DOCUMENTATION (OUTPATIENT)
Dept: OCCUPATIONAL THERAPY | Facility: HOSPITAL | Age: 40
End: 2020-01-15

## 2020-01-15 NOTE — THERAPY DISCHARGE NOTE
Outpatient Occupational Therapy Neuro D/c only/Discharge Summary       Patient Name: Kiley Ledesma  : 1980  MRN: [id number]  Today's Date: 1/15/2020      Visit Date: 01/15/2020    Patient Active Problem List   Diagnosis   • Depressive disorder   • Gastroesophageal reflux disease   • Spastic quadriplegic cerebral palsy (CMS/HCC)   • Colitis   • Chronic abdominal pain   • Diarrhea   • Gastroesophageal reflux disease with esophagitis   • Hydronephrosis   • Incontinence of feces with fecal urgency   • Constipation   • Abnormal finding on GI tract imaging   • History of colitis   • Absence seizure (CMS/HCC)        Past Medical History:   Diagnosis Date   • Acute vulvitis     Candida   • Allergy     Unspecified, initial encounter   • Amblyopia     OS   • Astigmatism    • Cerebral palsy (CMS/HCC)    • Cheilosis    • Common cold    • Contusion    • Depressive disorder    • Diarrhea    • Encounter for general adult medical examination without abnormal findings    • Encounter for routine adult health examination     Adult health examination      • Fever    • Gastroesophageal reflux disease    • Generalized abdominal pain    • Indeterminate colitis    • Infected insect bite    • Myopia    • Spastic quadriplegic cerebral palsy (CMS/HCC)    • Trouble walking     Walking disability      • Urinary incontinence    • Urinary tract infectious disease    • Wears glasses    • Worried well         Past Surgical History:   Procedure Laterality Date   • COLONOSCOPY N/A 2012    hemorrhoids   • COLONOSCOPY N/A 2018    Procedure: COLONOSCOPY--look TI, bx, hx colitis;  Surgeon: Sreedhar Kirkpatrick MD;  Location: [facility] ENDOSCOPY;  Service: Gastroenterology   • COLONOSCOPY N/A 2019    Procedure: COLONOSCOPY;  Surgeon: Sreedhar Kirkpatrick MD;  Location: [facility] ENDOSCOPY;  Service: Gastroenterology   • CYSTOSCOPY N/A 2019    Procedure: CYSTOSCOPY;  Surgeon: Cecilio Acuña MD;  Location: [facility] OR;  Service: Urology    • ENDOSCOPY N/A 03/28/2012    gastritis   • INJECTION OF MEDICATION  02/23/2016    Kenalog   • TOTAL ABDOMINAL HYSTERECTOMY WITH SALPINGO OOPHORECTOMY N/A 03/24/2005   • UPPER GASTROINTESTINAL ENDOSCOPY  03/28/2012         Visit Dx:  No diagnosis found.                     OT Goals     Row Name 01/15/20 1704          OT Short Term Goals    STG Date to Achieve  12/26/19  -AS     STG 1  Pt will participate in HEP focuses on RUE/hand ROM/stretching, strengthening, FMC, and functional use with mod vc for completion  -AS     STG 1 Progress  Not Met  -AS     STG 2  Pt will utilize R hand to pick-up/place 5/5 bean bags in 3 minutes or less to increase gross R hand strength and coordination to benefit feeding and functional tasks.   -AS     STG 2 Progress  Not Met  -AS     STG 3  Pt will utilize built up utensil with R hand to self feed meals with min A.   -AS     STG 3 Progress  Not Met  -AS        Long Term Goals    LTG Date to Achieve  -- until d/c  -AS     LTG 1  Pt and caregiver will demonstrate complaince of all HEPs as indicated 3/3 sessions.  -AS     LTG 1 Progress  Not Met  -AS     LTG 2  Pt will utilize built up utensil with R hand to eat 50% of meal/snack.   -AS     LTG 2 Progress  Not Met  -AS     LTG 3  Pt will utilize R hand to pick-up/place 5/5 bean bags in 1 min 30 sec or less to increase gross R hand strength and coordination to benefit feeding and functional tasks.   -AS     LTG 3 Progress  Not Met  -AS     LTG 4  Pt will utilize R UE/hand for gross stabilization of different size containers, while removing caps with L hand  5/5 times to increase coordination to benefit desired functional activities.   -AS     LTG 4 Progress  Not Met  -AS       User Key  (r) = Recorded By, (t) = Taken By, (c) = Cosigned By    Initials Name Provider Type    AS Joan Valdes, OT Occupational Therapist                                     Time Calculation:       D/c only            OP OT Discharge Summary  Date of  Discharge: 01/15/20  Reason for Discharge: other (comment)(Pt order  and failed to return within 30 days of inital eval. Will d/c OT and if return will require new MD order)  Outcomes Achieved: Unable to make functional progress toward goals at this time, Refer to plan of care for updates on goals achieved        Joan Valdes, OT  1/15/2020

## 2020-01-22 ENCOUNTER — OFFICE VISIT (OUTPATIENT)
Dept: GASTROENTEROLOGY | Facility: CLINIC | Age: 40
End: 2020-01-22

## 2020-01-22 VITALS — DIASTOLIC BLOOD PRESSURE: 62 MMHG | HEART RATE: 78 BPM | SYSTOLIC BLOOD PRESSURE: 104 MMHG

## 2020-01-22 DIAGNOSIS — G89.29 CHRONIC ABDOMINAL PAIN: ICD-10-CM

## 2020-01-22 DIAGNOSIS — R10.9 CHRONIC ABDOMINAL PAIN: ICD-10-CM

## 2020-01-22 DIAGNOSIS — R19.7 DIARRHEA, UNSPECIFIED TYPE: Primary | ICD-10-CM

## 2020-01-22 DIAGNOSIS — K59.00 CONSTIPATION, UNSPECIFIED CONSTIPATION TYPE: ICD-10-CM

## 2020-01-22 PROCEDURE — 99213 OFFICE O/P EST LOW 20 MIN: CPT | Performed by: PHYSICIAN ASSISTANT

## 2020-01-22 NOTE — PROGRESS NOTES
Chief Complaint   Patient presents with   • Diarrhea   • Constipation   • Abdominal Pain       ENDO PROCEDURE ORDERED:    Subjective    Kiley Ledesma is a 39 y.o. female. she is here today for follow-up.    History of Present Illness    The patient is seen on a recheck of her GERD, abdominal pain, IBS. Last seen 06/19/2019. She was accompanied by an aid. She is still in the motorized wheelchair. GERD is doing well on the Prilosec 20 mg daily. She denied nausea, vomiting, dysphagia. She does take a stool softener as needed. She has not had any episodes of incontinence since last visit. No blood in her stool. Weight appears stable. Last colonoscopy 06/07/2019.    The patient had a cholesterol panel on 08/23/2019. CT scan of the head with and without contrast on 10/10/2019 showed microvascular disease.    Studies on 10/28/2019, urinalysis showed some blood. Negative influenza screen. CK, magnesium were normal. CMP was okay. CBC showed mild anemia with hemoglobin 11.8.    ASSESSMENT/PLAN: Patient with chronic GERD, IBS with mild anemia, likely multifactorial. It appears stable on current regimen. She will continue current medication. We will plan followup in 6 months, sooner if needed.       The following portions of the patient's history were reviewed and updated as appropriate:   Past Medical History:   Diagnosis Date   • Acute vulvitis     Candida   • Allergy     Unspecified, initial encounter   • Amblyopia     OS   • Astigmatism    • Cerebral palsy (CMS/HCC)    • Cheilosis    • Common cold    • Contusion    • Depressive disorder    • Diarrhea    • Encounter for general adult medical examination without abnormal findings    • Encounter for routine adult health examination     Adult health examination      • Fever    • Gastroesophageal reflux disease    • Generalized abdominal pain    • Indeterminate colitis    • Infected insect bite    • Myopia    • Spastic quadriplegic cerebral palsy (CMS/HCC)    • Trouble  walking     Walking disability      • Urinary incontinence    • Urinary tract infectious disease    • Wears glasses    • Worried well      Past Surgical History:   Procedure Laterality Date   • COLONOSCOPY N/A 03/28/2012    hemorrhoids   • COLONOSCOPY N/A 4/23/2018    Procedure: COLONOSCOPY--look TI, bx, hx colitis;  Surgeon: Sreedhar Kirkpatrick MD;  Location: [facility] ENDOSCOPY;  Service: Gastroenterology   • COLONOSCOPY N/A 6/7/2019    Procedure: COLONOSCOPY;  Surgeon: Sreedhar Kirkpatrick MD;  Location: [facility] ENDOSCOPY;  Service: Gastroenterology   • CYSTOSCOPY N/A 4/19/2019    Procedure: CYSTOSCOPY;  Surgeon: Cecilio Acuña MD;  Location: [facility] OR;  Service: Urology   • ENDOSCOPY N/A 03/28/2012    gastritis   • INJECTION OF MEDICATION  02/23/2016    Kenalog   • TOTAL ABDOMINAL HYSTERECTOMY WITH SALPINGO OOPHORECTOMY N/A 03/24/2005   • UPPER GASTROINTESTINAL ENDOSCOPY  03/28/2012     Family History   Problem Relation Age of Onset   • Coronary artery disease Other    • Diabetes Other    • Hypertension Other    • Stroke Other      OB History    None       Allergies   Allergen Reactions   • Atropine Unknown (See Comments)     Unknown reaction per patient   • Ciprofloxacin Other (See Comments)     seizure   • Sulfa Antibiotics Itching   • Avelox [Moxifloxacin Hcl] Other (See Comments)     Concerned may cause seizure   • Factive [Gemifloxacin] Other (See Comments)     Concerned may cause seizure   • Floxin Otic [Ofloxacin] Other (See Comments)     Concerned may cause seizure   • Levaquin [Levofloxacin] Other (See Comments)     Concerned may cause seizure     Social History     Socioeconomic History   • Marital status: Single     Spouse name: Not on file   • Number of children: Not on file   • Years of education: Not on file   • Highest education level: Not on file   Tobacco Use   • Smoking status: Never Smoker   • Smokeless tobacco: Never Used   Substance and Sexual Activity   • Alcohol use: No   • Drug use: No   • Sexual  activity: Defer     Current Medications:  Prior to Admission medications    Medication Sig Start Date End Date Taking? Authorizing Provider   acetaminophen (TYLENOL) 325 MG tablet Take 2 tablets by mouth Every 6 (Six) Hours As Needed for Mild Pain . 7/31/19  Yes Greta Stoddard MD   albuterol (PROVENTIL) (2.5 MG/3ML) 0.083% nebulizer solution Take 2.5 mg by nebulization Every 4 (Four) Hours As Needed for Wheezing. 7/31/19  Yes Greta Stoddard MD   BACLOFEN IT Has a baclofen pump   Yes Candy Thomas MD   Cholecalciferol 1000 units capsule Take 1,000 Units by mouth Daily.   Yes Candy Thomas MD    MG capsule GIVE ONE CAPSULE BY MOUTH TWICE DAILY 8/1/19  Yes Greta Stoddard MD   doxycycline (VIBRAMYCIN) 100 MG capsule Take 100 mg by mouth Daily.   Yes Candy Thomas MD   ESTRADIOL PO Take 1 mg by mouth Daily.   Yes Candy Thomas MD   FLUoxetine (PROZAC) 40 MG capsule Take 40 mg by mouth Daily.   Yes Candy Thomas MD   fluticasone (FLONASE) 50 MCG/ACT nasal spray 2 sprays into each nostril Daily.   Yes Candy Thomas MD   metoclopramide (REGLAN) 10 MG tablet Take 10 mg by mouth Daily.   Yes Candy Thomas MD   Misc. Devices (WHEELCHAIR) misc As directed   Yes Candy Thomas MD   NON FORMULARY Daily As Needed. Nyst/bacit/Zno/hc 1%   Yes Candy Thomas MD   Omeprazole (PRILOSEC PO) Take 20 mg by mouth Daily. ON HOLD TILL KEFLEX COMPLETED   Yes Candy Thomas MD   solifenacin (VESICARE) 10 MG tablet Take 10 mg by mouth Daily.   Yes Candy Thomas MD   cholestyramine (QUESTRAN) 4 GM/DOSE powder Take 4 g by mouth 2 (Two) Times a Day With Meals.  1/22/20  Candy Thomas MD   diphenhydrAMINE (BENADRYL ALLERGY) 25 MG tablet Take 1 tablet by mouth At Night As Needed for Allergies or Sleep. 5/22/19 1/22/20  Greta Stoddard MD   LINZESS 145 MCG capsule capsule  7/29/19 1/22/20  Candy Thomas MD   mupirocin (BACTROBAN)  2 % ointment Apply  topically 3 (Three) Times a Day. 1/29/18 1/22/20  Ponce Caldwell MD   neomycin-bacitracin-polymyxin (NEOSPORIN) 5-400-5000 ointment Apply  topically to the appropriate area as directed. EVERY 8 HOURS PRN  1/22/20  Provider, MD Candy   ondansetron ODT (ZOFRAN-ODT) 8 MG disintegrating tablet Take 1 tablet by mouth Every 8 (Eight) Hours As Needed for Nausea. 11/8/19 1/22/20  Greta Stoddard MD   triamcinolone (KENALOG) 0.025 % ointment Apply  topically to the appropriate area as directed 2 (Two) Times a Day. 6/21/19 1/22/20  Greta Stoddard MD     Review of Systems  Review of Systems       Objective    /62 (BP Location: Left arm)   Pulse 78   LMP  (LMP Unknown)   Physical Exam   Constitutional: She is oriented to person, place, and time. She appears well-developed and well-nourished. No distress.   [employer] [facility] [employer]   HENT:   Head: Normocephalic and atraumatic.   Eyes: Pupils are equal, round, and reactive to light. EOM are normal.   Neck: Normal range of motion.   Cardiovascular: Normal rate, regular rhythm and normal heart sounds.   Pulmonary/Chest: Effort normal and breath sounds normal.   Abdominal: Soft. Bowel sounds are normal. She exhibits no shifting dullness, no distension, no abdominal bruit, no ascites and no mass. There is no hepatosplenomegaly. There is tenderness. There is no rigidity, no rebound, no guarding and no CVA tenderness. No hernia. Hernia confirmed negative in the ventral area.   Diffuse, pain pump in RLQ   Musculoskeletal: Normal range of motion.   Neurological: She is alert and oriented to person, place, and time.   Skin: Skin is warm and dry.   Psychiatric: She has a normal mood and affect. Her behavior is normal. Judgment and thought content normal.   Nursing note and vitals reviewed.    Assessment/Plan      1. Diarrhea, unspecified type    2. Constipation, unspecified constipation type    3. Chronic abdominal pain    .   iKley was seen today for diarrhea,  constipation and abdominal pain.    Diagnoses and all orders for this visit:    Diarrhea, unspecified type    Constipation, unspecified constipation type    Chronic abdominal pain        Orders placed during this encounter include:  No orders of the defined types were placed in this encounter.      Medications prescribed:  No orders of the defined types were placed in this encounter.    Discontinued Medications       Reason for Discontinue     cholestyramine (QUESTRAN) 4 GM/DOSE powder    Discontinued by another clinician     diphenhydrAMINE (BENADRYL ALLERGY) 25 MG tablet    Discontinued by another clinician     LINZESS 145 MCG capsule capsule    Discontinued by another clinician     mupirocin (BACTROBAN) 2 % ointment    Discontinued by another clinician     neomycin-bacitracin-polymyxin (NEOSPORIN) 5-400-5000 ointment    Discontinued by another clinician     ondansetron ODT (ZOFRAN-ODT) 8 MG disintegrating tablet    Discontinued by another clinician     triamcinolone (KENALOG) 0.025 % ointment    Discontinued by another clinician        Requested Prescriptions      No prescriptions requested or ordered in this encounter       Review and/or summary of lab tests, radiology, procedures, medications. Review and summary of old records and obtaining of history. The risks and benefits of my recommendations, as well as other treatment options were discussed with the patient today. Questions were answered.    Follow-up: Return in about 6 months (around 7/22/2020), or if symptoms worsen or fail to improve.     * Surgery not found *      This document has been electronically signed by Bro Lott PA-C on January 28, 2020 7:47 PM      Results for orders placed or performed during the hospital encounter of 10/28/19   [facility]   Result Value Ref Range    Extra Tube Hold for add-ons.    Urinalysis, Microscopic Only - Urine, Clean Catch   Result Value Ref Range    RBC, UA Too Numerous to Count (A) None Seen /HPF     WBC, UA 6-12 (A) None Seen, 0-2, 3-5 /HPF    Bacteria, UA None Seen None Seen /HPF    Squamous Epithelial Cells, UA 3-5 (A) None Seen, 0-2 /HPF    Hyaline Casts, UA 0-2 None Seen /LPF    Methodology Automated Microscopy    Urinalysis With Microscopic If Indicated (No Culture) - Urine, Clean Catch   Result Value Ref Range    Color, UA Yellow Yellow, Straw, Dark Yellow, Keli    Appearance, UA Cloudy (A) Clear    pH, UA 6.5 5.0 - 9.0    Specific Gravity, UA 1.018 1.003 - 1.030    Glucose, UA Negative Negative    Ketones, UA 15 mg/dL (1+) (A) Negative    Bilirubin, UA Negative Negative    Blood, UA Large (3+) (A) Negative    Protein, UA Trace (A) Negative    Leuk Esterase, UA Small (1+) (A) Negative    Nitrite, UA Negative Negative    Urobilinogen, UA 1.0 E.U./dL 0.2 - 1.0 E.U./dL   CBC Auto Differential   Result Value Ref Range    WBC 4.24 3.40 - 10.80 10*3/mm3    RBC 4.16 3.77 - 5.28 10*6/mm3    Hemoglobin 11.8 (L) 12.0 - 15.9 g/dL    Hematocrit 37.2 34.0 - 46.6 %    MCV 89.4 79.0 - 97.0 fL    MCH 28.4 26.6 - 33.0 pg    MCHC 31.7 31.5 - 35.7 g/dL    RDW 13.2 12.3 - 15.4 %    RDW-SD 43.3 37.0 - 54.0 fl    MPV 9.7 6.0 - 12.0 fL    Platelets 182 140 - 450 10*3/mm3    Neutrophil % 59.3 42.7 - 76.0 %    Lymphocyte % 24.3 19.6 - 45.3 %    Monocyte % 13.4 (H) 5.0 - 12.0 %    Eosinophil % 2.1 0.3 - 6.2 %    Basophil % 0.7 0.0 - 1.5 %    Immature Grans % 0.2 0.0 - 0.5 %    Neutrophils, Absolute 2.51 1.70 - 7.00 10*3/mm3    Lymphocytes, Absolute 1.03 0.70 - 3.10 10*3/mm3    Monocytes, Absolute 0.57 0.10 - 0.90 10*3/mm3    Eosinophils, Absolute 0.09 0.00 - 0.40 10*3/mm3    Basophils, Absolute 0.03 0.00 - 0.20 10*3/mm3    Immature Grans, Absolute 0.01 0.00 - 0.05 10*3/mm3    nRBC 0.0 0.0 - 0.2 /100 WBC   Light Blue Top   Result Value Ref Range    Extra Tube hold for add-on    Influenza Antigen, Rapid - Swab, Nasopharynx   Result Value Ref Range    Influenza A Ag, EIA Negative Negative    Influenza B Ag, EIA Negative Negative    Magnesium   Result Value Ref Range    Magnesium 1.9 1.6 - 2.6 mg/dL   CK   Result Value Ref Range    Creatine Kinase 61 20 - 180 U/L   Comprehensive Metabolic Panel   Result Value Ref Range    Glucose 81 65 - 99 mg/dL    BUN 12 6 - 20 mg/dL    Creatinine 0.39 (L) 0.57 - 1.00 mg/dL    Sodium 141 136 - 145 mmol/L    Potassium 3.6 3.5 - 5.2 mmol/L    Chloride 102 98 - 107 mmol/L    CO2 27.0 22.0 - 29.0 mmol/L    Calcium 9.2 8.6 - 10.5 mg/dL    Total Protein 6.9 6.0 - 8.5 g/dL    Albumin 4.20 3.50 - 5.20 g/dL    ALT (SGPT) 16 1 - 33 U/L    AST (SGOT) 19 1 - 32 U/L    Alkaline Phosphatase 87 39 - 117 U/L    Total Bilirubin 0.2 0.2 - 1.2 mg/dL    eGFR Non African Amer >150 >60 mL/min/1.73    Globulin 2.7 gm/dL    A/G Ratio 1.6 g/dL    BUN/Creatinine Ratio 30.8 (H) 7.0 - 25.0    Anion Gap 12.0 5.0 - 15.0 mmol/L   Results for orders placed or performed in visit on 08/23/19   Lipid Panel   Result Value Ref Range    Total Cholesterol 146 0 - 200 mg/dL    Triglycerides 72 0 - 150 mg/dL    HDL Cholesterol 57 40 - 60 mg/dL    LDL Cholesterol  75 0 - 100 mg/dL    VLDL Cholesterol 14.4 5 - 40 mg/dL    LDL/HDL Ratio 1.31    Results for orders placed or performed during the hospital encounter of 07/14/19   Gold Top - SST   Result Value Ref Range    Extra Tube Hold for add-ons.    Green Top (Gel)   Result Value Ref Range    Extra Tube Hold for add-ons.    Urinalysis With Microscopic If Indicated (No Culture) - Urine, Catheter   Result Value Ref Range    Color, UA Yellow Yellow, Straw, Dark Yellow, Keli    Appearance, UA Clear Clear    pH, UA 6.5 5.0 - 9.0    Specific Gravity, UA 1.022 1.003 - 1.030    Glucose, UA Negative Negative    Ketones, UA Negative Negative    Bilirubin, UA Negative Negative    Blood, UA Negative Negative    Protein, UA Negative Negative    Leuk Esterase, UA Negative Negative    Nitrite, UA Negative Negative    Urobilinogen, UA 1.0 E.U./dL 0.2 - 1.0 E.U./dL   CBC Auto Differential   Result Value Ref Range     WBC 8.26 3.40 - 10.80 10*3/mm3    RBC 4.39 3.77 - 5.28 10*6/mm3    Hemoglobin 12.5 12.0 - 15.9 g/dL    Hematocrit 39.2 34.0 - 46.6 %    MCV 89.3 79.0 - 97.0 fL    MCH 28.5 26.6 - 33.0 pg    MCHC 31.9 31.5 - 35.7 g/dL    RDW 13.2 12.3 - 15.4 %    RDW-SD 43.6 37.0 - 54.0 fl    MPV 10.1 6.0 - 12.0 fL    Platelets 245 140 - 450 10*3/mm3    Neutrophil % 70.4 42.7 - 76.0 %    Lymphocyte % 16.5 (L) 19.6 - 45.3 %    Monocyte % 9.6 5.0 - 12.0 %    Eosinophil % 2.7 0.3 - 6.2 %    Basophil % 0.7 0.0 - 1.5 %    Immature Grans % 0.1 0.0 - 0.5 %    Neutrophils, Absolute 5.82 1.70 - 7.00 10*3/mm3    Lymphocytes, Absolute 1.36 0.70 - 3.10 10*3/mm3    Monocytes, Absolute 0.79 0.10 - 0.90 10*3/mm3    Eosinophils, Absolute 0.22 0.00 - 0.40 10*3/mm3    Basophils, Absolute 0.06 0.00 - 0.20 10*3/mm3    Immature Grans, Absolute 0.01 0.00 - 0.05 10*3/mm3    nRBC 0.0 0.0 - 0.2 /100 WBC     *Note: Due to a large number of results and/or encounters for the requested time period, some results have not been displayed. A complete set of results can be found in Results Review.       Some portions of this note have been dictated using voice recognition software and may contain errors and/or omissions.

## 2020-01-22 NOTE — PATIENT INSTRUCTIONS

## 2020-01-23 ENCOUNTER — APPOINTMENT (OUTPATIENT)
Dept: PHYSICAL THERAPY | Facility: HOSPITAL | Age: 40
End: 2020-01-23

## 2020-01-27 ENCOUNTER — TELEPHONE (OUTPATIENT)
Dept: FAMILY MEDICINE CLINIC | Facility: CLINIC | Age: 40
End: 2020-01-27

## 2020-02-05 ENCOUNTER — TELEPHONE (OUTPATIENT)
Dept: FAMILY MEDICINE CLINIC | Facility: CLINIC | Age: 40
End: 2020-02-05

## 2020-02-05 RX ORDER — ALBUTEROL SULFATE 2.5 MG/3ML
2.5 SOLUTION RESPIRATORY (INHALATION) EVERY 4 HOURS PRN
Qty: 720 ML | Refills: 3 | Status: SHIPPED | OUTPATIENT
Start: 2020-02-05 | End: 2020-02-25 | Stop reason: SDUPTHER

## 2020-02-05 RX ORDER — ACETAMINOPHEN 325 MG/1
650 TABLET ORAL EVERY 6 HOURS PRN
Qty: 60 TABLET | Refills: 0 | Status: SHIPPED | OUTPATIENT
Start: 2020-02-05 | End: 2020-02-11 | Stop reason: SDUPTHER

## 2020-02-05 NOTE — TELEPHONE ENCOUNTER
Pato left a message asking that you send all new rx's to [facility].  The number is [phone]

## 2020-02-06 ENCOUNTER — TRANSCRIBE ORDERS (OUTPATIENT)
Dept: LAB | Facility: HOSPITAL | Age: 40
End: 2020-02-06

## 2020-02-06 ENCOUNTER — TELEPHONE (OUTPATIENT)
Dept: FAMILY MEDICINE CLINIC | Facility: CLINIC | Age: 40
End: 2020-02-06

## 2020-02-06 ENCOUNTER — APPOINTMENT (OUTPATIENT)
Dept: LAB | Facility: HOSPITAL | Age: 40
End: 2020-02-06

## 2020-02-06 DIAGNOSIS — G89.4 CHRONIC PAIN SYNDROME: Primary | ICD-10-CM

## 2020-02-06 DIAGNOSIS — Z01.812 PRE-PROCEDURE LAB EXAM: ICD-10-CM

## 2020-02-06 DIAGNOSIS — M54.50 LOW BACK PAIN, UNSPECIFIED BACK PAIN LATERALITY, UNSPECIFIED CHRONICITY, UNSPECIFIED WHETHER SCIATICA PRESENT: ICD-10-CM

## 2020-02-06 LAB
ANION GAP SERPL CALCULATED.3IONS-SCNC: 14 MMOL/L (ref 5–15)
BASOPHILS # BLD AUTO: 0.02 10*3/MM3 (ref 0–0.2)
BASOPHILS NFR BLD AUTO: 0.2 % (ref 0–1.5)
BUN BLD-MCNC: 12 MG/DL (ref 6–20)
BUN/CREAT SERPL: 26.7 (ref 7–25)
CALCIUM SPEC-SCNC: 9.4 MG/DL (ref 8.6–10.5)
CHLORIDE SERPL-SCNC: 95 MMOL/L (ref 98–107)
CO2 SERPL-SCNC: 23 MMOL/L (ref 22–29)
CREAT BLD-MCNC: 0.45 MG/DL (ref 0.57–1)
DEPRECATED RDW RBC AUTO: 41.9 FL (ref 37–54)
EOSINOPHIL # BLD AUTO: 0.01 10*3/MM3 (ref 0–0.4)
EOSINOPHIL NFR BLD AUTO: 0.1 % (ref 0.3–6.2)
ERYTHROCYTE [DISTWIDTH] IN BLOOD BY AUTOMATED COUNT: 13.1 % (ref 12.3–15.4)
GFR SERPL CREATININE-BSD FRML MDRD: >150 ML/MIN/1.73
GLUCOSE BLD-MCNC: 82 MG/DL (ref 65–99)
HCT VFR BLD AUTO: 37.2 % (ref 34–46.6)
HGB BLD-MCNC: 12 G/DL (ref 12–15.9)
IMM GRANULOCYTES # BLD AUTO: 0.03 10*3/MM3 (ref 0–0.05)
IMM GRANULOCYTES NFR BLD AUTO: 0.3 % (ref 0–0.5)
LYMPHOCYTES # BLD AUTO: 1.09 10*3/MM3 (ref 0.7–3.1)
LYMPHOCYTES NFR BLD AUTO: 12.2 % (ref 19.6–45.3)
MCH RBC QN AUTO: 28.2 PG (ref 26.6–33)
MCHC RBC AUTO-ENTMCNC: 32.3 G/DL (ref 31.5–35.7)
MCV RBC AUTO: 87.5 FL (ref 79–97)
MONOCYTES # BLD AUTO: 0.5 10*3/MM3 (ref 0.1–0.9)
MONOCYTES NFR BLD AUTO: 5.6 % (ref 5–12)
NEUTROPHILS # BLD AUTO: 7.26 10*3/MM3 (ref 1.7–7)
NEUTROPHILS NFR BLD AUTO: 81.6 % (ref 42.7–76)
NRBC BLD AUTO-RTO: 0 /100 WBC (ref 0–0.2)
PLATELET # BLD AUTO: 268 10*3/MM3 (ref 140–450)
PMV BLD AUTO: 9.3 FL (ref 6–12)
POTASSIUM BLD-SCNC: 3.9 MMOL/L (ref 3.5–5.2)
RBC # BLD AUTO: 4.25 10*6/MM3 (ref 3.77–5.28)
SODIUM BLD-SCNC: 132 MMOL/L (ref 136–145)
WBC NRBC COR # BLD: 8.91 10*3/MM3 (ref 3.4–10.8)

## 2020-02-06 PROCEDURE — 85025 COMPLETE CBC W/AUTO DIFF WBC: CPT | Performed by: PAIN MEDICINE

## 2020-02-06 PROCEDURE — 80048 BASIC METABOLIC PNL TOTAL CA: CPT | Performed by: PAIN MEDICINE

## 2020-02-06 PROCEDURE — 36415 COLL VENOUS BLD VENIPUNCTURE: CPT | Performed by: PAIN MEDICINE

## 2020-02-06 RX ORDER — TOLTERODINE 4 MG/1
4 CAPSULE, EXTENDED RELEASE ORAL DAILY
Qty: 90 CAPSULE | Refills: 3 | Status: SHIPPED | OUTPATIENT
Start: 2020-02-06 | End: 2020-02-10 | Stop reason: SDUPTHER

## 2020-02-06 RX ORDER — TOLTERODINE 4 MG/1
4 CAPSULE, EXTENDED RELEASE ORAL DAILY
Qty: 90 CAPSULE | Refills: 3 | Status: SHIPPED | OUTPATIENT
Start: 2020-02-06 | End: 2020-02-06 | Stop reason: SDUPTHER

## 2020-02-06 NOTE — TELEPHONE ENCOUNTER
Pato called and said she has to have a discontinue script for Vesicare and needs a new script for Tolterodine ER faxed to [phone]

## 2020-02-10 RX ORDER — TOLTERODINE 4 MG/1
4 CAPSULE, EXTENDED RELEASE ORAL DAILY
Qty: 90 CAPSULE | Refills: 3 | Status: SHIPPED | OUTPATIENT
Start: 2020-02-10 | End: 2020-02-10

## 2020-02-10 RX ORDER — TOLTERODINE 4 MG/1
CAPSULE, EXTENDED RELEASE ORAL
Qty: 90 CAPSULE | Refills: 3 | Status: SHIPPED | OUTPATIENT
Start: 2020-02-10 | End: 2020-03-13

## 2020-02-10 RX ORDER — TOLTERODINE 4 MG/1
4 CAPSULE, EXTENDED RELEASE ORAL DAILY
Qty: 90 CAPSULE | Refills: 3 | Status: SHIPPED | OUTPATIENT
Start: 2020-02-10 | End: 2020-02-10 | Stop reason: SDUPTHER

## 2020-02-11 ENCOUNTER — TELEPHONE (OUTPATIENT)
Dept: FAMILY MEDICINE CLINIC | Facility: CLINIC | Age: 40
End: 2020-02-11

## 2020-02-11 RX ORDER — ACETAMINOPHEN 325 MG/1
650 TABLET ORAL EVERY 6 HOURS PRN
Qty: 60 TABLET | Refills: 11 | Status: SHIPPED | OUTPATIENT
Start: 2020-02-11 | End: 2020-02-25 | Stop reason: SDUPTHER

## 2020-02-11 RX ORDER — DOCUSATE SODIUM 100 MG/1
100 CAPSULE, LIQUID FILLED ORAL DAILY
Qty: 62 CAPSULE | Refills: 11 | Status: SHIPPED | OUTPATIENT
Start: 2020-02-11 | End: 2020-02-12

## 2020-02-11 NOTE — TELEPHONE ENCOUNTER
Pato called and said all Kiley's PRN scripts are outdated and they need new copies faxed to [facility] at fax# [phone] when you have time.  I told her it may be next week and she said that was fine.

## 2020-02-12 RX ORDER — DOCUSATE SODIUM 100 MG/1
100 CAPSULE, LIQUID FILLED ORAL 2 TIMES DAILY
Qty: 62 CAPSULE | Refills: 11
Start: 2020-02-12 | End: 2021-02-05

## 2020-02-17 ENCOUNTER — TELEPHONE (OUTPATIENT)
Dept: FAMILY MEDICINE CLINIC | Facility: CLINIC | Age: 40
End: 2020-02-17

## 2020-02-17 RX ORDER — FLUCONAZOLE 150 MG/1
150 TABLET ORAL ONCE
Qty: 1 TABLET | Refills: 0 | Status: SHIPPED | OUTPATIENT
Start: 2020-02-17 | End: 2020-02-17

## 2020-02-17 NOTE — TELEPHONE ENCOUNTER
Pato called and said Kiley has a yeast infection, she is taking Clindamycin because she had a tube changed and wanted to know if Dr Stoddard would order her something for it to [facility]?

## 2020-02-24 ENCOUNTER — TELEPHONE (OUTPATIENT)
Dept: FAMILY MEDICINE CLINIC | Facility: CLINIC | Age: 40
End: 2020-02-24

## 2020-02-24 NOTE — TELEPHONE ENCOUNTER
Pato from [place] called and said that all of her PRN scripts have  and needs them sent to [facility] the fax number is [phone]

## 2020-02-25 RX ORDER — ALBUTEROL SULFATE 2.5 MG/3ML
2.5 SOLUTION RESPIRATORY (INHALATION) EVERY 4 HOURS PRN
Qty: 720 ML | Refills: 3 | Status: SHIPPED | OUTPATIENT
Start: 2020-02-25 | End: 2020-04-22 | Stop reason: SDUPTHER

## 2020-02-25 RX ORDER — ACETAMINOPHEN 325 MG/1
650 TABLET ORAL EVERY 6 HOURS PRN
Qty: 60 TABLET | Refills: 11 | Status: SHIPPED | OUTPATIENT
Start: 2020-02-25 | End: 2020-04-22 | Stop reason: SDUPTHER

## 2020-03-04 RX ORDER — METOCLOPRAMIDE 10 MG/1
TABLET ORAL
Qty: 31 TABLET | Refills: 5 | Status: SHIPPED | OUTPATIENT
Start: 2020-03-04 | End: 2020-10-06

## 2020-03-04 RX ORDER — FLUOXETINE HYDROCHLORIDE 40 MG/1
CAPSULE ORAL
Qty: 31 CAPSULE | Refills: 5 | Status: SHIPPED | OUTPATIENT
Start: 2020-03-04 | End: 2020-10-06

## 2020-03-04 RX ORDER — SOLIFENACIN SUCCINATE 10 MG/1
TABLET, FILM COATED ORAL
Qty: 29 TABLET | Refills: 5 | Status: SHIPPED | OUTPATIENT
Start: 2020-03-04 | End: 2020-03-13

## 2020-03-04 RX ORDER — ESTRADIOL 1 MG/1
TABLET ORAL
Qty: 31 TABLET | Refills: 5 | Status: SHIPPED | OUTPATIENT
Start: 2020-03-04 | End: 2020-10-06

## 2020-03-04 RX ORDER — OMEPRAZOLE 20 MG/1
CAPSULE, DELAYED RELEASE ORAL
Qty: 31 CAPSULE | Refills: 5 | Status: SHIPPED | OUTPATIENT
Start: 2020-03-04 | End: 2021-01-07

## 2020-03-13 RX ORDER — TOLTERODINE TARTRATE 2 MG/1
2 TABLET, EXTENDED RELEASE ORAL 2 TIMES DAILY
Qty: 180 TABLET | Refills: 3 | Status: SHIPPED | OUTPATIENT
Start: 2020-03-13 | End: 2021-01-07

## 2020-03-20 ENCOUNTER — TELEPHONE (OUTPATIENT)
Dept: FAMILY MEDICINE CLINIC | Facility: CLINIC | Age: 40
End: 2020-03-20

## 2020-03-20 RX ORDER — FLUTICASONE PROPIONATE 50 MCG
2 SPRAY, SUSPENSION (ML) NASAL DAILY
Qty: 16 G | Refills: 11 | Status: SHIPPED | OUTPATIENT
Start: 2020-03-20 | End: 2020-08-05 | Stop reason: SDUPTHER

## 2020-03-20 NOTE — TELEPHONE ENCOUNTER
Pato called and requested an updated script be faxed to [facility] for Flonase.      Fax [phone]

## 2020-04-22 RX ORDER — ACETAMINOPHEN 325 MG/1
650 TABLET ORAL EVERY 6 HOURS PRN
Qty: 60 TABLET | Refills: 11 | Status: SHIPPED | OUTPATIENT
Start: 2020-04-22 | End: 2021-06-18 | Stop reason: SDUPTHER

## 2020-04-22 RX ORDER — GINSENG 100 MG
CAPSULE ORAL 3 TIMES DAILY PRN
Qty: 14 G | Refills: 3 | Status: SHIPPED | OUTPATIENT
Start: 2020-04-22 | End: 2021-11-08

## 2020-04-22 RX ORDER — ALBUTEROL SULFATE 2.5 MG/3ML
2.5 SOLUTION RESPIRATORY (INHALATION) EVERY 4 HOURS PRN
Qty: 720 ML | Refills: 3 | Status: SHIPPED | OUTPATIENT
Start: 2020-04-22 | End: 2022-09-09

## 2020-04-22 NOTE — TELEPHONE ENCOUNTER
MARIAN WITH [employer] [facility] [employer] CALLED REQUESTING A REFILL ON PT'S  - acetaminophen (TYLENOL) 325 MG tablet  - albuterol (PROVENTIL) (2.5 MG/3ML) 0.083% nebulizer solution  - BACTRAZIN OINTMENT    PHARMACY: [place] - [address] [facility] - [phone] [facility] [phone]   [phone]    MARIAN'S CALLBACK NUMBER: [phone]

## 2020-05-19 ENCOUNTER — TELEMEDICINE (OUTPATIENT)
Dept: FAMILY MEDICINE CLINIC | Facility: CLINIC | Age: 40
End: 2020-05-19

## 2020-05-19 VITALS
SYSTOLIC BLOOD PRESSURE: 127 MMHG | TEMPERATURE: 98 F | DIASTOLIC BLOOD PRESSURE: 84 MMHG | BODY MASS INDEX: 18.83 KG/M2 | HEIGHT: 63 IN

## 2020-05-19 DIAGNOSIS — R10.84 GENERALIZED ABDOMINAL PAIN: ICD-10-CM

## 2020-05-19 DIAGNOSIS — R19.7 DIARRHEA, UNSPECIFIED TYPE: Primary | ICD-10-CM

## 2020-05-19 PROCEDURE — 99213 OFFICE O/P EST LOW 20 MIN: CPT | Performed by: GENERAL PRACTICE

## 2020-05-19 NOTE — PROGRESS NOTES
You have chosen to receive care through a telehealth visit.  Do you consent to use a video/audio connection for your medical care today? Yes    Subjective   Kiley Ledesma is a 39 y.o. female.   Chief Complaint   Patient presents with   • Abdominal Pain   • Diarrhea     You do you we know what that might be from    Diarrhea    This is a new problem. The current episode started yesterday. The problem occurs 2 to 4 times per day (None for the last 8 hours). The problem has been gradually improving. The stool consistency is described as watery. Associated symptoms include abdominal pain. Pertinent negatives include no arthralgias, bloating, chills, coughing, fever, headaches, myalgias or vomiting. There are no known risk factors. She has tried nothing for the symptoms.   Abdominal Pain   This is a new problem. The current episode started yesterday. The problem occurs intermittently. The problem has been gradually improving. The pain is located in the generalized abdominal region. The pain is at a severity of 2/10. The pain is mild. The quality of the pain is cramping. The abdominal pain does not radiate. Associated symptoms include diarrhea. Pertinent negatives include no arthralgias, constipation, dysuria, fever, frequency, headaches, myalgias, nausea or vomiting. Nothing aggravates the pain. The pain is relieved by nothing.      The following portions of the patient's history were reviewed and updated as appropriate: allergies, current medications, past social history and problem list.    Outpatient Medications Prior to Visit   Medication Sig Dispense Refill   • acetaminophen (TYLENOL) 325 MG tablet Take 2 tablets by mouth Every 6 (Six) Hours As Needed for Mild Pain . 60 tablet 11   • albuterol (PROVENTIL) (2.5 MG/3ML) 0.083% nebulizer solution Take 2.5 mg by nebulization Every 4 (Four) Hours As Needed for Wheezing. 720 mL 3   • bacitracin 500 UNIT/GM ointment Apply  topically to the appropriate area as  directed 3 (Three) Times a Day As Needed for Wound Care. 14 g 3   • BACLOFEN IT Has a baclofen pump     • Cholecalciferol 25 MCG (1000 UT) capsule Take 1 capsule by mouth Daily. 30 capsule 11   • docusate sodium (DOK) 100 MG capsule Take 1 capsule by mouth 2 (Two) Times a Day. 62 capsule 11   • estradiol (ESTRACE) 1 MG tablet GIVE ONE TABLET BY MOUTH ONCE DAILY FOR HORMONE REPLACEMENT 31 tablet 5   • ESTRADIOL PO Take 1 mg by mouth Daily.     • FLUoxetine (PROzac) 40 MG capsule GIVE ONE CAPSULE BY MOUTH ONCE DAILY FOR ANXIETY DISORDER 31 capsule 5   • fluticasone (FLONASE) 50 MCG/ACT nasal spray 2 sprays into the nostril(s) as directed by provider Daily. 16 g 11   • metoclopramide (REGLAN) 10 MG tablet GIVE ONE TABLET BY MOUTH ONCE DAILY FOR GASTROESOPHAGEAL REFLUX 31 tablet 5   • Misc. Devices (WHEELCHAIR) misc As directed     • NON FORMULARY Daily As Needed. Nyst/bacit/Zno/hc 1%     • Omeprazole (PRILOSEC PO) Take 20 mg by mouth Daily. ON HOLD TILL KEFLEX COMPLETED     • omeprazole (priLOSEC) 20 MG capsule GIVE ONE CAPSULE BY MOUTH ONCE DAILY FOR STOMACH ACID 31 capsule 5   • tolterodine (DETROL) 2 MG tablet Take 1 tablet by mouth 2 (Two) Times a Day. 180 tablet 3     No facility-administered medications prior to visit.        Review of Systems   Constitutional: Negative.  Negative for chills, fatigue, fever and unexpected weight change.   HENT: Negative.  Negative for congestion, ear pain, hearing loss, nosebleeds, rhinorrhea, sneezing, sore throat and tinnitus.    Eyes: Negative.  Negative for discharge.   Respiratory: Negative.  Negative for cough, shortness of breath and wheezing.    Cardiovascular: Negative.  Negative for chest pain and palpitations.   Gastrointestinal: Positive for abdominal pain and diarrhea. Negative for bloating, constipation, nausea and vomiting.   Endocrine: Negative.    Genitourinary: Negative.  Negative for dysuria, frequency and urgency.   Musculoskeletal: Negative.  Negative for  "arthralgias, back pain, joint swelling, myalgias and neck pain.   Skin: Negative.  Negative for rash.   Allergic/Immunologic: Negative.    Neurological: Negative.  Negative for dizziness, weakness, numbness and headaches.   Hematological: Negative.  Negative for adenopathy.   Psychiatric/Behavioral: Negative.  Negative for dysphoric mood and sleep disturbance. The patient is not nervous/anxious.        Objective   Visit Vitals  /84   Temp 98 °F (36.7 °C)   Ht 160 cm (63\")   LMP  (LMP Unknown)   BMI 18.83 kg/m²   Vitals signs per patient.      Physical Exam   Constitutional: She is oriented to person, place, and time. She appears well-developed and well-nourished.   HENT:   Head: Normocephalic and atraumatic.   Eyes: Pupils are equal, round, and reactive to light. Conjunctivae and EOM are normal.   Pulmonary/Chest: Effort normal.   Neurological: She is alert and oriented to person, place, and time.   Psychiatric: She has a normal mood and affect.   Vitals reviewed.    Notes brought forward are reviewed and updated if indicated.    Assessment/Plan   Problem List Items Addressed This Visit        Digestive    Diarrhea - Primary      Other Visit Diagnoses     Generalized abdominal pain               Patient Instructions   Clear liquid diet until morning.  Then slow restart brat diet.  Recheck if symptoms worsen.       This was an audio and video enabled telemedicine encounter. The time that was spent in reviewing the patient's chart and addressing their symptoms, diagnosis and treatment was 17 mins.      No orders of the defined types were placed in this encounter.    Return if symptoms worsen or fail to improve, for Next scheduled follow up.  "

## 2020-06-30 ENCOUNTER — TELEPHONE (OUTPATIENT)
Dept: FAMILY MEDICINE CLINIC | Facility: CLINIC | Age: 40
End: 2020-06-30

## 2020-06-30 RX ORDER — FLUCONAZOLE 150 MG/1
150 TABLET ORAL ONCE
Qty: 1 TABLET | Refills: 0 | Status: SHIPPED | OUTPATIENT
Start: 2020-06-30 | End: 2020-06-30

## 2020-06-30 NOTE — TELEPHONE ENCOUNTER
Per Dr Stoddard, Cristal with [employer] has been called.  She said to send the pill, a script for Diflucan 150 mg #1 [facility]

## 2020-06-30 NOTE — TELEPHONE ENCOUNTER
Are they wanting diflucan or a vaginal cream? Send 150 mg diflucan or nystatin cream to use tid - whichever they prefer

## 2020-06-30 NOTE — TELEPHONE ENCOUNTER
Cristal, with [employer], called to see if Dr. Stoddard would be willing to prescribe PT a medication to combat vaginal discomfort with itching, ongoing since Sunday. Cristal would like a response by EOD today: [phone].    Confirmed Pharmacy:   [id number] - [place], [place] - [facility] [address] [facility] AT [facility] [address] - [phone]  - [phone] [address] [address]  Phone: [phone] Fax: [phone]

## 2020-07-09 ENCOUNTER — TELEPHONE (OUTPATIENT)
Dept: FAMILY MEDICINE CLINIC | Facility: CLINIC | Age: 40
End: 2020-07-09

## 2020-07-09 RX ORDER — NYSTATIN 100000 U/G
CREAM TOPICAL 2 TIMES DAILY PRN
Qty: 30 G | Refills: 0 | Status: SHIPPED | OUTPATIENT
Start: 2020-07-09 | End: 2020-11-11 | Stop reason: SDUPTHER

## 2020-07-09 NOTE — TELEPHONE ENCOUNTER
Caller: MARIAN MUNSON    Relationship: Emergency Contact    Best call back number: [phone]    What medication are you requesting: DIFLUCAN    What are your current symptoms: VAGINAL DISCOMFORT AND ITCHING    How long have you been experiencing symptoms: SEVERAL WEEKS    Have you had these symptoms before:    [x] Yes  [] No    Have you been treated for these symptoms before:   [x] Yes  [] No    If a prescription is needed, what is your preferred pharmacy and phone number: [id number] - [address] AT [facility] [address] & [facility] [phone] [facility] [phone] [facility]     Additional notes: PATIENT FINISHED THE DIFLUCAN THAT WAS PRESCRIBED ON 06/30/2020 BUT IS STILL EXPERIENCING DISCOMFORT. MARIAN WANTED TO KNOW IF ANOTHER PRESCRIPTION COULD BE CALLED IN OR IF PATIENT NEEDS TO BE SEEN.

## 2020-07-17 ENCOUNTER — TELEPHONE (OUTPATIENT)
Dept: FAMILY MEDICINE CLINIC | Facility: CLINIC | Age: 40
End: 2020-07-17

## 2020-07-17 RX ORDER — FLUCONAZOLE 150 MG/1
150 TABLET ORAL ONCE
Qty: 1 TABLET | Refills: 0 | Status: SHIPPED | OUTPATIENT
Start: 2020-07-17 | End: 2020-07-17

## 2020-07-17 NOTE — TELEPHONE ENCOUNTER
Pato from [employer] called and said that she is still having itching and discomfort. She uses [facility]. Phone [phone]

## 2020-08-05 RX ORDER — FLUTICASONE PROPIONATE 50 MCG
2 SPRAY, SUSPENSION (ML) NASAL DAILY
Qty: 16 G | Refills: 11 | Status: SHIPPED | OUTPATIENT
Start: 2020-08-05 | End: 2021-09-27 | Stop reason: SDUPTHER

## 2020-08-12 ENCOUNTER — OFFICE VISIT (OUTPATIENT)
Dept: GASTROENTEROLOGY | Facility: CLINIC | Age: 40
End: 2020-08-12

## 2020-08-12 VITALS
DIASTOLIC BLOOD PRESSURE: 84 MMHG | HEART RATE: 78 BPM | SYSTOLIC BLOOD PRESSURE: 116 MMHG | WEIGHT: 162 LBS | BODY MASS INDEX: 28.7 KG/M2

## 2020-08-12 DIAGNOSIS — K59.00 CONSTIPATION, UNSPECIFIED CONSTIPATION TYPE: Primary | ICD-10-CM

## 2020-08-12 DIAGNOSIS — R10.9 CHRONIC ABDOMINAL PAIN: ICD-10-CM

## 2020-08-12 DIAGNOSIS — K21.00 GASTROESOPHAGEAL REFLUX DISEASE WITH ESOPHAGITIS: ICD-10-CM

## 2020-08-12 DIAGNOSIS — G89.29 CHRONIC ABDOMINAL PAIN: ICD-10-CM

## 2020-08-12 PROCEDURE — 99213 OFFICE O/P EST LOW 20 MIN: CPT | Performed by: PHYSICIAN ASSISTANT

## 2020-08-12 RX ORDER — DIPHENHYDRAMINE HCL 25 MG
25 CAPSULE ORAL EVERY 6 HOURS PRN
COMMUNITY
End: 2022-02-02

## 2020-08-12 RX ORDER — DOXYCYCLINE HYCLATE 100 MG/1
100 CAPSULE ORAL DAILY
COMMUNITY
End: 2021-10-25

## 2020-08-12 RX ORDER — GUAIFENESIN 600 MG/1
1200 TABLET, EXTENDED RELEASE ORAL AS NEEDED
COMMUNITY
End: 2021-11-08

## 2020-08-12 RX ORDER — ONDANSETRON HYDROCHLORIDE 8 MG/1
8 TABLET, FILM COATED ORAL EVERY 8 HOURS PRN
Status: ON HOLD | COMMUNITY
End: 2022-09-02

## 2020-08-12 RX ORDER — LEVETIRACETAM 500 MG/1
500 TABLET ORAL 2 TIMES DAILY
COMMUNITY
End: 2022-03-07 | Stop reason: SDUPTHER

## 2020-08-12 NOTE — PROGRESS NOTES
Chief Complaint   Patient presents with   • Diarrhea   • Abdominal Pain   • Constipation       ENDO PROCEDURE ORDERED:    Subjective    Kiley Ledesma is a 39 y.o. female. she is here today for follow-up.    History of Present Illness    Patient seen on a recheck of her GERD, abdominal pain and diarrhea.  Last seen 01/22/2020.  She was accompanied by an aide.  She currently denies abdominal pain.  She states her GERD is doing well on the Prilosec.  Denied nausea, vomiting or dysphagia.  Bowels are moving with the Linzess.  No issues with incontinence or blood in her stool.  Weight is fairly stable.  Last colonoscopy 06/07/2019.    Laboratories 02/06/2020 showed normal CBC.  BMP showed a sodium of 132, chloride 95, otherwise normal.      ASSESSMENT AND PLAN:  Patient with chronic GERD and IBS-C, doing well on current regimen.  We will continue current medications.  We will plan follow-up in 6 months, sooner if needed.        The following portions of the patient's history were reviewed and updated as appropriate:   Past Medical History:   Diagnosis Date   • Acute vulvitis     Candida   • Allergy     Unspecified, initial encounter   • Amblyopia     OS   • Astigmatism    • Cerebral palsy (CMS/HCC)    • Cheilosis    • Common cold    • Contusion    • Depressive disorder    • Diarrhea    • Encounter for general adult medical examination without abnormal findings    • Encounter for routine adult health examination     Adult health examination      • Fever    • Gastroesophageal reflux disease    • Generalized abdominal pain    • Indeterminate colitis    • Infected insect bite    • Myopia    • Spastic quadriplegic cerebral palsy (CMS/HCC)    • Trouble walking     Walking disability      • Urinary incontinence    • Urinary tract infectious disease    • Wears glasses    • Worried well      Past Surgical History:   Procedure Laterality Date   • COLONOSCOPY N/A 03/28/2012    hemorrhoids   • COLONOSCOPY N/A 4/23/2018     Procedure: COLONOSCOPY--look TI, bx, hx colitis;  Surgeon: Sreedhar Kirkpatrick MD;  Location: [facility] ENDOSCOPY;  Service: Gastroenterology   • COLONOSCOPY N/A 6/7/2019    Procedure: COLONOSCOPY;  Surgeon: Sreedhar Kirkpatrick MD;  Location: [facility] ENDOSCOPY;  Service: Gastroenterology   • CYSTOSCOPY N/A 4/19/2019    Procedure: CYSTOSCOPY;  Surgeon: Cecilio Acuña MD;  Location: [facility] OR;  Service: Urology   • ENDOSCOPY N/A 03/28/2012    gastritis   • INJECTION OF MEDICATION  02/23/2016    Kenalog   • TOTAL ABDOMINAL HYSTERECTOMY WITH SALPINGO OOPHORECTOMY N/A 03/24/2005   • UPPER GASTROINTESTINAL ENDOSCOPY  03/28/2012     Family History   Problem Relation Age of Onset   • Coronary artery disease Other    • Diabetes Other    • Hypertension Other    • Stroke Other      OB History    None       Allergies   Allergen Reactions   • Atropine Unknown (See Comments)     Unknown reaction per patient   • Ciprofloxacin Other (See Comments)     seizure   • Sulfa Antibiotics Itching   • Avelox [Moxifloxacin Hcl] Other (See Comments)     Concerned may cause seizure   • Factive [Gemifloxacin] Other (See Comments)     Concerned may cause seizure   • Floxin Otic [Ofloxacin] Other (See Comments)     Concerned may cause seizure   • Levaquin [Levofloxacin] Other (See Comments)     Concerned may cause seizure     Social History     Socioeconomic History   • Marital status: Single     Spouse name: Not on file   • Number of children: Not on file   • Years of education: Not on file   • Highest education level: Not on file   Tobacco Use   • Smoking status: Never Smoker   • Smokeless tobacco: Never Used   Substance and Sexual Activity   • Alcohol use: No   • Drug use: No   • Sexual activity: Defer     Current Medications:  Prior to Admission medications    Medication Sig Start Date End Date Taking? Authorizing Provider   acetaminophen (TYLENOL) 325 MG tablet Take 2 tablets by mouth Every 6 (Six) Hours As Needed for Mild Pain . 4/22/20  Yes  Greta Stoddard MD   albuterol (PROVENTIL) (2.5 MG/3ML) 0.083% nebulizer solution Take 2.5 mg by nebulization Every 4 (Four) Hours As Needed for Wheezing. 4/22/20  Yes Greta Stoddard MD   bacitracin 500 UNIT/GM ointment Apply  topically to the appropriate area as directed 3 (Three) Times a Day As Needed for Wound Care. 4/22/20  Yes Greta Stoddard MD   BACLOFEN IT Has a baclofen pump   Yes ProviderCandy MD   Cholecalciferol 25 MCG (1000 UT) capsule Take 1 capsule by mouth Daily. 2/11/20  Yes Greta Stoddard MD   diphenhydrAMINE (BENADRYL) 25 mg capsule Take 25 mg by mouth Every 6 (Six) Hours As Needed for Itching.   Yes ProviderCandy MD   docusate sodium (DOK) 100 MG capsule Take 1 capsule by mouth 2 (Two) Times a Day. 2/12/20  Yes Greta Stoddard MD   doxycycline (VIBRAMYCIN) 100 MG capsule Take 100 mg by mouth Daily.   Yes ProviderCandy MD   estradiol (ESTRACE) 1 MG tablet GIVE ONE TABLET BY MOUTH ONCE DAILY FOR HORMONE REPLACEMENT 3/4/20  Yes Greta Stoddard MD   FLUoxetine (PROzac) 40 MG capsule GIVE ONE CAPSULE BY MOUTH ONCE DAILY FOR ANXIETY DISORDER 3/4/20  Yes Greta Stoddard MD   fluticasone (FLONASE) 50 MCG/ACT nasal spray 2 sprays into the nostril(s) as directed by provider Daily. 8/5/20  Yes Greta Stoddard MD   guaiFENesin (MUCINEX) 600 MG 12 hr tablet Take 1,200 mg by mouth As Needed for Cough.   Yes ProviderCandy MD   levETIRAcetam (KEPPRA) 500 MG tablet Take 500 mg by mouth 2 (Two) Times a Day.   Yes Candy Thomas MD   linaclotide (LINZESS) 145 MCG capsule capsule Take 145 mcg by mouth As Needed.   Yes ProviderCandy MD   metoclopramide (REGLAN) 10 MG tablet GIVE ONE TABLET BY MOUTH ONCE DAILY FOR GASTROESOPHAGEAL REFLUX 3/4/20  Yes Greta Stoddard MD   Misc. Devices (WHEELCHAIR) misc As directed   Yes Provider, Historical, MD   NON FORMULARY Daily As Needed. Nyst/bacit/Zno/hc 1%   Yes Provider, Historical, MD vannatin  (MYCOSTATIN) 880348 UNIT/GM cream Apply  topically to the appropriate area as directed 2 (Two) Times a Day As Needed (for external vaginal discomfort and itching). 7/9/20  Yes Greta Stoddard MD   omeprazole (priLOSEC) 20 MG capsule GIVE ONE CAPSULE BY MOUTH ONCE DAILY FOR STOMACH ACID 3/4/20  Yes Greta Stoddard MD   ondansetron (ZOFRAN) 8 MG tablet Take 8 mg by mouth Every 8 (Eight) Hours As Needed for Nausea or Vomiting.   Yes Candy Thomas MD   tolterodine (DETROL) 2 MG tablet Take 1 tablet by mouth 2 (Two) Times a Day. 3/13/20  Yes Greta Stoddard MD   ESTRADIOL PO Take 1 mg by mouth Daily.  8/12/20  Candy Thomas MD   Omeprazole (PRILOSEC PO) Take 20 mg by mouth Daily. ON HOLD TILL KEFLEX COMPLETED  8/12/20  Candy Thomas MD     Review of Systems  Review of Systems       Objective    /84   Pulse 78   Wt 73.5 kg (162 lb) Comment: estimate  LMP  (LMP Unknown)   BMI 28.70 kg/m²   Physical Exam   Constitutional: She is oriented to person, place, and time. She appears well-developed and well-nourished. No distress.   [facility]   HENT:   Head: Normocephalic and atraumatic.   Eyes: Pupils are equal, round, and reactive to light. EOM are normal.   Neck: Normal range of motion.   Cardiovascular: Normal rate, regular rhythm and normal heart sounds.   Pulmonary/Chest: Effort normal and breath sounds normal.   Abdominal: Soft. Bowel sounds are normal. She exhibits no shifting dullness, no distension, no abdominal bruit, no ascites and no mass. There is no hepatosplenomegaly. There is tenderness. There is no rigidity, no rebound, no guarding and no CVA tenderness. No hernia. Hernia confirmed negative in the ventral area.   Diffuse, pain pump in RLQ   Musculoskeletal: Normal range of motion.   Neurological: She is alert and oriented to person, place, and time.   Skin: Skin is warm and dry.   Psychiatric: She has a normal mood and affect. Her behavior is normal. Judgment and thought  content normal.   Nursing note and vitals reviewed.    Assessment/Plan      1. Constipation, unspecified constipation type    2. Chronic abdominal pain    3. Gastroesophageal reflux disease with esophagitis    .   Kiley was seen today for diarrhea, abdominal pain and constipation.    Diagnoses and all orders for this visit:    Constipation, unspecified constipation type    Chronic abdominal pain    Gastroesophageal reflux disease with esophagitis        Orders placed during this encounter include:  No orders of the defined types were placed in this encounter.      Medications prescribed:  No orders of the defined types were placed in this encounter.    Discontinued Medications       Reason for Discontinue     Omeprazole (PRILOSEC PO)    Duplicate order     ESTRADIOL PO    Duplicate order        Requested Prescriptions      No prescriptions requested or ordered in this encounter       Review and/or summary of lab tests, radiology, procedures, medications. Review and summary of old records and obtaining of history. The risks and benefits of my recommendations, as well as other treatment options were discussed with the patient today. Questions were answered.    Follow-up: Return in about 6 months (around 2/12/2021), or if symptoms worsen or fail to improve.     * Surgery not found *      This document has been electronically signed by Bro Lott PA-C on August 14, 2020 15:37      Results for orders placed or performed in visit on 02/06/20   CBC Auto Differential   Result Value Ref Range    WBC 8.91 3.40 - 10.80 10*3/mm3    RBC 4.25 3.77 - 5.28 10*6/mm3    Hemoglobin 12.0 12.0 - 15.9 g/dL    Hematocrit 37.2 34.0 - 46.6 %    MCV 87.5 79.0 - 97.0 fL    MCH 28.2 26.6 - 33.0 pg    MCHC 32.3 31.5 - 35.7 g/dL    RDW 13.1 12.3 - 15.4 %    RDW-SD 41.9 37.0 - 54.0 fl    MPV 9.3 6.0 - 12.0 fL    Platelets 268 140 - 450 10*3/mm3    Neutrophil % 81.6 (H) 42.7 - 76.0 %    Lymphocyte % 12.2 (L) 19.6 - 45.3 %    Monocyte % 5.6  5.0 - 12.0 %    Eosinophil % 0.1 (L) 0.3 - 6.2 %    Basophil % 0.2 0.0 - 1.5 %    Immature Grans % 0.3 0.0 - 0.5 %    Neutrophils, Absolute 7.26 (H) 1.70 - 7.00 10*3/mm3    Lymphocytes, Absolute 1.09 0.70 - 3.10 10*3/mm3    Monocytes, Absolute 0.50 0.10 - 0.90 10*3/mm3    Eosinophils, Absolute 0.01 0.00 - 0.40 10*3/mm3    Basophils, Absolute 0.02 0.00 - 0.20 10*3/mm3    Immature Grans, Absolute 0.03 0.00 - 0.05 10*3/mm3    nRBC 0.0 0.0 - 0.2 /100 WBC   Basic Metabolic Panel   Result Value Ref Range    Glucose 82 65 - 99 mg/dL    BUN 12 6 - 20 mg/dL    Creatinine 0.45 (L) 0.57 - 1.00 mg/dL    Sodium 132 (L) 136 - 145 mmol/L    Potassium 3.9 3.5 - 5.2 mmol/L    Chloride 95 (L) 98 - 107 mmol/L    CO2 23.0 22.0 - 29.0 mmol/L    Calcium 9.4 8.6 - 10.5 mg/dL    eGFR Non African Amer >150 >60 mL/min/1.73    BUN/Creatinine Ratio 26.7 (H) 7.0 - 25.0    Anion Gap 14.0 5.0 - 15.0 mmol/L   Results for orders placed or performed during the hospital encounter of 10/28/19   [facility]   Result Value Ref Range    Extra Tube Hold for add-ons.    Urinalysis, Microscopic Only - Urine, Clean Catch   Result Value Ref Range    RBC, UA Too Numerous to Count (A) None Seen /HPF    WBC, UA 6-12 (A) None Seen, 0-2, 3-5 /HPF    Bacteria, UA None Seen None Seen /HPF    Squamous Epithelial Cells, UA 3-5 (A) None Seen, 0-2 /HPF    Hyaline Casts, UA 0-2 None Seen /LPF    Methodology Automated Microscopy    Urinalysis With Microscopic If Indicated (No Culture) - Urine, Clean Catch   Result Value Ref Range    Color, UA Yellow Yellow, Straw, Dark Yellow, Keli    Appearance, UA Cloudy (A) Clear    pH, UA 6.5 5.0 - 9.0    Specific Gravity, UA 1.018 1.003 - 1.030    Glucose, UA Negative Negative    Ketones, UA 15 mg/dL (1+) (A) Negative    Bilirubin, UA Negative Negative    Blood, UA Large (3+) (A) Negative    Protein, UA Trace (A) Negative    Leuk Esterase, UA Small (1+) (A) Negative    Nitrite, UA Negative Negative    Urobilinogen, UA 1.0 E.U./dL  0.2 - 1.0 E.U./dL   CBC Auto Differential   Result Value Ref Range    WBC 4.24 3.40 - 10.80 10*3/mm3    RBC 4.16 3.77 - 5.28 10*6/mm3    Hemoglobin 11.8 (L) 12.0 - 15.9 g/dL    Hematocrit 37.2 34.0 - 46.6 %    MCV 89.4 79.0 - 97.0 fL    MCH 28.4 26.6 - 33.0 pg    MCHC 31.7 31.5 - 35.7 g/dL    RDW 13.2 12.3 - 15.4 %    RDW-SD 43.3 37.0 - 54.0 fl    MPV 9.7 6.0 - 12.0 fL    Platelets 182 140 - 450 10*3/mm3    Neutrophil % 59.3 42.7 - 76.0 %    Lymphocyte % 24.3 19.6 - 45.3 %    Monocyte % 13.4 (H) 5.0 - 12.0 %    Eosinophil % 2.1 0.3 - 6.2 %    Basophil % 0.7 0.0 - 1.5 %    Immature Grans % 0.2 0.0 - 0.5 %    Neutrophils, Absolute 2.51 1.70 - 7.00 10*3/mm3    Lymphocytes, Absolute 1.03 0.70 - 3.10 10*3/mm3    Monocytes, Absolute 0.57 0.10 - 0.90 10*3/mm3    Eosinophils, Absolute 0.09 0.00 - 0.40 10*3/mm3    Basophils, Absolute 0.03 0.00 - 0.20 10*3/mm3    Immature Grans, Absolute 0.01 0.00 - 0.05 10*3/mm3    nRBC 0.0 0.0 - 0.2 /100 WBC   Light Blue Top   Result Value Ref Range    Extra Tube hold for add-on    Influenza Antigen, Rapid - Swab, Nasopharynx   Result Value Ref Range    Influenza A Ag, EIA Negative Negative    Influenza B Ag, EIA Negative Negative   Magnesium   Result Value Ref Range    Magnesium 1.9 1.6 - 2.6 mg/dL   CK   Result Value Ref Range    Creatine Kinase 61 20 - 180 U/L   Comprehensive Metabolic Panel   Result Value Ref Range    Glucose 81 65 - 99 mg/dL    BUN 12 6 - 20 mg/dL    Creatinine 0.39 (L) 0.57 - 1.00 mg/dL    Sodium 141 136 - 145 mmol/L    Potassium 3.6 3.5 - 5.2 mmol/L    Chloride 102 98 - 107 mmol/L    CO2 27.0 22.0 - 29.0 mmol/L    Calcium 9.2 8.6 - 10.5 mg/dL    Total Protein 6.9 6.0 - 8.5 g/dL    Albumin 4.20 3.50 - 5.20 g/dL    ALT (SGPT) 16 1 - 33 U/L    AST (SGOT) 19 1 - 32 U/L    Alkaline Phosphatase 87 39 - 117 U/L    Total Bilirubin 0.2 0.2 - 1.2 mg/dL    eGFR Non African Amer >150 >60 mL/min/1.73    Globulin 2.7 gm/dL    A/G Ratio 1.6 g/dL    BUN/Creatinine Ratio 30.8 (H) 7.0  - 25.0    Anion Gap 12.0 5.0 - 15.0 mmol/L     *Note: Due to a large number of results and/or encounters for the requested time period, some results have not been displayed. A complete set of results can be found in Results Review.       Some portions of this note have been dictated using voice recognition software and may contain errors and/or omissions.

## 2020-08-12 NOTE — PATIENT INSTRUCTIONS

## 2020-09-22 ENCOUNTER — OFFICE VISIT (OUTPATIENT)
Dept: FAMILY MEDICINE CLINIC | Facility: CLINIC | Age: 40
End: 2020-09-22

## 2020-09-22 VITALS
DIASTOLIC BLOOD PRESSURE: 80 MMHG | HEART RATE: 81 BPM | BODY MASS INDEX: 28.7 KG/M2 | SYSTOLIC BLOOD PRESSURE: 122 MMHG | OXYGEN SATURATION: 98 % | HEIGHT: 63 IN

## 2020-09-22 DIAGNOSIS — G80.0 SPASTIC QUADRIPLEGIC CEREBRAL PALSY (HCC): ICD-10-CM

## 2020-09-22 DIAGNOSIS — Z23 NEED FOR IMMUNIZATION AGAINST INFLUENZA: ICD-10-CM

## 2020-09-22 DIAGNOSIS — G40.A09 ABSENCE SEIZURE (HCC): ICD-10-CM

## 2020-09-22 DIAGNOSIS — F32.5 MAJOR DEPRESSIVE DISORDER WITH SINGLE EPISODE, IN FULL REMISSION (HCC): ICD-10-CM

## 2020-09-22 DIAGNOSIS — Z00.00 ANNUAL PHYSICAL EXAM: Primary | ICD-10-CM

## 2020-09-22 PROCEDURE — 99214 OFFICE O/P EST MOD 30 MIN: CPT | Performed by: GENERAL PRACTICE

## 2020-09-22 PROCEDURE — 90686 IIV4 VACC NO PRSV 0.5 ML IM: CPT | Performed by: GENERAL PRACTICE

## 2020-09-22 PROCEDURE — 90471 IMMUNIZATION ADMIN: CPT | Performed by: GENERAL PRACTICE

## 2020-09-23 ENCOUNTER — LAB (OUTPATIENT)
Dept: LAB | Facility: HOSPITAL | Age: 40
End: 2020-09-23

## 2020-09-23 DIAGNOSIS — Z00.00 ANNUAL PHYSICAL EXAM: ICD-10-CM

## 2020-09-23 LAB
ALBUMIN SERPL-MCNC: 3.9 G/DL (ref 3.5–5.2)
ALBUMIN/GLOB SERPL: 1.3 G/DL
ALP SERPL-CCNC: 115 U/L (ref 39–117)
ALT SERPL W P-5'-P-CCNC: 12 U/L (ref 1–33)
ANION GAP SERPL CALCULATED.3IONS-SCNC: 7.9 MMOL/L (ref 5–15)
AST SERPL-CCNC: 14 U/L (ref 1–32)
BASOPHILS # BLD AUTO: 0.03 10*3/MM3 (ref 0–0.2)
BASOPHILS NFR BLD AUTO: 0.6 % (ref 0–1.5)
BILIRUB SERPL-MCNC: 0.2 MG/DL (ref 0–1.2)
BUN SERPL-MCNC: 12 MG/DL (ref 6–20)
BUN/CREAT SERPL: 26.1 (ref 7–25)
CALCIUM SPEC-SCNC: 8.7 MG/DL (ref 8.6–10.5)
CHLORIDE SERPL-SCNC: 103 MMOL/L (ref 98–107)
CHOLEST SERPL-MCNC: 156 MG/DL (ref 0–200)
CO2 SERPL-SCNC: 27.1 MMOL/L (ref 22–29)
CREAT SERPL-MCNC: 0.46 MG/DL (ref 0.57–1)
DEPRECATED RDW RBC AUTO: 40.7 FL (ref 37–54)
EOSINOPHIL # BLD AUTO: 0.23 10*3/MM3 (ref 0–0.4)
EOSINOPHIL NFR BLD AUTO: 4.3 % (ref 0.3–6.2)
ERYTHROCYTE [DISTWIDTH] IN BLOOD BY AUTOMATED COUNT: 12.6 % (ref 12.3–15.4)
GFR SERPL CREATININE-BSD FRML MDRD: >150 ML/MIN/1.73
GLOBULIN UR ELPH-MCNC: 2.9 GM/DL
GLUCOSE SERPL-MCNC: 83 MG/DL (ref 65–99)
HCT VFR BLD AUTO: 38 % (ref 34–46.6)
HDLC SERPL-MCNC: 55 MG/DL (ref 40–60)
HGB BLD-MCNC: 12.7 G/DL (ref 12–15.9)
IMM GRANULOCYTES # BLD AUTO: 0.02 10*3/MM3 (ref 0–0.05)
IMM GRANULOCYTES NFR BLD AUTO: 0.4 % (ref 0–0.5)
LDLC SERPL CALC-MCNC: 82 MG/DL (ref 0–100)
LDLC/HDLC SERPL: 1.48 {RATIO}
LYMPHOCYTES # BLD AUTO: 0.5 10*3/MM3 (ref 0.7–3.1)
LYMPHOCYTES NFR BLD AUTO: 9.4 % (ref 19.6–45.3)
MCH RBC QN AUTO: 29.5 PG (ref 26.6–33)
MCHC RBC AUTO-ENTMCNC: 33.4 G/DL (ref 31.5–35.7)
MCV RBC AUTO: 88.4 FL (ref 79–97)
MONOCYTES # BLD AUTO: 0.53 10*3/MM3 (ref 0.1–0.9)
MONOCYTES NFR BLD AUTO: 9.9 % (ref 5–12)
NEUTROPHILS NFR BLD AUTO: 4.02 10*3/MM3 (ref 1.7–7)
NEUTROPHILS NFR BLD AUTO: 75.4 % (ref 42.7–76)
NRBC BLD AUTO-RTO: 0 /100 WBC (ref 0–0.2)
PLATELET # BLD AUTO: 198 10*3/MM3 (ref 140–450)
PMV BLD AUTO: 9.8 FL (ref 6–12)
POTASSIUM SERPL-SCNC: 4.6 MMOL/L (ref 3.5–5.2)
PROT SERPL-MCNC: 6.8 G/DL (ref 6–8.5)
RBC # BLD AUTO: 4.3 10*6/MM3 (ref 3.77–5.28)
SODIUM SERPL-SCNC: 138 MMOL/L (ref 136–145)
TRIGL SERPL-MCNC: 97 MG/DL (ref 0–150)
VLDLC SERPL-MCNC: 19.4 MG/DL (ref 5–40)
WBC # BLD AUTO: 5.33 10*3/MM3 (ref 3.4–10.8)

## 2020-09-23 PROCEDURE — 36415 COLL VENOUS BLD VENIPUNCTURE: CPT

## 2020-09-23 PROCEDURE — 80061 LIPID PANEL: CPT

## 2020-09-23 PROCEDURE — 80053 COMPREHEN METABOLIC PANEL: CPT

## 2020-09-23 PROCEDURE — 85025 COMPLETE CBC W/AUTO DIFF WBC: CPT

## 2020-10-06 RX ORDER — FLUOXETINE HYDROCHLORIDE 40 MG/1
CAPSULE ORAL
Qty: 31 CAPSULE | Refills: 11 | Status: SHIPPED | OUTPATIENT
Start: 2020-10-06 | End: 2021-09-27 | Stop reason: SDUPTHER

## 2020-10-06 RX ORDER — ESTRADIOL 1 MG/1
TABLET ORAL
Qty: 31 TABLET | Refills: 11 | Status: SHIPPED | OUTPATIENT
Start: 2020-10-06 | End: 2021-09-27 | Stop reason: SDUPTHER

## 2020-10-06 RX ORDER — METOCLOPRAMIDE 10 MG/1
TABLET ORAL
Qty: 31 TABLET | Refills: 11 | Status: SHIPPED | OUTPATIENT
Start: 2020-10-06 | End: 2021-10-07

## 2020-11-02 ENCOUNTER — TELEPHONE (OUTPATIENT)
Dept: FAMILY MEDICINE CLINIC | Facility: CLINIC | Age: 40
End: 2020-11-02

## 2020-11-02 NOTE — TELEPHONE ENCOUNTER
THE [facility] CALLED REQUESTING AN ORDER FOR A LIFT FOR THE PATIENT.    PLEASE CALL AND ADVISE  MEDICAL COORDINATOR AT [facility]  [phone]

## 2020-11-03 NOTE — TELEPHONE ENCOUNTER
Yes Geovanna lift, also said that Kiley is having trouble sleeping if you would give something? She got benadryl before

## 2020-11-11 ENCOUNTER — TELEPHONE (OUTPATIENT)
Dept: FAMILY MEDICINE CLINIC | Facility: CLINIC | Age: 40
End: 2020-11-11

## 2020-11-11 RX ORDER — FLUCONAZOLE 150 MG/1
150 TABLET ORAL ONCE
Qty: 2 TABLET | Refills: 0 | Status: SHIPPED | OUTPATIENT
Start: 2020-11-11 | End: 2020-11-11

## 2020-11-11 RX ORDER — NYSTATIN 100000 U/G
CREAM TOPICAL 2 TIMES DAILY PRN
Qty: 30 G | Refills: 0 | Status: ON HOLD | OUTPATIENT
Start: 2020-11-11 | End: 2022-09-02

## 2020-11-11 NOTE — TELEPHONE ENCOUNTER
Kiley's care giver said, she has vaginal discharge and itching and is [facility] that [facility]

## 2020-12-01 ENCOUNTER — TELEPHONE (OUTPATIENT)
Dept: FAMILY MEDICINE CLINIC | Facility: CLINIC | Age: 40
End: 2020-12-01

## 2020-12-01 NOTE — TELEPHONE ENCOUNTER
[employer] [facility] IS CALLING WANTING TO TALK TO SOMEONE ABOUT PATIENT. IS MAKING HERSELF THROW UP. THEY WOULD LIKE TO DISCUSS WHAT THEY RECOMMEND THEY DO.    BEST NUMBER [phone]

## 2020-12-09 ENCOUNTER — OFFICE VISIT (OUTPATIENT)
Dept: FAMILY MEDICINE CLINIC | Facility: CLINIC | Age: 40
End: 2020-12-09

## 2020-12-09 VITALS — SYSTOLIC BLOOD PRESSURE: 120 MMHG | DIASTOLIC BLOOD PRESSURE: 74 MMHG | OXYGEN SATURATION: 95 % | HEART RATE: 91 BPM

## 2020-12-09 DIAGNOSIS — F33.0 MILD EPISODE OF RECURRENT MAJOR DEPRESSIVE DISORDER (HCC): Primary | ICD-10-CM

## 2020-12-09 PROCEDURE — 99213 OFFICE O/P EST LOW 20 MIN: CPT | Performed by: GENERAL PRACTICE

## 2020-12-09 RX ORDER — BUPROPION HYDROCHLORIDE 100 MG/1
100 TABLET, EXTENDED RELEASE ORAL EVERY MORNING
Qty: 30 TABLET | Refills: 3 | Status: SHIPPED | OUTPATIENT
Start: 2020-12-09 | End: 2021-04-15

## 2020-12-09 NOTE — PROGRESS NOTES
Subjective   Kiley Ledesma is a 40 y.o. female.   Chief Complaint   Patient presents with   • urine incontence     History of Present Illness     Kiley was brought in as she has been having some behavioral issues.  Has been urinating on herself which is not usual.  She will announce that she is going to do this first.  She also tore off her improve.  On further questioning she admits to crying off and on.  Her aunt just went through a kidney transplant so she is worried about her.  She also has not been able to visit with her family as much as usual due to the coronavirus.  She denies being mistreated at home she is in and likes where she is.  They take good care of her.  She has friends there.    The following portions of the patient's history were reviewed and updated as appropriate: allergies, current medications, past social history and problem list.    Outpatient Medications Prior to Visit   Medication Sig Dispense Refill   • acetaminophen (TYLENOL) 325 MG tablet Take 2 tablets by mouth Every 6 (Six) Hours As Needed for Mild Pain . 60 tablet 11   • albuterol (PROVENTIL) (2.5 MG/3ML) 0.083% nebulizer solution Take 2.5 mg by nebulization Every 4 (Four) Hours As Needed for Wheezing. 720 mL 3   • bacitracin 500 UNIT/GM ointment Apply  topically to the appropriate area as directed 3 (Three) Times a Day As Needed for Wound Care. 14 g 3   • BACLOFEN IT Has a baclofen pump     • Cholecalciferol 25 MCG (1000 UT) capsule Take 1 capsule by mouth Daily. 30 capsule 11   • diphenhydrAMINE (BENADRYL) 25 mg capsule Take 25 mg by mouth Every 6 (Six) Hours As Needed for Itching.     • docusate sodium (DOK) 100 MG capsule Take 1 capsule by mouth 2 (Two) Times a Day. 62 capsule 11   • doxycycline (VIBRAMYCIN) 100 MG capsule Take 100 mg by mouth Daily.     • estradiol (ESTRACE) 1 MG tablet GIVE ONE TABLET BY MOUTH ONCE DAILY FOR HORMONE REPLACEMENT 31 tablet 11   • FLUoxetine (PROzac) 40 MG capsule GIVE ONE CAPSULE BY MOUTH  ONCE DAILY FOR ANXIETY DISORDER 31 capsule 11   • fluticasone (FLONASE) 50 MCG/ACT nasal spray 2 sprays into the nostril(s) as directed by provider Daily. 16 g 11   • guaiFENesin (MUCINEX) 600 MG 12 hr tablet Take 1,200 mg by mouth As Needed for Cough.     • levETIRAcetam (KEPPRA) 500 MG tablet Take 500 mg by mouth 2 (Two) Times a Day.     • linaclotide (LINZESS) 145 MCG capsule capsule Take 145 mcg by mouth As Needed.     • metoclopramide (REGLAN) 10 MG tablet GIVE ONE TABLET BY MOUTH ONCE DAILY FOR GASTROESOPHAGEAL REFLUX 31 tablet 11   • Misc. Devices (WHEELCHAIR) misc As directed     • NON FORMULARY Daily As Needed. Nyst/bacit/Zno/hc 1%     • nystatin (MYCOSTATIN) 591448 UNIT/GM cream Apply  topically to the appropriate area as directed 2 (Two) Times a Day As Needed (for external vaginal discomfort and itching). 30 g 0   • omeprazole (priLOSEC) 20 MG capsule GIVE ONE CAPSULE BY MOUTH ONCE DAILY FOR STOMACH ACID 31 capsule 5   • ondansetron (ZOFRAN) 8 MG tablet Take 8 mg by mouth Every 8 (Eight) Hours As Needed for Nausea or Vomiting.     • tolterodine (DETROL) 2 MG tablet Take 1 tablet by mouth 2 (Two) Times a Day. 180 tablet 3     No facility-administered medications prior to visit.        Review of Systems   Constitutional: Negative.  Negative for chills, fatigue, fever and unexpected weight change.   HENT: Negative.  Negative for congestion, ear pain, hearing loss, nosebleeds, rhinorrhea, sneezing, sore throat and tinnitus.    Eyes: Negative.  Negative for discharge.   Respiratory: Negative.  Negative for cough, shortness of breath and wheezing.    Cardiovascular: Negative.  Negative for chest pain and palpitations.   Gastrointestinal: Negative.  Negative for abdominal pain, constipation, diarrhea, nausea and vomiting.   Endocrine: Negative.    Genitourinary: Negative.  Negative for dysuria, frequency and urgency.   Musculoskeletal: Negative.  Negative for arthralgias, back pain, joint swelling, myalgias and  neck pain.   Skin: Negative.  Negative for rash.   Allergic/Immunologic: Negative.    Neurological: Negative.  Negative for dizziness, weakness, numbness and headaches.   Hematological: Negative.  Negative for adenopathy.   Psychiatric/Behavioral: Negative.  Negative for dysphoric mood and sleep disturbance. The patient is not nervous/anxious.        Objective   Visit Vitals  /74   Pulse 91   LMP  (LMP Unknown)   SpO2 95%     Physical Exam  Vitals signs and nursing note reviewed.   Constitutional:       General: She is not in acute distress.     Appearance: She is well-developed.   HENT:      Head: Normocephalic and atraumatic.      Nose: Nose normal.   Eyes:      General:         Right eye: No discharge.         Left eye: No discharge.      Conjunctiva/sclera: Conjunctivae normal.      Pupils: Pupils are equal, round, and reactive to light.   Neck:      Thyroid: No thyromegaly.      Trachea: No tracheal deviation.   Cardiovascular:      Rate and Rhythm: Normal rate and regular rhythm.      Heart sounds: Normal heart sounds. No murmur.   Pulmonary:      Effort: Pulmonary effort is normal. No respiratory distress.      Breath sounds: Normal breath sounds. No wheezing or rales.   Abdominal:      General: Bowel sounds are normal. There is no distension.      Palpations: Abdomen is soft. There is no mass.      Tenderness: There is no abdominal tenderness.      Hernia: No hernia is present.   Musculoskeletal: Normal range of motion.         General: No deformity.   Lymphadenopathy:      Cervical: No cervical adenopathy.   Skin:     General: Skin is warm and dry.   Neurological:      Mental Status: She is alert and oriented to person, place, and time.      Motor: Weakness and abnormal muscle tone present.      Coordination: Coordination abnormal.      Gait: Gait abnormal.      Deep Tendon Reflexes: Reflexes are normal and symmetric.      Comments: Spastic quadriplegia due to cerebral palsy   Psychiatric:          Mood and Affect: Mood is depressed.         Behavior: Behavior normal.         Thought Content: Thought content normal.         Judgment: Judgment normal.       Notes brought forward are reviewed and updated if indicated.     Assessment/Plan   Problems Addressed this Visit        Other    Major depressive disorder with single episode, in full remission (CMS/HCC) - Primary    Relevant Medications    buPROPion SR (Wellbutrin SR) 100 MG 12 hr tablet      Diagnoses       Codes Comments    Mild episode of recurrent major depressive disorder (CMS/HCC)    -  Primary ICD-10-CM: F33.0  ICD-9-CM: 296.31          Continue fluoxetine and add bupropion.  Recheck if not improving otherwise as scheduled.    New Medications Ordered This Visit   Medications   • buPROPion SR (Wellbutrin SR) 100 MG 12 hr tablet     Sig: Take 1 tablet by mouth Every Morning.     Dispense:  30 tablet     Refill:  3     Return in about 3 months (around 3/9/2021), or if symptoms worsen or fail to improve, for Recheck, Next scheduled follow up.

## 2021-01-05 ENCOUNTER — TELEPHONE (OUTPATIENT)
Dept: FAMILY MEDICINE CLINIC | Facility: CLINIC | Age: 41
End: 2021-01-05

## 2021-01-05 DIAGNOSIS — R30.0 DYSURIA: Primary | ICD-10-CM

## 2021-01-05 NOTE — TELEPHONE ENCOUNTER
Caller: [facility]     Relationship: jail    Best call back number: [phone]    What medication are you requesting: SOMETHING FOR BURNING    What are your current symptoms: BURNING WHEN URINATING.     How long have you been experiencing symptoms: 24 HOURS    Have you had these symptoms before:    [x] Yes  [] No    Have you been treated for these symptoms before:   [x] Yes  [] No    If a prescription is needed, what is your preferred pharmacy and phone number: [id number] - [address] - [phone] [facility] [phone] [place]     Additional notes:  NO ODOR, NO DISCHARGE

## 2021-01-07 RX ORDER — OMEPRAZOLE 20 MG/1
CAPSULE, DELAYED RELEASE ORAL
Qty: 31 CAPSULE | Refills: 11 | Status: SHIPPED | OUTPATIENT
Start: 2021-01-07 | End: 2022-01-07

## 2021-01-07 RX ORDER — TOLTERODINE TARTRATE 2 MG/1
TABLET, EXTENDED RELEASE ORAL
Qty: 62 TABLET | Refills: 11 | Status: SHIPPED | OUTPATIENT
Start: 2021-01-07 | End: 2021-10-25

## 2021-01-08 ENCOUNTER — LAB (OUTPATIENT)
Dept: LAB | Facility: HOSPITAL | Age: 41
End: 2021-01-08

## 2021-01-08 DIAGNOSIS — R30.0 DYSURIA: ICD-10-CM

## 2021-01-08 LAB
BACTERIA UR QL AUTO: ABNORMAL /HPF
BILIRUB UR QL STRIP: NEGATIVE
CLARITY UR: ABNORMAL
COLOR UR: YELLOW
GLUCOSE UR STRIP-MCNC: NEGATIVE MG/DL
HGB UR QL STRIP.AUTO: ABNORMAL
HYALINE CASTS UR QL AUTO: ABNORMAL /LPF
KETONES UR QL STRIP: NEGATIVE
LEUKOCYTE ESTERASE UR QL STRIP.AUTO: ABNORMAL
NITRITE UR QL STRIP: NEGATIVE
PH UR STRIP.AUTO: 6.5 [PH] (ref 5–9)
PROT UR QL STRIP: ABNORMAL
RBC # UR: ABNORMAL /HPF
REF LAB TEST METHOD: ABNORMAL
SP GR UR STRIP: 1.01 (ref 1–1.03)
SQUAMOUS #/AREA URNS HPF: ABNORMAL /HPF
UROBILINOGEN UR QL STRIP: ABNORMAL
WBC UR QL AUTO: ABNORMAL /HPF

## 2021-01-08 PROCEDURE — 87086 URINE CULTURE/COLONY COUNT: CPT

## 2021-01-08 PROCEDURE — 81001 URINALYSIS AUTO W/SCOPE: CPT

## 2021-01-08 RX ORDER — NITROFURANTOIN 25; 75 MG/1; MG/1
100 CAPSULE ORAL 2 TIMES DAILY
Qty: 14 CAPSULE | Refills: 0 | Status: SHIPPED | OUTPATIENT
Start: 2021-01-08 | End: 2021-01-21

## 2021-01-09 LAB — BACTERIA SPEC AEROBE CULT: NORMAL

## 2021-01-21 ENCOUNTER — OFFICE VISIT (OUTPATIENT)
Dept: FAMILY MEDICINE CLINIC | Facility: CLINIC | Age: 41
End: 2021-01-21

## 2021-01-21 VITALS — SYSTOLIC BLOOD PRESSURE: 130 MMHG | OXYGEN SATURATION: 99 % | DIASTOLIC BLOOD PRESSURE: 80 MMHG | HEART RATE: 93 BPM

## 2021-01-21 DIAGNOSIS — F98.1 PSYCHOGENIC FECAL INCONTINENCE: Primary | ICD-10-CM

## 2021-01-21 PROCEDURE — 99213 OFFICE O/P EST LOW 20 MIN: CPT | Performed by: GENERAL PRACTICE

## 2021-02-05 RX ORDER — MELATONIN
Qty: 28 TABLET | Refills: 11 | Status: SHIPPED | OUTPATIENT
Start: 2021-02-05 | End: 2021-12-07

## 2021-02-05 RX ORDER — DOCUSATE SODIUM 100 MG/1
CAPSULE, LIQUID FILLED ORAL
Qty: 56 CAPSULE | Refills: 11 | Status: SHIPPED | OUTPATIENT
Start: 2021-02-05 | End: 2021-12-07

## 2021-02-24 ENCOUNTER — TELEPHONE (OUTPATIENT)
Dept: FAMILY MEDICINE CLINIC | Facility: CLINIC | Age: 41
End: 2021-02-24

## 2021-02-24 NOTE — TELEPHONE ENCOUNTER
[facility] called to say the battery needs to be replaced.     She needs to be seen ASAP. Has to have visit before insurance will pay.    Please advise.

## 2021-03-22 ENCOUNTER — OFFICE VISIT (OUTPATIENT)
Dept: FAMILY MEDICINE CLINIC | Facility: CLINIC | Age: 41
End: 2021-03-22

## 2021-03-22 VITALS
DIASTOLIC BLOOD PRESSURE: 80 MMHG | HEIGHT: 63 IN | SYSTOLIC BLOOD PRESSURE: 130 MMHG | HEART RATE: 97 BPM | OXYGEN SATURATION: 98 % | WEIGHT: 172 LBS | BODY MASS INDEX: 30.48 KG/M2

## 2021-03-22 DIAGNOSIS — G40.A09 ABSENCE SEIZURE (HCC): Chronic | ICD-10-CM

## 2021-03-22 DIAGNOSIS — G80.0 SPASTIC QUADRIPLEGIC CEREBRAL PALSY (HCC): Primary | Chronic | ICD-10-CM

## 2021-03-22 PROCEDURE — G0372 MD SERVICE REQUIRED FOR PMD: HCPCS | Performed by: GENERAL PRACTICE

## 2021-03-22 NOTE — PROGRESS NOTES
Subjective   Kiley Ledesma is a 40 y.o. female.   Chief Complaint   Patient presents with   • Depression     History of Present Illness     Kiley lis here for her face-to-face evaluation for power mobility. 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 Is not able to stand or walk. Is dependent on her power wheelchair for all mobility. Transfers are made with a Geovanna lift.    Records reviewed. Recent labs, xrays reviewed and medications reconciled.     The following portions of the patient's history were reviewed and updated as appropriate: allergies, current medications, past social history and problem list.    Outpatient Medications Prior to Visit   Medication Sig Dispense Refill   • acetaminophen (TYLENOL) 325 MG tablet Take 2 tablets by mouth Every 6 (Six) Hours As Needed for Mild Pain . 60 tablet 11   • albuterol (PROVENTIL) (2.5 MG/3ML) 0.083% nebulizer solution Take 2.5 mg by nebulization Every 4 (Four) Hours As Needed for Wheezing. 720 mL 3   • bacitracin 500 UNIT/GM ointment Apply  topically to the appropriate area as directed 3 (Three) Times a Day As Needed for Wound Care. 14 g 3   • BACLOFEN IT Has a baclofen pump     • buPROPion SR (Wellbutrin SR) 100 MG 12 hr tablet Take 1 tablet by mouth Every Morning. 30 tablet 3   • cholecalciferol (VITAMIN D3) 25 MCG (1000 UT) tablet TAKE 1 TABLET BY MOUTH ONCE DAILY 28 tablet 11   • Cholecalciferol 25 MCG (1000 UT) capsule Take 1 capsule by mouth Daily. 30 capsule 11   • diphenhydrAMINE (BENADRYL) 25 mg capsule Take 25 mg by mouth Every 6 (Six) Hours As Needed for Itching.     • doxycycline (VIBRAMYCIN) 100 MG capsule Take 100 mg by mouth Daily.     • estradiol (ESTRACE) 1 MG tablet GIVE ONE TABLET BY MOUTH ONCE DAILY FOR HORMONE REPLACEMENT 31 tablet 11   • FLUoxetine (PROzac) 40 MG capsule GIVE ONE CAPSULE BY MOUTH ONCE DAILY FOR ANXIETY DISORDER 31 capsule 11   • fluticasone (FLONASE) 50 MCG/ACT nasal spray 2 sprays into the nostril(s) as directed by provider  Daily. 16 g 11   • GNP Stool Softener 100 MG capsule GIVE ONE CAPSULE BY MOUTH TWICE DAILY 56 capsule 11   • guaiFENesin (MUCINEX) 600 MG 12 hr tablet Take 1,200 mg by mouth As Needed for Cough.     • levETIRAcetam (KEPPRA) 500 MG tablet Take 500 mg by mouth 2 (Two) Times a Day.     • linaclotide (LINZESS) 145 MCG capsule capsule Take 145 mcg by mouth As Needed.     • metoclopramide (REGLAN) 10 MG tablet GIVE ONE TABLET BY MOUTH ONCE DAILY FOR GASTROESOPHAGEAL REFLUX 31 tablet 11   • Misc. Devices (WHEELCHAIR) misc As directed     • NON FORMULARY Daily As Needed. Nyst/bacit/Zno/hc 1%     • nystatin (MYCOSTATIN) 532364 UNIT/GM cream Apply  topically to the appropriate area as directed 2 (Two) Times a Day As Needed (for external vaginal discomfort and itching). 30 g 0   • omeprazole (priLOSEC) 20 MG capsule GIVE ONE CAPSULE BY MOUTH ONCE DAILY FOR STOMACH ACID 31 capsule 11   • ondansetron (ZOFRAN) 8 MG tablet Take 8 mg by mouth Every 8 (Eight) Hours As Needed for Nausea or Vomiting.     • tolterodine (DETROL) 2 MG tablet GIVE ONE TABLET BY MOUTH TWICE DAILY 62 tablet 11     No facility-administered medications prior to visit.     I have reviewed 12 systems with patient. Findings were negative except what is noted below and/or in history of present illness.   Review of Systems   Constitutional: Negative for unexpected weight change.   HENT: Negative.  Negative for ear pain, hearing loss, nosebleeds, rhinorrhea, sneezing and tinnitus.    Eyes: Negative.  Negative for discharge.   Respiratory: Negative.  Negative for shortness of breath and wheezing.    Cardiovascular: Negative.  Negative for palpitations.   Gastrointestinal: Negative.  Negative for constipation and diarrhea.   Endocrine: Negative.    Genitourinary: Negative.  Negative for dysuria, frequency and urgency.   Musculoskeletal: Negative.  Negative for back pain.   Skin: Negative.    Allergic/Immunologic: Negative.    Neurological: Positive for seizures and  "weakness. Negative for dizziness.   Hematological: Negative.  Negative for adenopathy.   Psychiatric/Behavioral: Negative.  Negative for dysphoric mood and sleep disturbance. The patient is not nervous/anxious.        Objective   Visit Vitals  /80   Pulse 97   Ht 160 cm (63\") Comment: estimated   Wt 78 kg (172 lb) Comment: voiced per care giver   LMP  (LMP Unknown)   SpO2 98%   BMI 30.47 kg/m²     Physical Exam  Vitals and nursing note reviewed.   Constitutional:       General: She is not in acute distress.     Appearance: She is well-developed.   HENT:      Head: Normocephalic and atraumatic.      Nose: Nose normal.   Eyes:      General:         Right eye: No discharge.         Left eye: No discharge.      Conjunctiva/sclera: Conjunctivae normal.      Pupils: Pupils are equal, round, and reactive to light.   Neck:      Thyroid: No thyromegaly.      Trachea: No tracheal deviation.   Cardiovascular:      Rate and Rhythm: Normal rate and regular rhythm.      Heart sounds: Normal heart sounds. No murmur heard.     Pulmonary:      Effort: Pulmonary effort is normal. No respiratory distress.      Breath sounds: Normal breath sounds. No wheezing or rales.   Abdominal:      General: Bowel sounds are normal. There is no distension.      Palpations: Abdomen is soft. There is no mass.      Tenderness: There is no abdominal tenderness.      Hernia: No hernia is present.   Musculoskeletal:         General: No deformity. Normal range of motion.      Comments: Spastic quadriplegia with increased tone in all extremities   Lymphadenopathy:      Cervical: No cervical adenopathy.   Skin:     General: Skin is warm and dry.   Neurological:      Mental Status: She is alert and oriented to person, place, and time.      Motor: Weakness (Of the upper and lower extremity bilateral) and abnormal muscle tone present.      Coordination: Coordination abnormal.      Gait: Gait abnormal.      Deep Tendon Reflexes:      Reflex Scores:       " Patellar reflexes are 3+ on the right side and 3+ on the left side.     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         Behavior: Behavior normal.         Thought Content: Thought content normal.         Judgment: Judgment normal.       Notes brought forward are reviewed and updated if indicated.     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  She is unable to stand and therefore cannot walk.  Transfers are made with a Geovanna lift. A scooter will not satisfy her needs as her upper extremities are also affected and she will be unable to maneuver a scooter.  With the power wheelchair she is able to be mobile within her home.  She is physically and mentally able to safely use this device and it will give her the ability to participate in as many ADLs that she is able such as going into the dining room and bathroom.    No orders of the defined types were placed in this encounter.    Return in about 6 months (around 9/24/2021) for Annual physical.

## 2021-03-24 DIAGNOSIS — E66.3 OVERWEIGHT: ICD-10-CM

## 2021-03-24 DIAGNOSIS — Z00.00 ANNUAL PHYSICAL EXAM: ICD-10-CM

## 2021-03-24 DIAGNOSIS — G80.0 SPASTIC QUADRIPLEGIC CEREBRAL PALSY (HCC): Primary | ICD-10-CM

## 2021-03-24 DIAGNOSIS — Z13.220 SCREENING FOR HYPERLIPIDEMIA: ICD-10-CM

## 2021-03-24 DIAGNOSIS — G40.A09 ABSENCE SEIZURE (HCC): ICD-10-CM

## 2021-03-26 ENCOUNTER — TRANSCRIBE ORDERS (OUTPATIENT)
Dept: LAB | Facility: HOSPITAL | Age: 41
End: 2021-03-26

## 2021-03-26 DIAGNOSIS — N39.0 URINARY TRACT INFECTION WITHOUT HEMATURIA, SITE UNSPECIFIED: ICD-10-CM

## 2021-03-26 DIAGNOSIS — Z79.899 ENCOUNTER FOR LONG-TERM (CURRENT) USE OF OTHER MEDICATIONS: Primary | ICD-10-CM

## 2021-03-26 DIAGNOSIS — Z51.81 ENCOUNTER FOR THERAPEUTIC DRUG MONITORING: ICD-10-CM

## 2021-03-31 ENCOUNTER — TRANSCRIBE ORDERS (OUTPATIENT)
Dept: FAMILY MEDICINE CLINIC | Facility: CLINIC | Age: 41
End: 2021-03-31

## 2021-03-31 DIAGNOSIS — G40.A09 RETROPULSION PETIT MAL (HCC): ICD-10-CM

## 2021-03-31 DIAGNOSIS — G80.0 CP (CEREBRAL PALSY), SPASTIC, QUADRIPLEGIC (HCC): Primary | ICD-10-CM

## 2021-04-06 ENCOUNTER — APPOINTMENT (OUTPATIENT)
Dept: GENERAL RADIOLOGY | Facility: HOSPITAL | Age: 41
End: 2021-04-06

## 2021-04-06 ENCOUNTER — APPOINTMENT (OUTPATIENT)
Dept: CT IMAGING | Facility: HOSPITAL | Age: 41
End: 2021-04-06

## 2021-04-07 ENCOUNTER — APPOINTMENT (OUTPATIENT)
Dept: PHYSICAL THERAPY | Facility: HOSPITAL | Age: 41
End: 2021-04-07

## 2021-04-14 ENCOUNTER — LAB (OUTPATIENT)
Dept: LAB | Facility: HOSPITAL | Age: 41
End: 2021-04-14

## 2021-04-14 ENCOUNTER — HOSPITAL ENCOUNTER (OUTPATIENT)
Dept: CT IMAGING | Facility: HOSPITAL | Age: 41
Discharge: HOME | End: 2021-04-14

## 2021-04-14 ENCOUNTER — TRANSCRIBE ORDERS (OUTPATIENT)
Dept: LAB | Facility: HOSPITAL | Age: 41
End: 2021-04-14

## 2021-04-14 ENCOUNTER — HOSPITAL ENCOUNTER (OUTPATIENT)
Dept: GENERAL RADIOLOGY | Facility: HOSPITAL | Age: 41
Discharge: HOME | End: 2021-04-14

## 2021-04-14 DIAGNOSIS — N39.0 URINARY TRACT INFECTION WITHOUT HEMATURIA, SITE UNSPECIFIED: ICD-10-CM

## 2021-04-14 DIAGNOSIS — Z79.899 ENCOUNTER FOR LONG-TERM (CURRENT) USE OF OTHER MEDICATIONS: ICD-10-CM

## 2021-04-14 DIAGNOSIS — Z51.81 ENCOUNTER FOR THERAPEUTIC DRUG MONITORING: ICD-10-CM

## 2021-04-14 DIAGNOSIS — Z79.899 ENCOUNTER FOR LONG-TERM (CURRENT) USE OF OTHER MEDICATIONS: Primary | ICD-10-CM

## 2021-04-14 DIAGNOSIS — J18.9 PNEUMONIA OF BOTH LOWER LOBES DUE TO INFECTIOUS ORGANISM: ICD-10-CM

## 2021-04-14 DIAGNOSIS — R41.82 ALTERED MENTAL STATUS, UNSPECIFIED ALTERED MENTAL STATUS TYPE: ICD-10-CM

## 2021-04-14 LAB
ALBUMIN SERPL-MCNC: 3.9 G/DL (ref 3.5–5.2)
ALBUMIN/GLOB SERPL: 1.3 G/DL
ALP SERPL-CCNC: 82 U/L (ref 39–117)
ALT SERPL W P-5'-P-CCNC: 14 U/L (ref 1–33)
ANION GAP SERPL CALCULATED.3IONS-SCNC: 10 MMOL/L (ref 5–15)
AST SERPL-CCNC: 17 U/L (ref 1–32)
BILIRUB SERPL-MCNC: 0.2 MG/DL (ref 0–1.2)
BUN SERPL-MCNC: 11 MG/DL (ref 6–20)
BUN/CREAT SERPL: 20 (ref 7–25)
CALCIUM SPEC-SCNC: 9.6 MG/DL (ref 8.6–10.5)
CHLORIDE SERPL-SCNC: 104 MMOL/L (ref 98–107)
CO2 SERPL-SCNC: 26 MMOL/L (ref 22–29)
CREAT SERPL-MCNC: 0.55 MG/DL (ref 0.57–1)
GFR SERPL CREATININE-BSD FRML MDRD: 148 ML/MIN/1.73
GLOBULIN UR ELPH-MCNC: 2.9 GM/DL
GLUCOSE SERPL-MCNC: 134 MG/DL (ref 65–99)
POTASSIUM SERPL-SCNC: 4.2 MMOL/L (ref 3.5–5.2)
PROT SERPL-MCNC: 6.8 G/DL (ref 6–8.5)
SODIUM SERPL-SCNC: 140 MMOL/L (ref 136–145)

## 2021-04-14 PROCEDURE — 36415 COLL VENOUS BLD VENIPUNCTURE: CPT

## 2021-04-14 PROCEDURE — 80177 DRUG SCRN QUAN LEVETIRACETAM: CPT

## 2021-04-14 PROCEDURE — 25010000002 IOPAMIDOL 61 % SOLUTION: Performed by: PSYCHIATRY & NEUROLOGY

## 2021-04-14 PROCEDURE — 80053 COMPREHEN METABOLIC PANEL: CPT

## 2021-04-14 PROCEDURE — 71046 X-RAY EXAM CHEST 2 VIEWS: CPT

## 2021-04-14 PROCEDURE — 70470 CT HEAD/BRAIN W/O & W/DYE: CPT

## 2021-04-14 RX ADMIN — IOPAMIDOL 90 ML: 612 INJECTION, SOLUTION INTRAVENOUS at 17:32

## 2021-04-15 RX ORDER — BUPROPION HYDROCHLORIDE 100 MG/1
100 TABLET, EXTENDED RELEASE ORAL EVERY MORNING
Qty: 30 TABLET | Refills: 3 | Status: SHIPPED | OUTPATIENT
Start: 2021-04-15 | End: 2021-07-09

## 2021-04-19 ENCOUNTER — HOSPITAL ENCOUNTER (OUTPATIENT)
Dept: PHYSICAL THERAPY | Facility: HOSPITAL | Age: 41
Setting detail: THERAPIES SERIES
Discharge: HOME | End: 2021-04-19

## 2021-04-19 DIAGNOSIS — G80.0 CP (CEREBRAL PALSY), SPASTIC, QUADRIPLEGIC (HCC): Primary | ICD-10-CM

## 2021-04-19 DIAGNOSIS — G40.A09 RETROPULSION PETIT MAL (HCC): ICD-10-CM

## 2021-04-19 LAB — LEVETIRACETAM SERPL-MCNC: 15.8 UG/ML (ref 10–40)

## 2021-04-19 PROCEDURE — 97163 PT EVAL HIGH COMPLEX 45 MIN: CPT

## 2021-04-19 NOTE — THERAPY EVALUATION
Outpatient Physical Therapy Ortho Initial Evaluation/ Wheel Chair Evaluation  [facility]     Patient Name: Kiley Ledesma  : 1980  MRN: [id number]  Today's Date: 2021      Visit Date: 2021    Patient Active Problem List   Diagnosis   • Depressive disorder   • Gastroesophageal reflux disease   • Spastic quadriplegic cerebral palsy (CMS/HCC)   • Colitis   • Chronic abdominal pain   • Diarrhea   • Gastroesophageal reflux disease with esophagitis   • Hydronephrosis   • Incontinence of feces with fecal urgency   • Constipation   • Abnormal finding on GI tract imaging   • History of colitis   • Absence seizure (CMS/HCC)   • Major depressive disorder with single episode, in full remission (CMS/HCC)        Past Medical History:   Diagnosis Date   • Acute vulvitis     Candida   • Allergy     Unspecified, initial encounter   • Amblyopia     OS   • Astigmatism    • Cerebral palsy (CMS/HCC)    • Cheilosis    • Common cold    • Contusion    • Depressive disorder    • Diarrhea    • Encounter for general adult medical examination without abnormal findings    • Encounter for routine adult health examination     Adult health examination      • Fever    • Gastroesophageal reflux disease    • Generalized abdominal pain    • Indeterminate colitis    • Infected insect bite    • Myopia    • Spastic quadriplegic cerebral palsy (CMS/HCC)    • Trouble walking     Walking disability      • Urinary incontinence    • Urinary tract infectious disease    • Wears glasses    • Worried well         Past Surgical History:   Procedure Laterality Date   • COLONOSCOPY N/A 2012    hemorrhoids   • COLONOSCOPY N/A 2018    Procedure: COLONOSCOPY--look TI, bx, hx colitis;  Surgeon: Sreedhar Kirkpatrick MD;  Location: [facility] ENDOSCOPY;  Service: Gastroenterology   • COLONOSCOPY N/A 2019    Procedure: COLONOSCOPY;  Surgeon: Sreedhar Kirkpatrick MD;  Location: [facility] ENDOSCOPY;  Service: Gastroenterology   • CYSTOSCOPY  N/A 4/19/2019    Procedure: CYSTOSCOPY;  Surgeon: Cecilio Acuña MD;  Location: [facility];  Service: Urology   • ENDOSCOPY N/A 03/28/2012    gastritis   • INJECTION OF MEDICATION  02/23/2016    Kenalog   • TOTAL ABDOMINAL HYSTERECTOMY WITH SALPINGO OOPHORECTOMY N/A 03/24/2005   • UPPER GASTROINTESTINAL ENDOSCOPY  03/28/2012     Current Outpatient Medications   Medication Instructions   • acetaminophen (TYLENOL) 650 mg, Oral, Every 6 Hours PRN   • albuterol (PROVENTIL) 2.5 mg, Nebulization, Every 4 Hours PRN   • bacitracin 500 UNIT/GM ointment Topical, 3 Times Daily PRN   • BACLOFEN IT Has a baclofen pump   • buPROPion SR (WELLBUTRIN SR) 100 mg, Oral, Every Morning   • cholecalciferol (VITAMIN D3) 25 MCG (1000 UT) tablet TAKE 1 TABLET BY MOUTH ONCE DAILY   • Cholecalciferol 1,000 Units, Oral, Daily   • diphenhydrAMINE (BENADRYL) 25 mg, Oral, Every 6 Hours PRN   • doxycycline (VIBRAMYCIN) 100 mg, Oral, Daily   • estradiol (ESTRACE) 1 MG tablet GIVE ONE TABLET BY MOUTH ONCE DAILY FOR HORMONE REPLACEMENT   • FLUoxetine (PROzac) 40 MG capsule GIVE ONE CAPSULE BY MOUTH ONCE DAILY FOR ANXIETY DISORDER   • fluticasone (FLONASE) 50 MCG/ACT nasal spray 2 sprays, Nasal, Daily   • GNP Stool Softener 100 MG capsule GIVE ONE CAPSULE BY MOUTH TWICE DAILY   • guaiFENesin (MUCINEX) 1,200 mg, Oral, As Needed   • levETIRAcetam (KEPPRA) 500 mg, Oral, 2 Times Daily   • linaclotide (LINZESS) 145 mcg, Oral, As Needed   • metoclopramide (REGLAN) 10 MG tablet GIVE ONE TABLET BY MOUTH ONCE DAILY FOR GASTROESOPHAGEAL REFLUX   • Misc. Devices (WHEELCHAIR) misc Does not apply, As directed    • NON FORMULARY Daily PRN, Nyst/bacit/Zno/hc 1%    • nystatin (MYCOSTATIN) 408193 UNIT/GM cream Topical, 2 Times Daily PRN   • omeprazole (priLOSEC) 20 MG capsule GIVE ONE CAPSULE BY MOUTH ONCE DAILY FOR STOMACH ACID   • ondansetron (ZOFRAN) 8 mg, Oral, Every 8 Hours PRN   • tolterodine (DETROL) 2 MG tablet GIVE ONE TABLET BY MOUTH TWICE DAILY        Allergies   Allergen Reactions   • Atropine Unknown (See Comments)     Unknown reaction per patient   • Ciprofloxacin Other (See Comments)     seizure   • Sulfa Antibiotics Itching   • Avelox [Moxifloxacin Hcl] Other (See Comments)     Concerned may cause seizure   • Factive [Gemifloxacin] Other (See Comments)     Concerned may cause seizure   • Floxin Otic [Ofloxacin] Other (See Comments)     Concerned may cause seizure   • Levaquin [Levofloxacin] Other (See Comments)     Concerned may cause seizure         Visit Dx:     ICD-10-CM ICD-9-CM   1. CP (cerebral palsy), spastic, quadriplegic (CMS/[facility])  G80.0 343.2   2. 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[facility])  G40.A09 345.00           Standard Power Wheelchair Seating/Mobility Evaluation     Name:            Kiley Ledesma           Date Referred: 3/31/2021            Date of Evaluation: 2021                                   : 1980         Sex: Female   OT/PT: Physical Therapy  Insurance: Medicare__________      Requested DME: [facility] - phone: [phone]                                                                                                               Fax: [phone]   Reason For Referral:         Powered wheel chair evaluation     Patient Goals:  __To be as independent as possible with ADLs and mobility     Caregivers Goals:   For patient to be as independent as possible with ADLs _____________________________________________________________________________________   MEDICAL HISTORY: DX:   CP (cerebral palsy), spastic, quadriplegic, Retropulsion petit mal    Recent Surgeries: n/a   Length Of Need: Life time due to dependency on wheel chair for mobility  _______________________________________________________________________   Cardio- Respiratory Status:   ?Intact      ? Impaired  Comments:  ________________________________________________   COGNITIVE/ VISUAL STATUS:   Memory Skills : ?Intact  ?Impaired  Comment:   Problem  Solving: ?Intact  ?Impaired Comment:   Judgment : ?Intact ?Impaired Comment:   Vision: ?Intact ?Impaired Comment: Pt wears prescription glasses    Hearing: ?Intact  ?Impaired Comment:   Other: ?Intact  ?Impaired Comment:   SENSATION:  ?Intact ? Impaired ? Absent Hx of Pressure Sores  ?Yes ?  No Current Pressure Sores  ?Yes ? No    At Risk ?Yes?  NO -Comments:        ADL STATUS  Indep  w/assist Unable  Comments    Dressing: ? ? ?    Bathing: ? ? ?    Feeding: ? ? ?    Grooming/ Hygiene ? ? ?    Toileting: ? ? ? Max assist for transfer to toilet   Meal Prep: ? ? ? Max assist   Home Management ? ? ? Patient has some to keep home clean   Bowel Management: ? Continent  ?  Incontinent    Bladder Management: ?Continent ?  Incontinent    MOBILITY SKILLS Indep W/ assist  Unable N/A Comments   Bed ? W/C Transfers ? ? ? ? Geovanna lift   W/C ? Commode Transfers ? ? ? ?    Ambulation on a 24/7 basis: ? ? ? ? Unable to stand   Manual W/C Propulsion on a 24/7 basis  ? ? ? ? Bilateral UE weakness   Operate Power W/C W/ Std. Joystick ? ? ? ?    Able To Perform Weight Shift: ? ? ? ?    Max Hours Spent Sitting In W/C Comments: “About 11 hours a day.”     Mobility Assessment: Basic Coverage Criteria:     Does the patient have limitations of mobility that impair his/her ability to participate in mobility related activities of daily living  (MRADL) such as feeding, grooming, toileting, dressing, meal preparation, light housekeeping and bathing either without any mobility devices     ? Prevents patient from accomplishing and MRADL entirely, or      ? Patient can accomplish but with risk to safety, or      ?  Patient can accomplish but not within a reasonable time  Considering a cane or walker?  Will a cane or walker allow the patient to participate in MRADL’s safely and in a timely manner?     ?Yes ?   No and why? Patient is unable to stand due to LE weakness, contractures, spasticity, and contractures. Therefore, patient is unable to  ambulate.   Considering a manual wheelchair?   Does the patient  have sufficient upper extremity and/ or lower extremity strength or endurance necessary to self-propel an optimally configured manual wheelchair all day long?     ? Yes  ?  No and why: Patient has bilateral UE weakness and right UE spasticity, increased tone, and decreased motion    Considering a scooter/POV?  Scooter use requires a patient to have sufficient trunk strength, hand  and upper extremity function, balance to sit upright, requires the ability to stand and pivot and requires more space in the home to maneuver. Given these requirements, in your assessment of this patient and their living environment, is a scooter appropriate?   ?   Yes  ? No and why Patient presents with poor trunk control and trunk weakness. She also has bilateral UE weakness and she would be unable to maneuver a scooter.     Specific PMD coverage criteria:     Considering a power wheelchair?   Is the patient willing or does he/she has the cognition, judgment and/or vision to participate in MRADL’s?     ? Yes ?  No  Does the patient have the functional ability to consistently operate a joystick and judgment and visual ability to safely operate a power wheelchair to participate in MRADL’s within the home environment?     ? Yes ?  No  Patient is able to safely operate the power wheelchair.  ? Yes ?   No  Patient’s weight is less than or equal to the weight capacity on the power wheelchair?     ? Yes ?   No   Patient’s home provides adequate access between rooms, maneuvering space, and surfaces for the operation of the power wheelchair    ?  Yes  ? No  Use of the power wheelchair will significantly improve the patient’s ability to participate in MRADL’s and the patient will use it in the home.     ? Yes  ?  No     MAT EVALUATION:   Head and Neck: Function and control   Patient has full function and control of head and neck. She is able to go through full cervical AROM.    Shoulder and Arms: Function/ Control and ROM& Strength   Left shoulder and arm presents with adequate control for operating a carri stick and AROM is WFL. Right shoulder and arm AROM is limited by contractors and spasticity. Weakness present in both shoulders and arms. Right is 3+/5 for strength. Left is     Wrist and Hand: Function/ Control and ROM& Strength  Left wrist/hand AROM WFL with good control. Right wrist/hand limited by contracture, stiffness, and increased tone.   Trunk: ?Kyphosis ?Lordosis ? Convex ?Rotation   Function and Control: Poor trunk control and strength. Mod assist needed for trunk flex. Patient sits with a right lateral lean and increased lumbar lordosis.   Pelvis: Anterior/Posterior ? Obliquity ? Rotation  Function and Control: Poor pelvis control and strength. Increased anterior pelvic rotation. Weight shift towards the right.   Hips:  ?windswept? Abduct ? Adduct   Function/ Control and ROM: Unable to actively move hips due to weakness. Bilateral hip flexion contractors. Increased muscle tone/spasticity noted bilaterally. Right LE hip to knee measures longer than left.    Knees and Feet: ROM &Strength: Unable to actively move knees and ankles/feet due to weakness. Bilateral knee flexion contractors. Increased muscle tone/spasticity noted bilaterally. Right LE knee to ankle measures longer than left.  Foot Positioning: Bilateral feet are placed in DF.   Balance: Sitting and Standing: Pt unable to sit independently without external support or support by PT. Patent unable to stand.   Recommendation ? Custom Power Wheelchair       Justification: Patient (pt) would benefit from a powered wheelchair with mobility base . The pt is non-ambulatory and her home is accessible. She is willing and can safely operate a PWC. The use of a PWC will significantly improve [facility] participation. Pt presents today in a PWC that is at least 5 years old. Pt and caregiver stated that pt requires a new PWC due  to the current wheelchair not working properly. It occasionally will not turn on at all, the horn does not work, operating system not working as it will go forward, reverse, up/down on its own, and the chair does not keep a charge (only about 8hrs). Pt stated that the chair cushion feels flat. She would benefit from a multi-function control as she is capable of operating them and this would allow for power tile or recline. Upgraded electronic controller and harness would allow for input device to communicate between the joystick and seating functions which would meet current and future needs of the pt. Attendant controlled joystick would be beneficial for long distance driving as pt has the mental capacity and is able to independently control the joystick for self-mobility in her home and the community. This would also meet compliance with transportation regulations and would allow for operation of seat functions. Tilt power would be beneficial for the pt as this would allow for her move into tile independently to change position of gravitational forces on her head and shoulders, allow for change in position for pressure relief (pt is at high risk for developing pressure ulcers), and management of increased tone/spasticity. A power recline component to accommodate for asymmetries of upper torso and pelvis, would allow for changes in position for pressure reduction as weight sift is difficult for pt, and is medical necessary as p has increased spasms and tone.    Gel Battery Type:  Size 34   Justification: Required for power motor on wheelchair.   Accessories:   Elevation Leg rests: ?  Manual ?  Power ?   ELR Articulating   Justification: Center mount articulating ELR would provide change in position for pt’s legs and would allow pt to elevate her legs while in tile or recline.          Other required options and justifications: Pt requires transit option for safety tie down during vehicle transportation as she is  transported via van with ramp access. She also requires lateral trunk support to help decreased her lateral trunk lean towards the right and contour for increased contact. A head rest is required to accommodate for her tone, provides posterior head/neck support, and supports her head and neck during tilt/recline. A pad size 10 would also be required. She would benefit from knee support to decrease hip add and improve alignment of her femurs and knees. She would require adjustable or swing away mounting hardware for joystick, laterals, headrest, and knee adductor/abductors. This would allow for easier transfers to and from the [facility]. A seat cushion (PsP) would be low maintenance and would help stabilize pelvic alignment and accommodate for her obliquity. This would also increase pressure distribution as she has a history of pressure ulcers in the past and is at high risk for repeat pressure ulcers. Back style of PBE would provide posterior trunk support, lumbosacral support, lateral trunk support, and support the pt’s trunk in midline.                    Time Calculation:     Start Time: 1100  Stop Time: 1202  Time Calculation (min): 62 min     Therapy Charges for Today     Code Description Service Date Service Provider Modifiers Qty    87284413089 HC PT EVAL HIGH COMPLEXITY 4 4/19/2021 Shea Horner, GARY GP 1                    Shea Horner PT, DPT  4/19/2021

## 2021-06-01 ENCOUNTER — TELEPHONE (OUTPATIENT)
Dept: FAMILY MEDICINE CLINIC | Facility: CLINIC | Age: 41
End: 2021-06-01

## 2021-06-01 DIAGNOSIS — R30.0 DYSURIA: Primary | ICD-10-CM

## 2021-06-01 NOTE — TELEPHONE ENCOUNTER
Pato from [employer] called and said that Kiley is burning when she urinates. Do you need her to come in and do labs? Her phone is [phone].

## 2021-06-02 ENCOUNTER — LAB (OUTPATIENT)
Dept: LAB | Facility: HOSPITAL | Age: 41
End: 2021-06-02

## 2021-06-02 DIAGNOSIS — R30.0 DYSURIA: ICD-10-CM

## 2021-06-02 LAB
BACTERIA UR QL AUTO: ABNORMAL /HPF
BILIRUB UR QL STRIP: NEGATIVE
CLARITY UR: ABNORMAL
COLOR UR: YELLOW
GLUCOSE UR STRIP-MCNC: NEGATIVE MG/DL
HGB UR QL STRIP.AUTO: ABNORMAL
HYALINE CASTS UR QL AUTO: ABNORMAL /LPF
KETONES UR QL STRIP: NEGATIVE
LEUKOCYTE ESTERASE UR QL STRIP.AUTO: ABNORMAL
NITRITE UR QL STRIP: NEGATIVE
PH UR STRIP.AUTO: 6 [PH] (ref 5–9)
PROT UR QL STRIP: ABNORMAL
RBC # UR: ABNORMAL /HPF
REF LAB TEST METHOD: ABNORMAL
SP GR UR STRIP: 1.01 (ref 1–1.03)
SQUAMOUS #/AREA URNS HPF: ABNORMAL /HPF
STARCH GRANULES URNS QL MICRO: ABNORMAL /HPF
UROBILINOGEN UR QL STRIP: ABNORMAL
WBC UR QL AUTO: ABNORMAL /HPF

## 2021-06-02 PROCEDURE — 81001 URINALYSIS AUTO W/SCOPE: CPT

## 2021-06-02 PROCEDURE — 87086 URINE CULTURE/COLONY COUNT: CPT

## 2021-06-03 LAB — BACTERIA SPEC AEROBE CULT: NORMAL

## 2021-06-07 ENCOUNTER — TELEPHONE (OUTPATIENT)
Dept: FAMILY MEDICINE CLINIC | Facility: CLINIC | Age: 41
End: 2021-06-07

## 2021-06-07 NOTE — TELEPHONE ENCOUNTER
Pato called regarding the urine sample that she brought back for Kiley on 6-2. She hasn't heard anything and she is still burning when she urinates. Call Pato at [phone].

## 2021-06-18 RX ORDER — ACETAMINOPHEN 325 MG/1
650 TABLET ORAL EVERY 6 HOURS PRN
Qty: 60 TABLET | Refills: 11 | Status: SHIPPED | OUTPATIENT
Start: 2021-06-18 | End: 2021-11-08

## 2021-06-26 ENCOUNTER — APPOINTMENT (OUTPATIENT)
Dept: GENERAL RADIOLOGY | Facility: HOSPITAL | Age: 41
End: 2021-06-26

## 2021-06-26 ENCOUNTER — HOSPITAL ENCOUNTER (EMERGENCY)
Facility: HOSPITAL | Age: 41
Discharge: HOME | End: 2021-06-26
Attending: FAMILY MEDICINE | Admitting: FAMILY MEDICINE

## 2021-06-26 VITALS
TEMPERATURE: 98.2 F | SYSTOLIC BLOOD PRESSURE: 170 MMHG | BODY MASS INDEX: 28.34 KG/M2 | HEART RATE: 78 BPM | RESPIRATION RATE: 18 BRPM | DIASTOLIC BLOOD PRESSURE: 99 MMHG | WEIGHT: 160 LBS | OXYGEN SATURATION: 98 %

## 2021-06-26 DIAGNOSIS — N30.00 ACUTE CYSTITIS WITHOUT HEMATURIA: Primary | ICD-10-CM

## 2021-06-26 LAB
ALBUMIN SERPL-MCNC: 3.6 G/DL (ref 3.5–5.2)
ALBUMIN/GLOB SERPL: 1.3 G/DL
ALP SERPL-CCNC: 102 U/L (ref 39–117)
ALT SERPL W P-5'-P-CCNC: 14 U/L (ref 1–33)
ANION GAP SERPL CALCULATED.3IONS-SCNC: 7 MMOL/L (ref 5–15)
AST SERPL-CCNC: 19 U/L (ref 1–32)
BACTERIA UR QL AUTO: ABNORMAL /HPF
BASOPHILS # BLD AUTO: 0.07 10*3/MM3 (ref 0–0.2)
BASOPHILS NFR BLD AUTO: 0.8 % (ref 0–1.5)
BILIRUB SERPL-MCNC: 0.2 MG/DL (ref 0–1.2)
BILIRUB UR QL STRIP: NEGATIVE
BUN SERPL-MCNC: 10 MG/DL (ref 6–20)
BUN/CREAT SERPL: 18.2 (ref 7–25)
CALCIUM SPEC-SCNC: 8.9 MG/DL (ref 8.6–10.5)
CHLORIDE SERPL-SCNC: 105 MMOL/L (ref 98–107)
CLARITY UR: ABNORMAL
CO2 SERPL-SCNC: 25 MMOL/L (ref 22–29)
COLOR UR: YELLOW
CREAT SERPL-MCNC: 0.55 MG/DL (ref 0.57–1)
DEPRECATED RDW RBC AUTO: 44.3 FL (ref 37–54)
EOSINOPHIL # BLD AUTO: 0.35 10*3/MM3 (ref 0–0.4)
EOSINOPHIL NFR BLD AUTO: 3.8 % (ref 0.3–6.2)
ERYTHROCYTE [DISTWIDTH] IN BLOOD BY AUTOMATED COUNT: 13.5 % (ref 12.3–15.4)
GFR SERPL CREATININE-BSD FRML MDRD: 148 ML/MIN/1.73
GLOBULIN UR ELPH-MCNC: 2.8 GM/DL
GLUCOSE SERPL-MCNC: 85 MG/DL (ref 65–99)
GLUCOSE UR STRIP-MCNC: NEGATIVE MG/DL
HCT VFR BLD AUTO: 38.3 % (ref 34–46.6)
HGB BLD-MCNC: 12.3 G/DL (ref 12–15.9)
HGB UR QL STRIP.AUTO: NEGATIVE
HYALINE CASTS UR QL AUTO: ABNORMAL /LPF
IMM GRANULOCYTES # BLD AUTO: 0.01 10*3/MM3 (ref 0–0.05)
IMM GRANULOCYTES NFR BLD AUTO: 0.1 % (ref 0–0.5)
KETONES UR QL STRIP: NEGATIVE
LEUKOCYTE ESTERASE UR QL STRIP.AUTO: ABNORMAL
LIPASE SERPL-CCNC: 29 U/L (ref 13–60)
LYMPHOCYTES # BLD AUTO: 2.36 10*3/MM3 (ref 0.7–3.1)
LYMPHOCYTES NFR BLD AUTO: 25.4 % (ref 19.6–45.3)
MCH RBC QN AUTO: 28.5 PG (ref 26.6–33)
MCHC RBC AUTO-ENTMCNC: 32.1 G/DL (ref 31.5–35.7)
MCV RBC AUTO: 88.7 FL (ref 79–97)
MONOCYTES # BLD AUTO: 0.92 10*3/MM3 (ref 0.1–0.9)
MONOCYTES NFR BLD AUTO: 9.9 % (ref 5–12)
NEUTROPHILS NFR BLD AUTO: 5.57 10*3/MM3 (ref 1.7–7)
NEUTROPHILS NFR BLD AUTO: 60 % (ref 42.7–76)
NITRITE UR QL STRIP: NEGATIVE
NRBC BLD AUTO-RTO: 0 /100 WBC (ref 0–0.2)
PH UR STRIP.AUTO: 6 [PH] (ref 5–9)
PLATELET # BLD AUTO: 249 10*3/MM3 (ref 140–450)
PMV BLD AUTO: 9.4 FL (ref 6–12)
POTASSIUM SERPL-SCNC: 3.4 MMOL/L (ref 3.5–5.2)
PROT SERPL-MCNC: 6.4 G/DL (ref 6–8.5)
PROT UR QL STRIP: NEGATIVE
RBC # BLD AUTO: 4.32 10*6/MM3 (ref 3.77–5.28)
RBC # UR: ABNORMAL /HPF
REF LAB TEST METHOD: ABNORMAL
SODIUM SERPL-SCNC: 137 MMOL/L (ref 136–145)
SP GR UR STRIP: 1.02 (ref 1–1.03)
SQUAMOUS #/AREA URNS HPF: ABNORMAL /HPF
UROBILINOGEN UR QL STRIP: ABNORMAL
WBC # BLD AUTO: 9.28 10*3/MM3 (ref 3.4–10.8)
WBC UR QL AUTO: ABNORMAL /HPF

## 2021-06-26 PROCEDURE — 81001 URINALYSIS AUTO W/SCOPE: CPT | Performed by: FAMILY MEDICINE

## 2021-06-26 PROCEDURE — 99283 EMERGENCY DEPT VISIT LOW MDM: CPT

## 2021-06-26 PROCEDURE — 80053 COMPREHEN METABOLIC PANEL: CPT | Performed by: FAMILY MEDICINE

## 2021-06-26 PROCEDURE — 85025 COMPLETE CBC W/AUTO DIFF WBC: CPT | Performed by: FAMILY MEDICINE

## 2021-06-26 PROCEDURE — 83690 ASSAY OF LIPASE: CPT | Performed by: FAMILY MEDICINE

## 2021-06-26 PROCEDURE — 87086 URINE CULTURE/COLONY COUNT: CPT | Performed by: FAMILY MEDICINE

## 2021-06-26 PROCEDURE — 99284 EMERGENCY DEPT VISIT MOD MDM: CPT

## 2021-06-26 PROCEDURE — 74022 RADEX COMPL AQT ABD SERIES: CPT

## 2021-06-26 RX ORDER — BACLOFEN 10 MG/1
10 TABLET ORAL 2 TIMES DAILY
Qty: 28 TABLET | Refills: 0 | Status: SHIPPED | OUTPATIENT
Start: 2021-06-26 | End: 2021-07-10

## 2021-06-26 RX ORDER — CEPHALEXIN 500 MG/1
500 CAPSULE ORAL 3 TIMES DAILY
Qty: 21 CAPSULE | Refills: 0 | Status: SHIPPED | OUTPATIENT
Start: 2021-06-26 | End: 2021-09-27

## 2021-06-26 RX ORDER — HYDROCODONE BITARTRATE AND ACETAMINOPHEN 5; 325 MG/1; MG/1
1 TABLET ORAL ONCE
Status: COMPLETED | OUTPATIENT
Start: 2021-06-26 | End: 2021-06-26

## 2021-06-26 RX ORDER — ONDANSETRON 4 MG/1
4 TABLET, ORALLY DISINTEGRATING ORAL EVERY 6 HOURS PRN
Qty: 10 TABLET | Refills: 0 | Status: ON HOLD | OUTPATIENT
Start: 2021-06-26 | End: 2022-09-02

## 2021-06-26 RX ADMIN — HYDROCODONE BITARTRATE AND ACETAMINOPHEN 1 TABLET: 5; 325 TABLET ORAL at 17:34

## 2021-06-26 RX ADMIN — SODIUM CHLORIDE 500 ML: 9 INJECTION, SOLUTION INTRAVENOUS at 17:50

## 2021-06-27 LAB — BACTERIA SPEC AEROBE CULT: NORMAL

## 2021-07-07 ENCOUNTER — TELEPHONE (OUTPATIENT)
Dept: FAMILY MEDICINE CLINIC | Facility: CLINIC | Age: 41
End: 2021-07-07

## 2021-07-07 RX ORDER — DEXTROMETHORPHAN HYDROBROMIDE AND PROMETHAZINE HYDROCHLORIDE 15; 6.25 MG/5ML; MG/5ML
SYRUP ORAL
Qty: 150 ML | Refills: 0 | Status: ON HOLD | OUTPATIENT
Start: 2021-07-07 | End: 2022-09-02

## 2021-07-08 ENCOUNTER — TELEPHONE (OUTPATIENT)
Dept: FAMILY MEDICINE CLINIC | Facility: CLINIC | Age: 41
End: 2021-07-08

## 2021-07-08 NOTE — TELEPHONE ENCOUNTER
Asking if there is anything you can prescribe for Kiley's dry cough she is having.  Pato's # [phone]

## 2021-07-09 RX ORDER — BUPROPION HYDROCHLORIDE 100 MG/1
TABLET, EXTENDED RELEASE ORAL
Qty: 31 TABLET | Refills: 11 | Status: SHIPPED | OUTPATIENT
Start: 2021-07-09 | End: 2022-07-08

## 2021-09-20 ENCOUNTER — TELEPHONE (OUTPATIENT)
Dept: FAMILY MEDICINE CLINIC | Facility: CLINIC | Age: 41
End: 2021-09-20

## 2021-09-20 RX ORDER — ACYCLOVIR 50 MG/G
OINTMENT TOPICAL
Qty: 5 G | Refills: 0 | Status: ON HOLD | OUTPATIENT
Start: 2021-09-20 | End: 2022-09-02

## 2021-09-20 NOTE — TELEPHONE ENCOUNTER
Pato said, that Kiley has a really bad fever blister and wondered if you could call her in something to [facility]?

## 2021-09-21 RX ORDER — VALACYCLOVIR HYDROCHLORIDE 1 G/1
TABLET, FILM COATED ORAL
Qty: 4 TABLET | Refills: 0 | Status: ON HOLD | OUTPATIENT
Start: 2021-09-21 | End: 2022-09-02

## 2021-09-27 ENCOUNTER — OFFICE VISIT (OUTPATIENT)
Dept: FAMILY MEDICINE CLINIC | Facility: CLINIC | Age: 41
End: 2021-09-27

## 2021-09-27 VITALS
OXYGEN SATURATION: 98 % | DIASTOLIC BLOOD PRESSURE: 70 MMHG | RESPIRATION RATE: 18 BRPM | HEART RATE: 101 BPM | SYSTOLIC BLOOD PRESSURE: 110 MMHG

## 2021-09-27 DIAGNOSIS — F32.5 MAJOR DEPRESSIVE DISORDER WITH SINGLE EPISODE, IN FULL REMISSION (HCC): ICD-10-CM

## 2021-09-27 DIAGNOSIS — Z23 NEED FOR IMMUNIZATION AGAINST INFLUENZA: ICD-10-CM

## 2021-09-27 DIAGNOSIS — Z79.890 HORMONE REPLACEMENT THERAPY (HRT): ICD-10-CM

## 2021-09-27 DIAGNOSIS — Z00.00 ANNUAL PHYSICAL EXAM: Primary | ICD-10-CM

## 2021-09-27 DIAGNOSIS — K90.49 DAIRY PRODUCT INTOLERANCE: ICD-10-CM

## 2021-09-27 DIAGNOSIS — J30.1 SEASONAL ALLERGIC RHINITIS DUE TO POLLEN: ICD-10-CM

## 2021-09-27 PROCEDURE — G0008 ADMIN INFLUENZA VIRUS VAC: HCPCS | Performed by: GENERAL PRACTICE

## 2021-09-27 PROCEDURE — 99214 OFFICE O/P EST MOD 30 MIN: CPT | Performed by: GENERAL PRACTICE

## 2021-09-27 PROCEDURE — 90686 IIV4 VACC NO PRSV 0.5 ML IM: CPT | Performed by: GENERAL PRACTICE

## 2021-09-27 RX ORDER — FLUOXETINE HYDROCHLORIDE 40 MG/1
40 CAPSULE ORAL DAILY
Qty: 31 CAPSULE | Refills: 11 | Status: SHIPPED | OUTPATIENT
Start: 2021-09-27 | End: 2022-10-07

## 2021-09-27 RX ORDER — FLUTICASONE PROPIONATE 50 MCG
2 SPRAY, SUSPENSION (ML) NASAL DAILY
Qty: 16 G | Refills: 11 | Status: SHIPPED | OUTPATIENT
Start: 2021-09-27 | End: 2021-11-08

## 2021-09-27 RX ORDER — ESTRADIOL 1 MG/1
0.5 TABLET ORAL DAILY
Qty: 31 TABLET | Refills: 11 | Status: SHIPPED | OUTPATIENT
Start: 2021-09-27 | End: 2021-11-10

## 2021-09-29 ENCOUNTER — TELEPHONE (OUTPATIENT)
Dept: FAMILY MEDICINE CLINIC | Facility: CLINIC | Age: 41
End: 2021-09-29

## 2021-09-29 ENCOUNTER — LAB (OUTPATIENT)
Dept: LAB | Facility: HOSPITAL | Age: 41
End: 2021-09-29

## 2021-09-29 DIAGNOSIS — G80.0 SPASTIC QUADRIPLEGIC CEREBRAL PALSY (HCC): ICD-10-CM

## 2021-09-29 DIAGNOSIS — G40.A09 ABSENCE SEIZURE (HCC): ICD-10-CM

## 2021-09-29 DIAGNOSIS — E66.3 OVERWEIGHT: ICD-10-CM

## 2021-09-29 DIAGNOSIS — Z00.00 ANNUAL PHYSICAL EXAM: ICD-10-CM

## 2021-09-29 PROCEDURE — 87086 URINE CULTURE/COLONY COUNT: CPT

## 2021-09-29 PROCEDURE — 80053 COMPREHEN METABOLIC PANEL: CPT

## 2021-09-29 PROCEDURE — 80061 LIPID PANEL: CPT

## 2021-09-29 PROCEDURE — 36415 COLL VENOUS BLD VENIPUNCTURE: CPT

## 2021-09-29 PROCEDURE — 85025 COMPLETE CBC W/AUTO DIFF WBC: CPT

## 2021-09-29 PROCEDURE — 87186 SC STD MICRODIL/AGAR DIL: CPT

## 2021-09-29 PROCEDURE — 87077 CULTURE AEROBIC IDENTIFY: CPT

## 2021-09-29 PROCEDURE — 81001 URINALYSIS AUTO W/SCOPE: CPT

## 2021-09-30 ENCOUNTER — TELEPHONE (OUTPATIENT)
Dept: FAMILY MEDICINE CLINIC | Facility: CLINIC | Age: 41
End: 2021-09-30

## 2021-09-30 LAB
ALBUMIN SERPL-MCNC: 4.3 G/DL (ref 3.5–5.2)
ALBUMIN/GLOB SERPL: 1.5 G/DL
ALP SERPL-CCNC: 102 U/L (ref 39–117)
ALT SERPL W P-5'-P-CCNC: 15 U/L (ref 1–33)
ANION GAP SERPL CALCULATED.3IONS-SCNC: 14.1 MMOL/L (ref 5–15)
AST SERPL-CCNC: 22 U/L (ref 1–32)
BACTERIA UR QL AUTO: ABNORMAL /HPF
BASOPHILS # BLD AUTO: 0.06 10*3/MM3 (ref 0–0.2)
BASOPHILS NFR BLD AUTO: 0.9 % (ref 0–1.5)
BILIRUB SERPL-MCNC: 0.3 MG/DL (ref 0–1.2)
BILIRUB UR QL STRIP: NEGATIVE
BUN SERPL-MCNC: 13 MG/DL (ref 6–20)
BUN/CREAT SERPL: 30.2 (ref 7–25)
CALCIUM SPEC-SCNC: 9 MG/DL (ref 8.6–10.5)
CHLORIDE SERPL-SCNC: 102 MMOL/L (ref 98–107)
CHOLEST SERPL-MCNC: 168 MG/DL (ref 0–200)
CLARITY UR: CLEAR
CO2 SERPL-SCNC: 19.9 MMOL/L (ref 22–29)
COLOR UR: YELLOW
CREAT SERPL-MCNC: 0.43 MG/DL (ref 0.57–1)
DEPRECATED RDW RBC AUTO: 41.6 FL (ref 37–54)
EOSINOPHIL # BLD AUTO: 0.21 10*3/MM3 (ref 0–0.4)
EOSINOPHIL NFR BLD AUTO: 3 % (ref 0.3–6.2)
ERYTHROCYTE [DISTWIDTH] IN BLOOD BY AUTOMATED COUNT: 13.1 % (ref 12.3–15.4)
GFR SERPL CREATININE-BSD FRML MDRD: >150 ML/MIN/1.73
GLOBULIN UR ELPH-MCNC: 2.8 GM/DL
GLUCOSE SERPL-MCNC: 69 MG/DL (ref 65–99)
GLUCOSE UR STRIP-MCNC: NEGATIVE MG/DL
HCT VFR BLD AUTO: 40.2 % (ref 34–46.6)
HDLC SERPL-MCNC: 62 MG/DL (ref 40–60)
HGB BLD-MCNC: 13 G/DL (ref 12–15.9)
HGB UR QL STRIP.AUTO: NEGATIVE
HYALINE CASTS UR QL AUTO: ABNORMAL /LPF
IMM GRANULOCYTES # BLD AUTO: 0.04 10*3/MM3 (ref 0–0.05)
IMM GRANULOCYTES NFR BLD AUTO: 0.6 % (ref 0–0.5)
KETONES UR QL STRIP: NEGATIVE
LDLC SERPL CALC-MCNC: 93 MG/DL (ref 0–100)
LDLC/HDLC SERPL: 1.5 {RATIO}
LEUKOCYTE ESTERASE UR QL STRIP.AUTO: ABNORMAL
LYMPHOCYTES # BLD AUTO: 1.28 10*3/MM3 (ref 0.7–3.1)
LYMPHOCYTES NFR BLD AUTO: 18.3 % (ref 19.6–45.3)
MCH RBC QN AUTO: 28.4 PG (ref 26.6–33)
MCHC RBC AUTO-ENTMCNC: 32.3 G/DL (ref 31.5–35.7)
MCV RBC AUTO: 87.8 FL (ref 79–97)
MONOCYTES # BLD AUTO: 0.6 10*3/MM3 (ref 0.1–0.9)
MONOCYTES NFR BLD AUTO: 8.6 % (ref 5–12)
NEUTROPHILS NFR BLD AUTO: 4.8 10*3/MM3 (ref 1.7–7)
NEUTROPHILS NFR BLD AUTO: 68.6 % (ref 42.7–76)
NITRITE UR QL STRIP: NEGATIVE
NRBC BLD AUTO-RTO: 0 /100 WBC (ref 0–0.2)
PH UR STRIP.AUTO: 6 [PH] (ref 5–8)
PLATELET # BLD AUTO: 271 10*3/MM3 (ref 140–450)
PMV BLD AUTO: 10.6 FL (ref 6–12)
POTASSIUM SERPL-SCNC: 4.4 MMOL/L (ref 3.5–5.2)
PROT SERPL-MCNC: 7.1 G/DL (ref 6–8.5)
PROT UR QL STRIP: NEGATIVE
RBC # BLD AUTO: 4.58 10*6/MM3 (ref 3.77–5.28)
RBC # UR: ABNORMAL /HPF
REF LAB TEST METHOD: ABNORMAL
SODIUM SERPL-SCNC: 136 MMOL/L (ref 136–145)
SP GR UR STRIP: 1.02 (ref 1–1.03)
SQUAMOUS #/AREA URNS HPF: ABNORMAL /HPF
TRIGL SERPL-MCNC: 66 MG/DL (ref 0–150)
UROBILINOGEN UR QL STRIP: ABNORMAL
VLDLC SERPL-MCNC: 13 MG/DL (ref 5–40)
WBC # BLD AUTO: 6.99 10*3/MM3 (ref 3.4–10.8)
WBC UR QL AUTO: ABNORMAL /HPF

## 2021-09-30 NOTE — TELEPHONE ENCOUNTER
Pato is calling in regards of the patients last visit with the compliant for the patient was her stomach. Dr. Stoddard requested she stopped eating dairy products and the patient isnt happy with the new request.     Pato didn't no is Dr. Stoddard would like to continue this or if she wanted to drop the requirement for the patient or if the patient needed to come in and be reevaluated.    Thanks

## 2021-10-01 ENCOUNTER — OFFICE VISIT (OUTPATIENT)
Dept: FAMILY MEDICINE CLINIC | Facility: CLINIC | Age: 41
End: 2021-10-01

## 2021-10-01 VITALS
DIASTOLIC BLOOD PRESSURE: 88 MMHG | OXYGEN SATURATION: 98 % | BODY MASS INDEX: 28.34 KG/M2 | HEART RATE: 105 BPM | HEIGHT: 63 IN | SYSTOLIC BLOOD PRESSURE: 130 MMHG

## 2021-10-01 DIAGNOSIS — R10.84 GENERALIZED ABDOMINAL PAIN: Primary | ICD-10-CM

## 2021-10-01 PROCEDURE — 99213 OFFICE O/P EST LOW 20 MIN: CPT | Performed by: GENERAL PRACTICE

## 2021-10-01 NOTE — PROGRESS NOTES
Subjective   Kiley Ledesma is a 40 y.o. female.   Chief Complaint   Patient presents with   • GI Problem     she wants to eat dairy         History of Present Illness     Was in earlier this week and was complaining of abdominal pain after eating and this seemed to be related to dairy products. I recommended a dairy-free diet which she initially agreed to but she does not want to have this restriction now. Talked to her psych advocate about it and told her that she did not get abdominal pain after dairy. Here today to resolve this decision.     The following portions of the patient's history were reviewed and updated as appropriate: allergies, current medications, past social history and problem list.    Outpatient Medications Prior to Visit   Medication Sig Dispense Refill   • acetaminophen (TYLENOL) 325 MG tablet Take 2 tablets by mouth Every 6 (Six) Hours As Needed for Mild Pain . 60 tablet 11   • acyclovir (Zovirax) 5 % ointment Apply every 2-3 hrs to fever blister 5 g 0   • albuterol (PROVENTIL) (2.5 MG/3ML) 0.083% nebulizer solution Take 2.5 mg by nebulization Every 4 (Four) Hours As Needed for Wheezing. 720 mL 3   • bacitracin 500 UNIT/GM ointment Apply  topically to the appropriate area as directed 3 (Three) Times a Day As Needed for Wound Care. 14 g 3   • BACLOFEN IT Has a baclofen pump     • buPROPion SR (WELLBUTRIN SR) 100 MG 12 hr tablet GIVE ONE TABLET BY MOUTH EVERY MORNING 31 tablet 11   • cholecalciferol (VITAMIN D3) 25 MCG (1000 UT) tablet TAKE 1 TABLET BY MOUTH ONCE DAILY 28 tablet 11   • Cholecalciferol 25 MCG (1000 UT) capsule Take 1 capsule by mouth Daily. 30 capsule 11   • diphenhydrAMINE (BENADRYL) 25 mg capsule Take 25 mg by mouth Every 6 (Six) Hours As Needed for Itching.     • doxycycline (VIBRAMYCIN) 100 MG capsule Take 100 mg by mouth Daily.     • estradiol (ESTRACE) 1 MG tablet Take 0.5 tablets by mouth Daily. (Patient taking differently: Take  by mouth Daily.) 31 tablet 11   •  "FLUoxetine (PROzac) 40 MG capsule Take 1 capsule by mouth Daily. 31 capsule 11   • fluticasone (FLONASE) 50 MCG/ACT nasal spray 2 sprays into the nostril(s) as directed by provider Daily. 16 g 11   • GNP Stool Softener 100 MG capsule GIVE ONE CAPSULE BY MOUTH TWICE DAILY 56 capsule 11   • guaiFENesin (MUCINEX) 600 MG 12 hr tablet Take 1,200 mg by mouth As Needed for Cough.     • levETIRAcetam (KEPPRA) 500 MG tablet Take 500 mg by mouth 2 (Two) Times a Day.     • linaclotide (LINZESS) 145 MCG capsule capsule Take 145 mcg by mouth As Needed.     • metoclopramide (REGLAN) 10 MG tablet GIVE ONE TABLET BY MOUTH ONCE DAILY FOR GASTROESOPHAGEAL REFLUX 31 tablet 11   • Misc. Devices (WHEELCHAIR) misc As directed     • NON FORMULARY Daily As Needed. Nyst/bacit/Zno/hc 1%     • nystatin (MYCOSTATIN) 982768 UNIT/GM cream Apply  topically to the appropriate area as directed 2 (Two) Times a Day As Needed (for external vaginal discomfort and itching). 30 g 0   • omeprazole (priLOSEC) 20 MG capsule GIVE ONE CAPSULE BY MOUTH ONCE DAILY FOR STOMACH ACID 31 capsule 11   • ondansetron (ZOFRAN) 8 MG tablet Take 8 mg by mouth Every 8 (Eight) Hours As Needed for Nausea or Vomiting.     • ondansetron ODT (ZOFRAN-ODT) 4 MG disintegrating tablet Place 1 tablet on the tongue Every 6 (Six) Hours As Needed for Nausea or Vomiting. 10 tablet 0   • promethazine-dextromethorphan (PROMETHAZINE-DM) 6.25-15 MG/5ML syrup 5 ml qhs prn 150 mL 0   • tolterodine (DETROL) 2 MG tablet GIVE ONE TABLET BY MOUTH TWICE DAILY 62 tablet 11   • valACYclovir (Valtrex) 1000 MG tablet 2 gm q12h x 2 doses 4 tablet 0     No facility-administered medications prior to visit.       Review of Systems  I have reviewed 12 systems with patient. Findings were negative except what is noted below and/or in history of present illness.     Objective   Visit Vitals  /88 (BP Location: Left arm)   Pulse 105   Ht 160 cm (63\")   LMP  (LMP Unknown)   SpO2 98%   BMI 28.34 kg/m² "     Physical Exam  Vitals and nursing note reviewed.   Constitutional:       General: She is not in acute distress.     Appearance: She is well-developed.   HENT:      Head: Normocephalic and atraumatic.   Eyes:      General:         Right eye: No discharge.         Left eye: No discharge.      Conjunctiva/sclera: Conjunctivae normal.      Pupils: Pupils are equal, round, and reactive to light.   Neck:      Thyroid: No thyromegaly.      Trachea: No tracheal deviation.   Pulmonary:      Effort: No respiratory distress.   Musculoskeletal:         General: No deformity. Normal range of motion.      Comments: Spastic quadriplegia with increased tone in all extremities   Skin:     General: Skin is warm and dry.   Neurological:      Mental Status: She is alert and oriented to person, place, and time.      Motor: Weakness (Of the upper and lower extremity bilateral) and abnormal muscle tone present.      Coordination: Coordination abnormal.      Gait: Gait abnormal.      Deep Tendon Reflexes:      Reflex Scores:       Patellar reflexes are 3+ on the right side and 3+ on the left side.     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         Behavior: Behavior normal.         Thought Content: Thought content normal.         Judgment: Judgment normal.       Notes brought forward are reviewed and updated if indicated.     Assessment/Plan   Problems Addressed this Visit     None      Visit Diagnoses     Generalized abdominal pain    -  Primary    ?dairy intolerance      Diagnoses       Codes Comments    Generalized abdominal pain    -  Primary ICD-10-CM: R10.84  ICD-9-CM: 789.07 ?dairy intolerance         Decided to restart dairy ad try lactaid tablet before meals. Recheck if not improving.    Approximately 20   minutes was spent in review of records, counseling and coordination of care.    No orders of the defined types were placed in  this encounter.    Return if symptoms worsen or fail to improve, for Next scheduled follow up.        This document has been electronically signed by Greta Stoddard MD on October 1, 2021 18:29 CDT

## 2021-10-02 LAB — BACTERIA SPEC AEROBE CULT: ABNORMAL

## 2021-10-04 ENCOUNTER — TELEPHONE (OUTPATIENT)
Dept: FAMILY MEDICINE CLINIC | Facility: CLINIC | Age: 41
End: 2021-10-04

## 2021-10-04 RX ORDER — LACTASE 3000 UNIT
3000 TABLET ORAL
Qty: 30 TABLET | Refills: 0 | Status: SHIPPED | OUTPATIENT
Start: 2021-10-04 | End: 2021-10-21 | Stop reason: SDUPTHER

## 2021-10-04 NOTE — TELEPHONE ENCOUNTER
[facility] called stating the pharmacy needs a strength, quantity and number of refills for Lactaid Tablets  before it can be filled. She would like a return call please. Ask for Pato [phone]  Thanks

## 2021-10-05 RX ORDER — CEPHALEXIN 250 MG/1
250 CAPSULE ORAL 3 TIMES DAILY
Qty: 21 CAPSULE | Refills: 0 | OUTPATIENT
Start: 2021-10-05 | End: 2021-10-25

## 2021-10-06 ENCOUNTER — TELEPHONE (OUTPATIENT)
Dept: FAMILY MEDICINE CLINIC | Facility: CLINIC | Age: 41
End: 2021-10-06

## 2021-10-06 NOTE — TELEPHONE ENCOUNTER
Dr. Stoddard Patient     Pato said that Kiley is complaining of her wrist hurting and requesting a brace or physical therapy.     Staff at the patients house has been cutting her Estradiol in half so she is not getting the full dose. Pato is wanting to know what she should do about this or if the patient is okay with just taking the half of the 0.5mg tablets.    Thanks

## 2021-10-07 DIAGNOSIS — M25.531 RIGHT WRIST PAIN: Primary | ICD-10-CM

## 2021-10-07 RX ORDER — METOCLOPRAMIDE 10 MG/1
TABLET ORAL
Qty: 31 TABLET | Refills: 11 | Status: SHIPPED | OUTPATIENT
Start: 2021-10-07 | End: 2022-10-07

## 2021-10-13 ENCOUNTER — TELEPHONE (OUTPATIENT)
Dept: FAMILY MEDICINE CLINIC | Facility: CLINIC | Age: 41
End: 2021-10-13

## 2021-10-13 RX ORDER — ACETAMINOPHEN/DIPHENHYDRAMINE 500MG-25MG
1 TABLET ORAL NIGHTLY PRN
Qty: 90 TABLET | Refills: 3 | Status: SHIPPED | OUTPATIENT
Start: 2021-10-13 | End: 2021-10-25

## 2021-10-13 NOTE — TELEPHONE ENCOUNTER
Message on machine saying Kiley is having issues falling asleep and staying asleep.  Asking, if there is anything you can give her or does she need an apt to see you first?

## 2021-10-21 RX ORDER — LACTASE 3000 UNIT
3000 TABLET ORAL
Qty: 30 TABLET | Refills: 0 | Status: SHIPPED | OUTPATIENT
Start: 2021-10-21 | End: 2021-11-17

## 2021-10-21 NOTE — TELEPHONE ENCOUNTER
Incoming Refill Request      Medication requested (name and dose): Lactad Tablet    Pharmacy where request should be sent: [facility]    Additional details provided by patient: none    Best call back number: [phone]    Does the patient have less than a 3 day supply:  [x] Yes  [] No    Malika Jimenez  10/21/21, 10:25 CDT

## 2021-11-08 DIAGNOSIS — J30.1 SEASONAL ALLERGIC RHINITIS DUE TO POLLEN: ICD-10-CM

## 2021-11-08 RX ORDER — ACETAMINOPHEN 325 MG/1
TABLET ORAL
Qty: 60 TABLET | Refills: 11 | Status: SHIPPED | OUTPATIENT
Start: 2021-11-08

## 2021-11-08 RX ORDER — IBUPROFEN 200 MG
CAPSULE ORAL
Qty: 28.4 G | Refills: 11 | Status: ON HOLD | OUTPATIENT
Start: 2021-11-08 | End: 2022-09-02

## 2021-11-08 RX ORDER — GINSENG 100 MG
CAPSULE ORAL
Qty: 14 G | Refills: 11 | Status: ON HOLD | OUTPATIENT
Start: 2021-11-08 | End: 2022-09-02

## 2021-11-08 RX ORDER — FLUTICASONE PROPIONATE 50 MCG
SPRAY, SUSPENSION (ML) NASAL
Qty: 16 G | Refills: 11 | Status: SHIPPED | OUTPATIENT
Start: 2021-11-08 | End: 2022-01-07

## 2021-11-09 ENCOUNTER — TELEPHONE (OUTPATIENT)
Dept: FAMILY MEDICINE CLINIC | Facility: CLINIC | Age: 41
End: 2021-11-09

## 2021-11-09 NOTE — TELEPHONE ENCOUNTER
Ida is asking what mg of Estradiol should the patient be taking. It was filled in September and was 0.5mg but n her form it says 1mg

## 2021-11-10 ENCOUNTER — TELEPHONE (OUTPATIENT)
Dept: FAMILY MEDICINE CLINIC | Facility: CLINIC | Age: 41
End: 2021-11-10

## 2021-11-10 RX ORDER — ESTRADIOL 0.5 MG/1
0.5 TABLET ORAL DAILY
Qty: 90 TABLET | Refills: 3 | Status: SHIPPED | OUTPATIENT
Start: 2021-11-10 | End: 2022-09-08

## 2021-11-10 NOTE — TELEPHONE ENCOUNTER
Pato from [facility] is calling requesting a form stating that the patient takes only 0.5mg of Estradiol instead of the 1mg.    Fax # [phone]

## 2021-11-16 ENCOUNTER — TELEPHONE (OUTPATIENT)
Dept: FAMILY MEDICINE CLINIC | Facility: CLINIC | Age: 41
End: 2021-11-16

## 2021-11-17 RX ORDER — LACTASE 3000 UNIT
3000 TABLET ORAL
Qty: 30 TABLET | Refills: 5 | Status: SHIPPED | OUTPATIENT
Start: 2021-11-17 | End: 2022-04-06

## 2021-11-22 ENCOUNTER — OFFICE VISIT (OUTPATIENT)
Dept: FAMILY MEDICINE CLINIC | Facility: CLINIC | Age: 41
End: 2021-11-22

## 2021-11-22 VITALS — OXYGEN SATURATION: 96 % | SYSTOLIC BLOOD PRESSURE: 144 MMHG | DIASTOLIC BLOOD PRESSURE: 90 MMHG | HEART RATE: 79 BPM

## 2021-11-22 DIAGNOSIS — R23.3 BRUISES EASILY: ICD-10-CM

## 2021-11-22 DIAGNOSIS — G47.09 OTHER INSOMNIA: Primary | Chronic | ICD-10-CM

## 2021-11-22 PROCEDURE — 99213 OFFICE O/P EST LOW 20 MIN: CPT | Performed by: GENERAL PRACTICE

## 2021-11-22 RX ORDER — ACETAMINOPHEN,DIPHENHYDRAMINE HCL 500; 25 MG/1; MG/1
1 TABLET, FILM COATED ORAL NIGHTLY PRN
COMMUNITY

## 2021-11-22 NOTE — PROGRESS NOTES
Subjective   Kiley Ledesma is a 41 y.o. female.   Chief Complaint   Patient presents with   • Insomnia     bruising really easy     Is having some insomnia, uses Tylenol as needed.  Is requesting to have this given as a scheduled medicine every night.  She does not have any grogginess that next morning.  Also has had occasional bruising.  No bleeding issues.  Insomnia  This is a chronic problem. The current episode started more than 1 year ago. The problem occurs constantly. The problem has been unchanged. The symptoms are aggravated by stress. Treatments tried: Tylenol pm prn. The treatment provided significant relief.      The following portions of the patient's history were reviewed and updated as appropriate: allergies, current medications, past social history and problem list.    Outpatient Medications Prior to Visit   Medication Sig Dispense Refill   • acetaminophen (TYLENOL) 325 MG tablet GIVE TWO (2) TABLETS BY MOUTH EVERY 6 HOURS AS NEEDED FOR MILD PAIN. 60 tablet 11   • acyclovir (Zovirax) 5 % ointment Apply every 2-3 hrs to fever blister 5 g 0   • albuterol (PROVENTIL) (2.5 MG/3ML) 0.083% nebulizer solution Take 2.5 mg by nebulization Every 4 (Four) Hours As Needed for Wheezing. 720 mL 3   • bacitracin 500 UNIT/GM ointment APPLY TOPICALLY TO THE APPROPRIATE AREAS THREE TIMES DAILY AS NEEDED FOR WOUND CARE 14 g 11   • BACLOFEN IT Has a baclofen pump     • buPROPion SR (WELLBUTRIN SR) 100 MG 12 hr tablet GIVE ONE TABLET BY MOUTH EVERY MORNING 31 tablet 11   • cholecalciferol (VITAMIN D3) 25 MCG (1000 UT) tablet TAKE 1 TABLET BY MOUTH ONCE DAILY 28 tablet 11   • Cholecalciferol 25 MCG (1000 UT) capsule Take 1 capsule by mouth Daily. 30 capsule 11   • diphenhydrAMINE (BENADRYL) 25 mg capsule Take 25 mg by mouth Every 6 (Six) Hours As Needed for Itching.     • diphenhydrAMINE-acetaminophen (TYLENOL PM)  MG tablet per tablet Take 1 tablet by mouth At Night As Needed for Sleep.     • estradiol  (ESTRACE) 0.5 MG tablet Take 1 tablet by mouth Daily. 90 tablet 3   • FLUoxetine (PROzac) 40 MG capsule Take 1 capsule by mouth Daily. 31 capsule 11   • fluticasone (FLONASE) 50 MCG/ACT nasal spray INSTILL 2 SPRAYS IN EACH NOSTRIL ONCE DAILY FOR SEASONAL ALLERGIES **REORDER WHEN NEEDED-NOT A CYCLE FILL MED** 16 g 11   • GNP Stool Softener 100 MG capsule GIVE ONE CAPSULE BY MOUTH TWICE DAILY 56 capsule 11   • lactase (Lactase Enzyme) 3000 units tablet Take 1 tablet by mouth 3 (Three) Times a Day With Meals. 30 tablet 5   • levETIRAcetam (KEPPRA) 500 MG tablet Take 500 mg by mouth 2 (Two) Times a Day.     • linaclotide (Linzess) 145 MCG capsule capsule Give one capsule by mouth once daily for 3 days if no BM in 2 days as needed for constipation 30 capsule 11   • metoclopramide (REGLAN) 10 MG tablet GIVE ONE TABLET BY MOUTH ONCE DAILY FOR GASTROESOPHAGEAL REFLUX 31 tablet 11   • Misc. Devices (WHEELCHAIR) misc As directed     • Misc. Devices (Wrist Brace) misc 1 Device Daily. 1 each 0   • Mucinex 600 MG 12 hr tablet GIVE ONE TABLET BY MOUTH TWICE DAILY AS NEEDED FOR CONGESTION 20 tablet 11   • neomycin-bacitracin-polymyxin b (NEOSPORIN) 3.5-400-5000 ointment ointment APPLY THIN FILM TO AFFECTED AREA(S) EVERY 8 HOURS AS NEEDED FOR CUT, SCRAPE, OR WITNESSED BUG BITE AFTER CLEANING WITH SOAP/WATER. 28.4 g 11   • NON FORMULARY Daily As Needed. Nyst/bacit/Zno/hc 1%     • nystatin (MYCOSTATIN) 082238 UNIT/GM cream Apply  topically to the appropriate area as directed 2 (Two) Times a Day As Needed (for external vaginal discomfort and itching). 30 g 0   • omeprazole (priLOSEC) 20 MG capsule GIVE ONE CAPSULE BY MOUTH ONCE DAILY FOR STOMACH ACID 31 capsule 11   • ondansetron (ZOFRAN) 8 MG tablet Take 8 mg by mouth Every 8 (Eight) Hours As Needed for Nausea or Vomiting.     • ondansetron ODT (ZOFRAN-ODT) 4 MG disintegrating tablet Place 1 tablet on the tongue Every 6 (Six) Hours As Needed for Nausea or Vomiting. 10 tablet 0   •  promethazine-dextromethorphan (PROMETHAZINE-DM) 6.25-15 MG/5ML syrup 5 ml qhs prn 150 mL 0   • tolterodine LA (DETROL LA) 4 MG 24 hr capsule      • valACYclovir (Valtrex) 1000 MG tablet 2 gm q12h x 2 doses 4 tablet 0     No facility-administered medications prior to visit.       Review of Systems   Psychiatric/Behavioral: The patient has insomnia.      I have reviewed 12 systems with patient. Findings were negative except what is noted below and/or in history of present illness.     Objective   Visit Vitals  /90   Pulse 79   LMP  (LMP Unknown)   SpO2 96%     Physical Exam  Vitals and nursing note reviewed.   Constitutional:       General: She is not in acute distress.     Appearance: She is well-developed.   HENT:      Head: Normocephalic and atraumatic.   Eyes:      General:         Right eye: No discharge.         Left eye: No discharge.      Conjunctiva/sclera: Conjunctivae normal.      Pupils: Pupils are equal, round, and reactive to light.   Neck:      Thyroid: No thyromegaly.      Trachea: No tracheal deviation.   Pulmonary:      Effort: No respiratory distress.   Musculoskeletal:         General: No deformity. Normal range of motion.      Comments: Spastic quadriplegia with increased tone in all extremities   Skin:     General: Skin is warm and dry.          Neurological:      Mental Status: She is alert and oriented to person, place, and time.      Motor: Weakness (Of the upper and lower extremity bilateral) and abnormal muscle tone present.      Coordination: Coordination abnormal.      Gait: Gait abnormal.      Deep Tendon Reflexes:      Reflex Scores:       Patellar reflexes are 3+ on the right side and 3+ on the left side.     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         Behavior: Behavior normal.         Thought Content: Thought content normal.         Judgment: Judgment normal.       Notes brought  forward are reviewed and updated if indicated.     Assessment/Plan   Problems Addressed this Visit     None      Visit Diagnoses     Other insomnia  (Chronic)   -  Primary    Bruises easily          Diagnoses       Codes Comments    Other insomnia    -  Primary ICD-10-CM: G47.09  ICD-9-CM: 780.52     Bruises easily     ICD-10-CM: R23.8  ICD-9-CM: 782.9          Can take Tylenol PM every night.  Monitor for further bruising.    No orders of the defined types were placed in this encounter.    No follow-ups on file.        This document has been electronically signed by Greta Stoddard MD on November 22, 2021 18:03 CST

## 2021-12-07 RX ORDER — CHOLECALCIFEROL (VITAMIN D3) 25 MCG
TABLET ORAL
Qty: 31 TABLET | Refills: 11 | Status: ON HOLD | OUTPATIENT
Start: 2021-12-07 | End: 2022-09-02

## 2021-12-07 RX ORDER — DOCUSATE SODIUM 100 MG/1
CAPSULE, LIQUID FILLED ORAL
Qty: 62 CAPSULE | Refills: 11 | Status: SHIPPED | OUTPATIENT
Start: 2021-12-07 | End: 2022-12-07

## 2022-01-07 DIAGNOSIS — J30.1 SEASONAL ALLERGIC RHINITIS DUE TO POLLEN: ICD-10-CM

## 2022-01-07 RX ORDER — OMEPRAZOLE 20 MG/1
CAPSULE, DELAYED RELEASE ORAL
Qty: 31 CAPSULE | Refills: 11 | Status: SHIPPED | OUTPATIENT
Start: 2022-01-07 | End: 2023-01-09

## 2022-01-07 RX ORDER — FLUTICASONE PROPIONATE 50 MCG
SPRAY, SUSPENSION (ML) NASAL
Qty: 48 G | Refills: 3 | Status: SHIPPED | OUTPATIENT
Start: 2022-01-07 | End: 2022-06-02

## 2022-02-01 NOTE — TELEPHONE ENCOUNTER
Patient Education     4 Steps for Eating Healthier  Changing the way you eat can improve your health. It can lower your cholesterol and blood pressure, and help you stay at a healthy weight. Your diet doesn’t have to be bland and boring to be healthy. Just watch your calories and follow these steps:    Step 1. Eat fewer unhealthy fats  · Choose more fish and lean meats instead of fatty cuts of meat.  · Skip butter and lard, and use less margarine.  · Pass on foods that have palm, coconut, or hydrogenated oils.  · Eat fewer high-fat dairy foods like cheese, ice cream, and whole milk.  · Get a heart-healthy cookbook and try some low-fat recipes.  Step 2. Go light on salt  · Keep the saltshaker off the table.  · Limit high-salt ingredients, such as soy sauce, bouillon, and garlic salt.  · Instead of adding salt when cooking, season your food with herbs and flavorings. Try lemon, garlic, and onion, or salt-free herb seasonings.  · Limit convenience foods, such as boxed or canned foods and restaurant food.  · Read food labels and choose lower-sodium options.  Step 3. Limit sugar  · Pause before you add sugars to pancakes, cereal, coffee, or tea. This includes white and brown table sugar, syrup, honey, and molasses. Cut your usual amount by half.  · Use non-sugar sweeteners. Stevia, aspartame, and sucralose can satisfy a sweet tooth without adding calories.  · Swap out sugar-filled soda and other drinks. Buy sugar-free or low-calorie beverages. Remember water is always the best choice.  · Read labels and choose foods with less added sugar. Keep in mind that dairy foods and foods with fruit will have some natural sugar.  · Cut the sugar in recipes by 1/3 to 1/2. Boost the flavor with extracts like almond, vanilla, or orange. Or add spices such as cinnamon or nutmeg.  Step 4. Eat more fiber  · Eat fresh fruits and vegetables every day.  · Boost your diet with whole grains. Go for oats, whole-grain rice, and bran.  · Add  Requested Refills Sent     beans and lentils to your meals.  · Drink more water to match your fiber increase to help prevent constipation.  Date Last Reviewed: 6/1/2017 © 2000-2018 [employer]. [address], [place] [address]. All rights reserved. This information is not intended as a substitute for professional medical care. Always follow your healthcare professional's instructions.         Patient Education     Exercise for a Healthier Heart     Exercise with a friend. When activity is fun, you're more likely to stick with it.   You may wonder how you can improve the health of your heart. If you’re thinking about exercise, you’re on the right track. You don’t need to become an athlete. But you do need a certain amount of brisk exercise to help strengthen your heart. If you have been diagnosed with a heart condition, your healthcare provider may advise exercise to help stabilize your condition. To help make exercise a habit, choose safe, fun activities.   Before you start  Check with your healthcare provider before starting an exercise program. This is especially important if you have not been active for a while. It's also important if you have a long-term (chronic) health problem such as heart disease, diabetes, or obesity. Or if you are at high risk for having these problems.   Why exercise?  Exercising regularly offers many healthy rewards. It can help you do all of the following:  · Improve your blood cholesterol level to help prevent further heart trouble  · Lower your blood pressure to help prevent a stroke or heart attack  · Control diabetes, or reduce your risk of getting this disease  · Improve your heart and lung function  · Reach and stay at a healthy weight  · Make your muscles stronger so you can stay active  · Prevent falls and fractures by slowing the loss of bone mass (osteoporosis)  · Manage stress better  · Reduce your blood pressure  · Improve your sense of self and your body image  Exercise  tips    · Ease into your routine. Set small goals. Then build on them. If you are not sure what your activity level should be, talk with your healthcare provider first before starting an exercise routine.  · Exercise on most days. Aim for a total of 150 minutes (2 hours and 30 minutes) or more of moderate-intensity aerobic activity each week. Or 75 minutes (1 hour and 15 minutes) or more of vigorous-intensity aerobic activity each week. Or try for a combination of both. Moderate activity means that you breathe heavier and your heart rate increases but you can still talk. Think about doing 40 minutes of moderate exercise, 3 to 4 times a week. For best results, activity should last for about 40 minutes to lower blood pressure and cholesterol. It's OK to work up to the 40-minute period over time. Examples of moderate-intensity activity are walking 1 mile in 15 minutes. Or doing 30 to 45 minutes of yard work.  · Step up your daily activity level.  Along with your exercise program, try being more active the whole day. Walk instead of drive. Or park further away so that you take more steps each day. Do more household tasks or yard work. You may not be able to meet the advised mount of physical activity. But doing some moderate- or vigorous-intensity aerobic activity can help reduce your risk for heart disease. Your healthcare provider can help you figure out what is best for you.  · Choose 1 or more activities you enjoy.  Walking is one of the easiest things you can do. You can also try swimming, riding a bike, dancing, or taking an exercise class.    When to call your healthcare provider  Call your healthcare provider if you have any of these:   · Chest pain or feel dizzy or lightheaded  · Burning, tightness, pressure, or heaviness in your chest, neck, shoulders, back, or arms  · Abnormal shortness of breath  · More joint or muscle pain  · A very fast or irregular heartbeat (palpitations)  Leandra last reviewed this  educational content on 7/1/2019  © 3168-9908 [employer]. [address] [place] [address], [place] [address]. All rights reserved. This information is not intended as a substitute for professional medical care. Always follow your healthcare professional's instructions.

## 2022-02-02 RX ORDER — DIPHENHYDRAMINE HYDROCHLORIDE 25 MG/1
CAPSULE ORAL
Qty: 30 CAPSULE | Refills: 10 | Status: ON HOLD | OUTPATIENT
Start: 2022-02-02 | End: 2022-09-02

## 2022-02-08 ENCOUNTER — OFFICE VISIT (OUTPATIENT)
Dept: FAMILY MEDICINE CLINIC | Facility: CLINIC | Age: 42
End: 2022-02-08

## 2022-02-08 VITALS
SYSTOLIC BLOOD PRESSURE: 121 MMHG | DIASTOLIC BLOOD PRESSURE: 70 MMHG | TEMPERATURE: 96.2 F | HEIGHT: 65 IN | BODY MASS INDEX: 28.79 KG/M2

## 2022-02-08 DIAGNOSIS — R30.0 DYSURIA: Primary | ICD-10-CM

## 2022-02-08 PROCEDURE — 99213 OFFICE O/P EST LOW 20 MIN: CPT | Performed by: GENERAL PRACTICE

## 2022-02-08 NOTE — PROGRESS NOTES
Subjective   Kiley Ledesma is a 41 y.o. female.   Chief Complaint   Patient presents with   • Urinary Tract Infection   • Vaginitis       History of Present Illness     Thinks she has a yeast infection or a uti. Is burning when she pees. No itching or irritation.Does wear pads and is incontinent most of the time. The patient denies fever or chills.     The following portions of the patient's history were reviewed and updated as appropriate: allergies, current medications, past social history and problem list.    Outpatient Medications Prior to Visit   Medication Sig Dispense Refill   • acetaminophen (TYLENOL) 325 MG tablet GIVE TWO (2) TABLETS BY MOUTH EVERY 6 HOURS AS NEEDED FOR MILD PAIN. 60 tablet 11   • acyclovir (Zovirax) 5 % ointment Apply every 2-3 hrs to fever blister 5 g 0   • albuterol (PROVENTIL) (2.5 MG/3ML) 0.083% nebulizer solution Take 2.5 mg by nebulization Every 4 (Four) Hours As Needed for Wheezing. 720 mL 3   • bacitracin 500 UNIT/GM ointment APPLY TOPICALLY TO THE APPROPRIATE AREAS THREE TIMES DAILY AS NEEDED FOR WOUND CARE 14 g 11   • BACLOFEN IT Has a baclofen pump     • Banophen 25 MG capsule GIVE ONE CAPSULE BY MOUTH AT BEDTIME AS NEEDED FOR SLEEP. * NO REFILLS * 30 capsule 10   • buPROPion SR (WELLBUTRIN SR) 100 MG 12 hr tablet GIVE ONE TABLET BY MOUTH EVERY MORNING 31 tablet 11   • Cholecalciferol 25 MCG (1000 UT) capsule Take 1 capsule by mouth Daily. 30 capsule 11   • cholecalciferol 25 MCG tablet TAKE 1 TABLET BY MOUTH ONCE DAILY 31 tablet 11   • diphenhydrAMINE-acetaminophen (TYLENOL PM)  MG tablet per tablet Take 1 tablet by mouth At Night As Needed for Sleep.     • docusate sodium (COLACE) 100 MG capsule GIVE ONE CAPSULE BY MOUTH TWICE DAILY 62 capsule 11   • estradiol (ESTRACE) 0.5 MG tablet Take 1 tablet by mouth Daily. 90 tablet 3   • FLUoxetine (PROzac) 40 MG capsule Take 1 capsule by mouth Daily. 31 capsule 11   • fluticasone (FLONASE) 50 MCG/ACT nasal spray INSTILL 2  SPRAYS INTO EACH NOSTRIL DAILY 48 g 3   • lactase (Lactase Enzyme) 3000 units tablet Take 1 tablet by mouth 3 (Three) Times a Day With Meals. 30 tablet 5   • levETIRAcetam (KEPPRA) 500 MG tablet Take 500 mg by mouth 2 (Two) Times a Day.     • linaclotide (Linzess) 145 MCG capsule capsule Give one capsule by mouth once daily for 3 days if no BM in 2 days as needed for constipation 30 capsule 11   • metoclopramide (REGLAN) 10 MG tablet GIVE ONE TABLET BY MOUTH ONCE DAILY FOR GASTROESOPHAGEAL REFLUX 31 tablet 11   • Misc. Devices (WHEELCHAIR) misc As directed     • Misc. Devices (Wrist Brace) misc 1 Device Daily. 1 each 0   • Mucinex 600 MG 12 hr tablet GIVE ONE TABLET BY MOUTH TWICE DAILY AS NEEDED FOR CONGESTION 20 tablet 11   • neomycin-bacitracin-polymyxin b (NEOSPORIN) 3.5-400-5000 ointment ointment APPLY THIN FILM TO AFFECTED AREA(S) EVERY 8 HOURS AS NEEDED FOR CUT, SCRAPE, OR WITNESSED BUG BITE AFTER CLEANING WITH SOAP/WATER. 28.4 g 11   • NON FORMULARY Daily As Needed. Nyst/bacit/Zno/hc 1%     • nystatin (MYCOSTATIN) 041478 UNIT/GM cream Apply  topically to the appropriate area as directed 2 (Two) Times a Day As Needed (for external vaginal discomfort and itching). 30 g 0   • omeprazole (priLOSEC) 20 MG capsule GIVE ONE CAPSULE BY MOUTH ONCE DAILY FOR STOMACH ACID 31 capsule 11   • ondansetron (ZOFRAN) 8 MG tablet Take 8 mg by mouth Every 8 (Eight) Hours As Needed for Nausea or Vomiting.     • ondansetron ODT (ZOFRAN-ODT) 4 MG disintegrating tablet Place 1 tablet on the tongue Every 6 (Six) Hours As Needed for Nausea or Vomiting. 10 tablet 0   • promethazine-dextromethorphan (PROMETHAZINE-DM) 6.25-15 MG/5ML syrup 5 ml qhs prn 150 mL 0   • tolterodine LA (DETROL LA) 4 MG 24 hr capsule      • valACYclovir (Valtrex) 1000 MG tablet 2 gm q12h x 2 doses 4 tablet 0     No facility-administered medications prior to visit.       Review of Systems  I have reviewed 12 systems with patient. Findings were negative except  "what is noted below and/or in history of present illness.     Objective   Visit Vitals  /70 (BP Location: Left arm)   Temp 96.2 °F (35.7 °C)   Ht 165.1 cm (65\")   LMP  (LMP Unknown)   BMI 28.79 kg/m²     Physical Exam  Vitals and nursing note reviewed.   Constitutional:       General: She is not in acute distress.     Appearance: She is well-developed.   HENT:      Head: Normocephalic and atraumatic.   Eyes:      General:         Right eye: No discharge.         Left eye: No discharge.      Conjunctiva/sclera: Conjunctivae normal.      Pupils: Pupils are equal, round, and reactive to light.   Neck:      Thyroid: No thyromegaly.      Trachea: No tracheal deviation.   Pulmonary:      Effort: No respiratory distress.   Genitourinary:     Comments: No vulvitis  Musculoskeletal:         General: No deformity. Normal range of motion.      Comments: Spastic quadriplegia with increased tone in all extremities   Skin:     General: Skin is warm and dry.   Neurological:      Mental Status: She is alert and oriented to person, place, and time.      Motor: Weakness (Of the upper and lower extremity bilateral) and abnormal muscle tone present.      Coordination: Coordination abnormal.      Gait: Gait abnormal.      Deep Tendon Reflexes:      Reflex Scores:       Patellar reflexes are 3+ on the right side and 3+ on the left side.     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         Behavior: Behavior normal.         Thought Content: Thought content normal.         Judgment: Judgment normal.       Notes brought forward are reviewed and updated if indicated.     Assessment/Plan   Problems Addressed this Visit     None      Visit Diagnoses     Dysuria    -  Primary    Relevant Orders    Urinalysis With Culture If Indicated - (Completed)      Diagnoses       Codes Comments    Dysuria    -  Primary ICD-10-CM: R30.0  ICD-9-CM: 788.1     "       Will notify regarding results. Push fluids.     No orders of the defined types were placed in this encounter.    No follow-ups on file.        This document has been electronically signed by Greta Stoddard MD on February 9, 2022 15:49 CST

## 2022-02-09 ENCOUNTER — LAB (OUTPATIENT)
Dept: LAB | Facility: HOSPITAL | Age: 42
End: 2022-02-09

## 2022-02-09 DIAGNOSIS — R30.0 DYSURIA: ICD-10-CM

## 2022-02-09 LAB
BACTERIA UR QL AUTO: ABNORMAL /HPF
BILIRUB UR QL STRIP: NEGATIVE
CLARITY UR: CLEAR
COLOR UR: YELLOW
GLUCOSE UR STRIP-MCNC: NEGATIVE MG/DL
HGB UR QL STRIP.AUTO: ABNORMAL
HYALINE CASTS UR QL AUTO: ABNORMAL /LPF
KETONES UR QL STRIP: NEGATIVE
LEUKOCYTE ESTERASE UR QL STRIP.AUTO: ABNORMAL
NITRITE UR QL STRIP: NEGATIVE
PH UR STRIP.AUTO: 5.5 [PH] (ref 5–9)
PROT UR QL STRIP: NEGATIVE
RBC # UR STRIP: ABNORMAL /HPF
REF LAB TEST METHOD: ABNORMAL
SP GR UR STRIP: 1.01 (ref 1–1.03)
SQUAMOUS #/AREA URNS HPF: ABNORMAL /HPF
UROBILINOGEN UR QL STRIP: ABNORMAL
WBC # UR STRIP: ABNORMAL /HPF

## 2022-02-09 PROCEDURE — 87086 URINE CULTURE/COLONY COUNT: CPT

## 2022-02-09 PROCEDURE — 81001 URINALYSIS AUTO W/SCOPE: CPT

## 2022-02-09 RX ORDER — CEPHALEXIN 500 MG/1
500 CAPSULE ORAL 3 TIMES DAILY
Qty: 21 CAPSULE | Refills: 0 | Status: SHIPPED | OUTPATIENT
Start: 2022-02-09 | End: 2022-03-10

## 2022-02-10 LAB — BACTERIA SPEC AEROBE CULT: NORMAL

## 2022-02-28 RX ORDER — ONDANSETRON 8 MG/1
TABLET, ORALLY DISINTEGRATING ORAL
Qty: 30 TABLET | Refills: 11 | Status: ON HOLD | OUTPATIENT
Start: 2022-02-28 | End: 2022-09-02

## 2022-03-07 RX ORDER — LEVETIRACETAM 500 MG/1
500 TABLET ORAL 2 TIMES DAILY
Qty: 60 TABLET | Refills: 3 | Status: SHIPPED | OUTPATIENT
Start: 2022-03-07 | End: 2022-06-07

## 2022-03-07 NOTE — TELEPHONE ENCOUNTER
Incoming Refill Request      Medication requested (name and dose): Levetriraacetam    Pharmacy where request should be sent: [facility]    Additional details provided by patient: none    Best call back number: [phone]    Does the patient have less than a 3 day supply:  [] Yes  [x] No    Malika Jimenez  03/07/22, 12:50 CST

## 2022-03-21 ENCOUNTER — OFFICE VISIT (OUTPATIENT)
Dept: PODIATRY | Facility: CLINIC | Age: 42
End: 2022-03-21

## 2022-03-21 VITALS — BODY MASS INDEX: 26.66 KG/M2 | HEART RATE: 99 BPM | OXYGEN SATURATION: 98 % | WEIGHT: 160 LBS | HEIGHT: 65 IN

## 2022-03-21 DIAGNOSIS — L60.0 INGROWN TOENAIL: ICD-10-CM

## 2022-03-21 DIAGNOSIS — B35.1 ONYCHOMYCOSIS: Primary | ICD-10-CM

## 2022-03-21 DIAGNOSIS — M79.674 PAIN IN TOES OF BOTH FEET: ICD-10-CM

## 2022-03-21 DIAGNOSIS — G80.0 SPASTIC QUADRIPLEGIC CEREBRAL PALSY: ICD-10-CM

## 2022-03-21 DIAGNOSIS — M79.675 PAIN IN TOES OF BOTH FEET: ICD-10-CM

## 2022-03-21 PROCEDURE — 99203 OFFICE O/P NEW LOW 30 MIN: CPT | Performed by: PODIATRIST

## 2022-03-21 PROCEDURE — 11721 DEBRIDE NAIL 6 OR MORE: CPT | Performed by: PODIATRIST

## 2022-03-21 NOTE — PROGRESS NOTES
Kiley Ledesma  1980  41 y.o. female    03/21/2022  Chief Complaint   Patient presents with   • Left Foot - Ingrown Toenail     Great toe   • Right Foot - Nail Problem     Treated previously on great toe           History of Present Illness    Kiley Ledesma is a 41 y.o. female presenting to office for evaluation of possible ingrown toenail and toenail fungus.  Previously seen in urgent care for increased redness and drainage around her left great toenail.  She was given antibiotic which seems to have improved this however the site remains painful.      Past Medical History:   Diagnosis Date   • Acute vulvitis     Candida   • Allergy     Unspecified, initial encounter   • Amblyopia     OS   • Astigmatism    • Cerebral palsy (HCC)    • Cheilosis    • Common cold    • Contusion    • Depressive disorder    • Diarrhea    • Encounter for general adult medical examination without abnormal findings    • Encounter for routine adult health examination     Adult health examination      • Fever    • Gastroesophageal reflux disease    • Generalized abdominal pain    • Indeterminate colitis    • Infected insect bite    • Myopia    • Spastic quadriplegic cerebral palsy (HCC)    • Trouble walking     Walking disability      • Urinary incontinence    • Urinary tract infectious disease    • Wears glasses    • Worried well          Past Surgical History:   Procedure Laterality Date   • COLONOSCOPY N/A 03/28/2012    hemorrhoids   • COLONOSCOPY N/A 4/23/2018    Procedure: COLONOSCOPY--look TI, bx, hx colitis;  Surgeon: Sreedhar Kirkpatrick MD;  Location: [facility] ENDOSCOPY;  Service: Gastroenterology   • COLONOSCOPY N/A 6/7/2019    Procedure: COLONOSCOPY;  Surgeon: Sreedhar Kirkpatrick MD;  Location: [facility] ENDOSCOPY;  Service: Gastroenterology   • CYSTOSCOPY N/A 4/19/2019    Procedure: CYSTOSCOPY;  Surgeon: Cecilio Acuña MD;  Location: [facility] OR;  Service: Urology   • ENDOSCOPY N/A 03/28/2012    gastritis   • INJECTION OF  MEDICATION  02/23/2016    Kenalog   • TOTAL ABDOMINAL HYSTERECTOMY WITH SALPINGO OOPHORECTOMY N/A 03/24/2005   • UPPER GASTROINTESTINAL ENDOSCOPY  03/28/2012         Family History   Problem Relation Age of Onset   • Coronary artery disease Other    • Diabetes Other    • Hypertension Other    • Stroke Other          Social History     Socioeconomic History   • Marital status: Single   Tobacco Use   • Smoking status: Never Smoker   • Smokeless tobacco: Never Used   Substance and Sexual Activity   • Alcohol use: No   • Drug use: No   • Sexual activity: Defer         Current Outpatient Medications   Medication Sig Dispense Refill   • acetaminophen (TYLENOL) 325 MG tablet GIVE TWO (2) TABLETS BY MOUTH EVERY 6 HOURS AS NEEDED FOR MILD PAIN. 60 tablet 11   • acyclovir (Zovirax) 5 % ointment Apply every 2-3 hrs to fever blister 5 g 0   • albuterol (PROVENTIL) (2.5 MG/3ML) 0.083% nebulizer solution Take 2.5 mg by nebulization Every 4 (Four) Hours As Needed for Wheezing. 720 mL 3   • bacitracin 500 UNIT/GM ointment APPLY TOPICALLY TO THE APPROPRIATE AREAS THREE TIMES DAILY AS NEEDED FOR WOUND CARE 14 g 11   • BACLOFEN IT Has a baclofen pump     • Banophen 25 MG capsule GIVE ONE CAPSULE BY MOUTH AT BEDTIME AS NEEDED FOR SLEEP. * NO REFILLS * 30 capsule 10   • buPROPion SR (WELLBUTRIN SR) 100 MG 12 hr tablet GIVE ONE TABLET BY MOUTH EVERY MORNING 31 tablet 11   • cephalexin (KEFLEX) 500 MG capsule Take 1 capsule by mouth 2 (Two) Times a Day. 7 capsule 0   • Cholecalciferol 25 MCG (1000 UT) capsule Take 1 capsule by mouth Daily. 30 capsule 11   • cholecalciferol 25 MCG tablet TAKE 1 TABLET BY MOUTH ONCE DAILY 31 tablet 11   • diphenhydrAMINE-acetaminophen (TYLENOL PM)  MG tablet per tablet Take 1 tablet by mouth At Night As Needed for Sleep.     • docusate sodium (COLACE) 100 MG capsule GIVE ONE CAPSULE BY MOUTH TWICE DAILY 62 capsule 11   • estradiol (ESTRACE) 0.5 MG tablet Take 1 tablet by mouth Daily. 90 tablet 3   •  fluconazole (DIFLUCAN) 150 MG tablet Take one pill ...  May repeat in 5-7 days. 2 tablet 0   • FLUoxetine (PROzac) 40 MG capsule Take 1 capsule by mouth Daily. 31 capsule 11   • fluticasone (FLONASE) 50 MCG/ACT nasal spray INSTILL 2 SPRAYS INTO EACH NOSTRIL DAILY 48 g 3   • lactase (Lactase Enzyme) 3000 units tablet Take 1 tablet by mouth 3 (Three) Times a Day With Meals. 30 tablet 5   • levETIRAcetam (KEPPRA) 500 MG tablet Take 1 tablet by mouth 2 (Two) Times a Day. 60 tablet 3   • linaclotide (Linzess) 145 MCG capsule capsule Give one capsule by mouth once daily for 3 days if no BM in 2 days as needed for constipation 30 capsule 11   • metoclopramide (REGLAN) 10 MG tablet GIVE ONE TABLET BY MOUTH ONCE DAILY FOR GASTROESOPHAGEAL REFLUX 31 tablet 11   • Misc. Devices (WHEELCHAIR) misc As directed     • Misc. Devices (Wrist Brace) misc 1 Device Daily. 1 each 0   • Mucinex 600 MG 12 hr tablet GIVE ONE TABLET BY MOUTH TWICE DAILY AS NEEDED FOR CONGESTION 20 tablet 11   • neomycin-bacitracin-polymyxin b (NEOSPORIN) 3.5-400-5000 ointment ointment APPLY THIN FILM TO AFFECTED AREA(S) EVERY 8 HOURS AS NEEDED FOR CUT, SCRAPE, OR WITNESSED BUG BITE AFTER CLEANING WITH SOAP/WATER. 28.4 g 11   • NON FORMULARY Daily As Needed. Nyst/bacit/Zno/hc 1%     • nystatin (MYCOSTATIN) 159164 UNIT/GM cream Apply  topically to the appropriate area as directed 2 (Two) Times a Day As Needed (for external vaginal discomfort and itching). 30 g 0   • nystatin-zinc oxide Apply  topically to the appropriate area as directed 2 (Two) Times a Day. 60 g 0   • omeprazole (priLOSEC) 20 MG capsule GIVE ONE CAPSULE BY MOUTH ONCE DAILY FOR STOMACH ACID 31 capsule 11   • ondansetron (ZOFRAN) 8 MG tablet Take 8 mg by mouth Every 8 (Eight) Hours As Needed for Nausea or Vomiting.     • ondansetron ODT (ZOFRAN-ODT) 4 MG disintegrating tablet Place 1 tablet on the tongue Every 6 (Six) Hours As Needed for Nausea or Vomiting. 10 tablet 0   • ondansetron ODT  "(ZOFRAN-ODT) 8 MG disintegrating tablet GIVE ONE TABLET BY MOUTH EVERY 8 HOURS AS NEEDED FOR NAUSEA 30 tablet 11   • promethazine-dextromethorphan (PROMETHAZINE-DM) 6.25-15 MG/5ML syrup 5 ml qhs prn 150 mL 0   • tolterodine LA (DETROL LA) 4 MG 24 hr capsule      • valACYclovir (Valtrex) 1000 MG tablet 2 gm q12h x 2 doses 4 tablet 0     No current facility-administered medications for this visit.         OBJECTIVE    Pulse 99   Ht 165.1 cm (65\")   Wt 72.6 kg (160 lb)   LMP  (LMP Unknown)   SpO2 98%   BMI 26.63 kg/m²         Review of Systems   Constitutional: Negative.    Respiratory: Negative.    Cardiovascular: Negative.          Physical Exam   Constitutional: she appears well-developed and well-nourished.   CV: No chest pain. Normal RR  Resp: Non labored respirations  Psychiatric: she has a normal mood and affect. her behavior is normal.      Lower Extremity Exam:  Vascular: DP/PT pulses palpable 2+.   No hallux edema  Toes warm  Neuro: Protective sensation intact, b/l.  DTRs intact  Integument: No open wounds.  Ingrown bilateral nail border, left hallux. Mild erythema, no edema. +tenderness to palpation.  Many nails 1-5 thickened, irregular and dystrophic with subungual debris.  Web spaces c/d/i  No skin lesions  Musculoskeletal: LE muscle strength 4/5.  Spastic diplegia  Motorized scooter bound  Hallux valgus, claw toe deformities bilateral    Nail Removal    Date/Time: 3/22/2022 7:01 AM  Performed by: Boubacar Lott DPM  Authorized by: Boubacar Lott DPM   Consent: Written consent obtained.  Risks and benefits: risks, benefits and alternatives were discussed  Consent given by: patient and guardian  Location: left foot  Location details: left big toe  Anesthesia: digital block    Anesthesia:  Local Anesthetic: lidocaine 2% without epinephrine  Anesthetic total: 3 mL  Preparation: skin prepped with Betadine  Amount removed: complete  Nail matrix removed: partial  Dressing: 4x4 and antibiotic " ointment  Patient tolerance: patient tolerated the procedure well with no immediate complications  Comments: Matrixectomy with 10% NaOH. Neutralized with acetic acid.                  ASSESSMENT AND PLAN    Diagnoses and all orders for this visit:    1. Onychomycosis (Primary)    2. Ingrown toenail    3. Pain in toes of both feet    4. Spastic quadriplegic cerebral palsy (HCC)      -Comprehensive foot and ankle exam performed  -Diagnosis, prevention, and treatment of ingrown toe nails discussed with patient, including risks and potential benefits of nail avulsion both temporary and permanent versus simple debridement.  -Pt elected for partial permanent avulsion of left hallux  -Aftercare instructions for soapy jean-baptiste soaks BID  -Debridement of remaining nails 1 through 5 on right, 2 through 5 on left with nail nipper decrease thickness, fungal load and discomfort.  -Recheck 2 weeks            This document has been electronically signed by Boubacar Lott DPM on March 22, 2022 07:01 CDT         Much of this encounter note is an electronic transcription/translation of spoken language to printed text.   Boubacar Lott DPM  3/22/2022  07:01 CDT

## 2022-03-22 PROCEDURE — 11750 EXCISION NAIL&NAIL MATRIX: CPT | Performed by: PODIATRIST

## 2022-03-24 ENCOUNTER — PATIENT ROUNDING (BHMG ONLY) (OUTPATIENT)
Dept: PODIATRY | Facility: CLINIC | Age: 42
End: 2022-03-24

## 2022-03-24 NOTE — PROGRESS NOTES
March 24, 2022    Hello, may I speak with Kiley Ledesma?    My name is ONEIL RYAN    I am  with [facility]  [address] [place] [address] [phone].    Before we get started may I verify your date of birth? 1980    I am calling to officially welcome you to our practice and ask about your recent visit. Is this a good time to talk? no    UNABLE TO CONTACT PATIENT SHE IS IN [employer] AND PER MESSAGE MAILBOX IS FULL AND CAN NOT RECEIVE ANY MESSAGES

## 2022-03-28 ENCOUNTER — OFFICE VISIT (OUTPATIENT)
Dept: FAMILY MEDICINE CLINIC | Facility: CLINIC | Age: 42
End: 2022-03-28

## 2022-03-28 VITALS — SYSTOLIC BLOOD PRESSURE: 128 MMHG | DIASTOLIC BLOOD PRESSURE: 72 MMHG | OXYGEN SATURATION: 99 % | HEART RATE: 95 BPM

## 2022-03-28 DIAGNOSIS — Z11.59 NEED FOR HEPATITIS C SCREENING TEST: ICD-10-CM

## 2022-03-28 DIAGNOSIS — F32.5 MAJOR DEPRESSIVE DISORDER WITH SINGLE EPISODE, IN FULL REMISSION: Primary | Chronic | ICD-10-CM

## 2022-03-28 DIAGNOSIS — Z00.00 ANNUAL PHYSICAL EXAM: ICD-10-CM

## 2022-03-28 DIAGNOSIS — G40.A09 ABSENCE SEIZURE: ICD-10-CM

## 2022-03-28 DIAGNOSIS — G80.0 SPASTIC QUADRIPLEGIC CEREBRAL PALSY: Chronic | ICD-10-CM

## 2022-03-28 PROBLEM — K21.00 GASTROESOPHAGEAL REFLUX DISEASE WITH ESOPHAGITIS: Chronic | Status: ACTIVE | Noted: 2018-02-26

## 2022-03-28 PROCEDURE — 99214 OFFICE O/P EST MOD 30 MIN: CPT | Performed by: GENERAL PRACTICE

## 2022-03-28 NOTE — PROGRESS NOTES
Subjective   Kiley Ledesma is a 41 y.o. female.   Chief Complaint   Patient presents with   • Cerebral Palsy     For review and evaluation of management of chronic medical problems. Records reviewed. Recent labs, xrays reviewed and medications reconciled.  No new problems. Depression and anxiety stable.  Seizures are controlled.  Insomnia  This is a chronic problem. The current episode started more than 1 year ago. The problem occurs constantly. The problem has been unchanged. The symptoms are aggravated by stress. Treatments tried: Tylenol pm prn. The treatment provided significant relief.      The following portions of the patient's history were reviewed and updated as appropriate: allergies, current medications, past social history and problem list.    Outpatient Medications Prior to Visit   Medication Sig Dispense Refill   • acetaminophen (TYLENOL) 325 MG tablet GIVE TWO (2) TABLETS BY MOUTH EVERY 6 HOURS AS NEEDED FOR MILD PAIN. 60 tablet 11   • acyclovir (Zovirax) 5 % ointment Apply every 2-3 hrs to fever blister 5 g 0   • albuterol (PROVENTIL) (2.5 MG/3ML) 0.083% nebulizer solution Take 2.5 mg by nebulization Every 4 (Four) Hours As Needed for Wheezing. 720 mL 3   • bacitracin 500 UNIT/GM ointment APPLY TOPICALLY TO THE APPROPRIATE AREAS THREE TIMES DAILY AS NEEDED FOR WOUND CARE 14 g 11   • BACLOFEN IT Has a baclofen pump     • Banophen 25 MG capsule GIVE ONE CAPSULE BY MOUTH AT BEDTIME AS NEEDED FOR SLEEP. * NO REFILLS * 30 capsule 10   • buPROPion SR (WELLBUTRIN SR) 100 MG 12 hr tablet GIVE ONE TABLET BY MOUTH EVERY MORNING 31 tablet 11   • Cholecalciferol 25 MCG (1000 UT) capsule Take 1 capsule by mouth Daily. 30 capsule 11   • cholecalciferol 25 MCG tablet TAKE 1 TABLET BY MOUTH ONCE DAILY 31 tablet 11   • diphenhydrAMINE-acetaminophen (TYLENOL PM)  MG tablet per tablet Take 1 tablet by mouth At Night As Needed for Sleep.     • docusate sodium (COLACE) 100 MG capsule GIVE ONE CAPSULE BY MOUTH  TWICE DAILY 62 capsule 11   • estradiol (ESTRACE) 0.5 MG tablet Take 1 tablet by mouth Daily. 90 tablet 3   • fluconazole (DIFLUCAN) 150 MG tablet Take one pill ...  May repeat in 5-7 days. 2 tablet 0   • FLUoxetine (PROzac) 40 MG capsule Take 1 capsule by mouth Daily. 31 capsule 11   • fluticasone (FLONASE) 50 MCG/ACT nasal spray INSTILL 2 SPRAYS INTO EACH NOSTRIL DAILY 48 g 3   • lactase (Lactase Enzyme) 3000 units tablet Take 1 tablet by mouth 3 (Three) Times a Day With Meals. 30 tablet 5   • levETIRAcetam (KEPPRA) 500 MG tablet Take 1 tablet by mouth 2 (Two) Times a Day. 60 tablet 3   • linaclotide (Linzess) 145 MCG capsule capsule Give one capsule by mouth once daily for 3 days if no BM in 2 days as needed for constipation 30 capsule 11   • metoclopramide (REGLAN) 10 MG tablet GIVE ONE TABLET BY MOUTH ONCE DAILY FOR GASTROESOPHAGEAL REFLUX 31 tablet 11   • Misc. Devices (WHEELCHAIR) misc As directed     • Misc. Devices (Wrist Brace) misc 1 Device Daily. 1 each 0   • Mucinex 600 MG 12 hr tablet GIVE ONE TABLET BY MOUTH TWICE DAILY AS NEEDED FOR CONGESTION 20 tablet 11   • neomycin-bacitracin-polymyxin b (NEOSPORIN) 3.5-400-5000 ointment ointment APPLY THIN FILM TO AFFECTED AREA(S) EVERY 8 HOURS AS NEEDED FOR CUT, SCRAPE, OR WITNESSED BUG BITE AFTER CLEANING WITH SOAP/WATER. 28.4 g 11   • NON FORMULARY Daily As Needed. Nyst/bacit/Zno/hc 1%     • nystatin (MYCOSTATIN) 087368 UNIT/GM cream Apply  topically to the appropriate area as directed 2 (Two) Times a Day As Needed (for external vaginal discomfort and itching). 30 g 0   • nystatin-zinc oxide Apply  topically to the appropriate area as directed 2 (Two) Times a Day. 60 g 0   • omeprazole (priLOSEC) 20 MG capsule GIVE ONE CAPSULE BY MOUTH ONCE DAILY FOR STOMACH ACID 31 capsule 11   • ondansetron (ZOFRAN) 8 MG tablet Take 8 mg by mouth Every 8 (Eight) Hours As Needed for Nausea or Vomiting.     • ondansetron ODT (ZOFRAN-ODT) 4 MG disintegrating tablet Place 1  tablet on the tongue Every 6 (Six) Hours As Needed for Nausea or Vomiting. 10 tablet 0   • ondansetron ODT (ZOFRAN-ODT) 8 MG disintegrating tablet GIVE ONE TABLET BY MOUTH EVERY 8 HOURS AS NEEDED FOR NAUSEA 30 tablet 11   • promethazine-dextromethorphan (PROMETHAZINE-DM) 6.25-15 MG/5ML syrup 5 ml qhs prn 150 mL 0   • valACYclovir (Valtrex) 1000 MG tablet 2 gm q12h x 2 doses 4 tablet 0   • tolterodine LA (DETROL LA) 4 MG 24 hr capsule      • cephalexin (KEFLEX) 500 MG capsule Take 1 capsule by mouth 2 (Two) Times a Day. 7 capsule 0     No facility-administered medications prior to visit.       Review of Systems   Psychiatric/Behavioral: The patient has insomnia.      I have reviewed 12 systems with patient. Findings were negative except what is noted below and/or in history of present illness.     Objective   Visit Vitals  /72   Pulse 95   LMP  (LMP Unknown)   SpO2 99%     Physical Exam  Vitals and nursing note reviewed.   Constitutional:       General: She is not in acute distress.     Appearance: She is well-developed.   HENT:      Head: Normocephalic and atraumatic.      Nose: Nose normal.   Eyes:      General:         Right eye: No discharge.         Left eye: No discharge.      Conjunctiva/sclera: Conjunctivae normal.      Pupils: Pupils are equal, round, and reactive to light.   Neck:      Thyroid: No thyromegaly.      Trachea: No tracheal deviation.   Cardiovascular:      Rate and Rhythm: Normal rate and regular rhythm.      Heart sounds: Normal heart sounds. No murmur heard.  Pulmonary:      Effort: Pulmonary effort is normal. No respiratory distress.      Breath sounds: Normal breath sounds. No wheezing or rales.   Abdominal:      General: Bowel sounds are normal. There is no distension.      Palpations: Abdomen is soft. There is no mass.      Tenderness: There is no abdominal tenderness.      Hernia: No hernia is present.   Musculoskeletal:         General: No deformity. Normal range of motion.       Comments: Spastic quadriplegia with increased tone in all extremities   Lymphadenopathy:      Cervical: No cervical adenopathy.   Skin:     General: Skin is warm and dry.   Neurological:      Mental Status: She is alert and oriented to person, place, and time.      Motor: Weakness (Of the upper and lower extremity bilateral) and abnormal muscle tone present.      Coordination: Coordination abnormal.      Gait: Gait abnormal.      Deep Tendon Reflexes:      Reflex Scores:       Patellar reflexes are 3+ on the right side and 3+ on the left side.     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         Behavior: Behavior normal.         Thought Content: Thought content normal.         Judgment: Judgment normal.         Notes brought forward are reviewed and updated if indicated.     Assessment/Plan   Problems Addressed this Visit        Mental Health    Major depressive disorder with single episode, in full remission (HCC) - Primary (Chronic)       Neuro    Spastic quadriplegic cerebral palsy (HCC) (Chronic)    Relevant Orders    CBC & Differential    Comprehensive Metabolic Panel    Lipid Panel    Urinalysis With Culture If Indicated - Urine, Clean Catch    Absence seizure (HCC) (Chronic)    Relevant Orders    CBC & Differential    Comprehensive Metabolic Panel    Lipid Panel    Urinalysis With Culture If Indicated - Urine, Clean Catch      Other Visit Diagnoses     Annual physical exam        Relevant Orders    CBC & Differential    Comprehensive Metabolic Panel    Lipid Panel    Urinalysis With Culture If Indicated - Urine, Clean Catch      Diagnoses       Codes Comments    Major depressive disorder with single episode, in full remission (HCC)    -  Primary ICD-10-CM: F32.5  ICD-9-CM: 296.26     Spastic quadriplegic cerebral palsy (HCC)     ICD-10-CM: G80.0  ICD-9-CM: 343.2     Absence seizure (HCC)     ICD-10-CM:  G40.A09  ICD-9-CM: 345.00     Annual physical exam     ICD-10-CM: Z00.00  ICD-9-CM: V70.0           Continue current medications.     No orders of the defined types were placed in this encounter.    Return in about 6 months (around 9/28/2022) for Annual physical, medicare wellness visit.        This document has been electronically signed by Greta Stoddard MD on March 28, 2022 17:30 CDT

## 2022-04-05 ENCOUNTER — OFFICE VISIT (OUTPATIENT)
Dept: PODIATRY | Facility: CLINIC | Age: 42
End: 2022-04-05

## 2022-04-05 VITALS — WEIGHT: 160 LBS | HEART RATE: 84 BPM | BODY MASS INDEX: 26.66 KG/M2 | OXYGEN SATURATION: 96 % | HEIGHT: 65 IN

## 2022-04-05 DIAGNOSIS — L60.0 INGROWN TOENAIL: ICD-10-CM

## 2022-04-05 DIAGNOSIS — S91.209D NAIL AVULSION OF TOE, SUBSEQUENT ENCOUNTER: Primary | ICD-10-CM

## 2022-04-05 PROCEDURE — 99212 OFFICE O/P EST SF 10 MIN: CPT | Performed by: PODIATRIST

## 2022-04-05 NOTE — PROGRESS NOTES
Kiley Ledesma  1980  41 y.o. female     Patient presents to clinic today for a check up on a total permanent nail avulsion on the left hallux.    04/05/2022    Chief Complaint   Patient presents with   • Left Foot - Follow-up           History of Present Illness    Kiley Ledesma is a 41 y.o. female presents for follow-upLeft great toe nail removal.  She is doing well overall.  Past Medical History:   Diagnosis Date   • Acute vulvitis     Candida   • Allergy     Unspecified, initial encounter   • Amblyopia     OS   • Astigmatism    • Cerebral palsy (HCC)    • Cheilosis    • Common cold    • Contusion    • Depressive disorder    • Diarrhea    • Encounter for general adult medical examination without abnormal findings    • Encounter for routine adult health examination     Adult health examination      • Fever    • Gastroesophageal reflux disease    • Generalized abdominal pain    • Indeterminate colitis    • Infected insect bite    • Myopia    • Spastic quadriplegic cerebral palsy (HCC)    • Trouble walking     Walking disability      • Urinary incontinence    • Urinary tract infectious disease    • Wears glasses    • Worried well          Past Surgical History:   Procedure Laterality Date   • COLONOSCOPY N/A 03/28/2012    hemorrhoids   • COLONOSCOPY N/A 4/23/2018    Procedure: COLONOSCOPY--look TI, bx, hx colitis;  Surgeon: Sreedhar Kirkpatrick MD;  Location: [facility] ENDOSCOPY;  Service: Gastroenterology   • COLONOSCOPY N/A 6/7/2019    Procedure: COLONOSCOPY;  Surgeon: Sreedhar Kirkpatrick MD;  Location: [facility] ENDOSCOPY;  Service: Gastroenterology   • CYSTOSCOPY N/A 4/19/2019    Procedure: CYSTOSCOPY;  Surgeon: Cecilio Acuña MD;  Location: [facility] OR;  Service: Urology   • ENDOSCOPY N/A 03/28/2012    gastritis   • INJECTION OF MEDICATION  02/23/2016    Kenalog   • TOTAL ABDOMINAL HYSTERECTOMY WITH SALPINGO OOPHORECTOMY N/A 03/24/2005   • UPPER GASTROINTESTINAL ENDOSCOPY  03/28/2012         Family History    Problem Relation Age of Onset   • Coronary artery disease Other    • Diabetes Other    • Hypertension Other    • Stroke Other          Social History     Socioeconomic History   • Marital status: Single   Tobacco Use   • Smoking status: Never Smoker   • Smokeless tobacco: Never Used   Substance and Sexual Activity   • Alcohol use: No   • Drug use: No   • Sexual activity: Defer         Current Outpatient Medications   Medication Sig Dispense Refill   • acetaminophen (TYLENOL) 325 MG tablet GIVE TWO (2) TABLETS BY MOUTH EVERY 6 HOURS AS NEEDED FOR MILD PAIN. 60 tablet 11   • acyclovir (Zovirax) 5 % ointment Apply every 2-3 hrs to fever blister 5 g 0   • albuterol (PROVENTIL) (2.5 MG/3ML) 0.083% nebulizer solution Take 2.5 mg by nebulization Every 4 (Four) Hours As Needed for Wheezing. 720 mL 3   • bacitracin 500 UNIT/GM ointment APPLY TOPICALLY TO THE APPROPRIATE AREAS THREE TIMES DAILY AS NEEDED FOR WOUND CARE 14 g 11   • BACLOFEN IT Has a baclofen pump     • Banophen 25 MG capsule GIVE ONE CAPSULE BY MOUTH AT BEDTIME AS NEEDED FOR SLEEP. * NO REFILLS * 30 capsule 10   • buPROPion SR (WELLBUTRIN SR) 100 MG 12 hr tablet GIVE ONE TABLET BY MOUTH EVERY MORNING 31 tablet 11   • Cholecalciferol 25 MCG (1000 UT) capsule Take 1 capsule by mouth Daily. 30 capsule 11   • cholecalciferol 25 MCG tablet TAKE 1 TABLET BY MOUTH ONCE DAILY 31 tablet 11   • diphenhydrAMINE-acetaminophen (TYLENOL PM)  MG tablet per tablet Take 1 tablet by mouth At Night As Needed for Sleep.     • docusate sodium (COLACE) 100 MG capsule GIVE ONE CAPSULE BY MOUTH TWICE DAILY 62 capsule 11   • estradiol (ESTRACE) 0.5 MG tablet Take 1 tablet by mouth Daily. 90 tablet 3   • fluconazole (DIFLUCAN) 150 MG tablet Take one pill ...  May repeat in 5-7 days. 2 tablet 0   • FLUoxetine (PROzac) 40 MG capsule Take 1 capsule by mouth Daily. 31 capsule 11   • fluticasone (FLONASE) 50 MCG/ACT nasal spray INSTILL 2 SPRAYS INTO EACH NOSTRIL DAILY 48 g 3   •  lactase (Lactase Enzyme) 3000 units tablet Take 1 tablet by mouth 3 (Three) Times a Day With Meals. 30 tablet 5   • levETIRAcetam (KEPPRA) 500 MG tablet Take 1 tablet by mouth 2 (Two) Times a Day. 60 tablet 3   • linaclotide (Linzess) 145 MCG capsule capsule Give one capsule by mouth once daily for 3 days if no BM in 2 days as needed for constipation 30 capsule 11   • metoclopramide (REGLAN) 10 MG tablet GIVE ONE TABLET BY MOUTH ONCE DAILY FOR GASTROESOPHAGEAL REFLUX 31 tablet 11   • Misc. Devices (WHEELCHAIR) mis As directed     • Misc. Devices (Wrist Brace) [facility] 1 Device Daily. 1 each 0   • Mucinex 600 MG 12 hr tablet GIVE ONE TABLET BY MOUTH TWICE DAILY AS NEEDED FOR CONGESTION 20 tablet 11   • neomycin-bacitracin-polymyxin b (NEOSPORIN) 3.5-400-5000 ointment ointment APPLY THIN FILM TO AFFECTED AREA(S) EVERY 8 HOURS AS NEEDED FOR CUT, SCRAPE, OR WITNESSED BUG BITE AFTER CLEANING WITH SOAP/WATER. 28.4 g 11   • NON FORMULARY Daily As Needed. Nyst/bacit/Zno/hc 1%     • nystatin (MYCOSTATIN) 524792 UNIT/GM cream Apply  topically to the appropriate area as directed 2 (Two) Times a Day As Needed (for external vaginal discomfort and itching). 30 g 0   • nystatin-zinc oxide Apply  topically to the appropriate area as directed 2 (Two) Times a Day. 60 g 0   • omeprazole (priLOSEC) 20 MG capsule GIVE ONE CAPSULE BY MOUTH ONCE DAILY FOR STOMACH ACID 31 capsule 11   • ondansetron (ZOFRAN) 8 MG tablet Take 8 mg by mouth Every 8 (Eight) Hours As Needed for Nausea or Vomiting.     • ondansetron ODT (ZOFRAN-ODT) 4 MG disintegrating tablet Place 1 tablet on the tongue Every 6 (Six) Hours As Needed for Nausea or Vomiting. 10 tablet 0   • ondansetron ODT (ZOFRAN-ODT) 8 MG disintegrating tablet GIVE ONE TABLET BY MOUTH EVERY 8 HOURS AS NEEDED FOR NAUSEA 30 tablet 11   • promethazine-dextromethorphan (PROMETHAZINE-DM) 6.25-15 MG/5ML syrup 5 ml qhs prn 150 mL 0   • tolterodine LA (DETROL LA) 4 MG 24 hr capsule      • valACYclovir  "(Valtrex) 1000 MG tablet 2 gm q12h x 2 doses 4 tablet 0     No current facility-administered medications for this visit.         OBJECTIVE    Pulse 84   Ht 165.1 cm (65\")   Wt 72.6 kg (160 lb)   LMP  (LMP Unknown)   SpO2 96%   BMI 26.63 kg/m²         Review of Systems   Constitutional: Negative.    Respiratory: Negative.    Cardiovascular: Negative.          Physical Exam   Constitutional: she appears well-developed and well-nourished.   CV: No chest pain. Normal RR  Resp: Non labored respirations  Psychiatric: she has a normal mood and affect. her behavior is normal.      Lower Extremity Exam:  Vascular: DP/PT pulses palpable 2+.   No hallux edema  Toes warm  Neuro: Protective sensation intact, b/l.  DTRs intact  Integument: No open wounds.  Left hallux nail bed scabbed over, no signs of infection  Many nails 1-5 thickened, irregular and dystrophic with subungual debris.  Web spaces c/d/i  No skin lesions  Musculoskeletal: LE muscle strength 4/5.  Spastic diplegia  Motorized scooter bound  Hallux valgus, claw toe deformities bilateral    Procedures          ASSESSMENT AND PLAN    Diagnoses and all orders for this visit:    1. Nail avulsion of toe, subsequent encounter (Primary)    2. Ingrown toenail      -Progressing well following nail procedure  -May wean soaks and bandaging over the next week  -Recheck as needed            This document has been electronically signed by Boubacar Lott DPM on April 5, 2022 12:38 CDT         Much of this encounter note is an electronic transcription/translation of spoken language to printed text.   Boubacar Lott DPM  4/5/2022  12:38 CDT              "

## 2022-04-06 RX ORDER — LACTASE 3000 UNIT
TABLET ORAL
Qty: 90 TABLET | Refills: 11 | Status: ON HOLD | OUTPATIENT
Start: 2022-04-06 | End: 2022-09-02

## 2022-04-07 ENCOUNTER — OFFICE VISIT (OUTPATIENT)
Dept: FAMILY MEDICINE CLINIC | Facility: CLINIC | Age: 42
End: 2022-04-07

## 2022-04-07 VITALS — DIASTOLIC BLOOD PRESSURE: 80 MMHG | HEART RATE: 90 BPM | SYSTOLIC BLOOD PRESSURE: 120 MMHG | OXYGEN SATURATION: 98 %

## 2022-04-07 DIAGNOSIS — K59.04 CHRONIC IDIOPATHIC CONSTIPATION: Primary | Chronic | ICD-10-CM

## 2022-04-07 DIAGNOSIS — J30.1 SEASONAL ALLERGIC RHINITIS DUE TO POLLEN: Chronic | ICD-10-CM

## 2022-04-07 DIAGNOSIS — F41.9 ANXIETY: Chronic | ICD-10-CM

## 2022-04-07 PROCEDURE — 99214 OFFICE O/P EST MOD 30 MIN: CPT | Performed by: GENERAL PRACTICE

## 2022-04-07 RX ORDER — LORATADINE 10 MG/1
10 TABLET ORAL DAILY
Qty: 90 TABLET | Refills: 3 | Status: SHIPPED | OUTPATIENT
Start: 2022-04-07 | End: 2023-03-28 | Stop reason: SDUPTHER

## 2022-04-07 RX ORDER — BUSPIRONE HYDROCHLORIDE 10 MG/1
10 TABLET ORAL 2 TIMES DAILY
Qty: 180 TABLET | Refills: 1 | Status: SHIPPED | OUTPATIENT
Start: 2022-04-07 | End: 2022-09-08

## 2022-04-07 RX ORDER — POLYETHYLENE GLYCOL 3350 17 G/17G
17 POWDER, FOR SOLUTION ORAL DAILY PRN
Qty: 30 PACKET | Refills: 2 | Status: ON HOLD | OUTPATIENT
Start: 2022-04-07 | End: 2022-09-02

## 2022-04-20 NOTE — TELEPHONE ENCOUNTER
According to [facility] she can do telehealth for the visit if that helps. I asked if the jan visit would work and they said no    Procedure:  COLONOSCOPY  EGD    Relevant Problems   ANESTHESIA (within normal limits)      CARDIO   (+) Atrial fibrillation (HCC)   (+) Coronary artery disease (hx MI)   (+) Dyspnea on exertion (chronic NAVA; no orthopnea)   (+) Hypertension   (+) Nonrheumatic aortic (valve) stenosis   (+) Pulmonary embolism (HCC) (2010)   (+) Sick sinus syndrome (HCC)      ENDO   (+) Hypothyroidism      GI/HEPATIC   (+) Cancer of sigmoid colon (HCC)   (+) Dysphagia (hx aspiration PNA 12/2021)   (+) GERD (gastroesophageal reflux disease)      /RENAL  hx ureteral CA   (+) Single kidney      PULMONARY   (+) COPD (chronic obstructive pulmonary disease) (HCC)   (+) Dyspnea on exertion (chronic NAVA; no orthopnea)   (-) Sleep apnea   (-) Smoking (remote former smoker)   (-) URI (upper respiratory infection)      Other   (+) Allergic rhinitis   (+) Cardiac defibrillator in place   (+) Chronic diastolic (congestive) heart failure (HCC)   (+) Diabetes mellitus (HCC)   (+) H/O right hemicolectomy   (+) Villalobos syndrome   (+) Malignant neoplasm of middle lobe of right lung (HCC)      Physical Exam    Airway    Mallampati score: III  TM Distance: >3 FB  Neck ROM: full     Dental   No notable dental hx     Cardiovascular      Pulmonary      Other Findings       TTE 11/2021   Mild left ventricular enlargement  Normal left ventricular wall   thickness  Moderate-to-severely decreased left ventricular systolic   function with moderate global hypokinesis  Anteroseptal and apical zones   appear severely hypokinetic as well as dyssynchronous, which may be   related to RV pacing which is new since the most recent echo 04/30/2021  The visually estimated left ventricular ejection fraction is 25-30%    Normal right ventricular size  Normal right ventricular wall thickness  Normal right ventricular function  A pacemaker lead is present in the   right ventricle    The left atrium is moderately dilated  The left atrial volume index is   43 mL/m2    Minimal mitral regurgitation    Normal tricuspid valve  Mild tricuspid regurgitation  No pulmonary   hypertension  The estimated pulmonary arterial systolic pressure is 31   mmHg assuming a right atrial pressure of 8 mmHg   Previous RVSP 52 mm Hg   April 2021  Anesthesia Plan  ASA Score- 4     Anesthesia Type- IV sedation with anesthesia with ASA Monitors  Additional Monitors:   Airway Plan:     Comment: Bilateral pitting edema both legs, R>L with some redness in right leg - discussed with daughter getting him in to see PCP ASAP vs ED visit to evaluate   Plan Factors-Exercise tolerance (METS): >4 METS  Chart reviewed  Existing labs reviewed  Patient summary reviewed  Patient is not a current smoker  Induction- intravenous  Postoperative Plan-     Informed Consent- Anesthetic plan and risks discussed with patient and daughter  I personally reviewed this patient with the CRNA  Discussed and agreed on the Anesthesia Plan with the CRNA  Swati Cohen

## 2022-04-22 ENCOUNTER — TELEPHONE (OUTPATIENT)
Dept: FAMILY MEDICINE CLINIC | Facility: CLINIC | Age: 42
End: 2022-04-22

## 2022-04-22 NOTE — TELEPHONE ENCOUNTER
Pt has had stomach pain, diarrhea, and watery stools since starting the Linzess daily. [facility] want to know if you could change how often she is taking the linzess, or stop taking it altogether? Call back is [phone]

## 2022-04-22 NOTE — TELEPHONE ENCOUNTER
[facility] Stoddard[facility] 145 [facility].    [facility] has been called and a verbal DC order for the Linzess 145 mg was relayed to the Pharmacist.

## 2022-06-02 DIAGNOSIS — J30.1 SEASONAL ALLERGIC RHINITIS DUE TO POLLEN: ICD-10-CM

## 2022-06-02 RX ORDER — FLUTICASONE PROPIONATE 50 MCG
SPRAY, SUSPENSION (ML) NASAL
Qty: 16 G | Refills: 11 | Status: SHIPPED | OUTPATIENT
Start: 2022-06-02 | End: 2022-12-05 | Stop reason: SDUPTHER

## 2022-06-07 RX ORDER — LEVETIRACETAM 500 MG/1
TABLET ORAL
Qty: 60 TABLET | Refills: 11 | Status: SHIPPED | OUTPATIENT
Start: 2022-06-07 | End: 2023-03-01 | Stop reason: SDUPTHER

## 2022-06-10 ENCOUNTER — TELEPHONE (OUTPATIENT)
Dept: FAMILY MEDICINE CLINIC | Facility: CLINIC | Age: 42
End: 2022-06-10

## 2022-06-10 NOTE — TELEPHONE ENCOUNTER
I called and talked to the ladies at [facility], She states the urine is cark like cola.  She said I don't know if she has blood in it or what.   She states she is drinking constantly.     I did tell them she needs to be see, I offered them Urgent Care or the ER for evaluation     YADIRA Clemens

## 2022-06-24 ENCOUNTER — TELEPHONE (OUTPATIENT)
Dept: FAMILY MEDICINE CLINIC | Facility: CLINIC | Age: 42
End: 2022-06-24

## 2022-06-24 NOTE — TELEPHONE ENCOUNTER
[facility] CALLED WANTING TO KNOW IF DR. APODACA WOULD SEND AN ORDER FOR A ANUJ LIFT. THE ONE SHE HAS IS BROKEN. THEY SAID TO SEND IT TO [facility].    THANKS

## 2022-07-06 ENCOUNTER — OFFICE VISIT (OUTPATIENT)
Dept: FAMILY MEDICINE CLINIC | Facility: CLINIC | Age: 42
End: 2022-07-06

## 2022-07-06 VITALS
HEART RATE: 98 BPM | SYSTOLIC BLOOD PRESSURE: 128 MMHG | RESPIRATION RATE: 18 BRPM | BODY MASS INDEX: 26.63 KG/M2 | OXYGEN SATURATION: 99 % | DIASTOLIC BLOOD PRESSURE: 75 MMHG | HEIGHT: 65 IN

## 2022-07-06 DIAGNOSIS — R05.9 COUGH: Primary | ICD-10-CM

## 2022-07-06 PROCEDURE — 99213 OFFICE O/P EST LOW 20 MIN: CPT | Performed by: GENERAL PRACTICE

## 2022-07-06 RX ORDER — DOXYCYCLINE HYCLATE 100 MG/1
100 CAPSULE ORAL DAILY
COMMUNITY
Start: 2022-06-22 | End: 2022-09-09

## 2022-07-06 RX ORDER — GUAIFENESIN AND DEXTROMETHORPHAN HYDROBROMIDE 100; 10 MG/5ML; MG/5ML
5 SOLUTION ORAL EVERY 12 HOURS
Qty: 120 ML | Refills: 0 | Status: ON HOLD | OUTPATIENT
Start: 2022-07-06 | End: 2022-09-02

## 2022-07-06 NOTE — PROGRESS NOTES
Subjective   Kiley Ledesma is a 41 y.o. female.   Chief Complaint   Patient presents with   • Abdominal Pain       Cough  This is a new problem. The current episode started 1 to 4 weeks ago. The problem has been unchanged. The cough is non-productive. Pertinent negatives include no chills. Associated symptoms comments: Epigastric pain. Nothing aggravates the symptoms. She has tried nothing for the symptoms. The treatment provided no relief.      The following portions of the patient's history were reviewed and updated as appropriate: allergies, current medications, past social history and problem list.    Outpatient Medications Prior to Visit   Medication Sig Dispense Refill   • acetaminophen (TYLENOL) 325 MG tablet GIVE TWO (2) TABLETS BY MOUTH EVERY 6 HOURS AS NEEDED FOR MILD PAIN. 60 tablet 11   • acyclovir (Zovirax) 5 % ointment Apply every 2-3 hrs to fever blister 5 g 0   • albuterol (PROVENTIL) (2.5 MG/3ML) 0.083% nebulizer solution Take 2.5 mg by nebulization Every 4 (Four) Hours As Needed for Wheezing. 720 mL 3   • bacitracin 500 UNIT/GM ointment APPLY TOPICALLY TO THE APPROPRIATE AREAS THREE TIMES DAILY AS NEEDED FOR WOUND CARE 14 g 11   • BACLOFEN IT Has a baclofen pump     • Banophen 25 MG capsule GIVE ONE CAPSULE BY MOUTH AT BEDTIME AS NEEDED FOR SLEEP. * NO REFILLS * 30 capsule 10   • busPIRone (BUSPAR) 10 MG tablet Take 1 tablet by mouth 2 (Two) Times a Day. 180 tablet 1   • Cholecalciferol 25 MCG (1000 UT) capsule Take 1 capsule by mouth Daily. 30 capsule 11   • cholecalciferol 25 MCG tablet TAKE 1 TABLET BY MOUTH ONCE DAILY 31 tablet 11   • diphenhydrAMINE-acetaminophen (TYLENOL PM)  MG tablet per tablet Take 1 tablet by mouth At Night As Needed for Sleep.     • docusate sodium (COLACE) 100 MG capsule GIVE ONE CAPSULE BY MOUTH TWICE DAILY 62 capsule 11   • estradiol (ESTRACE) 0.5 MG tablet Take 1 tablet by mouth Daily. 90 tablet 3   • fluconazole (DIFLUCAN) 150 MG tablet Take one pill ...   May repeat in 5-7 days. 2 tablet 0   • FLUoxetine (PROzac) 40 MG capsule Take 1 capsule by mouth Daily. 31 capsule 11   • fluticasone (FLONASE) 50 MCG/ACT nasal spray INSTILL 2 SPRAYS IN EACH NOSTRIL ONCE DAILY FOR SEASONAL ALLERGIES **REORDER WHEN NEEDED-NOT A CYCLE FILL MED** 16 g 11   • GNP Dairy Relief 3000 units tablet GIVE ONE TABLET BY MOUTH THREE TIMES DAILY WITH MEALS. 90 tablet 11   • levETIRAcetam (KEPPRA) 500 MG tablet GIVE ONE TABLET BY MOUTH TWICE DAILY 60 tablet 11   • linaclotide (Linzess) 145 MCG capsule capsule Take 1 capsule by mouth Daily. 30 capsule 11   • loratadine (Claritin) 10 MG tablet Take 1 tablet by mouth Daily. 90 tablet 3   • metoclopramide (REGLAN) 10 MG tablet GIVE ONE TABLET BY MOUTH ONCE DAILY FOR GASTROESOPHAGEAL REFLUX 31 tablet 11   • Misc. Devices (WHEELCHAIR) misc As directed     • Misc. Devices (Wrist Brace) misc 1 Device Daily. 1 each 0   • neomycin-bacitracin-polymyxin b (NEOSPORIN) 3.5-400-5000 ointment ointment APPLY THIN FILM TO AFFECTED AREA(S) EVERY 8 HOURS AS NEEDED FOR CUT, SCRAPE, OR WITNESSED BUG BITE AFTER CLEANING WITH SOAP/WATER. 28.4 g 11   • NON FORMULARY Daily As Needed. Nyst/bacit/Zno/hc 1%     • nystatin (MYCOSTATIN) 387248 UNIT/GM cream Apply  topically to the appropriate area as directed 2 (Two) Times a Day As Needed (for external vaginal discomfort and itching). 30 g 0   • nystatin-zinc oxide Apply  topically to the appropriate area as directed 2 (Two) Times a Day. 60 g 0   • omeprazole (priLOSEC) 20 MG capsule GIVE ONE CAPSULE BY MOUTH ONCE DAILY FOR STOMACH ACID 31 capsule 11   • ondansetron (ZOFRAN) 8 MG tablet Take 8 mg by mouth Every 8 (Eight) Hours As Needed for Nausea or Vomiting.     • ondansetron ODT (ZOFRAN-ODT) 4 MG disintegrating tablet Place 1 tablet on the tongue Every 6 (Six) Hours As Needed for Nausea or Vomiting. 10 tablet 0   • ondansetron ODT (ZOFRAN-ODT) 8 MG disintegrating tablet GIVE ONE TABLET BY MOUTH EVERY 8 HOURS AS NEEDED FOR  "NAUSEA 30 tablet 11   • polyethylene glycol (MIRALAX) 17 g packet Take 17 g by mouth Daily As Needed (constipation). 30 packet 2   • promethazine-dextromethorphan (PROMETHAZINE-DM) 6.25-15 MG/5ML syrup 5 ml qhs prn 150 mL 0   • tolterodine LA (DETROL LA) 4 MG 24 hr capsule      • valACYclovir (Valtrex) 1000 MG tablet 2 gm q12h x 2 doses 4 tablet 0   • buPROPion SR (WELLBUTRIN SR) 100 MG 12 hr tablet GIVE ONE TABLET BY MOUTH EVERY MORNING 31 tablet 11   • Mucinex 600 MG 12 hr tablet GIVE ONE TABLET BY MOUTH TWICE DAILY AS NEEDED FOR CONGESTION 20 tablet 11   • doxycycline (VIBRAMYCIN) 100 MG capsule        No facility-administered medications prior to visit.       Review of Systems   Constitutional: Negative for chills.   Respiratory: Positive for cough.      I have reviewed 12 systems with patient. Findings were negative except what is noted below and/or in history of present illness.     Objective   Visit Vitals  /75   Pulse 98   Resp 18   Ht 165.1 cm (65\")   LMP  (LMP Unknown)   SpO2 99%   BMI 26.63 kg/m²     Physical Exam  Vitals and nursing note reviewed.   Constitutional:       General: She is not in acute distress.     Appearance: She is well-developed.   HENT:      Head: Normocephalic and atraumatic.      Nose: Nose normal.   Eyes:      General:         Right eye: No discharge.         Left eye: No discharge.      Conjunctiva/sclera: Conjunctivae normal.      Pupils: Pupils are equal, round, and reactive to light.   Neck:      Thyroid: No thyromegaly.      Trachea: No tracheal deviation.   Cardiovascular:      Rate and Rhythm: Normal rate and regular rhythm.      Heart sounds: Normal heart sounds. No murmur heard.  Pulmonary:      Effort: Pulmonary effort is normal. No respiratory distress.      Breath sounds: Normal breath sounds. No wheezing or rales.   Abdominal:      General: Bowel sounds are normal. There is no distension.      Palpations: Abdomen is soft. There is no mass.      Tenderness: There " is no abdominal tenderness.      Hernia: No hernia is present.   Musculoskeletal:         General: No deformity. Normal range of motion.      Comments: Spastic quadriplegia with increased tone in all extremities   Lymphadenopathy:      Cervical: No cervical adenopathy.   Skin:     General: Skin is warm and dry.   Neurological:      Mental Status: She is alert and oriented to person, place, and time.      Motor: Weakness (Of the upper and lower extremity bilateral) and abnormal muscle tone present.      Coordination: Coordination abnormal.      Gait: Gait abnormal.      Deep Tendon Reflexes:      Reflex Scores:       Patellar reflexes are 3+ on the right side and 3+ on the left side.     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         Behavior: Behavior normal.         Thought Content: Thought content normal.         Judgment: Judgment normal.       Notes brought forward are reviewed and updated if indicated.     Assessment & Plan   Problems Addressed this Visit    None     Visit Diagnoses     Cough    -  Primary    Relevant Medications    dextromethorphan-guaifenesin (Robitussin Peak Cold DM)  MG/5ML syrup      Diagnoses       Codes Comments    Cough    -  Primary ICD-10-CM: R05.9  ICD-9-CM: 786.2           Symptomatic treatment. Recheck if not improving.      New Medications Ordered This Visit   Medications   • dextromethorphan-guaifenesin (Robitussin Peak Cold DM)  MG/5ML syrup     Sig: Take 5 mL by mouth Every 12 (Twelve) Hours. Until cough has resolved or Prescription finished     Dispense:  120 mL     Refill:  0     Return if symptoms worsen or fail to improve, for Next scheduled follow up.        This document has been electronically signed by Greta Stoddard MD on July 24, 2022 21:08 CDT

## 2022-07-08 RX ORDER — BUPROPION HYDROCHLORIDE 100 MG/1
TABLET, EXTENDED RELEASE ORAL
Qty: 31 TABLET | Refills: 11 | Status: SHIPPED | OUTPATIENT
Start: 2022-07-08

## 2022-08-16 ENCOUNTER — OFFICE VISIT (OUTPATIENT)
Dept: PODIATRY | Facility: CLINIC | Age: 42
End: 2022-08-16

## 2022-08-16 VITALS — WEIGHT: 160 LBS | HEIGHT: 65 IN | HEART RATE: 68 BPM | BODY MASS INDEX: 26.66 KG/M2 | OXYGEN SATURATION: 97 %

## 2022-08-16 DIAGNOSIS — B35.1 ONYCHOMYCOSIS: Primary | ICD-10-CM

## 2022-08-16 DIAGNOSIS — G80.0 SPASTIC QUADRIPLEGIC CEREBRAL PALSY: ICD-10-CM

## 2022-08-16 DIAGNOSIS — M79.675 PAIN IN TOES OF BOTH FEET: ICD-10-CM

## 2022-08-16 DIAGNOSIS — M79.674 PAIN IN TOES OF BOTH FEET: ICD-10-CM

## 2022-08-16 PROCEDURE — 11721 DEBRIDE NAIL 6 OR MORE: CPT | Performed by: PODIATRIST

## 2022-08-16 NOTE — PROGRESS NOTES
Kiley Ledesma  1980  41 y.o. female     Patient presents to clinic for non diabetic foot care.    08/16/2022    Chief Complaint   Patient presents with   • Right Foot - Follow-up     Non diabetic foot care    • Left Foot - Follow-up     Non diabetic foot care            History of Present Illness    Kiley Ledesma is a 41 y.o. female presents for nail care  Past Medical History:   Diagnosis Date   • Acute vulvitis     Candida   • Allergy     Unspecified, initial encounter   • Amblyopia     OS   • Astigmatism    • Cerebral palsy (HCC)    • Cheilosis    • Common cold    • Contusion    • Depressive disorder    • Diarrhea    • Encounter for general adult medical examination without abnormal findings    • Encounter for routine adult health examination     Adult health examination      • Fever    • Gastroesophageal reflux disease    • Generalized abdominal pain    • Indeterminate colitis    • Infected insect bite    • Myopia    • Spastic quadriplegic cerebral palsy (HCC)    • Trouble walking     Walking disability      • Urinary incontinence    • Urinary tract infectious disease    • Wears glasses    • Worried well          Past Surgical History:   Procedure Laterality Date   • COLONOSCOPY N/A 03/28/2012    hemorrhoids   • COLONOSCOPY N/A 4/23/2018    Procedure: COLONOSCOPY--look TI, bx, hx colitis;  Surgeon: Sreedhar Kirkpatrick MD;  Location: [facility] ENDOSCOPY;  Service: Gastroenterology   • COLONOSCOPY N/A 6/7/2019    Procedure: COLONOSCOPY;  Surgeon: Sreedhar Kirkpatrick MD;  Location: [facility] ENDOSCOPY;  Service: Gastroenterology   • CYSTOSCOPY N/A 4/19/2019    Procedure: CYSTOSCOPY;  Surgeon: Cecilio Acuña MD;  Location: [facility] OR;  Service: Urology   • ENDOSCOPY N/A 03/28/2012    gastritis   • INJECTION OF MEDICATION  02/23/2016    Kenalog   • TOTAL ABDOMINAL HYSTERECTOMY WITH SALPINGO OOPHORECTOMY N/A 03/24/2005   • UPPER GASTROINTESTINAL ENDOSCOPY  03/28/2012         Family History   Problem Relation Age  of Onset   • Coronary artery disease Other    • Diabetes Other    • Hypertension Other    • Stroke Other          Social History     Socioeconomic History   • Marital status: Single   Tobacco Use   • Smoking status: Never Smoker   • Smokeless tobacco: Never Used   Substance and Sexual Activity   • Alcohol use: No   • Drug use: No   • Sexual activity: Defer         Current Outpatient Medications   Medication Sig Dispense Refill   • acetaminophen (TYLENOL) 325 MG tablet GIVE TWO (2) TABLETS BY MOUTH EVERY 6 HOURS AS NEEDED FOR MILD PAIN. 60 tablet 11   • acyclovir (Zovirax) 5 % ointment Apply every 2-3 hrs to fever blister 5 g 0   • albuterol (PROVENTIL) (2.5 MG/3ML) 0.083% nebulizer solution Take 2.5 mg by nebulization Every 4 (Four) Hours As Needed for Wheezing. 720 mL 3   • bacitracin 500 UNIT/GM ointment APPLY TOPICALLY TO THE APPROPRIATE AREAS THREE TIMES DAILY AS NEEDED FOR WOUND CARE 14 g 11   • BACLOFEN IT Has a baclofen pump     • Banophen 25 MG capsule GIVE ONE CAPSULE BY MOUTH AT BEDTIME AS NEEDED FOR SLEEP. * NO REFILLS * 30 capsule 10   • buPROPion SR (WELLBUTRIN SR) 100 MG 12 hr tablet GIVE ONE TABLET BY MOUTH EVERY MORNING 31 tablet 11   • busPIRone (BUSPAR) 10 MG tablet Take 1 tablet by mouth 2 (Two) Times a Day. 180 tablet 1   • Cholecalciferol 25 MCG (1000 UT) capsule Take 1 capsule by mouth Daily. 30 capsule 11   • cholecalciferol 25 MCG tablet TAKE 1 TABLET BY MOUTH ONCE DAILY 31 tablet 11   • dextromethorphan-guaifenesin (Robitussin Peak Cold DM)  MG/5ML syrup Take 5 mL by mouth Every 12 (Twelve) Hours. Until cough has resolved or Prescription finished 120 mL 0   • diphenhydrAMINE-acetaminophen (TYLENOL PM)  MG tablet per tablet Take 1 tablet by mouth At Night As Needed for Sleep.     • docusate sodium (COLACE) 100 MG capsule GIVE ONE CAPSULE BY MOUTH TWICE DAILY 62 capsule 11   • doxycycline (VIBRAMYCIN) 100 MG capsule      • estradiol (ESTRACE) 0.5 MG tablet Take 1 tablet by mouth  Daily. 90 tablet 3   • fluconazole (DIFLUCAN) 150 MG tablet Take one pill ...  May repeat in 5-7 days. 2 tablet 0   • FLUoxetine (PROzac) 40 MG capsule Take 1 capsule by mouth Daily. 31 capsule 11   • fluticasone (FLONASE) 50 MCG/ACT nasal spray INSTILL 2 SPRAYS IN EACH NOSTRIL ONCE DAILY FOR SEASONAL ALLERGIES **REORDER WHEN NEEDED-NOT A CYCLE FILL MED** 16 g 11   • GNP Dairy Relief 3000 units tablet GIVE ONE TABLET BY MOUTH THREE TIMES DAILY WITH MEALS. 90 tablet 11   • levETIRAcetam (KEPPRA) 500 MG tablet GIVE ONE TABLET BY MOUTH TWICE DAILY 60 tablet 11   • linaclotide (Linzess) 145 MCG capsule capsule Take 1 capsule by mouth Daily. 30 capsule 11   • loratadine (Claritin) 10 MG tablet Take 1 tablet by mouth Daily. 90 tablet 3   • metoclopramide (REGLAN) 10 MG tablet GIVE ONE TABLET BY MOUTH ONCE DAILY FOR GASTROESOPHAGEAL REFLUX 31 tablet 11   • Misc. Devices (WHEELCHAIR) misc As directed     • Misc. Devices (Wrist Brace) misc 1 Device Daily. 1 each 0   • neomycin-bacitracin-polymyxin b (NEOSPORIN) 3.5-400-5000 ointment ointment APPLY THIN FILM TO AFFECTED AREA(S) EVERY 8 HOURS AS NEEDED FOR CUT, SCRAPE, OR WITNESSED BUG BITE AFTER CLEANING WITH SOAP/WATER. 28.4 g 11   • NON FORMULARY Daily As Needed. Nyst/bacit/Zno/hc 1%     • nystatin (MYCOSTATIN) 999910 UNIT/GM cream Apply  topically to the appropriate area as directed 2 (Two) Times a Day As Needed (for external vaginal discomfort and itching). 30 g 0   • nystatin-zinc oxide Apply  topically to the appropriate area as directed 2 (Two) Times a Day. 60 g 0   • omeprazole (priLOSEC) 20 MG capsule GIVE ONE CAPSULE BY MOUTH ONCE DAILY FOR STOMACH ACID 31 capsule 11   • ondansetron (ZOFRAN) 8 MG tablet Take 8 mg by mouth Every 8 (Eight) Hours As Needed for Nausea or Vomiting.     • ondansetron ODT (ZOFRAN-ODT) 4 MG disintegrating tablet Place 1 tablet on the tongue Every 6 (Six) Hours As Needed for Nausea or Vomiting. 10 tablet 0   • ondansetron ODT (ZOFRAN-ODT) 8  "MG disintegrating tablet GIVE ONE TABLET BY MOUTH EVERY 8 HOURS AS NEEDED FOR NAUSEA 30 tablet 11   • polyethylene glycol (MIRALAX) 17 g packet Take 17 g by mouth Daily As Needed (constipation). 30 packet 2   • promethazine-dextromethorphan (PROMETHAZINE-DM) 6.25-15 MG/5ML syrup 5 ml qhs prn 150 mL 0   • tolterodine LA (DETROL LA) 4 MG 24 hr capsule      • valACYclovir (Valtrex) 1000 MG tablet 2 gm q12h x 2 doses 4 tablet 0     No current facility-administered medications for this visit.         OBJECTIVE    Pulse 68   Ht 165.1 cm (65\")   Wt 72.6 kg (160 lb)   LMP  (LMP Unknown)   SpO2 97%   BMI 26.63 kg/m²         Review of Systems   Constitutional: Negative.    Respiratory: Negative.    Cardiovascular: Negative.          Physical Exam   Constitutional: she appears well-developed and well-nourished.   CV: No chest pain. Normal RR  Resp: Non labored respirations  Psychiatric: she has a normal mood and affect. her behavior is normal.      Lower Extremity Exam:  Vascular: DP/PT pulses palpable 2+.   No hallux edema  Toes warm  Neuro: Protective sensation intact, b/l.  DTRs intact  Integument: No open wounds.  Nails 1-5 thickened, irregular and dystrophic with subungual debris.  Web spaces c/d/i  No skin lesions  Musculoskeletal: LE muscle strength 4/5.  Spastic diplegia  Motorized scooter bound  Hallux valgus, claw toe deformities bilateral    Procedures          ASSESSMENT AND PLAN    Diagnoses and all orders for this visit:    1. Onychomycosis (Primary)    2. Pain in toes of both feet    3. Spastic quadriplegic cerebral palsy (HCC)      -Comprehensive  foot exam performed. Patient educated on importance ofdaily foot checks.   -Patient educated on proper extra depth  shoe gear.  Limit barefoot walking.  -Nails 1-5 b/l debrided in length and thickness with nail nipper to decrease pain, fungal load and risk of infection  -Follow up  PRN            This document has been electronically signed by Boubacar Lott, " DPM on August 16, 2022 08:44 CDT         Much of this encounter note is an electronic transcription/translation of spoken language to printed text.   Boubacar Lott DPM  8/16/2022  08:44 CDT

## 2022-08-25 ENCOUNTER — TELEPHONE (OUTPATIENT)
Dept: FAMILY MEDICINE CLINIC | Facility: CLINIC | Age: 42
End: 2022-08-25

## 2022-08-25 NOTE — TELEPHONE ENCOUNTER
[employer] CALLED WANTING TO KNOW IF DR. APODACA WOULD SEND AN ORDER FOR A ANUJ LIFT. THE ONE SHE HAS IS BROKEN. THEY SAID TO SEND IT TO [facility].     THANKS    Any questions call [employer] at [phone]    They are calling again as they requested this on 6/24/22 and has not heard anything back

## 2022-09-01 ENCOUNTER — HOSPITAL ENCOUNTER (INPATIENT)
Facility: HOSPITAL | Age: 42
LOS: 4 days | Discharge: HOME | End: 2022-09-05
Attending: EMERGENCY MEDICINE | Admitting: INTERNAL MEDICINE

## 2022-09-01 ENCOUNTER — APPOINTMENT (OUTPATIENT)
Dept: CT IMAGING | Facility: HOSPITAL | Age: 42
End: 2022-09-01

## 2022-09-01 ENCOUNTER — APPOINTMENT (OUTPATIENT)
Dept: GENERAL RADIOLOGY | Facility: HOSPITAL | Age: 42
End: 2022-09-01

## 2022-09-01 DIAGNOSIS — N12 PYELONEPHRITIS: Primary | ICD-10-CM

## 2022-09-01 LAB
ALBUMIN SERPL-MCNC: 4 G/DL (ref 3.5–5.2)
ALBUMIN/GLOB SERPL: 1.1 G/DL
ALP SERPL-CCNC: 106 U/L (ref 39–117)
ALT SERPL W P-5'-P-CCNC: 10 U/L (ref 1–33)
ANION GAP SERPL CALCULATED.3IONS-SCNC: 12 MMOL/L (ref 5–15)
AST SERPL-CCNC: 16 U/L (ref 1–32)
B PARAPERT DNA SPEC QL NAA+PROBE: NOT DETECTED
B PERT DNA SPEC QL NAA+PROBE: NOT DETECTED
BACTERIA UR QL AUTO: ABNORMAL /HPF
BASOPHILS # BLD AUTO: 0.11 10*3/MM3 (ref 0–0.2)
BASOPHILS NFR BLD AUTO: 0.9 % (ref 0–1.5)
BILIRUB SERPL-MCNC: 0.4 MG/DL (ref 0–1.2)
BILIRUB UR QL STRIP: NEGATIVE
BUN SERPL-MCNC: 14 MG/DL (ref 6–20)
BUN/CREAT SERPL: 21.9 (ref 7–25)
C PNEUM DNA NPH QL NAA+NON-PROBE: NOT DETECTED
CALCIUM SPEC-SCNC: 9.4 MG/DL (ref 8.6–10.5)
CHLORIDE SERPL-SCNC: 99 MMOL/L (ref 98–107)
CLARITY UR: ABNORMAL
CO2 SERPL-SCNC: 25 MMOL/L (ref 22–29)
COLOR UR: YELLOW
CREAT SERPL-MCNC: 0.64 MG/DL (ref 0.57–1)
D-LACTATE SERPL-SCNC: 0.8 MMOL/L (ref 0.5–2)
DEPRECATED RDW RBC AUTO: 42.5 FL (ref 37–54)
EGFRCR SERPLBLD CKD-EPI 2021: 113.3 ML/MIN/1.73
EOSINOPHIL # BLD AUTO: 0.02 10*3/MM3 (ref 0–0.4)
EOSINOPHIL NFR BLD AUTO: 0.2 % (ref 0.3–6.2)
ERYTHROCYTE [DISTWIDTH] IN BLOOD BY AUTOMATED COUNT: 13.4 % (ref 12.3–15.4)
FLUAV SUBTYP SPEC NAA+PROBE: NOT DETECTED
FLUBV RNA ISLT QL NAA+PROBE: NOT DETECTED
GLOBULIN UR ELPH-MCNC: 3.6 GM/DL
GLUCOSE SERPL-MCNC: 86 MG/DL (ref 65–99)
GLUCOSE UR STRIP-MCNC: NEGATIVE MG/DL
HADV DNA SPEC NAA+PROBE: NOT DETECTED
HCOV 229E RNA SPEC QL NAA+PROBE: NOT DETECTED
HCOV HKU1 RNA SPEC QL NAA+PROBE: NOT DETECTED
HCOV NL63 RNA SPEC QL NAA+PROBE: NOT DETECTED
HCOV OC43 RNA SPEC QL NAA+PROBE: NOT DETECTED
HCT VFR BLD AUTO: 38.1 % (ref 34–46.6)
HGB BLD-MCNC: 12.7 G/DL (ref 12–15.9)
HGB UR QL STRIP.AUTO: ABNORMAL
HMPV RNA NPH QL NAA+NON-PROBE: NOT DETECTED
HOLD SPECIMEN: NORMAL
HPIV1 RNA ISLT QL NAA+PROBE: NOT DETECTED
HPIV2 RNA SPEC QL NAA+PROBE: NOT DETECTED
HPIV3 RNA NPH QL NAA+PROBE: NOT DETECTED
HPIV4 P GENE NPH QL NAA+PROBE: NOT DETECTED
HYALINE CASTS UR QL AUTO: ABNORMAL /LPF
IMM GRANULOCYTES # BLD AUTO: 0.12 10*3/MM3 (ref 0–0.05)
IMM GRANULOCYTES NFR BLD AUTO: 1 % (ref 0–0.5)
KETONES UR QL STRIP: ABNORMAL
LEUKOCYTE ESTERASE UR QL STRIP.AUTO: ABNORMAL
LIPASE SERPL-CCNC: 39 U/L (ref 13–60)
LYMPHOCYTES # BLD AUTO: 1.84 10*3/MM3 (ref 0.7–3.1)
LYMPHOCYTES NFR BLD AUTO: 15.1 % (ref 19.6–45.3)
M PNEUMO IGG SER IA-ACNC: NOT DETECTED
MCH RBC QN AUTO: 29.1 PG (ref 26.6–33)
MCHC RBC AUTO-ENTMCNC: 33.3 G/DL (ref 31.5–35.7)
MCV RBC AUTO: 87.2 FL (ref 79–97)
MONOCYTES # BLD AUTO: 1.16 10*3/MM3 (ref 0.1–0.9)
MONOCYTES NFR BLD AUTO: 9.5 % (ref 5–12)
NEUTROPHILS NFR BLD AUTO: 73.3 % (ref 42.7–76)
NEUTROPHILS NFR BLD AUTO: 8.96 10*3/MM3 (ref 1.7–7)
NITRITE UR QL STRIP: NEGATIVE
NRBC BLD AUTO-RTO: 0 /100 WBC (ref 0–0.2)
PH UR STRIP.AUTO: 6.5 [PH] (ref 5–9)
PLATELET # BLD AUTO: 250 10*3/MM3 (ref 140–450)
PMV BLD AUTO: 9.5 FL (ref 6–12)
POTASSIUM SERPL-SCNC: 3.9 MMOL/L (ref 3.5–5.2)
PROCALCITONIN SERPL-MCNC: 0.11 NG/ML (ref 0–0.25)
PROT SERPL-MCNC: 7.6 G/DL (ref 6–8.5)
PROT UR QL STRIP: ABNORMAL
RBC # BLD AUTO: 4.37 10*6/MM3 (ref 3.77–5.28)
RBC # UR STRIP: ABNORMAL /HPF
REF LAB TEST METHOD: ABNORMAL
RHINOVIRUS RNA SPEC NAA+PROBE: NOT DETECTED
RSV RNA NPH QL NAA+NON-PROBE: NOT DETECTED
SARS-COV-2 RNA NPH QL NAA+NON-PROBE: NOT DETECTED
SODIUM SERPL-SCNC: 136 MMOL/L (ref 136–145)
SP GR UR STRIP: 1.01 (ref 1–1.03)
SQUAMOUS #/AREA URNS HPF: ABNORMAL /HPF
UROBILINOGEN UR QL STRIP: ABNORMAL
WBC # UR STRIP: ABNORMAL /HPF
WBC NRBC COR # BLD: 12.21 10*3/MM3 (ref 3.4–10.8)
WHOLE BLOOD HOLD COAG: NORMAL

## 2022-09-01 PROCEDURE — 93005 ELECTROCARDIOGRAM TRACING: CPT | Performed by: INTERNAL MEDICINE

## 2022-09-01 PROCEDURE — 87635 SARS-COV-2 COVID-19 AMP PRB: CPT | Performed by: NURSE PRACTITIONER

## 2022-09-01 PROCEDURE — 74177 CT ABD & PELVIS W/CONTRAST: CPT

## 2022-09-01 PROCEDURE — 25010000002 CEFTRIAXONE PER 250 MG: Performed by: NURSE PRACTITIONER

## 2022-09-01 PROCEDURE — 83605 ASSAY OF LACTIC ACID: CPT | Performed by: NURSE PRACTITIONER

## 2022-09-01 PROCEDURE — 87086 URINE CULTURE/COLONY COUNT: CPT | Performed by: NURSE PRACTITIONER

## 2022-09-01 PROCEDURE — 93010 ELECTROCARDIOGRAM REPORT: CPT | Performed by: INTERNAL MEDICINE

## 2022-09-01 PROCEDURE — 84145 PROCALCITONIN (PCT): CPT | Performed by: NURSE PRACTITIONER

## 2022-09-01 PROCEDURE — P9612 CATHETERIZE FOR URINE SPEC: HCPCS

## 2022-09-01 PROCEDURE — 85025 COMPLETE CBC W/AUTO DIFF WBC: CPT | Performed by: NURSE PRACTITIONER

## 2022-09-01 PROCEDURE — 71045 X-RAY EXAM CHEST 1 VIEW: CPT

## 2022-09-01 PROCEDURE — 83690 ASSAY OF LIPASE: CPT | Performed by: NURSE PRACTITIONER

## 2022-09-01 PROCEDURE — 25010000002 IOPAMIDOL 61 % SOLUTION: Performed by: EMERGENCY MEDICINE

## 2022-09-01 PROCEDURE — 0202U NFCT DS 22 TRGT SARS-COV-2: CPT | Performed by: NURSE PRACTITIONER

## 2022-09-01 PROCEDURE — 81001 URINALYSIS AUTO W/SCOPE: CPT | Performed by: NURSE PRACTITIONER

## 2022-09-01 PROCEDURE — 80053 COMPREHEN METABOLIC PANEL: CPT | Performed by: NURSE PRACTITIONER

## 2022-09-01 PROCEDURE — 99284 EMERGENCY DEPT VISIT MOD MDM: CPT

## 2022-09-01 RX ORDER — ACETAMINOPHEN 325 MG/1
650 TABLET ORAL EVERY 6 HOURS PRN
Status: DISCONTINUED | OUTPATIENT
Start: 2022-09-01 | End: 2022-09-01

## 2022-09-01 RX ORDER — AMOXICILLIN 250 MG
2 CAPSULE ORAL 2 TIMES DAILY PRN
Status: DISCONTINUED | OUTPATIENT
Start: 2022-09-01 | End: 2022-09-05 | Stop reason: HOSPADM

## 2022-09-01 RX ORDER — SODIUM CHLORIDE 0.9 % (FLUSH) 0.9 %
10 SYRINGE (ML) INJECTION AS NEEDED
Status: DISCONTINUED | OUTPATIENT
Start: 2022-09-01 | End: 2022-09-05 | Stop reason: HOSPADM

## 2022-09-01 RX ORDER — SODIUM CHLORIDE, SODIUM LACTATE, POTASSIUM CHLORIDE, CALCIUM CHLORIDE 600; 310; 30; 20 MG/100ML; MG/100ML; MG/100ML; MG/100ML
125 INJECTION, SOLUTION INTRAVENOUS CONTINUOUS
Status: DISCONTINUED | OUTPATIENT
Start: 2022-09-01 | End: 2022-09-05 | Stop reason: HOSPADM

## 2022-09-01 RX ORDER — FAMOTIDINE 10 MG/ML
20 INJECTION, SOLUTION INTRAVENOUS EVERY 12 HOURS SCHEDULED
Status: DISCONTINUED | OUTPATIENT
Start: 2022-09-01 | End: 2022-09-02

## 2022-09-01 RX ORDER — SODIUM CHLORIDE 0.9 % (FLUSH) 0.9 %
10 SYRINGE (ML) INJECTION EVERY 12 HOURS SCHEDULED
Status: DISCONTINUED | OUTPATIENT
Start: 2022-09-01 | End: 2022-09-05 | Stop reason: HOSPADM

## 2022-09-01 RX ORDER — ACETAMINOPHEN 500 MG
1000 TABLET ORAL ONCE
Status: COMPLETED | OUTPATIENT
Start: 2022-09-01 | End: 2022-09-01

## 2022-09-01 RX ORDER — ACETAMINOPHEN 325 MG/1
650 TABLET ORAL EVERY 6 HOURS PRN
Status: DISCONTINUED | OUTPATIENT
Start: 2022-09-01 | End: 2022-09-05 | Stop reason: HOSPADM

## 2022-09-01 RX ORDER — ENOXAPARIN SODIUM 100 MG/ML
40 INJECTION SUBCUTANEOUS NIGHTLY
Status: DISCONTINUED | OUTPATIENT
Start: 2022-09-01 | End: 2022-09-05 | Stop reason: HOSPADM

## 2022-09-01 RX ORDER — LABETALOL HYDROCHLORIDE 5 MG/ML
10 INJECTION, SOLUTION INTRAVENOUS EVERY 6 HOURS PRN
Status: DISCONTINUED | OUTPATIENT
Start: 2022-09-01 | End: 2022-09-05 | Stop reason: HOSPADM

## 2022-09-01 RX ORDER — TRAMADOL HYDROCHLORIDE 50 MG/1
50 TABLET ORAL EVERY 8 HOURS PRN
Status: DISCONTINUED | OUTPATIENT
Start: 2022-09-01 | End: 2022-09-05 | Stop reason: HOSPADM

## 2022-09-01 RX ADMIN — ACETAMINOPHEN 1000 MG: 500 TABLET ORAL at 21:19

## 2022-09-01 RX ADMIN — SODIUM CHLORIDE 1000 ML: 9 INJECTION, SOLUTION INTRAVENOUS at 18:38

## 2022-09-01 RX ADMIN — IOPAMIDOL 90 ML: 612 INJECTION, SOLUTION INTRAVENOUS at 20:10

## 2022-09-01 NOTE — ED NOTES
"Pt arrives with complaints of cough that makes her \"lose her breath\" and a fever that began today.  Patient's caregiver states fever was up to 103, acetaminophen given at urgent care and pt was advised to come her for further work up.  Patient had Covid swab, negative at urgent care.   "

## 2022-09-02 LAB
ALBUMIN SERPL-MCNC: 3.3 G/DL (ref 3.5–5.2)
ALBUMIN/GLOB SERPL: 1.1 G/DL
ALP SERPL-CCNC: 84 U/L (ref 39–117)
ALT SERPL W P-5'-P-CCNC: 10 U/L (ref 1–33)
ANION GAP SERPL CALCULATED.3IONS-SCNC: 12 MMOL/L (ref 5–15)
AST SERPL-CCNC: 16 U/L (ref 1–32)
BILIRUB SERPL-MCNC: 0.3 MG/DL (ref 0–1.2)
BUN SERPL-MCNC: 8 MG/DL (ref 6–20)
BUN/CREAT SERPL: 16.7 (ref 7–25)
CALCIUM SPEC-SCNC: 8.4 MG/DL (ref 8.6–10.5)
CHLORIDE SERPL-SCNC: 101 MMOL/L (ref 98–107)
CO2 SERPL-SCNC: 22 MMOL/L (ref 22–29)
CREAT SERPL-MCNC: 0.48 MG/DL (ref 0.57–1)
DEPRECATED RDW RBC AUTO: 43.7 FL (ref 37–54)
EGFRCR SERPLBLD CKD-EPI 2021: 121.5 ML/MIN/1.73
ERYTHROCYTE [DISTWIDTH] IN BLOOD BY AUTOMATED COUNT: 13.4 % (ref 12.3–15.4)
GLOBULIN UR ELPH-MCNC: 3.1 GM/DL
GLUCOSE SERPL-MCNC: 70 MG/DL (ref 65–99)
HBA1C MFR BLD: 4.9 % (ref 4.8–5.6)
HCT VFR BLD AUTO: 34.5 % (ref 34–46.6)
HGB BLD-MCNC: 11.4 G/DL (ref 12–15.9)
MAGNESIUM SERPL-MCNC: 1.6 MG/DL (ref 1.6–2.6)
MCH RBC QN AUTO: 29.3 PG (ref 26.6–33)
MCHC RBC AUTO-ENTMCNC: 33 G/DL (ref 31.5–35.7)
MCV RBC AUTO: 88.7 FL (ref 79–97)
PHOSPHATE SERPL-MCNC: 2.2 MG/DL (ref 2.5–4.5)
PLATELET # BLD AUTO: 199 10*3/MM3 (ref 140–450)
PMV BLD AUTO: 9.6 FL (ref 6–12)
POTASSIUM SERPL-SCNC: 3.9 MMOL/L (ref 3.5–5.2)
PROT SERPL-MCNC: 6.4 G/DL (ref 6–8.5)
RBC # BLD AUTO: 3.89 10*6/MM3 (ref 3.77–5.28)
SODIUM SERPL-SCNC: 135 MMOL/L (ref 136–145)
TSH SERPL DL<=0.05 MIU/L-ACNC: 1.53 UIU/ML (ref 0.27–4.2)
WBC NRBC COR # BLD: 11.65 10*3/MM3 (ref 3.4–10.8)

## 2022-09-02 PROCEDURE — 80053 COMPREHEN METABOLIC PANEL: CPT | Performed by: INTERNAL MEDICINE

## 2022-09-02 PROCEDURE — 36415 COLL VENOUS BLD VENIPUNCTURE: CPT | Performed by: INTERNAL MEDICINE

## 2022-09-02 PROCEDURE — 85027 COMPLETE CBC AUTOMATED: CPT | Performed by: INTERNAL MEDICINE

## 2022-09-02 PROCEDURE — 84100 ASSAY OF PHOSPHORUS: CPT | Performed by: INTERNAL MEDICINE

## 2022-09-02 PROCEDURE — 84443 ASSAY THYROID STIM HORMONE: CPT | Performed by: INTERNAL MEDICINE

## 2022-09-02 PROCEDURE — 63710000001 ONDANSETRON ODT 4 MG TABLET DISPERSIBLE: Performed by: HOSPITALIST

## 2022-09-02 PROCEDURE — 25010000002 ENOXAPARIN PER 10 MG: Performed by: INTERNAL MEDICINE

## 2022-09-02 PROCEDURE — 87040 BLOOD CULTURE FOR BACTERIA: CPT | Performed by: INTERNAL MEDICINE

## 2022-09-02 PROCEDURE — 83036 HEMOGLOBIN GLYCOSYLATED A1C: CPT | Performed by: INTERNAL MEDICINE

## 2022-09-02 PROCEDURE — 83735 ASSAY OF MAGNESIUM: CPT | Performed by: INTERNAL MEDICINE

## 2022-09-02 PROCEDURE — 25010000002 PIPERACILLIN SOD-TAZOBACTAM PER 1 G: Performed by: INTERNAL MEDICINE

## 2022-09-02 RX ORDER — LEVETIRACETAM 500 MG/1
500 TABLET ORAL EVERY 12 HOURS SCHEDULED
Status: DISCONTINUED | OUTPATIENT
Start: 2022-09-02 | End: 2022-09-05 | Stop reason: HOSPADM

## 2022-09-02 RX ORDER — PANTOPRAZOLE SODIUM 40 MG/1
40 TABLET, DELAYED RELEASE ORAL EVERY MORNING
Refills: 11 | Status: DISCONTINUED | OUTPATIENT
Start: 2022-09-03 | End: 2022-09-05 | Stop reason: HOSPADM

## 2022-09-02 RX ORDER — ACETAMINOPHEN,DIPHENHYDRAMINE HCL 500; 25 MG/1; MG/1
1 TABLET, FILM COATED ORAL NIGHTLY PRN
COMMUNITY

## 2022-09-02 RX ORDER — METOCLOPRAMIDE 10 MG/1
10 TABLET ORAL
Status: DISCONTINUED | OUTPATIENT
Start: 2022-09-02 | End: 2022-09-05 | Stop reason: HOSPADM

## 2022-09-02 RX ORDER — FLUOXETINE HYDROCHLORIDE 20 MG/1
40 CAPSULE ORAL DAILY
Status: DISCONTINUED | OUTPATIENT
Start: 2022-09-02 | End: 2022-09-05 | Stop reason: HOSPADM

## 2022-09-02 RX ORDER — GUAIFENESIN 600 MG/1
600 TABLET, EXTENDED RELEASE ORAL 2 TIMES DAILY PRN
COMMUNITY

## 2022-09-02 RX ORDER — ESTRADIOL 0.5 MG/1
0.5 TABLET ORAL DAILY
Status: DISCONTINUED | OUTPATIENT
Start: 2022-09-02 | End: 2022-09-05 | Stop reason: HOSPADM

## 2022-09-02 RX ORDER — ONDANSETRON 4 MG/1
8 TABLET, ORALLY DISINTEGRATING ORAL EVERY 8 HOURS PRN
Status: DISCONTINUED | OUTPATIENT
Start: 2022-09-02 | End: 2022-09-05 | Stop reason: HOSPADM

## 2022-09-02 RX ORDER — BUPROPION HYDROCHLORIDE 100 MG/1
100 TABLET, EXTENDED RELEASE ORAL DAILY
Status: DISCONTINUED | OUTPATIENT
Start: 2022-09-02 | End: 2022-09-05 | Stop reason: HOSPADM

## 2022-09-02 RX ORDER — OXYBUTYNIN CHLORIDE 5 MG/1
5 TABLET, EXTENDED RELEASE ORAL DAILY
Status: DISCONTINUED | OUTPATIENT
Start: 2022-09-02 | End: 2022-09-05 | Stop reason: HOSPADM

## 2022-09-02 RX ORDER — BUSPIRONE HYDROCHLORIDE 10 MG/1
10 TABLET ORAL 2 TIMES DAILY
Status: DISCONTINUED | OUTPATIENT
Start: 2022-09-02 | End: 2022-09-05 | Stop reason: HOSPADM

## 2022-09-02 RX ORDER — ONDANSETRON 8 MG/1
8 TABLET, ORALLY DISINTEGRATING ORAL EVERY 8 HOURS PRN
COMMUNITY

## 2022-09-02 RX ORDER — GUAIFENESIN 600 MG/1
600 TABLET, EXTENDED RELEASE ORAL 2 TIMES DAILY PRN
Status: DISCONTINUED | OUTPATIENT
Start: 2022-09-02 | End: 2022-09-05 | Stop reason: HOSPADM

## 2022-09-02 RX ORDER — MELATONIN
1000 DAILY
Refills: 11 | Status: DISCONTINUED | OUTPATIENT
Start: 2022-09-02 | End: 2022-09-05 | Stop reason: HOSPADM

## 2022-09-02 RX ORDER — DOCUSATE SODIUM 100 MG/1
100 CAPSULE, LIQUID FILLED ORAL 2 TIMES DAILY
Status: DISCONTINUED | OUTPATIENT
Start: 2022-09-02 | End: 2022-09-05 | Stop reason: HOSPADM

## 2022-09-02 RX ADMIN — PIPERACILLIN SODIUM AND TAZOBACTAM SODIUM 3.38 G: 3; .375 INJECTION, POWDER, LYOPHILIZED, FOR SOLUTION INTRAVENOUS at 16:47

## 2022-09-02 RX ADMIN — FAMOTIDINE 20 MG: 10 INJECTION INTRAVENOUS at 00:21

## 2022-09-02 RX ADMIN — DOCUSATE SODIUM 100 MG: 100 CAPSULE, LIQUID FILLED ORAL at 20:57

## 2022-09-02 RX ADMIN — Medication 10 ML: at 10:30

## 2022-09-02 RX ADMIN — Medication 10 ML: at 00:21

## 2022-09-02 RX ADMIN — PIPERACILLIN SODIUM AND TAZOBACTAM SODIUM 3.38 G: 3; .375 INJECTION, POWDER, LYOPHILIZED, FOR SOLUTION INTRAVENOUS at 00:22

## 2022-09-02 RX ADMIN — LEVETIRACETAM 500 MG: 500 TABLET, FILM COATED ORAL at 15:35

## 2022-09-02 RX ADMIN — SODIUM CHLORIDE, POTASSIUM CHLORIDE, SODIUM LACTATE AND CALCIUM CHLORIDE 125 ML/HR: 600; 310; 30; 20 INJECTION, SOLUTION INTRAVENOUS at 01:03

## 2022-09-02 RX ADMIN — SODIUM CHLORIDE, POTASSIUM CHLORIDE, SODIUM LACTATE AND CALCIUM CHLORIDE 1000 ML: 600; 310; 30; 20 INJECTION, SOLUTION INTRAVENOUS at 01:03

## 2022-09-02 RX ADMIN — ACETAMINOPHEN 650 MG: 325 TABLET, FILM COATED ORAL at 08:07

## 2022-09-02 RX ADMIN — FAMOTIDINE 20 MG: 10 INJECTION INTRAVENOUS at 08:08

## 2022-09-02 RX ADMIN — Medication 10 ML: at 20:57

## 2022-09-02 RX ADMIN — Medication 1000 UNITS: at 15:28

## 2022-09-02 RX ADMIN — BUPROPION HYDROCHLORIDE 100 MG: 100 TABLET, EXTENDED RELEASE ORAL at 15:32

## 2022-09-02 RX ADMIN — BUSPIRONE HYDROCHLORIDE 10 MG: 10 TABLET ORAL at 20:57

## 2022-09-02 RX ADMIN — PIPERACILLIN SODIUM AND TAZOBACTAM SODIUM 3.38 G: 3; .375 INJECTION, POWDER, LYOPHILIZED, FOR SOLUTION INTRAVENOUS at 10:30

## 2022-09-02 RX ADMIN — METOCLOPRAMIDE 10 MG: 10 TABLET ORAL at 16:47

## 2022-09-02 RX ADMIN — OXYBUTYNIN CHLORIDE 5 MG: 5 TABLET, EXTENDED RELEASE ORAL at 15:28

## 2022-09-02 RX ADMIN — FLUOXETINE 40 MG: 20 CAPSULE ORAL at 15:28

## 2022-09-02 RX ADMIN — ENOXAPARIN SODIUM 40 MG: 40 INJECTION SUBCUTANEOUS at 20:57

## 2022-09-02 RX ADMIN — ONDANSETRON 8 MG: 4 TABLET, ORALLY DISINTEGRATING ORAL at 15:56

## 2022-09-02 RX ADMIN — ACETAMINOPHEN 650 MG: 325 TABLET, FILM COATED ORAL at 16:12

## 2022-09-02 RX ADMIN — ESTRADIOL 0.5 MG: 0.5 TABLET ORAL at 15:32

## 2022-09-02 RX ADMIN — LEVETIRACETAM 500 MG: 500 TABLET, FILM COATED ORAL at 21:14

## 2022-09-02 RX ADMIN — SODIUM CHLORIDE, POTASSIUM CHLORIDE, SODIUM LACTATE AND CALCIUM CHLORIDE 125 ML/HR: 600; 310; 30; 20 INJECTION, SOLUTION INTRAVENOUS at 15:27

## 2022-09-02 RX ADMIN — ENOXAPARIN SODIUM 40 MG: 40 INJECTION SUBCUTANEOUS at 00:21

## 2022-09-02 NOTE — ED NOTES
Nursing report ED to floor  Kiley Ledesma  41 y.o.  female    HPI:   Chief Complaint   Patient presents with   • Cough   • Fever   • Fatigue       Admitting doctor:   Saeid Behroozi, MD    Consulting provider(s):  Consults     No orders found from 8/3/2022 to 9/2/2022.           Admitting diagnosis:   The encounter diagnosis was Pyelonephritis.    Code status:   Current Code Status     Date Active Code Status Order ID Comments User Context       9/1/2022 2220 CPR (Attempt to Resuscitate) [id number]  Behroozi, Saeid, MD ED     Advance Care Planning Activity      Questions for Current Code Status     Question Answer    Code Status (Patient has no pulse and is not breathing) CPR (Attempt to Resuscitate)    Medical Interventions (Patient has pulse or is breathing) Full Support          Allergies:   Atropine, Ciprofloxacin, Dairy aid [lactase], Sulfa antibiotics, Avelox [moxifloxacin hcl], Factive [gemifloxacin], Floxin otic [ofloxacin], and Levaquin [levofloxacin]    Intake and Output    Intake/Output Summary (Last 24 hours) at 9/1/2022 2301  Last data filed at 9/1/2022 2119  Gross per 24 hour   Intake 1000 ml   Output no documentation   Net 1000 ml       Weight:       09/01/22  1619   Weight: 65.9 kg (145 lb 3.2 oz)       Most recent vitals:   Vitals:    09/01/22 2023 09/01/22 2119 09/01/22 2215 09/01/22 2217   BP: 162/84 155/94  140/75   BP Location: Right arm      Patient Position: Lying   Lying   Pulse: 119 (Abnormal) 122 110 113   Resp: 20 20  18   Temp:    98.8 °F (37.1 °C)   TempSrc:    Oral   SpO2: 95% 97% 93% 97%   Weight:       Height:           Active LDAs/IV Access:   Lines, Drains & Airways     Active LDAs     Name Placement date Placement time Site Days    Peripheral IV 09/01/22 1827 Left Antecubital 09/01/22 1827  Antecubital  less than 1                Labs (abnormal labs have a star):   Labs Reviewed   URINALYSIS W/ MICROSCOPIC IF INDICATED (NO CULTURE) - Abnormal; Notable for the following  components:       Result Value    Appearance, UA Cloudy (*)     Ketones, UA 15 mg/dL (1+) (*)     Blood, UA Moderate (2+) (*)     Protein, UA Trace (*)     Leuk Esterase, UA Large (3+) (*)     All other components within normal limits   CBC WITH AUTO DIFFERENTIAL - Abnormal; Notable for the following components:    WBC 12.21 (*)     Lymphocyte % 15.1 (*)     Eosinophil % 0.2 (*)     Immature Grans % 1.0 (*)     Neutrophils, Absolute 8.96 (*)     Monocytes, Absolute 1.16 (*)     Immature Grans, Absolute 0.12 (*)     All other components within normal limits   URINALYSIS, MICROSCOPIC ONLY - Abnormal; Notable for the following components:    RBC, UA 13-20 (*)     WBC, UA Too Numerous to Count (*)     Bacteria, UA 1+ (*)     All other components within normal limits   RESPIRATORY PANEL PCR W/ COVID-19 (SARS-COV-2) MURALI/RINA/IVON/PAD/COR/MAD/[facility] IN-HOUSE, NP [facility], 3-4 HR TAT - Normal    Narrative:     In the setting of a positive respiratory panel with a viral infection PLUS a negative procalcitonin without other underlying concern for bacterial infection, consider observing off antibiotics or discontinuation of antibiotics and continue supportive care. If the respiratory panel is positive for atypical bacterial infection (Bordetella pertussis, Chlamydophila pneumoniae, or Mycoplasma pneumoniae), consider antibiotic de-escalation to target atypical bacterial infection.   LACTIC ACID, PLASMA - Normal   PROCALCITONIN - Normal    Narrative:     As a Marker for Sepsis (Non-Neonates):    1. <0.5 ng/mL represents a low risk of severe sepsis and/or septic shock.  2. >2 ng/mL represents a high risk of severe sepsis and/or septic shock.    As a Marker for Lower Respiratory Tract Infections that require antibiotic therapy:    PCT on Admission    Antibiotic Therapy       6-12 Hrs later    >0.5                Strongly Recommended  >0.25 - <0.5        Recommended   0.1 - 0.25          Discouraged               "Remeasure/reassess PCT  <0.1                Strongly Discouraged     Remeasure/reassess PCT    As 28 day mortality risk marker: \"Change in Procalcitonin Result\" (>80% or <=80%) if Day 0 (or Day 1) and Day 4 values are available. Refer to http://[email or web address]-pct-calculator.com    Change in PCT <=80%  A decrease of PCT levels below or equal to 80% defines a positive change in PCT test result representing a higher risk for 28-day all-cause mortality of patients diagnosed with severe sepsis for septic shock.    Change in PCT >80%  A decrease of PCT levels of more than 80% defines a negative change in PCT result representing a lower risk for 28-day all-cause mortality of patients diagnosed with severe sepsis or septic shock.      LIPASE - Normal   BLOOD CULTURE   BLOOD CULTURE   COMPREHENSIVE METABOLIC PANEL    Narrative:     GFR Normal >60  Chronic Kidney Disease <60  Kidney Failure <15     URINALYSIS W/ CULTURE IF INDICATED   URINALYSIS W/ CULTURE IF INDICATED   CBC (NO DIFF)   COMPREHENSIVE METABOLIC PANEL   HEMOGLOBIN A1C   MAGNESIUM   PHOSPHORUS   TSH   CBC AND DIFFERENTIAL    Narrative:     The following orders were created for panel order CBC & Differential.  Procedure                               Abnormality         Status                     ---------                               -----------         ------                     CBC Auto Differential[[id number]]        Abnormal            Final result                 Please view results for these tests on the individual orders.   EXTRA TUBES    Narrative:     The following orders were created for panel order Extra Tubes.  Procedure                               Abnormality         Status                     ---------                               -----------         ------                     Gold Top - SST[[id number]]                                   Final result               Light Blue Top[[id number]]                                   Final result             "     Please view results for these tests on the individual orders.   GOLD TOP - SST   LIGHT BLUE TOP       Meds given in ED:   Medications   sodium chloride 0.9 % flush 10 mL (has no administration in time range)   sodium chloride 0.9 % flush 10 mL (has no administration in time range)   sodium chloride 0.9 % flush 10 mL (has no administration in time range)   Enoxaparin Sodium (LOVENOX) syringe 40 mg (has no administration in time range)   piperacillin-tazobactam (ZOSYN) 3.375 g/100 mL 0.9% NS IVPB (mbp) (has no administration in time range)   piperacillin-tazobactam (ZOSYN) 3.375 g/100 mL 0.9% NS IVPB (mbp) (has no administration in time range)   famotidine (PEPCID) injection 20 mg (has no administration in time range)   lactated ringers bolus 1,000 mL (has no administration in time range)   lactated ringers infusion (has no administration in time range)   acetaminophen (TYLENOL) tablet 650 mg (has no administration in time range)   sennosides-docusate (PERICOLACE) 8.6-50 MG per tablet 2 tablet (has no administration in time range)   traMADol (ULTRAM) tablet 50 mg (has no administration in time range)   sodium chloride 0.9 % bolus 1,000 mL (0 mL Intravenous Stopped 9/1/22 2119)   iopamidol (ISOVUE-300) 61 % injection 100 mL (90 mL Intravenous Given 9/1/22 2010)   acetaminophen (TYLENOL) tablet 1,000 mg (1,000 mg Oral Given 9/1/22 2119)   cefTRIAXone (ROCEPHIN) 1 g/100 mL 0.9% NS (MBP) (0 g Intravenous Stopped 9/1/22 2217)     lactated ringers, 125 mL/hr         NIH Stroke Scale:       Isolation/Infection(s):  No active isolations   COVID (rule out)     COVID Testing  Collected yes  Resulted yes-neg    Nursing report ED to floor:  Mental status:A&O  Ambulatory status: WC bound  Precautions: fall     ED nurse phone extentsima- [phone]

## 2022-09-02 NOTE — PLAN OF CARE
Goal Outcome Evaluation:  Plan of Care Reviewed With: patient        Progress: no change  Outcome Evaluation: patient temp has been elevated today. Dr. Delgadillo aware. will cont to monitor.

## 2022-09-02 NOTE — PROGRESS NOTES
[facility]  INPATIENT PROGRESS NOTE    Length of Stay: 1  Date of Admission: 9/1/2022  Primary Care Physician: Greta Stoddard MD    Subjective   Chief Complaint: Fever  HPI: Patient still not feeling well today.  Still with elevated temperature.    Review of Systems   Constitutional: Positive for chills, fatigue and fever. Negative for appetite change and unexpected weight change.   Respiratory: Negative for cough, choking, chest tightness, shortness of breath and wheezing.    Cardiovascular: Negative for chest pain, palpitations and leg swelling.   Gastrointestinal: Positive for abdominal pain and nausea. Negative for blood in stool, constipation, diarrhea and vomiting.   Genitourinary: Positive for dysuria. Negative for flank pain and hematuria.   Neurological: Negative for dizziness, seizures, syncope, speech difficulty, weakness, light-headedness, numbness and headaches.   Hematological: Does not bruise/bleed easily.        All pertinent negatives and positives are as above. All other systems have been reviewed and are negative unless otherwise stated.    Objective    Temp:  [98.4 °F (36.9 °C)-102.1 °F (38.9 °C)] 100.3 °F (37.9 °C)  Heart Rate:  [] 93  Resp:  [18-20] 18  BP: (121-162)/(65-94) 121/74    AM-PAC 6 Clicks Score (PT): 7 (09/02/22 0807)    Physical Exam  Vitals reviewed.   Constitutional:       Appearance: She is well-developed.   HENT:      Head: Normocephalic and atraumatic.   Eyes:      Pupils: Pupils are equal, round, and reactive to light.   Cardiovascular:      Rate and Rhythm: Normal rate and regular rhythm.      Heart sounds: Normal heart sounds. No murmur heard.    No friction rub. No gallop.   Pulmonary:      Effort: Pulmonary effort is normal. No respiratory distress.      Breath sounds: Normal breath sounds. No wheezing or rales.   Chest:      Chest wall: No tenderness.   Abdominal:      General: Bowel sounds  are normal. There is no distension.      Palpations: Abdomen is soft.      Tenderness: There is no abdominal tenderness. There is no guarding.   Musculoskeletal:      Cervical back: Normal range of motion and neck supple.   Skin:     General: Skin is warm and dry.   Neurological:      Mental Status: She is alert and oriented to person, place, and time.      Comments: Patient with contractures and spastic quadriplegia.   Psychiatric:         Behavior: Behavior normal.         Thought Content: Thought content normal.           Results Review:  I have reviewed the labs, radiology results, and diagnostic studies.    Laboratory Data:   Results from last 7 days   Lab Units 09/02/22  0618 09/01/22  1828   SODIUM mmol/L 135* 136   POTASSIUM mmol/L 3.9 3.9   CHLORIDE mmol/L 101 99   CO2 mmol/L 22.0 25.0   BUN mg/dL 8 14   CREATININE mg/dL 0.48* 0.64   GLUCOSE mg/dL 70 86   CALCIUM mg/dL 8.4* 9.4   BILIRUBIN mg/dL 0.3 0.4   ALK PHOS U/L 84 106   ALT (SGPT) U/L 10 10   AST (SGOT) U/L 16 16   ANION GAP mmol/L 12.0 12.0     Estimated Creatinine Clearance: 127.7 mL/min (A) (by C-G formula based on SCr of 0.48 mg/dL (L)).  Results from last 7 days   Lab Units 09/02/22  0618   MAGNESIUM mg/dL 1.6   PHOSPHORUS mg/dL 2.2*         Results from last 7 days   Lab Units 09/02/22  0618 09/01/22  1828   WBC 10*3/mm3 11.65* 12.21*   HEMOGLOBIN g/dL 11.4* 12.7   HEMATOCRIT % 34.5 38.1   PLATELETS 10*3/mm3 199 250           Culture Data:   No results found for: BLOODCX  No results found for: URINECX  No results found for: RESPCX  No results found for: WOUNDCX  No results found for: STOOLCX  No components found for: BODYFLD    Radiology Data:   Imaging Results (Last 24 Hours)     Procedure Component Value Units Date/Time    CT Abdomen Pelvis With Contrast [[id number]] Collected: 09/01/22 2000     Updated: 09/01/22 2147    Narrative:      CT SCANS ABDOMEN AND PELVIS WITH IV CONTRAST.    HISTORY: abdominal pain, vomiting    COMPARISON:  6/14/2019.    TECHNIQUE: Radiation dose reduction techniques were utilized,  including automated exposure control and exposure modulation  based on body size. Axial images were obtained from the lung  bases to the symphysis pubis with IV contrast only.. Oral  contrast was not administered per request.    FINDINGS :  There is some mild motion artifact as well as  artifact from an implanted stimulator device over the anterior  pelvis. There is cortical renal atrophy with small parapelvic  cysts bilaterally which do not require imaging follow-up. There  are nonobstructing renal calculi bilaterally.     The left nephrogram is somewhat heterogeneous, particularly  laterally which was not evident on the previous exam. There is  also some fairly diffuse left ureteral enhancement but without  dilatation. This could potentially be related to mild  pyelonephritis. Suggest correlation with urinalysis.    Remaining abdominal organs have an unremarkable appearance.  Normal aorta and appendix. No significant constipation or  diverticular disease. Small hiatal hernia. The GI tract not  opacified for assessment but non obstructive in appearance.  Hysterectomy. No ascites.      Impression:      IMPRESSION :   1. Renal findings as discussed, suggest urinalysis correlation  for possibility of mild left pyelonephritis.      Electronically signed by:  Alex Garcia MD  9/1/2022 9:45 PM CDT  Workstation: [id number]    XR Chest 1 View [[id number]] Collected: 09/01/22 1849     Updated: 09/01/22 1951    Narrative:      PROCEDURE:XR CHEST 1 VIEW    HISTORY:cough, fever     COMPARISON:    None    FINDINGS:    The heart appears unremarkable. The lungs are clear there is no  alveolar consolidation, effusion or pneumothorax. There are no  acute bony or soft tissue abnormalities. Levoconvex scoliosis of  the thoracic spine.      Impression:        No acute cardiopulmonary process.    Electronically signed by:  Elicia Dorado MD, MARCIAL  9/1/2022  7:49  PM CDT Workstation: [id number]          I have reviewed the patient's current medications.     Assessment/Plan     Active Hospital Problems    Diagnosis    • Pyelonephritis        Plan:  1.  Pyelonephritis: Continue broad-spectrum antibiotics.  Cultures pending.  Will discuss with Dr. Acuña.  2.  Cerebral palsy  3.  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  Sepsis: Secondary to pyelonephritis.  Continue current supportive care.  5.  Gastroesophageal reflux disorder: Continue Reglan and PPI  6.  DVT prophylaxis: Lovenox.          The patient was evaluated during the global COVID-19 pandemic, and the diagnosis was suspected/considered upon their initial presentation.  Evaluation, treatment, and testing were consistent with current guidelines for patients who present with complaints or symptoms that may be related to COVID-19.    I confirmed that the patient's Advance Care Plan is present, code status is documented, or surrogate decision maker is listed in the patient's medical record.       Discharge Planning: I expect patient to be discharged to home in 2-3 days.        This document has been electronically signed by Mika Delgadillo MD on September 2, 2022 12:50 CDT

## 2022-09-02 NOTE — ED PROVIDER NOTES
Subjective   Patient presents to the ER with c/o fever and cough today with shortness of breath. She states her temp at the urgent care was 102.1 after Tylenol. She reports some nausea in the mornings only for the past several days with some vomiting - no vomiting in past 3 days. She has c/o lower abdominal pain. She has recently be treated about 2 weeks ago for a UTI and is currently on doxycycline daily for UTI prophylaxis. Patient has cerebral palsy and has a baclofen pump to her abdomen secondary to legs drawing up.           Review of Systems   Constitutional: Positive for chills, fatigue and fever.   HENT: Negative for congestion and sore throat.    Respiratory: Positive for cough and shortness of breath.    Cardiovascular: Negative for chest pain.   Gastrointestinal: Positive for abdominal pain, nausea and vomiting. Negative for diarrhea.   Genitourinary: Negative.    Musculoskeletal: Negative.    Skin: Negative.    Neurological: Negative for headaches.       Past Medical History:   Diagnosis Date   • Acute vulvitis     Candida   • Allergy     Unspecified, initial encounter   • Amblyopia     OS   • Astigmatism    • Cerebral palsy (HCC)    • Cheilosis    • Common cold    • Contusion    • Depressive disorder    • Diarrhea    • Encounter for general adult medical examination without abnormal findings    • Encounter for routine adult health examination     Adult health examination      • Fever    • Gastroesophageal reflux disease    • Generalized abdominal pain    • Indeterminate colitis    • Infected insect bite    • Myopia    • Spastic quadriplegic cerebral palsy (HCC)    • Trouble walking     Walking disability      • Urinary incontinence    • Urinary tract infectious disease    • Wears glasses    • Worried well        Allergies   Allergen Reactions   • Atropine Unknown (See Comments)     Unknown reaction per patient   • Ciprofloxacin Other (See Comments)     seizure   • Dairy Aid [Lactase] Other (See  "Comments)     abd pain   • Sulfa Antibiotics Itching   • Avelox [Moxifloxacin Hcl] Other (See Comments)     Concerned may cause seizure   • Factive [Gemifloxacin] Other (See Comments)     Concerned may cause seizure   • Floxin Otic [Ofloxacin] Other (See Comments)     Concerned may cause seizure   • Levaquin [Levofloxacin] Other (See Comments)     Concerned may cause seizure       Past Surgical History:   Procedure Laterality Date   • COLONOSCOPY N/A 03/28/2012    hemorrhoids   • COLONOSCOPY N/A 4/23/2018    Procedure: COLONOSCOPY--look TI, bx, hx colitis;  Surgeon: Sreedhar Kirkpatrick MD;  Location: [facility] ENDOSCOPY;  Service: Gastroenterology   • COLONOSCOPY N/A 6/7/2019    Procedure: COLONOSCOPY;  Surgeon: Sreedhar Kirkpatrick MD;  Location: [facility] ENDOSCOPY;  Service: Gastroenterology   • CYSTOSCOPY N/A 4/19/2019    Procedure: CYSTOSCOPY;  Surgeon: Cecilio Acuña MD;  Location: [facility] OR;  Service: Urology   • ENDOSCOPY N/A 03/28/2012    gastritis   • INJECTION OF MEDICATION  02/23/2016    Kenalog   • TOTAL ABDOMINAL HYSTERECTOMY WITH SALPINGO OOPHORECTOMY N/A 03/24/2005   • UPPER GASTROINTESTINAL ENDOSCOPY  03/28/2012       Family History   Problem Relation Age of Onset   • Coronary artery disease Other    • Diabetes Other    • Hypertension Other    • Stroke Other        Social History     Socioeconomic History   • Marital status: Single   Tobacco Use   • Smoking status: Never Smoker   • Smokeless tobacco: Never Used   Substance and Sexual Activity   • Alcohol use: No   • Drug use: No   • Sexual activity: Defer           Objective    /64 (BP Location: Right arm, Patient Position: Lying)   Pulse 66   Temp 97.8 °F (36.6 °C) (Temporal)   Resp 18   Ht 152.4 cm (60\")   Wt 52.2 kg (115 lb 1 oz)   LMP  (LMP Unknown)   SpO2 99%   BMI 22.47 kg/m²     Physical Exam  Vitals and nursing note reviewed.   Constitutional:       General: She is not in acute distress.     Appearance: She is well-developed. She is not " ill-appearing.   HENT:      Head: Normocephalic and atraumatic.      Right Ear: External ear normal.      Left Ear: External ear normal.      Nose: Nose normal.      Mouth/Throat:      Mouth: Mucous membranes are moist.      Pharynx: Oropharynx is clear.   Eyes:      Conjunctiva/sclera: Conjunctivae normal.   Cardiovascular:      Rate and Rhythm: Regular rhythm. Tachycardia present.      Heart sounds: Normal heart sounds.   Pulmonary:      Effort: Pulmonary effort is normal. No respiratory distress.      Breath sounds: Normal breath sounds. No wheezing.   Abdominal:      General: Bowel sounds are normal. There is no distension.      Palpations: Abdomen is soft.      Tenderness: There is abdominal tenderness (mid lower abd).   Musculoskeletal:         General: Normal range of motion.      Cervical back: Neck supple.      Comments: ROM normal for patient - chronic contractures - uses a wheelchair   Skin:     General: Skin is warm and dry.      Capillary Refill: Capillary refill takes less than 2 seconds.   Neurological:      Mental Status: She is alert and oriented to person, place, and time.      Coordination: Coordination normal.   Psychiatric:         Behavior: Behavior normal.         Thought Content: Thought content normal.         Judgment: Judgment normal.         Procedures  Results for orders placed or performed during the hospital encounter of 09/01/22   Respiratory Panel PCR w/COVID-19(SARS-CoV-2) MURALI/RINA/IVON/PAD/COR/MAD/CAROLEE In-House, NP Swab in UTM/VTM, 3-4 HR TAT - Swab, Nasopharynx    Specimen: Nasopharynx; Swab   Result Value Ref Range    ADENOVIRUS, PCR Not Detected Not Detected    Coronavirus 229E Not Detected Not Detected    Coronavirus HKU1 Not Detected Not Detected    Coronavirus NL63 Not Detected Not Detected    Coronavirus OC43 Not Detected Not Detected    COVID19 Not Detected Not Detected - Ref. Range    Human Metapneumovirus Not Detected Not Detected    Human Rhinovirus/Enterovirus Not Detected  Not Detected    Influenza A PCR Not Detected Not Detected    Influenza B PCR Not Detected Not Detected    Parainfluenza Virus 1 Not Detected Not Detected    Parainfluenza Virus 2 Not Detected Not Detected    Parainfluenza Virus 3 Not Detected Not Detected    Parainfluenza Virus 4 Not Detected Not Detected    RSV, PCR Not Detected Not Detected    Bordetella pertussis pcr Not Detected Not Detected    Bordetella parapertussis PCR Not Detected Not Detected    Chlamydophila pneumoniae PCR Not Detected Not Detected    Mycoplasma pneumo by PCR Not Detected Not Detected   Comprehensive Metabolic Panel    Specimen: Blood   Result Value Ref Range    Glucose 86 65 - 99 mg/dL    BUN 14 6 - 20 mg/dL    Creatinine 0.64 0.57 - 1.00 mg/dL    Sodium 136 136 - 145 mmol/L    Potassium 3.9 3.5 - 5.2 mmol/L    Chloride 99 98 - 107 mmol/L    CO2 25.0 22.0 - 29.0 mmol/L    Calcium 9.4 8.6 - 10.5 mg/dL    Total Protein 7.6 6.0 - 8.5 g/dL    Albumin 4.00 3.50 - 5.20 g/dL    ALT (SGPT) 10 1 - 33 U/L    AST (SGOT) 16 1 - 32 U/L    Alkaline Phosphatase 106 39 - 117 U/L    Total Bilirubin 0.4 0.0 - 1.2 mg/dL    Globulin 3.6 gm/dL    A/G Ratio 1.1 g/dL    BUN/Creatinine Ratio 21.9 7.0 - 25.0    Anion Gap 12.0 5.0 - 15.0 mmol/L    eGFR 113.3 >60.0 mL/min/1.73   Urinalysis With Microscopic If Indicated (No Culture) - Urine, Catheter    Specimen: Urine, Catheter   Result Value Ref Range    Color, UA Yellow Yellow, Straw, Dark Yellow, Keli    Appearance, UA Cloudy (A) Clear    pH, UA 6.5 5.0 - 9.0    Specific Sneads, UA 1.014 1.003 - 1.030    Glucose, UA Negative Negative    Ketones, UA 15 mg/dL (1+) (A) Negative    Bilirubin, UA Negative Negative    Blood, UA Moderate (2+) (A) Negative    Protein, UA Trace (A) Negative    Leuk Esterase, UA Large (3+) (A) Negative    Nitrite, UA Negative Negative    Urobilinogen, UA 0.2 E.U./dL 0.2 - 1.0 E.U./dL   Lactic Acid, Plasma    Specimen: Blood   Result Value Ref Range    Lactate 0.8 0.5 - 2.0 mmol/L    Procalcitonin    Specimen: Blood   Result Value Ref Range    Procalcitonin 0.11 0.00 - 0.25 ng/mL   Lipase    Specimen: Blood   Result Value Ref Range    Lipase 39 13 - 60 U/L   CBC Auto Differential    Specimen: Blood   Result Value Ref Range    WBC 12.21 (H) 3.40 - 10.80 10*3/mm3    RBC 4.37 3.77 - 5.28 10*6/mm3    Hemoglobin 12.7 12.0 - 15.9 g/dL    Hematocrit 38.1 34.0 - 46.6 %    MCV 87.2 79.0 - 97.0 fL    MCH 29.1 26.6 - 33.0 pg    MCHC 33.3 31.5 - 35.7 g/dL    RDW 13.4 12.3 - 15.4 %    RDW-SD 42.5 37.0 - 54.0 fl    MPV 9.5 6.0 - 12.0 fL    Platelets 250 140 - 450 10*3/mm3    Neutrophil % 73.3 42.7 - 76.0 %    Lymphocyte % 15.1 (L) 19.6 - 45.3 %    Monocyte % 9.5 5.0 - 12.0 %    Eosinophil % 0.2 (L) 0.3 - 6.2 %    Basophil % 0.9 0.0 - 1.5 %    Immature Grans % 1.0 (H) 0.0 - 0.5 %    Neutrophils, Absolute 8.96 (H) 1.70 - 7.00 10*3/mm3    Lymphocytes, Absolute 1.84 0.70 - 3.10 10*3/mm3    Monocytes, Absolute 1.16 (H) 0.10 - 0.90 10*3/mm3    Eosinophils, Absolute 0.02 0.00 - 0.40 10*3/mm3    Basophils, Absolute 0.11 0.00 - 0.20 10*3/mm3    Immature Grans, Absolute 0.12 (H) 0.00 - 0.05 10*3/mm3    nRBC 0.0 0.0 - 0.2 /100 WBC   Urinalysis, Microscopic Only - Urine, Catheter    Specimen: Urine, Catheter   Result Value Ref Range    RBC, UA 13-20 (A) None Seen /HPF    WBC, UA Too Numerous to Count (A) None Seen, 0-2, 3-5 /HPF    Bacteria, UA 1+ (A) None Seen /HPF    Squamous Epithelial Cells, UA 0-2 None Seen, 0-2 /HPF    Hyaline Casts, UA 0-2 None Seen /LPF    Methodology Automated Microscopy    Gold Top - SST   Result Value Ref Range    Extra Tube Hold for add-ons.    Light Blue Top   Result Value Ref Range    Extra Tube Hold for add-ons.      CT Abdomen Pelvis With Contrast    Result Date: 9/1/2022  Narrative: CT SCANS ABDOMEN AND PELVIS WITH IV CONTRAST. HISTORY: abdominal pain, vomiting COMPARISON: 6/14/2019. TECHNIQUE: Radiation dose reduction techniques were utilized, including automated exposure control and  exposure modulation based on body size. Axial images were obtained from the lung bases to the symphysis pubis with IV contrast only.. Oral contrast was not administered per request. FINDINGS :  There is some mild motion artifact as well as artifact from an implanted stimulator device over the anterior pelvis. There is cortical renal atrophy with small parapelvic cysts bilaterally which do not require imaging follow-up. There are nonobstructing renal calculi bilaterally. The left nephrogram is somewhat heterogeneous, particularly laterally which was not evident on the previous exam. There is also some fairly diffuse left ureteral enhancement but without dilatation. This could potentially be related to mild pyelonephritis. Suggest correlation with urinalysis. Remaining abdominal organs have an unremarkable appearance. Normal aorta and appendix. No significant constipation or diverticular disease. Small hiatal hernia. The GI tract not opacified for assessment but non obstructive in appearance. Hysterectomy. No ascites.     Impression: IMPRESSION : 1. Renal findings as discussed, suggest urinalysis correlation for possibility of mild left pyelonephritis. Electronically signed by:  Alex Garcia MD  9/1/2022 9:45 PM CDT Workstation: [id number]    XR Chest 1 View    Result Date: 9/1/2022  Narrative: PROCEDURE:XR CHEST 1 VIEW HISTORY:cough, fever COMPARISON: None FINDINGS: The heart appears unremarkable. The lungs are clear there is no alveolar consolidation, effusion or pneumothorax. There are no acute bony or soft tissue abnormalities. Levoconvex scoliosis of the thoracic spine.     Impression: No acute cardiopulmonary process. Electronically signed by:  Elicia Dorado MD, MARCIAL  9/1/2022 7:49 PM CDT Workstation: [id number]    US Renal Bilateral    Result Date: 8/10/2022  Narrative: Renal ultrasound Indication: UTI. Findings: The right kidney measures 9.1 x 5.5 x 5.0 cm. A 1.1 cm nonobstructing calculus is seen in the  midpole. There is a 1.4 cm cyst seen at the lower pole. No mass or hydronephrosis is seen in the right kidney. The left kidney is obscured by large quantity of shadowing bowel in the left abdomen. The urinary bladder is unremarkable. The left kidney was visible on a previous January 2022 ultrasound and demonstrated a nonobstructing calculus.    Impression: Nonobstructing right nephrolithiasis and small right renal cyst. Nonvisualization of the left kidney due to overshadowing bowel gas. Normal sonographic appearance of the urinary bladder. [id number]             ED Course  ED Course as of 09/01/22 2157   Thu Sep 01, 2022   2152 Dr. Behroozi paged.  []   2155 Spoke with Dr. Behroozi who agrees to admission.  []      ED Course User Index  [SH] Roxanna Lo APRN                                           [facility]    Final diagnoses:   Pyelonephritis       ED Disposition  ED Disposition     ED Disposition   Decision to Admit    Condition   --    Comment   Level of Care: Med/Surg [1]   Diagnosis: Pyelonephritis [069748]   Admitting Physician: BEHROOZI, SAEID [[id number]   Attending Physician: BEHROOZI, SAEID [[id number]]               No follow-up provider specified.       Medication List      No changes were made to your prescriptions during this visit.          Roxanna Lo APRN  09/05/22 [id number]

## 2022-09-02 NOTE — H&P
"      [facility] Medicine Admission      Date of Admission: 9/1/2022      Primary Care Physician: Greta Stoddard MD      Chief Complaint: Fever    HPI:    Patient is a 41-year-old female with known history of cerebral palsy, motorized wheelchair dependent, has caregivers, history of recurrent urinary tract infection on doxycycline, has been followed by urologist Dr. Acuña and per report she may had urinary tract infection 2 weeks ago, gastroesophageal reflux disease presented to urgent care concerning fever.  She was subsequently referred to ED.  Apparently she had a temperature of 102.1.  Hospitalist service was called for admission of the patient concerning  urinary tract infection.    Patient was seen and examined in ED room 19.  Her caregiver was not at bedside.  Patient reports today she had fever, with chills and was feeling tired and weak.  On questioning she reports she had some \"stomach pain\", on questioning about location of the pain she put her hand over her lower abdomen.  She reports some pain with urination.  She denies any obvious hematuria.  She had some nausea and vomiting recently.  She reports her last bowel movement was yesterday.  She reported in the ED cough and shortness of breath which she did not report to me.  She denies any fall injury trauma, headache sore throat neck pain chest pain,  back pain, constipation diarrhea, bleeding in particular.  Patient is on room air.  Per review of her record she had a positive urine culture for Protos in September 2021.    Concurrent Medical History:  has a past medical history of Acute vulvitis, Allergy, Amblyopia, Astigmatism, Cerebral palsy (HCC), Cheilosis, Common cold, Contusion, Depressive disorder, Diarrhea, Encounter for general adult medical examination without abnormal findings, Encounter for routine adult health examination, Fever, Gastroesophageal reflux disease, Generalized abdominal pain, " Indeterminate colitis, Infected insect bite, Myopia, Spastic quadriplegic cerebral palsy (HCC), Trouble walking, Urinary incontinence, Urinary tract infectious disease, Wears glasses, and Worried well.    Past Surgical History:  has a past surgical history that includes Injection of Medication (02/23/2016); Total abdominal hysterectomy w/ bilateral salpingoophorectomy (N/A, 03/24/2005); Esophagogastroduodenoscopy (N/A, 03/28/2012); Colonoscopy (N/A, 03/28/2012); Upper gastrointestinal endoscopy (03/28/2012); Colonoscopy (N/A, 4/23/2018); Cystoscopy (N/A, 4/19/2019); and Colonoscopy (N/A, 6/7/2019).    Family History: family history includes Coronary artery disease in an other family member; Diabetes in an other family member; Hypertension in an other family member; Stroke in an other family member.     Social History:  reports that she has never smoked. She has never used smokeless tobacco. She reports that she does not drink alcohol and does not use drugs.    Allergies:   Allergies   Allergen Reactions   • Atropine Unknown (See Comments)     Unknown reaction per patient   • Ciprofloxacin Other (See Comments)     seizure   • Dairy Aid [Lactase] Other (See Comments)     abd pain   • Sulfa Antibiotics Itching   • Avelox [Moxifloxacin Hcl] Other (See Comments)     Concerned may cause seizure   • Factive [Gemifloxacin] Other (See Comments)     Concerned may cause seizure   • Floxin Otic [Ofloxacin] Other (See Comments)     Concerned may cause seizure   • Levaquin [Levofloxacin] Other (See Comments)     Concerned may cause seizure       Medications:   Prior to Admission medications    Medication Sig Start Date End Date Taking? Authorizing Provider   acetaminophen (TYLENOL) 325 MG tablet GIVE TWO (2) TABLETS BY MOUTH EVERY 6 HOURS AS NEEDED FOR MILD PAIN. 11/8/21   Greta Stoddard MD   acyclovir (Zovirax) 5 % ointment Apply every 2-3 hrs to fever blister 9/20/21   Greta Stoddard MD   albuterol (PROVENTIL) (2.5  MG/3ML) 0.083% nebulizer solution Take 2.5 mg by nebulization Every 4 (Four) Hours As Needed for Wheezing. 4/22/20   Greta Stoddard MD   bacitracin 500 UNIT/GM ointment APPLY TOPICALLY TO THE APPROPRIATE AREAS THREE TIMES DAILY AS NEEDED FOR WOUND CARE 11/8/21   Greta Stoddard MD   BACLOFEN IT Has a baclofen pump    Candy Thomas MD   Banophen 25 MG capsule GIVE ONE CAPSULE BY MOUTH AT BEDTIME AS NEEDED FOR SLEEP. * NO REFILLS * 2/2/22   Greta Stoddard MD   buPROPion SR (WELLBUTRIN SR) 100 MG 12 hr tablet GIVE ONE TABLET BY MOUTH EVERY MORNING 7/8/22   Greta Stoddard MD   busPIRone (BUSPAR) 10 MG tablet Take 1 tablet by mouth 2 (Two) Times a Day. 4/7/22   Greta Stoddard MD   Cholecalciferol 25 MCG (1000 UT) capsule Take 1 capsule by mouth Daily. 2/11/20   Greta Stoddard MD   cholecalciferol 25 MCG tablet TAKE 1 TABLET BY MOUTH ONCE DAILY 12/7/21   Greta Stoddard MD   dextromethorphan-guaifenesin (Robitussin Peak Cold DM)  MG/5ML syrup Take 5 mL by mouth Every 12 (Twelve) Hours. Until cough has resolved or Prescription finished 7/6/22   Greta Stoddard MD   diphenhydrAMINE-acetaminophen (TYLENOL PM)  MG tablet per tablet Take 1 tablet by mouth At Night As Needed for Sleep.    Candy Thomas MD   docusate sodium (COLACE) 100 MG capsule GIVE ONE CAPSULE BY MOUTH TWICE DAILY 12/7/21   Greta Stoddard MD   doxycycline (VIBRAMYCIN) 100 MG capsule  6/22/22   Candy Thomas MD   estradiol (ESTRACE) 0.5 MG tablet Take 1 tablet by mouth Daily. 11/10/21   Greta Stoddard MD   fluconazole (DIFLUCAN) 150 MG tablet Take one pill ...  May repeat in 5-7 days. 3/15/22   Ponce Caldwell MD   FLUoxetine (PROzac) 40 MG capsule Take 1 capsule by mouth Daily. 9/27/21   Great Stoddard MD   fluticasone (FLONASE) 50 MCG/ACT nasal spray INSTILL 2 SPRAYS IN EACH NOSTRIL ONCE DAILY FOR SEASONAL ALLERGIES **REORDER WHEN NEEDED-NOT A CYCLE FILL MED** 6/2/22   Arlyn  Greta WHITFIELD MD   GNP Dairy Relief 3000 units tablet GIVE ONE TABLET BY MOUTH THREE TIMES DAILY WITH MEALS. 4/6/22   Greta Stoddard MD   levETIRAcetam (KEPPRA) 500 MG tablet GIVE ONE TABLET BY MOUTH TWICE DAILY 6/7/22   Greta Stoddard MD   linaclotide (Linzess) 145 MCG capsule capsule Take 1 capsule by mouth Daily. 4/7/22   Greta Stoddard MD   loratadine (Claritin) 10 MG tablet Take 1 tablet by mouth Daily. 4/7/22   Greta Stoddard MD   metoclopramide (REGLAN) 10 MG tablet GIVE ONE TABLET BY MOUTH ONCE DAILY FOR GASTROESOPHAGEAL REFLUX 10/7/21   Greta Stoddard MD   Misc. Devices (WHEELCHAIR) misc As directed    ProviderCandy MD Misc. Devices (Wrist Brace) misc 1 Device Daily. 10/7/21   Bere Fernandez APRN   neomycin-bacitracin-polymyxin b (NEOSPORIN) 3.5-400-5000 ointment ointment APPLY THIN FILM TO AFFECTED AREA(S) EVERY 8 HOURS AS NEEDED FOR CUT, SCRAPE, OR WITNESSED BUG BITE AFTER CLEANING WITH SOAP/WATER. 11/8/21   Greta Stoddard MD   NON FORMULARY Daily As Needed. Nyst/bacit/Zno/hc 1%    Candy Thomas MD   nystatin (MYCOSTATIN) 404411 UNIT/GM cream Apply  topically to the appropriate area as directed 2 (Two) Times a Day As Needed (for external vaginal discomfort and itching). 11/11/20   Greta Stoddard MD   nystatin-zinc oxide Apply  topically to the appropriate area as directed 2 (Two) Times a Day. 3/15/22   Ponce Caldwell MD   omeprazole (priLOSEC) 20 MG capsule GIVE ONE CAPSULE BY MOUTH ONCE DAILY FOR STOMACH ACID 1/7/22   Greta Stoddard MD   ondansetron (ZOFRAN) 8 MG tablet Take 8 mg by mouth Every 8 (Eight) Hours As Needed for Nausea or Vomiting.    Candy Thomas MD   ondansetron ODT (ZOFRAN-ODT) 4 MG disintegrating tablet Place 1 tablet on the tongue Every 6 (Six) Hours As Needed for Nausea or Vomiting. 6/26/21   Valentino Sepulveda MD   ondansetron ODT (ZOFRAN-ODT) 8 MG disintegrating tablet GIVE ONE TABLET BY MOUTH EVERY 8 HOURS AS NEEDED FOR  NAUSEA 2/28/22   Greta Stoddard MD   polyethylene glycol (MIRALAX) 17 g packet Take 17 g by mouth Daily As Needed (constipation). 4/7/22   Greta Stoddard MD   promethazine-dextromethorphan (PROMETHAZINE-DM) 6.25-15 MG/5ML syrup 5 ml qhs prn 7/7/21   Greta Stoddard MD   tolterodine LA (DETROL LA) 4 MG 24 hr capsule  10/21/21   Emergency, Nurse Epic, RN   valACYclovir (Valtrex) 1000 MG tablet 2 gm q12h x 2 doses 9/21/21   Greta Stoddard MD       Review of Systems:  Review of Systems   Constitutional: Positive for chills, fatigue and fever. Negative for diaphoresis.   HENT: Negative for congestion, dental problem, ear pain, facial swelling, rhinorrhea and sinus pressure.    Eyes: Negative for photophobia, discharge, redness, itching and visual disturbance.   Respiratory: Positive for cough and shortness of breath. Negative for apnea, choking, chest tightness, wheezing and stridor.    Cardiovascular: Negative for chest pain, palpitations and leg swelling.   Gastrointestinal: Positive for abdominal pain, nausea and vomiting. Negative for abdominal distention, anal bleeding, blood in stool, diarrhea and rectal pain.   Endocrine: Negative for cold intolerance, heat intolerance, polydipsia, polyphagia and polyuria.   Genitourinary: Positive for dysuria. Negative for difficulty urinating, flank pain, frequency, hematuria and urgency.   Musculoskeletal: Negative for arthralgias, back pain, joint swelling and myalgias.   Skin: Negative for pallor, rash and wound.   Allergic/Immunologic: Negative for environmental allergies and immunocompromised state.   Neurological: Negative for dizziness, tremors, seizures, facial asymmetry, speech difficulty, weakness, light-headedness, numbness and headaches.   Hematological: Negative for adenopathy. Does not bruise/bleed easily.   Psychiatric/Behavioral: Negative for agitation, behavioral problems and hallucinations. The patient is not nervous/anxious.       Otherwise  complete ROS is negative except as mentioned above.    Physical Exam:   Temp:  [98.4 °F (36.9 °C)-102.1 °F (38.9 °C)] 100.2 °F (37.9 °C)  Heart Rate:  [101-122] 122  Resp:  [20] 20  BP: (126-162)/(65-94) 155/94  Physical Exam  Constitutional:       General: She is not in acute distress.     Appearance: She is normal weight. She is not ill-appearing, toxic-appearing or diaphoretic.   HENT:      Head: Normocephalic and atraumatic.      Right Ear: External ear normal.      Left Ear: External ear normal.      Nose: Nose normal.      Mouth/Throat:      Mouth: Mucous membranes are moist.      Pharynx: Oropharynx is clear.   Eyes:      Extraocular Movements: Extraocular movements intact.      Conjunctiva/sclera: Conjunctivae normal.      Pupils: Pupils are equal, round, and reactive to light.   Cardiovascular:      Rate and Rhythm: Regular rhythm. Tachycardia present.      Heart sounds: No murmur heard.    No friction rub. No gallop.   Pulmonary:      Effort: No respiratory distress.      Breath sounds: No stridor. No wheezing or rales.   Chest:      Chest wall: No tenderness.   Abdominal:      General: Abdomen is flat. There is no distension.      Palpations: Abdomen is soft.      Tenderness: There is no abdominal tenderness. There is no guarding or rebound.   Musculoskeletal:         General: No swelling or tenderness.      Cervical back: No rigidity or tenderness.      Right lower leg: No edema.      Left lower leg: No edema.      Comments: Contractures present   Lymphadenopathy:      Cervical: No cervical adenopathy.   Skin:     General: Skin is warm and dry.      Coloration: Skin is not jaundiced.      Findings: No erythema.   Neurological:      Mental Status: She is alert and oriented to person, place, and time. Mental status is at baseline.      Sensory: No sensory deficit.      Motor: Weakness (chronic upper and lower extremities, contractures present) present.      Coordination: Coordination normal.      Comments:  Spastic quadriplegic, increased tone to lower extremities   Psychiatric:         Mood and Affect: Mood normal.         Behavior: Behavior normal.         Judgment: Judgment normal.                Results Reviewed:  I have personally reviewed current lab, radiology, and data and agree with results.  Lab Results (last 24 hours)     Procedure Component Value Units Date/Time    Urinalysis With Microscopic If Indicated (No Culture) - Urine, Catheter [[id number]]  (Abnormal) Collected: 09/01/22 1934    Specimen: Urine, Catheter Updated: 09/01/22 1949     Color, UA Yellow     Appearance, UA Cloudy     pH, UA 6.5     Specific Gravity, UA 1.014     Glucose, UA Negative     Ketones, UA 15 mg/dL (1+)     Bilirubin, UA Negative     Blood, UA Moderate (2+)     Protein, UA Trace     Leuk Esterase, UA Large (3+)     Nitrite, UA Negative     Urobilinogen, UA 0.2 E.U./dL    Urinalysis, Microscopic Only - Urine, Catheter [[id number]]  (Abnormal) Collected: 09/01/22 1934    Specimen: Urine, Catheter Updated: 09/01/22 1949     RBC, UA 13-20 /HPF      WBC, UA Too Numerous to Count /HPF      Bacteria, UA 1+ /HPF      Squamous Epithelial Cells, UA 0-2 /HPF      Hyaline Casts, UA 0-2 /LPF      Methodology Automated Microscopy    Extra Tubes [[id number]] Collected: 09/01/22 1828    Specimen: Blood, Venous Line Updated: 09/01/22 1932    Narrative:      The following orders were created for panel order Extra Tubes.  Procedure                               Abnormality         Status                     ---------                               -----------         ------                     Gold Top - SST[[id number]]                                   Final result               Light Blue Top[[id number]]                                   Final result                 Please view results for these tests on the individual orders.    Gold Top - SST [[id number]] Collected: 09/01/22 1828    Specimen: Blood Updated: 09/01/22 1932     Extra Tube Hold for  "add-ons.     Comment: Auto resulted.       Light Blue Top [[id number]] Collected: 09/01/22 1828    Specimen: Blood Updated: 09/01/22 1932     Extra Tube Hold for add-ons.     Comment: Auto resulted       Procalcitonin [[id number]]  (Normal) Collected: 09/01/22 1828    Specimen: Blood Updated: 09/01/22 1903     Procalcitonin 0.11 ng/mL     Narrative:      As a Marker for Sepsis (Non-Neonates):    1. <0.5 ng/mL represents a low risk of severe sepsis and/or septic shock.  2. >2 ng/mL represents a high risk of severe sepsis and/or septic shock.    As a Marker for Lower Respiratory Tract Infections that require antibiotic therapy:    PCT on Admission    Antibiotic Therapy       6-12 Hrs later    >0.5                Strongly Recommended  >0.25 - <0.5        Recommended   0.1 - 0.25          Discouraged              Remeasure/reassess PCT  <0.1                Strongly Discouraged     Remeasure/reassess PCT    As 28 day mortality risk marker: \"Change in Procalcitonin Result\" (>80% or <=80%) if Day 0 (or Day 1) and Day 4 values are available. Refer to [email or web address]://[email or web address] [email or web address]p[place][email or web address]    Change in PCT <=80%  A decrease of PCT levels below or equal to 80% defines a positive change in PCT test result representing a higher risk for 28-day all-cause mortality of patients diagnosed with severe sepsis for septic shock.    Change in PCT >80%  A decrease of PCT levels of more than 80% defines a negative change in PCT result representing a lower risk for 28-day all-cause mortality of patients diagnosed with severe sepsis or septic shock.       Respiratory Panel PCR w/COVID-19(SARS-CoV-2) MURALI/RINA/IVON/PAD/COR/MAD/CAROLEE In-House, NP Swab in UTM/VTM, 3-4 HR TAT - Swab, Nasopharynx [[id number]]  (Normal) Collected: 09/01/22 1753    Specimen: Swab from Nasopharynx Updated: 09/01/22 1858     ADENOVIRUS, PCR Not Detected     Coronavirus 229E Not Detected     Coronavirus HKU1 Not Detected     Coronavirus NL63 Not Detected     Coronavirus OC43 " Not Detected     COVID19 Not Detected     Human Metapneumovirus Not Detected     Human Rhinovirus/Enterovirus Not Detected     Influenza A PCR Not Detected     Influenza B PCR Not Detected     Parainfluenza Virus 1 Not Detected     Parainfluenza Virus 2 Not Detected     Parainfluenza Virus 3 Not Detected     Parainfluenza Virus 4 Not Detected     RSV, PCR Not Detected     Bordetella pertussis pcr Not Detected     Bordetella parapertussis PCR Not Detected     Chlamydophila pneumoniae PCR Not Detected     Mycoplasma pneumo by PCR Not Detected    Narrative:      In the setting of a positive respiratory panel with a viral infection PLUS a negative procalcitonin without other underlying concern for bacterial infection, consider observing off antibiotics or discontinuation of antibiotics and continue supportive care. If the respiratory panel is positive for atypical bacterial infection (Bordetella pertussis, Chlamydophila pneumoniae, or Mycoplasma pneumoniae), consider antibiotic de-escalation to target atypical bacterial infection.    Comprehensive Metabolic Panel [[id number]] Collected: 09/01/22 1828    Specimen: Blood Updated: 09/01/22 1856     Glucose 86 mg/dL      BUN 14 mg/dL      Creatinine 0.64 mg/dL      Sodium 136 mmol/L      Potassium 3.9 mmol/L      Chloride 99 mmol/L      CO2 25.0 mmol/L      Calcium 9.4 mg/dL      Total Protein 7.6 g/dL      Albumin 4.00 g/dL      ALT (SGPT) 10 U/L      AST (SGOT) 16 U/L      Alkaline Phosphatase 106 U/L      Total Bilirubin 0.4 mg/dL      Globulin 3.6 gm/dL      A/G Ratio 1.1 g/dL      BUN/Creatinine Ratio 21.9     Anion Gap 12.0 mmol/L      eGFR 113.3 mL/min/1.73      Comment: National Kidney Foundation and American Society of Nephrology (ASN) Task Force recommended calculation based on the Chronic Kidney Disease Epidemiology Collaboration (CKD-EPI) equation refit without adjustment for race.       Narrative:      GFR Normal >60  Chronic Kidney Disease <60  Kidney Failure  <15      Lipase [[id number]]  (Normal) Collected: 09/01/22 1828    Specimen: Blood Updated: 09/01/22 1856     Lipase 39 U/L     Lactic Acid, Plasma [[id number]]  (Normal) Collected: 09/01/22 1828    Specimen: Blood Updated: 09/01/22 1851     Lactate 0.8 mmol/L     CBC & Differential [[id number]]  (Abnormal) Collected: 09/01/22 1828    Specimen: Blood Updated: 09/01/22 1842    Narrative:      The following orders were created for panel order CBC & Differential.  Procedure                               Abnormality         Status                     ---------                               -----------         ------                     CBC Auto Differential[[id number]]        Abnormal            Final result                 Please view results for these tests on the individual orders.    CBC Auto Differential [[id number]]  (Abnormal) Collected: 09/01/22 1828    Specimen: Blood Updated: 09/01/22 1842     WBC 12.21 10*3/mm3      RBC 4.37 10*6/mm3      Hemoglobin 12.7 g/dL      Hematocrit 38.1 %      MCV 87.2 fL      MCH 29.1 pg      MCHC 33.3 g/dL      RDW 13.4 %      RDW-SD 42.5 fl      MPV 9.5 fL      Platelets 250 10*3/mm3      Neutrophil % 73.3 %      Lymphocyte % 15.1 %      Monocyte % 9.5 %      Eosinophil % 0.2 %      Basophil % 0.9 %      Immature Grans % 1.0 %      Neutrophils, Absolute 8.96 10*3/mm3      Lymphocytes, Absolute 1.84 10*3/mm3      Monocytes, Absolute 1.16 10*3/mm3      Eosinophils, Absolute 0.02 10*3/mm3      Basophils, Absolute 0.11 10*3/mm3      Immature Grans, Absolute 0.12 10*3/mm3      nRBC 0.0 /100 WBC         Imaging Results (Last 24 Hours)     Procedure Component Value Units Date/Time    CT Abdomen Pelvis With Contrast [[id number]] Collected: 09/01/22 2000     Updated: 09/01/22 2147    Narrative:      CT SCANS ABDOMEN AND PELVIS WITH IV CONTRAST.    HISTORY: abdominal pain, vomiting    COMPARISON: 6/14/2019.    TECHNIQUE: Radiation dose reduction techniques were utilized,  including automated  exposure control and exposure modulation  based on body size. Axial images were obtained from the lung  bases to the symphysis pubis with IV contrast only.. Oral  contrast was not administered per request.    FINDINGS :  There is some mild motion artifact as well as  artifact from an implanted stimulator device over the anterior  pelvis. There is cortical renal atrophy with small parapelvic  cysts bilaterally which do not require imaging follow-up. There  are nonobstructing renal calculi bilaterally.     The left nephrogram is somewhat heterogeneous, particularly  laterally which was not evident on the previous exam. There is  also some fairly diffuse left ureteral enhancement but without  dilatation. This could potentially be related to mild  pyelonephritis. Suggest correlation with urinalysis.    Remaining abdominal organs have an unremarkable appearance.  Normal aorta and appendix. No significant constipation or  diverticular disease. Small hiatal hernia. The GI tract not  opacified for assessment but non obstructive in appearance.  Hysterectomy. No ascites.      Impression:      IMPRESSION :   1. Renal findings as discussed, suggest urinalysis correlation  for possibility of mild left pyelonephritis.      Electronically signed by:  Alex Garcia MD  9/1/2022 9:45 PM CDT  Workstation: [id number]    XR Chest 1 View [[id number]] Collected: 09/01/22 1849     Updated: 09/01/22 1951    Narrative:      PROCEDURE:XR CHEST 1 VIEW    HISTORY:cough, fever     COMPARISON:    None    FINDINGS:    The heart appears unremarkable. The lungs are clear there is no  alveolar consolidation, effusion or pneumothorax. There are no  acute bony or soft tissue abnormalities. Levoconvex scoliosis of  the thoracic spine.      Impression:        No acute cardiopulmonary process.    Electronically signed by:  Elicia Dorado MD, MBA  9/1/2022 7:49  PM CDT Workstation: [id number]            Assessment:    Active Hospital Problems     Diagnosis    • Pyelonephritis      # Acute pyelonephritis, with failure of outpatient therapy  # Fever, possible secondary to above acute pyelonephritis   # Nausea vomiting abdominal pain possible secondary to above  # Sepsis, sepsis present on admission secondary to acute pyelonephritis cannot be excluded considering presence of fever and tachycardia, leukocytosis and source of infection  # Chronic spastic quadriplegic, cerebral palsy with severe debility  # Tachycardia, potential sinus tachycardia, reactive  #Leukocytosis reactive  # History of gastroesophageal reflux disease         Plan:  Maintain on telemetry  Obtain EKG considering tachycardia on examination  Urine culture to be added to UA in ED.  Obtain blood culture  Obtain further laboratory work-up  Give IV fluid bolus and place patient on maintenance IV fluid  Place on broad-spectrum IV antibiotic Zosyn pending further evaluation considering acute pyelonephritis and history of recurrent urinary tract infection,  Supportive therapy.  Patient follows with urologist Dr. Acuña outpatient.  Comorbidities will be treated appropriately  Reconcile home medication and continue with essential home medications upon availability  Please see orders for comprehensive plan    Addendum: EKG showed sinus tachycardia as expected.    I confirmed that the patient's Advance Care Plan is present, code status is documented, or surrogate decision maker is listed in the patient's medical record.     I have utilized all available immediate resources to obtain, update, or review the patient's current medications.     I discussed the patient's findings and my recommendations with: Patient and she agreed with above plan of care.      Saeid Behroozi, MD   09/01/22   22:03 CDT

## 2022-09-02 NOTE — NURSING NOTE
Temperature 101.8 at this time, tylenol given.  Reported to Dr. Delgadillo and will call back if any changes.  Blood cultures done last night.

## 2022-09-03 ENCOUNTER — APPOINTMENT (OUTPATIENT)
Dept: ULTRASOUND IMAGING | Facility: HOSPITAL | Age: 42
End: 2022-09-03

## 2022-09-03 ENCOUNTER — APPOINTMENT (OUTPATIENT)
Dept: INTERVENTIONAL RADIOLOGY/VASCULAR | Facility: HOSPITAL | Age: 42
End: 2022-09-03

## 2022-09-03 LAB — BACTERIA SPEC AEROBE CULT: NORMAL

## 2022-09-03 PROCEDURE — 25010000002 ENOXAPARIN PER 10 MG: Performed by: INTERNAL MEDICINE

## 2022-09-03 PROCEDURE — 76937 US GUIDE VASCULAR ACCESS: CPT

## 2022-09-03 PROCEDURE — 25010000002 PIPERACILLIN SOD-TAZOBACTAM PER 1 G: Performed by: INTERNAL MEDICINE

## 2022-09-03 PROCEDURE — C1751 CATH, INF, PER/CENT/MIDLINE: HCPCS

## 2022-09-03 PROCEDURE — 63710000001 ONDANSETRON ODT 4 MG TABLET DISPERSIBLE: Performed by: HOSPITALIST

## 2022-09-03 PROCEDURE — 05HC33Z INSERTION OF INFUSION DEVICE INTO LEFT BASILIC VEIN, PERCUTANEOUS APPROACH: ICD-10-PCS | Performed by: HOSPITALIST

## 2022-09-03 PROCEDURE — 36410 VNPNXR 3YR/> PHY/QHP DX/THER: CPT

## 2022-09-03 RX ADMIN — PIPERACILLIN SODIUM AND TAZOBACTAM SODIUM 3.38 G: 3; .375 INJECTION, POWDER, LYOPHILIZED, FOR SOLUTION INTRAVENOUS at 20:16

## 2022-09-03 RX ADMIN — LEVETIRACETAM 500 MG: 500 TABLET, FILM COATED ORAL at 09:59

## 2022-09-03 RX ADMIN — BUSPIRONE HYDROCHLORIDE 10 MG: 10 TABLET ORAL at 09:59

## 2022-09-03 RX ADMIN — FLUOXETINE 40 MG: 20 CAPSULE ORAL at 09:59

## 2022-09-03 RX ADMIN — PIPERACILLIN SODIUM AND TAZOBACTAM SODIUM 3.38 G: 3; .375 INJECTION, POWDER, LYOPHILIZED, FOR SOLUTION INTRAVENOUS at 12:01

## 2022-09-03 RX ADMIN — ACETAMINOPHEN 650 MG: 325 TABLET, FILM COATED ORAL at 20:16

## 2022-09-03 RX ADMIN — DOCUSATE SODIUM 100 MG: 100 CAPSULE, LIQUID FILLED ORAL at 09:59

## 2022-09-03 RX ADMIN — ESTRADIOL 0.5 MG: 0.5 TABLET ORAL at 09:59

## 2022-09-03 RX ADMIN — ACETAMINOPHEN 650 MG: 325 TABLET, FILM COATED ORAL at 10:44

## 2022-09-03 RX ADMIN — METOCLOPRAMIDE 10 MG: 10 TABLET ORAL at 16:32

## 2022-09-03 RX ADMIN — SODIUM CHLORIDE, POTASSIUM CHLORIDE, SODIUM LACTATE AND CALCIUM CHLORIDE 125 ML/HR: 600; 310; 30; 20 INJECTION, SOLUTION INTRAVENOUS at 20:50

## 2022-09-03 RX ADMIN — ENOXAPARIN SODIUM 40 MG: 40 INJECTION SUBCUTANEOUS at 20:17

## 2022-09-03 RX ADMIN — BUSPIRONE HYDROCHLORIDE 10 MG: 10 TABLET ORAL at 20:16

## 2022-09-03 RX ADMIN — ACETAMINOPHEN 650 MG: 325 TABLET, FILM COATED ORAL at 03:21

## 2022-09-03 RX ADMIN — Medication 1000 UNITS: at 09:59

## 2022-09-03 RX ADMIN — PIPERACILLIN SODIUM AND TAZOBACTAM SODIUM 3.38 G: 3; .375 INJECTION, POWDER, LYOPHILIZED, FOR SOLUTION INTRAVENOUS at 01:43

## 2022-09-03 RX ADMIN — METOCLOPRAMIDE 10 MG: 10 TABLET ORAL at 09:59

## 2022-09-03 RX ADMIN — PANTOPRAZOLE SODIUM 40 MG: 40 TABLET, DELAYED RELEASE ORAL at 06:26

## 2022-09-03 RX ADMIN — ONDANSETRON 8 MG: 4 TABLET, ORALLY DISINTEGRATING ORAL at 20:23

## 2022-09-03 RX ADMIN — BUPROPION HYDROCHLORIDE 100 MG: 100 TABLET, EXTENDED RELEASE ORAL at 09:59

## 2022-09-03 RX ADMIN — METOCLOPRAMIDE 10 MG: 10 TABLET ORAL at 12:43

## 2022-09-03 RX ADMIN — LEVETIRACETAM 500 MG: 500 TABLET, FILM COATED ORAL at 20:16

## 2022-09-03 RX ADMIN — OXYBUTYNIN CHLORIDE 5 MG: 5 TABLET, EXTENDED RELEASE ORAL at 09:59

## 2022-09-03 RX ADMIN — Medication 10 ML: at 20:17

## 2022-09-03 NOTE — PLAN OF CARE
Goal Outcome Evaluation:  Plan of Care Reviewed With: patient        Progress: improving  Outcome Evaluation: Patient has been more verbal today of needs with bedpan, exc.. Patient voiced that she has felt much better today and has been smiling alot.  No s/s of distress noted.

## 2022-09-03 NOTE — PLAN OF CARE
Goal Outcome Evaluation:              Outcome Evaluation: Patient resting in bed at this time. VSS. Temp has been stable most of shift. PRN tylenol given for temp 100.4 at 0300. Will continue to monitor.

## 2022-09-03 NOTE — CONSULTS
[facility]   Consult Note    Patient Name: Kiley Ledesma  : 1980  MRN: [id number]  Primary Care Physician:  Greta Stoddard MD  Referring Physician: Vidal Neely MD  Date of admission: 2022    Consults  Subjective   Subjective     Reason for Consult/ Chief Complaint: Pyelonephritis left    History of Present Illness  Kiley Ledesma is a 41 y.o. female patient comes in with pyelonephritis who we have seen in the past CT scan consistent as such.  Patient not a terrible good historian but does says she is starting to feel better.  Apparently voiding on her own.    Review of Systems     Personal History     Past Medical History:   Diagnosis Date   • Acute vulvitis     Candida   • Allergy     Unspecified, initial encounter   • Amblyopia     OS   • Astigmatism    • Cerebral palsy (HCC)    • Cheilosis    • Common cold    • Contusion    • Depressive disorder    • Diarrhea    • Encounter for general adult medical examination without abnormal findings    • Encounter for routine adult health examination     Adult health examination      • Fever    • Gastroesophageal reflux disease    • Generalized abdominal pain    • Indeterminate colitis    • Infected insect bite    • Myopia    • Spastic quadriplegic cerebral palsy (HCC)    • Trouble walking     Walking disability      • Urinary incontinence    • Urinary tract infectious disease    • Wears glasses    • Worried well        Past Surgical History:   Procedure Laterality Date   • COLONOSCOPY N/A 2012    hemorrhoids   • COLONOSCOPY N/A 2018    Procedure: COLONOSCOPY--look TI, bx, hx colitis;  Surgeon: Sreedhar Kirkpatrick MD;  Location: [facility] ENDOSCOPY;  Service: Gastroenterology   • COLONOSCOPY N/A 2019    Procedure: COLONOSCOPY;  Surgeon: Sreedhar Kirkpatrick MD;  Location: [facility] ENDOSCOPY;  Service: Gastroenterology   • CYSTOSCOPY N/A 2019    Procedure: CYSTOSCOPY;  Surgeon: Cecilio Acuña MD;  Location: [facility] OR;  Service:  Urology   • ENDOSCOPY N/A 03/28/2012    gastritis   • INJECTION OF MEDICATION  02/23/2016    Kenalog   • TOTAL ABDOMINAL HYSTERECTOMY WITH SALPINGO OOPHORECTOMY N/A 03/24/2005   • UPPER GASTROINTESTINAL ENDOSCOPY  03/28/2012       Family History: family history includes Coronary artery disease in an other family member; Diabetes in an other family member; Hypertension in an other family member; Stroke in an other family member. Otherwise pertinent FHx was reviewed and not pertinent to current issue.    Social History:  reports that she has never smoked. She has never used smokeless tobacco. She reports that she does not drink alcohol and does not use drugs.    Home Medications:   Baclofen, Cholecalciferol, FLUoxetine, Wheelchair, Wrist Brace, acetaminophen, albuterol, buPROPion SR, busPIRone, diphenhydrAMINE-acetaminophen, docusate sodium, doxycycline, estradiol, fluticasone, guaiFENesin, levETIRAcetam, linaclotide, loratadine, metoclopramide, omeprazole, ondansetron ODT, and tolterodine LA    Allergies:  Allergies   Allergen Reactions   • Atropine Unknown (See Comments)     Unknown reaction per patient   • Ciprofloxacin Other (See Comments)     seizure   • Dairy Aid [Lactase] Other (See Comments)     abd pain   • Sulfa Antibiotics Itching   • Avelox [Moxifloxacin Hcl] Other (See Comments)     Concerned may cause seizure   • Factive [Gemifloxacin] Other (See Comments)     Concerned may cause seizure   • Floxin Otic [Ofloxacin] Other (See Comments)     Concerned may cause seizure   • Levaquin [Levofloxacin] Other (See Comments)     Concerned may cause seizure       Objective    Objective     Vitals:  Temp:  [96.6 °F (35.9 °C)-101.8 °F (38.8 °C)] 97.7 °F (36.5 °C)  Heart Rate:  [] 90  Resp:  [16-18] 16  BP: (116-139)/(59-84) 116/71    Physical Exam    Result Review    Result Review:  I have personally reviewed the results from the time of this admission to 9/3/2022 13:23 CDT and agree with these findings:  []   Laboratory list / accordion  []  Microbiology  []  Radiology  []  EKG/Telemetry   []  Cardiology/Vascular   []  Pathology  []  Old records  []  Other:  Most notable findings include: CT consistent of left pyelonephritis, culture mixed alf      Assessment & Plan Pyelonephritis, kidney Brant with IV antibiotics no surgical intervention at this point.  Case discussed with patient  Assessment / Plan     Brief Patient Summary:  Kiley Ledesma is a 41 y.o. female who patient with left pyelonephritis cultures mixed alf history of neurogenic bladder UTIs    Active Hospital Problems:  Active Hospital Problems    Diagnosis    • Pyelonephritis      Plan:       Cecilio Acuña MD

## 2022-09-03 NOTE — PROGRESS NOTES
TWO PATIENT IDENTIFIERS WERE USED. THE PATIENT WAS DRAPED WITH A FULL BODY DRAPE AND THE PATIENT'S LEFT ARM WAS PREPPED WITH CHLORA PREP. ULTRASOUND WAS USED TO LOCALIZE THELEFT BASILIC VEIN. SUBCUTANEOUS TISSUE AT THE CATHETER SITE WAS INFILTRATED WITH 2% LIDOCAINE. UNDER ULTRASOUND GUIDANCE, THE VEIN WAS ACCESSED WITH A 21 GAUGE  NEEDLE. AN 0.018 WIRE WAS THEN THREADED THROUGH THE NEEDLE. THE 21 GAUGE NEEDLE WAS REMOVED AND A 4 Italian SHEATH WAS PLACED OVER THE WIRE INTO THE VEIN.THE MIDLINE CATHETER WAS TRIMMED TO 18 CM. THE MIDLINE CATHETER WAS THEN PLACED OVER THE WIRE INTO THE VEIN, THE SHEATH WAS PEELED AWAY, WIRE WAS REMOVED. CATHETER WAS FLUSHED WITH NORMAL SALINE AND CATHETER TIP APPLIED. BIOPATCH PLACED. CATHETER SECURED WITH STAT LOCK AND TEGADERM. PATIENT TOLERATED PROCEDURE WELL. THIS WAS DONE AT BEDSIDE      IMPRESSION:SUCCESSFUL PLACEMENT OF DUAL LUMEN MIDLINE.           Roma Perez  9/3/2022  12:37 CDT

## 2022-09-03 NOTE — PROGRESS NOTES
[facility] Medicine Services  INPATIENT PROGRESS NOTE    Length of Stay: 2  Date of Admission: 9/1/2022  Primary Care Physician: Greta Stoddard MD    Subjective   Chief Complaint: Fever  HPI: Patient states she is feeling better today asks when she can go home.    Review of Systems   Constitutional: Positive for chills and fatigue. Negative for appetite change, fever and unexpected weight change.   Respiratory: Negative for cough, choking, chest tightness, shortness of breath and wheezing.    Cardiovascular: Negative for chest pain, palpitations and leg swelling.   Gastrointestinal: Positive for abdominal pain. Negative for blood in stool, constipation, diarrhea and vomiting.   Genitourinary: Positive for dysuria. Negative for flank pain and hematuria.   Neurological: Negative for dizziness, seizures, syncope, speech difficulty, weakness, light-headedness, numbness and headaches.   Hematological: Does not bruise/bleed easily.        All pertinent negatives and positives are as above. All other systems have been reviewed and are negative unless otherwise stated.    Objective    Temp:  [96.6 °F (35.9 °C)-101.8 °F (38.8 °C)] 97.7 °F (36.5 °C)  Heart Rate:  [] 90  Resp:  [16-18] 16  BP: (116-139)/(59-84) 116/71    AM-PAC 6 Clicks Score (PT): 7 (09/02/22 0807)    Physical Exam  Vitals reviewed.   Constitutional:       Appearance: She is well-developed.   HENT:      Head: Normocephalic and atraumatic.   Eyes:      Pupils: Pupils are equal, round, and reactive to light.   Cardiovascular:      Rate and Rhythm: Normal rate and regular rhythm.      Heart sounds: Normal heart sounds. No murmur heard.    No friction rub. No gallop.   Pulmonary:      Effort: Pulmonary effort is normal. No respiratory distress.      Breath sounds: Normal breath sounds. No wheezing or rales.   Chest:      Chest wall: No tenderness.   Abdominal:      General: Bowel sounds are normal.  There is no distension.      Palpations: Abdomen is soft.      Tenderness: There is no abdominal tenderness. There is no guarding.   Musculoskeletal:      Cervical back: Normal range of motion and neck supple.   Skin:     General: Skin is warm and dry.   Neurological:      Mental Status: She is alert and oriented to person, place, and time.      Comments: Patient with contractures and spastic quadriplegia.   Psychiatric:         Behavior: Behavior normal.         Thought Content: Thought content normal.           Results Review:  I have reviewed the labs, radiology results, and diagnostic studies.    Laboratory Data:   Results from last 7 days   Lab Units 09/02/22  0618 09/01/22  1828   SODIUM mmol/L 135* 136   POTASSIUM mmol/L 3.9 3.9   CHLORIDE mmol/L 101 99   CO2 mmol/L 22.0 25.0   BUN mg/dL 8 14   CREATININE mg/dL 0.48* 0.64   GLUCOSE mg/dL 70 86   CALCIUM mg/dL 8.4* 9.4   BILIRUBIN mg/dL 0.3 0.4   ALK PHOS U/L 84 106   ALT (SGPT) U/L 10 10   AST (SGOT) U/L 16 16   ANION GAP mmol/L 12.0 12.0     Estimated Creatinine Clearance: 127.7 mL/min (A) (by C-G formula based on SCr of 0.48 mg/dL (L)).  Results from last 7 days   Lab Units 09/02/22  0618   MAGNESIUM mg/dL 1.6   PHOSPHORUS mg/dL 2.2*         Results from last 7 days   Lab Units 09/02/22  0618 09/01/22  1828   WBC 10*3/mm3 11.65* 12.21*   HEMOGLOBIN g/dL 11.4* 12.7   HEMATOCRIT % 34.5 38.1   PLATELETS 10*3/mm3 199 250           Culture Data:   Blood Culture   Date Value Ref Range Status   09/02/2022 No growth at 24 hours  Preliminary   09/02/2022 No growth at 24 hours  Preliminary     Urine Culture   Date Value Ref Range Status   09/01/2022 <25,000 CFU/mL Mixed Lizbeth Isolated  Final     No results found for: RESPCX  No results found for: WOUNDCX  No results found for: STOOLCX  No components found for: BODYFLD    Radiology Data:   Imaging Results (Last 24 Hours)     Procedure Component Value Units Date/Time    US Guided Vascular Access [[id number]] Collected:  09/03/22 1153     Updated: 09/03/22 1256    Narrative:      PROCEDURE: Ultrasound Guidance Vascular Access      Ordering physician(s): DWAYNE BAIN    Clinical Indication:  Vascular access    Findings:    The left basilic vein was sonographically evaluated and  determined to be patent.  Concurrent realtime ultrasound was used  to visualize needle entry into left basilic vein and a permanent  image was stored for permanent recording and reporting.       Impression:      Impression:   Ultrasound guidance utilized for vascular access as above    01000    Electronically signed by:  Alfredo Overton MD  9/3/2022 12:54 PM CDT  Workstation: [id number]    IR Insert Midline Without Port Pump 5 Plus [[id number]] Resulted: 09/03/22 1237     Updated: 09/03/22 1237    Narrative:      This procedure was auto-finalized with no dictation required.          I have reviewed the patient's current medications.     Assessment/Plan     Active Hospital Problems    Diagnosis    • Pyelonephritis        Plan:  1.  Pyelonephritis: Continue broad-spectrum antibiotics.  Cultures pending.  Urology has been consulted.  2.  Cerebral palsy  3.  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  Sepsis: Secondary to pyelonephritis.  Continue current supportive care.  Improving.  5.  Gastroesophageal reflux disorder: Continue Reglan and PPI  6.  DVT prophylaxis: Lovenox.          The patient was evaluated during the global COVID-19 pandemic, and the diagnosis was suspected/considered upon their initial presentation.  Evaluation, treatment, and testing were consistent with current guidelines for patients who present with complaints or symptoms that may be related to COVID-19.    I confirmed that the patient's Advance Care Plan is present, code status is documented, or surrogate decision maker is listed in the patient's medical record.       Discharge Planning: I expect patient to be discharged to home in 2-3 days.        This document has been  electronically signed by Mika Delgadillo MD on September 3, 2022 14:00 CDT

## 2022-09-04 PROCEDURE — 25010000002 ENOXAPARIN PER 10 MG: Performed by: INTERNAL MEDICINE

## 2022-09-04 PROCEDURE — 25010000002 PIPERACILLIN SOD-TAZOBACTAM PER 1 G: Performed by: INTERNAL MEDICINE

## 2022-09-04 PROCEDURE — 63710000001 ONDANSETRON ODT 4 MG TABLET DISPERSIBLE: Performed by: HOSPITALIST

## 2022-09-04 RX ADMIN — Medication 10 ML: at 08:21

## 2022-09-04 RX ADMIN — METOCLOPRAMIDE 10 MG: 10 TABLET ORAL at 13:33

## 2022-09-04 RX ADMIN — PIPERACILLIN SODIUM AND TAZOBACTAM SODIUM 3.38 G: 3; .375 INJECTION, POWDER, LYOPHILIZED, FOR SOLUTION INTRAVENOUS at 04:15

## 2022-09-04 RX ADMIN — Medication 1000 UNITS: at 08:20

## 2022-09-04 RX ADMIN — PANTOPRAZOLE SODIUM 40 MG: 40 TABLET, DELAYED RELEASE ORAL at 08:20

## 2022-09-04 RX ADMIN — METOCLOPRAMIDE 10 MG: 10 TABLET ORAL at 17:35

## 2022-09-04 RX ADMIN — METOCLOPRAMIDE 10 MG: 10 TABLET ORAL at 08:20

## 2022-09-04 RX ADMIN — SODIUM CHLORIDE, POTASSIUM CHLORIDE, SODIUM LACTATE AND CALCIUM CHLORIDE 125 ML/HR: 600; 310; 30; 20 INJECTION, SOLUTION INTRAVENOUS at 16:19

## 2022-09-04 RX ADMIN — ONDANSETRON 8 MG: 4 TABLET, ORALLY DISINTEGRATING ORAL at 13:33

## 2022-09-04 RX ADMIN — LEVETIRACETAM 500 MG: 500 TABLET, FILM COATED ORAL at 20:30

## 2022-09-04 RX ADMIN — ESTRADIOL 0.5 MG: 0.5 TABLET ORAL at 08:19

## 2022-09-04 RX ADMIN — OXYBUTYNIN CHLORIDE 5 MG: 5 TABLET, EXTENDED RELEASE ORAL at 08:19

## 2022-09-04 RX ADMIN — LEVETIRACETAM 500 MG: 500 TABLET, FILM COATED ORAL at 08:20

## 2022-09-04 RX ADMIN — BUPROPION HYDROCHLORIDE 100 MG: 100 TABLET, EXTENDED RELEASE ORAL at 08:20

## 2022-09-04 RX ADMIN — ENOXAPARIN SODIUM 40 MG: 40 INJECTION SUBCUTANEOUS at 20:29

## 2022-09-04 RX ADMIN — PIPERACILLIN SODIUM AND TAZOBACTAM SODIUM 3.38 G: 3; .375 INJECTION, POWDER, LYOPHILIZED, FOR SOLUTION INTRAVENOUS at 20:32

## 2022-09-04 RX ADMIN — PIPERACILLIN SODIUM AND TAZOBACTAM SODIUM 3.38 G: 3; .375 INJECTION, POWDER, LYOPHILIZED, FOR SOLUTION INTRAVENOUS at 11:29

## 2022-09-04 RX ADMIN — SODIUM CHLORIDE, POTASSIUM CHLORIDE, SODIUM LACTATE AND CALCIUM CHLORIDE 125 ML/HR: 600; 310; 30; 20 INJECTION, SOLUTION INTRAVENOUS at 04:15

## 2022-09-04 RX ADMIN — FLUOXETINE 40 MG: 20 CAPSULE ORAL at 08:20

## 2022-09-04 RX ADMIN — BUSPIRONE HYDROCHLORIDE 10 MG: 10 TABLET ORAL at 08:20

## 2022-09-04 RX ADMIN — BUSPIRONE HYDROCHLORIDE 10 MG: 10 TABLET ORAL at 20:29

## 2022-09-04 NOTE — PLAN OF CARE
Goal Outcome Evaluation:  Plan of Care Reviewed With: patient        Progress: improving  Outcome Evaluation: no s/s of distress; vss; will cont to monitor

## 2022-09-04 NOTE — PROGRESS NOTES
LOS: 3 days     Patient Care Team:  Greta Stoddard MD as PCP - General  Bro Lott PA-C as Physician Assistant (Gastroenterology)  Harpreet Duran DPM as Consulting Physician (Podiatry)      Subjective     Pyelonephritis left proved however diarrhea this morning    Objective       Vital Signs  Temp:  [96.4 °F (35.8 °C)-100.9 °F (38.3 °C)] 99.2 °F (37.3 °C)  Heart Rate:  [61-90] 66  Resp:  [16-18] 18  BP: (108-142)/(71-82) 121/75    Physical Exam:        General Appearance:   Seems much more at her self once to go home     Respiratory:    UNLABORED RESPIRATIONS.     Abdomen:     SOFT.       Genitourinary:  Voiding without problems reviewed with nursing staff     Rectal:     DEFERRED       Results Review:       Imaging Results (Last 24 Hours)     Procedure Component Value Units Date/Time    US Guided Vascular Access [[id number]] Collected: 09/03/22 1153     Updated: 09/03/22 1256    Narrative:      PROCEDURE: Ultrasound Guidance Vascular Access      Ordering physician(s): DWAYNE BAIN    Clinical Indication:  Vascular access    Findings:    The left basilic vein was sonographically evaluated and  determined to be patent.  Concurrent realtime ultrasound was used  to visualize needle entry into left basilic vein and a permanent  image was stored for permanent recording and reporting.       Impression:      Impression:   Ultrasound guidance utilized for vascular access as above    70550    Electronically signed by:  Alfredo Overton MD  9/3/2022 12:54 PM CDT  Workstation: [id number]    IR Insert Midline Without Port Pump 5 Plus [[id number]] Resulted: 09/03/22 1237     Updated: 09/03/22 1237    Narrative:      This procedure was auto-finalized with no dictation required.        Lab Results (last 24 hours)     Procedure Component Value Units Date/Time    Blood Culture - Blood, Hand, Left [[id number]]  (Normal) Collected: 09/02/22 0009    Specimen: Blood from Hand, Left Updated: 09/04/22 0031     Blood Culture No  growth at 2 days    Blood Culture - Blood, Hand, Right [[id number]]  (Normal) Collected: 09/02/22 0018    Specimen: Blood from Hand, Right Updated: 09/04/22 0031     Blood Culture No growth at 2 days    Urine Culture - Urine, Urine, Catheter [[id number]] Collected: 09/01/22 1934    Specimen: Urine, Catheter Updated: 09/03/22 1219     Urine Culture <25,000 CFU/mL Mixed Lizbeth Isolated    Narrative:      Specimen contains mixed organisms of questionable pathogenicity suggestive of contamination. If symptoms persist, suggest recollection.  Colonization of the urinary tract without infection is common. Treatment is discouraged unless the patient is symptomatic, pregnant, or undergoing an invasive urologic procedure.            I reviewed the patient's new clinical results.  I reviewed the patient's new imaging results and agree with the interpretation.  I reviewed the patient's other test results and agree with the interpretation        Assessment:    Pyelonephritis    Plan:     Discharge when ready from primary service      Cecilio Acuña MD  09/04/22  09:00 CDT

## 2022-09-04 NOTE — PLAN OF CARE
Goal Outcome Evaluation:              Outcome Evaluation: Patient resting in bed at this time. Has been awake more frequent, asking for bedpan. No s/s of distress. VSS. Will continue to monitor.

## 2022-09-04 NOTE — PROGRESS NOTES
[facility] Medicine Services  INPATIENT PROGRESS NOTE    Length of Stay: 3  Date of Admission: 9/1/2022  Primary Care Physician: Greta Stoddard MD    Subjective   Chief Complaint: Fever  HPI: Patient states she felt better today.  No specific complaints.    Review of Systems   Constitutional: Positive for fatigue. Negative for appetite change, chills, fever and unexpected weight change.   Respiratory: Negative for cough, choking, chest tightness, shortness of breath and wheezing.    Cardiovascular: Negative for chest pain, palpitations and leg swelling.   Gastrointestinal: Negative for abdominal pain, blood in stool, constipation, diarrhea and vomiting.   Genitourinary: Positive for dysuria. Negative for flank pain and hematuria.   Neurological: Negative for dizziness, seizures, syncope, speech difficulty, weakness, light-headedness, numbness and headaches.   Hematological: Does not bruise/bleed easily.        All pertinent negatives and positives are as above. All other systems have been reviewed and are negative unless otherwise stated.    Objective    Temp:  [96.4 °F (35.8 °C)-100.9 °F (38.3 °C)] 97.8 °F (36.6 °C)  Heart Rate:  [61-85] 78  Resp:  [16-18] 16  BP: (108-142)/(72-83) 125/83    AM-PAC 6 Clicks Score (PT): 7 (09/04/22 0900)    Physical Exam  Vitals reviewed.   Constitutional:       Appearance: She is well-developed.   HENT:      Head: Normocephalic and atraumatic.   Eyes:      Pupils: Pupils are equal, round, and reactive to light.   Cardiovascular:      Rate and Rhythm: Normal rate and regular rhythm.      Heart sounds: Normal heart sounds. No murmur heard.    No friction rub. No gallop.   Pulmonary:      Effort: Pulmonary effort is normal. No respiratory distress.      Breath sounds: Normal breath sounds. No wheezing or rales.   Chest:      Chest wall: No tenderness.   Abdominal:      General: Bowel sounds are normal. There is no distension.       Palpations: Abdomen is soft.      Tenderness: There is no abdominal tenderness. There is no guarding.   Musculoskeletal:      Cervical back: Normal range of motion and neck supple.   Skin:     General: Skin is warm and dry.   Neurological:      Mental Status: She is alert and oriented to person, place, and time.      Comments: Patient with contractures and spastic quadriplegia.   Psychiatric:         Behavior: Behavior normal.         Thought Content: Thought content normal.           Results Review:  I have reviewed the labs, radiology results, and diagnostic studies.    Laboratory Data:   Results from last 7 days   Lab Units 09/02/22  0618 09/01/22  1828   SODIUM mmol/L 135* 136   POTASSIUM mmol/L 3.9 3.9   CHLORIDE mmol/L 101 99   CO2 mmol/L 22.0 25.0   BUN mg/dL 8 14   CREATININE mg/dL 0.48* 0.64   GLUCOSE mg/dL 70 86   CALCIUM mg/dL 8.4* 9.4   BILIRUBIN mg/dL 0.3 0.4   ALK PHOS U/L 84 106   ALT (SGPT) U/L 10 10   AST (SGOT) U/L 16 16   ANION GAP mmol/L 12.0 12.0     Estimated Creatinine Clearance: 127.7 mL/min (A) (by C-G formula based on SCr of 0.48 mg/dL (L)).  Results from last 7 days   Lab Units 09/02/22  0618   MAGNESIUM mg/dL 1.6   PHOSPHORUS mg/dL 2.2*         Results from last 7 days   Lab Units 09/02/22  0618 09/01/22  1828   WBC 10*3/mm3 11.65* 12.21*   HEMOGLOBIN g/dL 11.4* 12.7   HEMATOCRIT % 34.5 38.1   PLATELETS 10*3/mm3 199 250           Culture Data:   Blood Culture   Date Value Ref Range Status   09/02/2022 No growth at 2 days  Preliminary   09/02/2022 No growth at 2 days  Preliminary     Urine Culture   Date Value Ref Range Status   09/01/2022 <25,000 CFU/mL Mixed Lizbeth Isolated  Final     No results found for: RESPCX  No results found for: WOUNDCX  No results found for: STOOLCX  No components found for: BODYFLD    Radiology Data:   Imaging Results (Last 24 Hours)     ** No results found for the last 24 hours. **          I have reviewed the patient's current medications.     Assessment/Plan      Active Hospital Problems    Diagnosis    • Pyelonephritis        Plan:  1.  Pyelonephritis: Continue broad-spectrum antibiotics.  Cultures contaminated.  Appreciate Dr. Acuña help.  Continue IV antibiotics today.  2.  Cerebral palsy  3.  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  Sepsis: Essentially resolved.  Secondary to pyelonephritis.  Continue current supportive care.   5.  Gastroesophageal reflux disorder: Continue Reglan and PPI  6.  DVT prophylaxis: Lovenox.          The patient was evaluated during the global COVID-19 pandemic, and the diagnosis was suspected/considered upon their initial presentation.  Evaluation, treatment, and testing were consistent with current guidelines for patients who present with complaints or symptoms that may be related to COVID-19.    I confirmed that the patient's Advance Care Plan is present, code status is documented, or surrogate decision maker is listed in the patient's medical record.       Discharge Planning: I expect patient to be discharged to home in 1-2 days.        This document has been electronically signed by Mika Delgadillo MD on September 4, 2022 13:25 CDT

## 2022-09-05 ENCOUNTER — READMISSION MANAGEMENT (OUTPATIENT)
Dept: CALL CENTER | Facility: HOSPITAL | Age: 42
End: 2022-09-05

## 2022-09-05 VITALS
TEMPERATURE: 97.8 F | OXYGEN SATURATION: 99 % | SYSTOLIC BLOOD PRESSURE: 118 MMHG | WEIGHT: 115.06 LBS | RESPIRATION RATE: 18 BRPM | BODY MASS INDEX: 22.59 KG/M2 | DIASTOLIC BLOOD PRESSURE: 64 MMHG | HEART RATE: 66 BPM | HEIGHT: 60 IN

## 2022-09-05 PROCEDURE — 25010000002 PIPERACILLIN SOD-TAZOBACTAM PER 1 G: Performed by: INTERNAL MEDICINE

## 2022-09-05 RX ORDER — AMOXICILLIN AND CLAVULANATE POTASSIUM 875; 125 MG/1; MG/1
1 TABLET, FILM COATED ORAL 2 TIMES DAILY
Qty: 6 TABLET | Refills: 0 | OUTPATIENT
Start: 2022-09-05 | End: 2022-09-22

## 2022-09-05 RX ADMIN — SODIUM CHLORIDE, POTASSIUM CHLORIDE, SODIUM LACTATE AND CALCIUM CHLORIDE 125 ML/HR: 600; 310; 30; 20 INJECTION, SOLUTION INTRAVENOUS at 11:03

## 2022-09-05 RX ADMIN — LEVETIRACETAM 500 MG: 500 TABLET, FILM COATED ORAL at 08:25

## 2022-09-05 RX ADMIN — PIPERACILLIN SODIUM AND TAZOBACTAM SODIUM 3.38 G: 3; .375 INJECTION, POWDER, LYOPHILIZED, FOR SOLUTION INTRAVENOUS at 11:11

## 2022-09-05 RX ADMIN — SODIUM CHLORIDE, POTASSIUM CHLORIDE, SODIUM LACTATE AND CALCIUM CHLORIDE 125 ML/HR: 600; 310; 30; 20 INJECTION, SOLUTION INTRAVENOUS at 02:23

## 2022-09-05 RX ADMIN — BUSPIRONE HYDROCHLORIDE 10 MG: 10 TABLET ORAL at 08:25

## 2022-09-05 RX ADMIN — OXYBUTYNIN CHLORIDE 5 MG: 5 TABLET, EXTENDED RELEASE ORAL at 08:25

## 2022-09-05 RX ADMIN — Medication 10 ML: at 08:26

## 2022-09-05 RX ADMIN — ESTRADIOL 0.5 MG: 0.5 TABLET ORAL at 08:25

## 2022-09-05 RX ADMIN — DOCUSATE SODIUM 100 MG: 100 CAPSULE, LIQUID FILLED ORAL at 08:26

## 2022-09-05 RX ADMIN — METOCLOPRAMIDE 10 MG: 10 TABLET ORAL at 08:25

## 2022-09-05 RX ADMIN — METOCLOPRAMIDE 10 MG: 10 TABLET ORAL at 11:03

## 2022-09-05 RX ADMIN — Medication 1000 UNITS: at 08:25

## 2022-09-05 RX ADMIN — FLUOXETINE 40 MG: 20 CAPSULE ORAL at 08:25

## 2022-09-05 RX ADMIN — PIPERACILLIN SODIUM AND TAZOBACTAM SODIUM 3.38 G: 3; .375 INJECTION, POWDER, LYOPHILIZED, FOR SOLUTION INTRAVENOUS at 05:00

## 2022-09-05 RX ADMIN — PANTOPRAZOLE SODIUM 40 MG: 40 TABLET, DELAYED RELEASE ORAL at 06:26

## 2022-09-05 RX ADMIN — BUPROPION HYDROCHLORIDE 100 MG: 100 TABLET, EXTENDED RELEASE ORAL at 08:25

## 2022-09-05 NOTE — PLAN OF CARE
Goal Outcome Evaluation:           Progress: improving  Outcome Evaluation: Patient resting in bed at this time. No s/s of distress. VSS. Will continue to monitor.

## 2022-09-05 NOTE — OUTREACH NOTE
Prep Survey    Flowsheet Row Responses   [facility] [place] patient discharged from? [place]   Is LACE score < 7 ? No   Emergency Room discharge w/ pulse ox? No   Eligibility [facility]   Date of Admission 09/01/22   Date of Discharge 09/05/22   Discharge Disposition Home or Self Care   Discharge diagnosis Pyelonephritis   [with resulting Sepsis]   Does the patient have one of the following disease processes/diagnoses(primary or secondary)? Sepsis   Does the patient have Home health ordered? No   Is there a DME ordered? No   Prep survey completed? Yes          BEAU JARVIS - Registered Nurse

## 2022-09-05 NOTE — DISCHARGE SUMMARY
[facility] Medicine Services  DISCHARGE SUMMARY       Date of Admission: 9/1/2022  Date of Discharge:  9/5/2022  Primary Care Physician: Greta Stoddard MD    Presenting Problem/History of Present Illness:  Pyelonephritis [N12]       Final Discharge Diagnoses:  Active Hospital Problems    Diagnosis    • Pyelonephritis        Consults:   Consults     Date and Time Order Name Status Description    9/2/2022  1:03 PM Inpatient Urology Consult          Pertinent Test Results:   Lab Results (most recent)     Procedure Component Value Units Date/Time    Blood Culture - Blood, Hand, Left [[id number]]  (Normal) Collected: 09/02/22 0009    Specimen: Blood from Hand, Left Updated: 09/05/22 0032     Blood Culture No growth at 3 days    Blood Culture - Blood, Hand, Right [[id number]]  (Normal) Collected: 09/02/22 0018    Specimen: Blood from Hand, Right Updated: 09/05/22 0032     Blood Culture No growth at 3 days    Urine Culture - Urine, Urine, Catheter [[id number]] Collected: 09/01/22 1934    Specimen: Urine, Catheter Updated: 09/03/22 1219     Urine Culture <25,000 CFU/mL Mixed Lizbeth Isolated    Narrative:      Specimen contains mixed organisms of questionable pathogenicity suggestive of contamination. If symptoms persist, suggest recollection.  Colonization of the urinary tract without infection is common. Treatment is discouraged unless the patient is symptomatic, pregnant, or undergoing an invasive urologic procedure.    TSH [[id number]]  (Normal) Collected: 09/02/22 0618    Specimen: Blood Updated: 09/02/22 0711     TSH 1.530 uIU/mL     Comprehensive Metabolic Panel [[id number]]  (Abnormal) Collected: 09/02/22 0618    Specimen: Blood Updated: 09/02/22 0658     Glucose 70 mg/dL      BUN 8 mg/dL      Creatinine 0.48 mg/dL      Sodium 135 mmol/L      Potassium 3.9 mmol/L      Chloride 101 mmol/L      CO2 22.0 mmol/L      Calcium 8.4 mg/dL      Total Protein 6.4 g/dL       Albumin 3.30 g/dL      ALT (SGPT) 10 U/L      AST (SGOT) 16 U/L      Alkaline Phosphatase 84 U/L      Total Bilirubin 0.3 mg/dL      Globulin 3.1 gm/dL      A/G Ratio 1.1 g/dL      BUN/Creatinine Ratio 16.7     Anion Gap 12.0 mmol/L      eGFR 121.5 mL/min/1.73      Comment: National Kidney Foundation and American Society of Nephrology (ASN) Task Force recommended calculation based on the Chronic Kidney Disease Epidemiology Collaboration (CKD-EPI) equation refit without adjustment for race.       Narrative:      GFR Normal >60  Chronic Kidney Disease <60  Kidney Failure <15      Magnesium [[id number]]  (Normal) Collected: 09/02/22 0618    Specimen: Blood Updated: 09/02/22 0658     Magnesium 1.6 mg/dL     Phosphorus [[id number]]  (Abnormal) Collected: 09/02/22 0618    Specimen: Blood Updated: 09/02/22 0658     Phosphorus 2.2 mg/dL     Hemoglobin A1c [[id number]]  (Normal) Collected: 09/02/22 0618    Specimen: Blood Updated: 09/02/22 0651     Hemoglobin A1C 4.90 %     Narrative:      Hemoglobin A1C Ranges:    Increased Risk for Diabetes  5.7% to 6.4%  Diabetes                     >= 6.5%  Diabetic Goal                < 7.0%    CBC (No Diff) [[id number]]  (Abnormal) Collected: 09/02/22 0618    Specimen: Blood Updated: 09/02/22 0639     WBC 11.65 10*3/mm3      RBC 3.89 10*6/mm3      Hemoglobin 11.4 g/dL      Hematocrit 34.5 %      MCV 88.7 fL      MCH 29.3 pg      MCHC 33.0 g/dL      RDW 13.4 %      RDW-SD 43.7 fl      MPV 9.6 fL      Platelets 199 10*3/mm3     Urinalysis With Microscopic If Indicated (No Culture) - Urine, Catheter [[id number]]  (Abnormal) Collected: 09/01/22 1934    Specimen: Urine, Catheter Updated: 09/01/22 1949     Color, UA Yellow     Appearance, UA Cloudy     pH, UA 6.5     Specific Gravity, UA 1.014     Glucose, UA Negative     Ketones, UA 15 mg/dL (1+)     Bilirubin, UA Negative     Blood, UA Moderate (2+)     Protein, UA Trace     Leuk Esterase, UA Large (3+)     Nitrite, UA Negative      Urobilinogen, UA 0.2 E.U./dL    Urinalysis, Microscopic Only - Urine, Catheter [[id number]]  (Abnormal) Collected: 09/01/22 1934    Specimen: Urine, Catheter Updated: 09/01/22 1949     RBC, UA 13-20 /HPF      WBC, UA Too Numerous to Count /HPF      Bacteria, UA 1+ /HPF      Squamous Epithelial Cells, UA 0-2 /HPF      Hyaline Casts, UA 0-2 /LPF      Methodology Automated Microscopy    Extra Tubes [[id number]] Collected: 09/01/22 1828    Specimen: Blood, Venous Line Updated: 09/01/22 1932    Narrative:      The following orders were created for panel order Extra Tubes.  Procedure                               Abnormality         Status                     ---------                               -----------         ------                     Gold Top - SST[[id number]]                                   Final result               Light Blue Top[[id number]]                                   Final result                 Please view results for these tests on the individual orders.    Gold Top - SST [[id number]] Collected: 09/01/22 1828    Specimen: Blood Updated: 09/01/22 1932     Extra Tube Hold for add-ons.     Comment: Auto resulted.       Light Blue Top [[id number]] Collected: 09/01/22 1828    Specimen: Blood Updated: 09/01/22 1932     Extra Tube Hold for add-ons.     Comment: Auto resulted       Procalcitonin [[id number]]  (Normal) Collected: 09/01/22 1828    Specimen: Blood Updated: 09/01/22 1903     Procalcitonin 0.11 ng/mL     Narrative:      As a Marker for Sepsis (Non-Neonates):    1. <0.5 ng/mL represents a low risk of severe sepsis and/or septic shock.  2. >2 ng/mL represents a high risk of severe sepsis and/or septic shock.    As a Marker for Lower Respiratory Tract Infections that require antibiotic therapy:    PCT on Admission    Antibiotic Therapy       6-12 Hrs later    >0.5                Strongly Recommended  >0.25 - <0.5        Recommended   0.1 - 0.25          Discouraged              Remeasure/reassess  "PCT  <0.1                Strongly Discouraged     Remeasure/reassess PCT    As 28 day mortality risk marker: \"Change in Procalcitonin Result\" (>80% or <=80%) if Day 0 (or Day 1) and Day 4 values are available. Refer to http://www.[facility]-pct-calculator.com    Change in PCT <=80%  A decrease of PCT levels below or equal to 80% defines a positive change in PCT test result representing a higher risk for 28-day all-cause mortality of patients diagnosed with severe sepsis for septic shock.    Change in PCT >80%  A decrease of PCT levels of more than 80% defines a negative change in PCT result representing a lower risk for 28-day all-cause mortality of patients diagnosed with severe sepsis or septic shock.       Respiratory Panel PCR w/COVID-19(SARS-CoV-2) MURALI/RINA/IVON/PAD/COR/MAD/CAROLEE In-House, NP Swab in UTM/VTM, 3-4 HR TAT - Swab, Nasopharynx [[id number]]  (Normal) Collected: 09/01/22 1753    Specimen: Swab from Nasopharynx Updated: 09/01/22 1858     ADENOVIRUS, PCR Not Detected     Coronavirus 229E Not Detected     Coronavirus HKU1 Not Detected     Coronavirus NL63 Not Detected     Coronavirus OC43 Not Detected     COVID19 Not Detected     Human Metapneumovirus Not Detected     Human Rhinovirus/Enterovirus Not Detected     Influenza A PCR Not Detected     Influenza B PCR Not Detected     Parainfluenza Virus 1 Not Detected     Parainfluenza Virus 2 Not Detected     Parainfluenza Virus 3 Not Detected     Parainfluenza Virus 4 Not Detected     RSV, PCR Not Detected     Bordetella pertussis pcr Not Detected     Bordetella parapertussis PCR Not Detected     Chlamydophila pneumoniae PCR Not Detected     Mycoplasma pneumo by PCR Not Detected    Narrative:      In the setting of a positive respiratory panel with a viral infection PLUS a negative procalcitonin without other underlying concern for bacterial infection, consider observing off antibiotics or discontinuation of antibiotics and continue supportive care. If the " respiratory panel is positive for atypical bacterial infection (Bordetella pertussis, Chlamydophila pneumoniae, or Mycoplasma pneumoniae), consider antibiotic de-escalation to target atypical bacterial infection.    Comprehensive Metabolic Panel [[id number]] Collected: 09/01/22 1828    Specimen: Blood Updated: 09/01/22 1856     Glucose 86 mg/dL      BUN 14 mg/dL      Creatinine 0.64 mg/dL      Sodium 136 mmol/L      Potassium 3.9 mmol/L      Chloride 99 mmol/L      CO2 25.0 mmol/L      Calcium 9.4 mg/dL      Total Protein 7.6 g/dL      Albumin 4.00 g/dL      ALT (SGPT) 10 U/L      AST (SGOT) 16 U/L      Alkaline Phosphatase 106 U/L      Total Bilirubin 0.4 mg/dL      Globulin 3.6 gm/dL      A/G Ratio 1.1 g/dL      BUN/Creatinine Ratio 21.9     Anion Gap 12.0 mmol/L      eGFR 113.3 mL/min/1.73      Comment: National Kidney Foundation and American Society of Nephrology (ASN) Task Force recommended calculation based on the Chronic Kidney Disease Epidemiology Collaboration (CKD-EPI) equation refit without adjustment for race.       Narrative:      GFR Normal >60  Chronic Kidney Disease <60  Kidney Failure <15      Lipase [[id number]]  (Normal) Collected: 09/01/22 1828    Specimen: Blood Updated: 09/01/22 1856     Lipase 39 U/L     Lactic Acid, Plasma [[id number]]  (Normal) Collected: 09/01/22 1828    Specimen: Blood Updated: 09/01/22 1851     Lactate 0.8 mmol/L     CBC & Differential [[id number]]  (Abnormal) Collected: 09/01/22 1828    Specimen: Blood Updated: 09/01/22 1842    Narrative:      The following orders were created for panel order CBC & Differential.  Procedure                               Abnormality         Status                     ---------                               -----------         ------                     CBC Auto Differential[[id number]]        Abnormal            Final result                 Please view results for these tests on the individual orders.    CBC Auto Differential [[id number]]   (Abnormal) Collected: 09/01/22 1828    Specimen: Blood Updated: 09/01/22 1842     WBC 12.21 10*3/mm3      RBC 4.37 10*6/mm3      Hemoglobin 12.7 g/dL      Hematocrit 38.1 %      MCV 87.2 fL      MCH 29.1 pg      MCHC 33.3 g/dL      RDW 13.4 %      RDW-SD 42.5 fl      MPV 9.5 fL      Platelets 250 10*3/mm3      Neutrophil % 73.3 %      Lymphocyte % 15.1 %      Monocyte % 9.5 %      Eosinophil % 0.2 %      Basophil % 0.9 %      Immature Grans % 1.0 %      Neutrophils, Absolute 8.96 10*3/mm3      Lymphocytes, Absolute 1.84 10*3/mm3      Monocytes, Absolute 1.16 10*3/mm3      Eosinophils, Absolute 0.02 10*3/mm3      Basophils, Absolute 0.11 10*3/mm3      Immature Grans, Absolute 0.12 10*3/mm3      nRBC 0.0 /100 WBC         Imaging Results (Most Recent)     Procedure Component Value Units Date/Time    US Guided Vascular Access [[id number]] Collected: 09/03/22 1153     Updated: 09/03/22 1256    Narrative:      PROCEDURE: Ultrasound Guidance Vascular Access      Ordering physician(s): DWAYNE BAIN    Clinical Indication:  Vascular access    Findings:    The left basilic vein was sonographically evaluated and  determined to be patent.  Concurrent realtime ultrasound was used  to visualize needle entry into left basilic vein and a permanent  image was stored for permanent recording and reporting.       Impression:      Impression:   Ultrasound guidance utilized for vascular access as above    [id number]    Electronically signed by:  Alfredo Overton MD  9/3/2022 12:54 PM CDT  Workstation: [id number]    IR Insert Midline Without Port Pump 5 Plus [[id number]] Resulted: 09/03/22 1237     Updated: 09/03/22 1237    Narrative:      This procedure was auto-finalized with no dictation required.    CT Abdomen Pelvis With Contrast [[id number]] Collected: 09/01/22 2000     Updated: 09/01/22 2147    Narrative:      CT SCANS ABDOMEN AND PELVIS WITH IV CONTRAST.    HISTORY: abdominal pain, vomiting    COMPARISON: 6/14/2019.    TECHNIQUE: Radiation  dose reduction techniques were utilized,  including automated exposure control and exposure modulation  based on body size. Axial images were obtained from the lung  bases to the symphysis pubis with IV contrast only.. Oral  contrast was not administered per request.    FINDINGS :  There is some mild motion artifact as well as  artifact from an implanted stimulator device over the anterior  pelvis. There is cortical renal atrophy with small parapelvic  cysts bilaterally which do not require imaging follow-up. There  are nonobstructing renal calculi bilaterally.     The left nephrogram is somewhat heterogeneous, particularly  laterally which was not evident on the previous exam. There is  also some fairly diffuse left ureteral enhancement but without  dilatation. This could potentially be related to mild  pyelonephritis. Suggest correlation with urinalysis.    Remaining abdominal organs have an unremarkable appearance.  Normal aorta and appendix. No significant constipation or  diverticular disease. Small hiatal hernia. The GI tract not  opacified for assessment but non obstructive in appearance.  Hysterectomy. No ascites.      Impression:      IMPRESSION :   1. Renal findings as discussed, suggest urinalysis correlation  for possibility of mild left pyelonephritis.      Electronically signed by:  Alex Garcia MD  9/1/2022 9:45 PM CDT  Workstation: [id number]    XR Chest 1 View [[id number]] Collected: 09/01/22 1849     Updated: 09/01/22 1951    Narrative:      PROCEDURE:XR CHEST 1 VIEW    HISTORY:cough, fever     COMPARISON:    None    FINDINGS:    The heart appears unremarkable. The lungs are clear there is no  alveolar consolidation, effusion or pneumothorax. There are no  acute bony or soft tissue abnormalities. Levoconvex scoliosis of  the thoracic spine.      Impression:        No acute cardiopulmonary process.    Electronically signed by:  Elicia Dorado MD, MBA  9/1/2022 7:49  PM CDT Workstation:  "[id number]          Chief Complaint on Day of Discharge: None    Hospital Course:  The patient is a 41 y.o. female who presented to [facility] with fever.  She was found to have acute pyelonephritis with resulting sepsis.  She was treated with broad-spectrum antibiotics and IV fluids.  Symptoms improved significantly and she returned to baseline.  Culture was contaminated.  Urology was consulted and felt medical management was appropriate.  When she returned to her baseline and leukocytosis was improving, she was discharged home.  She will be discharged home on oral antibiotics and will follow up with her primary care provider in 1 to 2 weeks.    Condition on Discharge: Hemodynamically stable    Physical Exam on Discharge:  /64 (BP Location: Right arm, Patient Position: Lying)   Pulse 66   Temp 97.8 °F (36.6 °C) (Temporal)   Resp 18   Ht 152.4 cm (60\")   Wt 52.2 kg (115 lb 1 oz)   LMP  (LMP Unknown)   SpO2 99%   BMI 22.47 kg/m²      Physical Exam  Vitals reviewed.   Constitutional:       Appearance: She is well-developed.   HENT:      Head: Normocephalic and atraumatic.   Eyes:      Pupils: Pupils are equal, round, and reactive to light.   Cardiovascular:      Rate and Rhythm: Normal rate and regular rhythm.      Heart sounds: Normal heart sounds. No murmur heard.    No friction rub. No gallop.   Pulmonary:      Effort: Pulmonary effort is normal. No respiratory distress.      Breath sounds: Normal breath sounds. No wheezing or rales.   Chest:      Chest wall: No tenderness.   Abdominal:      General: Bowel sounds are normal. There is no distension.      Palpations: Abdomen is soft.      Tenderness: There is no abdominal tenderness. There is no guarding.   Musculoskeletal:      Cervical back: Normal range of motion and neck supple.   Skin:     General: Skin is warm and dry.   Neurological:      Comments: Spastic quadriplegia   Psychiatric:         Behavior: Behavior normal.         " Thought Content: Thought content normal.           Discharge Disposition:  Home or Self Care    Discharge Medications:     Discharge Medications      New Medications      Instructions Start Date   amoxicillin-clavulanate 875-125 MG per tablet  Commonly known as: Augmentin   1 tablet, Oral, 2 Times Daily         Continue These Medications      Instructions Start Date   acetaminophen 325 MG tablet  Commonly known as: TYLENOL   GIVE TWO (2) TABLETS BY MOUTH EVERY 6 HOURS AS NEEDED FOR MILD PAIN.      albuterol (2.5 MG/3ML) 0.083% nebulizer solution  Commonly known as: PROVENTIL   2.5 mg, Nebulization, Every 4 Hours PRN      BACLOFEN IT   Has a baclofen pump      buPROPion  MG 12 hr tablet  Commonly known as: WELLBUTRIN SR   GIVE ONE TABLET BY MOUTH EVERY MORNING      busPIRone 10 MG tablet  Commonly known as: BUSPAR   10 mg, Oral, 2 Times Daily      Cholecalciferol 25 MCG (1000 UT) capsule   1,000 Units, Oral, Daily      diphenhydrAMINE-acetaminophen  MG tablet per tablet  Commonly known as: TYLENOL PM   1 tablet, Oral, Nightly PRN      diphenhydrAMINE-acetaminophen  MG tablet per tablet  Commonly known as: TYLENOL PM   1 tablet, Oral, Nightly PRN      docusate sodium 100 MG capsule  Commonly known as: COLACE   GIVE ONE CAPSULE BY MOUTH TWICE DAILY      doxycycline 100 MG capsule  Commonly known as: VIBRAMYCIN   100 mg, Oral, Daily      estradiol 0.5 MG tablet  Commonly known as: ESTRACE   0.5 mg, Oral, Daily      FLUoxetine 40 MG capsule  Commonly known as: PROzac   40 mg, Oral, Daily      fluticasone 50 MCG/ACT nasal spray  Commonly known as: FLONASE   INSTILL 2 SPRAYS IN EACH NOSTRIL ONCE DAILY FOR SEASONAL ALLERGIES **REORDER WHEN NEEDED-NOT A CYCLE FILL MED**      guaiFENesin 600 MG 12 hr tablet  Commonly known as: MUCINEX   600 mg, Oral, 2 Times Daily PRN      levETIRAcetam 500 MG tablet  Commonly known as: KEPPRA   GIVE ONE TABLET BY MOUTH TWICE DAILY      linaclotide 145 MCG capsule  capsule  Commonly known as: Linzess   145 mcg, Oral, Daily      loratadine 10 MG tablet  Commonly known as: Claritin   10 mg, Oral, Daily      metoclopramide 10 MG tablet  Commonly known as: REGLAN   GIVE ONE TABLET BY MOUTH ONCE DAILY FOR GASTROESOPHAGEAL REFLUX      omeprazole 20 MG capsule  Commonly known as: priLOSEC   GIVE ONE CAPSULE BY MOUTH ONCE DAILY FOR STOMACH ACID      ondansetron ODT 8 MG disintegrating tablet  Commonly known as: ZOFRAN-ODT   8 mg, Translingual, Every 8 Hours PRN      tolterodine LA 4 MG 24 hr capsule  Commonly known as: DETROL LA   No dose, route, or frequency recorded.      Wrist Brace misc   1 Device, Does not apply, Daily      Wheelchair misc   Does not apply, As directed              Discharge Diet:   Diet Instructions     Diet: Regular      Discharge Diet: Regular          Activity at Discharge:   Activity Instructions     Activity as Tolerated          Follow-up Appointments:   Future Appointments   Date Time Provider Department Center   9/28/2022 11:00 AM Greta Stoddard MD [facility]       Test Results Pending at Discharge:   Pending Labs     Order Current Status    Blood Culture - Blood, Hand, Left Preliminary result    Blood Culture - Blood, Hand, Right Preliminary result              This document has been electronically signed by Mkia Delgadillo MD on September 5, 2022 12:13 CDT      Time: 35 minutes

## 2022-09-06 ENCOUNTER — TRANSITIONAL CARE MANAGEMENT TELEPHONE ENCOUNTER (OUTPATIENT)
Dept: CALL CENTER | Facility: HOSPITAL | Age: 42
End: 2022-09-06

## 2022-09-06 NOTE — OUTREACH NOTE
Call Center TCM Note    Flowsheet Row Responses   [facility] patient discharged from? [place]   Does the patient have one of the following disease processes/diagnoses(primary or secondary)? Sepsis   TCM attempt successful? No  [verbal release on file but not up to date as program coordinators have changed--Pato is listed on verbal but has stepped out for a moment, will attempt call later]   Unsuccessful attempts Attempt 1          Malika Liu RN    9/6/2022, 14:01 EDT

## 2022-09-06 NOTE — OUTREACH NOTE
Call Center TCM Note    Flowsheet Row Responses   [facility] patient discharged from? [place]   Does the patient have one of the following disease processes/diagnoses(primary or secondary)? Sepsis   TCM attempt successful? No  [verbal release on file but not up to date as program coordinators have changed--Pato is listed on verbal but has stepped out for a moment, will attempt call later]   Unsuccessful attempts Attempt 1   TCM call completed? Yes          Malika Liu RN    9/6/2022, 13:59 EDT

## 2022-09-06 NOTE — OUTREACH NOTE
Call Center TCM Note    Flowsheet Row Responses   [facility] patient discharged from? [place]   Does the patient have one of the following disease processes/diagnoses(primary or secondary)? Sepsis   TCM attempt successful? No   Unsuccessful attempts Attempt 2  [Called back as instructed, Pato on verbal release had left for the day.  Called Rodrick DEAN who deferred back to facility.  ]          Malika Liu RN    9/6/2022, 15:59 EDT

## 2022-09-07 ENCOUNTER — TRANSITIONAL CARE MANAGEMENT TELEPHONE ENCOUNTER (OUTPATIENT)
Dept: CALL CENTER | Facility: HOSPITAL | Age: 42
End: 2022-09-07

## 2022-09-07 DIAGNOSIS — F41.9 ANXIETY: Chronic | ICD-10-CM

## 2022-09-07 LAB
BACTERIA SPEC AEROBE CULT: NORMAL
BACTERIA SPEC AEROBE CULT: NORMAL

## 2022-09-07 NOTE — OUTREACH NOTE
Call Center TCM Note    Flowsheet Row Responses   [facility] patient discharged from? [place]   Does the patient have one of the following disease processes/diagnoses(primary or secondary)? Sepsis   TCM attempt successful? Yes  [verbal release is not up to date for facility--POA defers to facility]   Call start time 1346   Call end time 1354   Discharge diagnosis Pyelonephritis    Person spoke with today (if not patient) and relationship Pato Arreola   Meds reviewed with patient/caregiver? Yes   Is the patient having any side effects they believe may be caused by any medication additions or changes? No   Does the patient have all medications related to this admission filled (includes all antibiotics, inhalers, nebulizers,steroids,etc.) Yes   Prescription comments Taking atb as ordered--reports she is having at least one loose BM daily, encouraged yogurt while on ATBs but also reviewed s/s cdiff   Is the patient taking all medications as directed (includes completed medication regime)? Yes   Comments    Hospital PCP FOLLOW UP APPOINTMENT IS 9/9/22@0930am   Has home health visited the patient within 72 hours of discharge? N/A   Psychosocial issues? No   Did the patient receive a copy of their discharge instructions? No  [Staff with review in mychart or return to hospital to  AVS]   Nursing interventions Reviewed instructions with patient, Educated on MyChart   What is the patient's perception of their health status since discharge? Improving  [CG reports patient is better--urine is clearing with antibiotic use.  Reports she did not have urinary complaints of burning prior to admission and only c/o was fever, had just finished atb for UTI.  Reviewed s/s UTI.]   Nursing interventions Nurse provided patient education   Is the patient/caregiver able to teach back TIME? I nfection - may have signs and symptoms of an infection, T emperature - higher or lower than normal, M ental Decline - confused, sleepy,  difficult to arouse   Nursing interventions Nurse provided patient education   Is patient/caregiver able to teach back steps to recovery at home? Rest and regain strength   Is the patient/caregiver able to teach back signs and symptoms of worsening condition: Fever, Shortness of breath/rapid respiratory rate, Rapid heart rate (>90), Altered mental status(confusion/coma)   Is the patient/caregiver able to teach back the hierarchy of who to call/visit for symptoms/problems? PCP, Specialist, Home health nurse, Urgent Care, ED, 911 Yes   TCM call completed? Yes          Malika Liu RN    9/7/2022, 14:00 EDT

## 2022-09-08 RX ORDER — BUSPIRONE HYDROCHLORIDE 10 MG/1
TABLET ORAL
Qty: 60 TABLET | Refills: 11 | Status: SHIPPED | OUTPATIENT
Start: 2022-09-08

## 2022-09-08 RX ORDER — ESTRADIOL 0.5 MG/1
TABLET ORAL
Qty: 30 TABLET | Refills: 11 | Status: SHIPPED | OUTPATIENT
Start: 2022-09-08

## 2022-09-09 ENCOUNTER — OFFICE VISIT (OUTPATIENT)
Dept: FAMILY MEDICINE CLINIC | Facility: CLINIC | Age: 42
End: 2022-09-09

## 2022-09-09 VITALS
OXYGEN SATURATION: 96 % | SYSTOLIC BLOOD PRESSURE: 118 MMHG | BODY MASS INDEX: 22.47 KG/M2 | RESPIRATION RATE: 18 BRPM | HEIGHT: 60 IN | HEART RATE: 68 BPM | DIASTOLIC BLOOD PRESSURE: 64 MMHG

## 2022-09-09 DIAGNOSIS — R19.7 DIARRHEA, UNSPECIFIED TYPE: ICD-10-CM

## 2022-09-09 DIAGNOSIS — N12 PYELONEPHRITIS: Primary | ICD-10-CM

## 2022-09-09 PROCEDURE — 1111F DSCHRG MED/CURRENT MED MERGE: CPT | Performed by: GENERAL PRACTICE

## 2022-09-09 PROCEDURE — 99213 OFFICE O/P EST LOW 20 MIN: CPT | Performed by: GENERAL PRACTICE

## 2022-09-09 NOTE — PROGRESS NOTES
Transitional Care Follow Up Visit  Subjective     Kiley Ledesma is a 41 y.o. female who presents for a transitional care management visit.    Within 48 business hours after discharge our office contacted her via telephone to coordinate her care and needs.      I reviewed and discussed the details of that call along with the discharge summary, hospital problems, inpatient lab results, inpatient diagnostic studies, and consultation reports with Kiley.     Current outpatient and discharge medications have been reconciled for the patient.  Reviewed by: Greta Stoddard MD      Date of TCM Phone Call 9/5/2022   [facility]   Date of Admission 9/1/2022   Date of Discharge 9/5/2022   Discharge Disposition Home or Self Care     Risk for Readmission (LACE) Score: 10 (9/5/2022  5:01 AM)      History of Present Illness   Course During Hospital Stay: Recent hospitalization, labs, xrays reviewed and medications reconciled. Treated for pyelonephritis. Now has some diarrhea. finished antibiotic last night.       Copied from hospital record:   Hospital Course:  The patient is a 41 y.o. female who presented to [facility] with fever.  She was found to have acute pyelonephritis with resulting sepsis.  She was treated with broad-spectrum antibiotics and IV fluids.  Symptoms improved significantly and she returned to baseline.  Culture was contaminated.  Urology was consulted and felt medical management was appropriate.  When she returned to her baseline and leukocytosis was improving, she was discharged home.  She will be discharged home on oral antibiotics and will follow up with her primary care provider in 1 to 2 weeks.     The following portions of the patient's history were reviewed and updated as appropriate: allergies, current medications, past family history, past medical history, past social history, past surgical history and problem list.     Outpatient Medications  Prior to Visit   Medication Sig Dispense Refill   • acetaminophen (TYLENOL) 325 MG tablet GIVE TWO (2) TABLETS BY MOUTH EVERY 6 HOURS AS NEEDED FOR MILD PAIN. 60 tablet 11   • buPROPion SR (WELLBUTRIN SR) 100 MG 12 hr tablet GIVE ONE TABLET BY MOUTH EVERY MORNING 31 tablet 11   • busPIRone (BUSPAR) 10 MG tablet GIVE ONE TABLET BY MOUTH TWICE DAILY 60 tablet 11   • Cholecalciferol 25 MCG (1000 UT) capsule Take 1 capsule by mouth Daily. 30 capsule 11   • diphenhydrAMINE-acetaminophen (TYLENOL PM)  MG tablet per tablet Take 1 tablet by mouth At Night As Needed for Sleep.     • docusate sodium (COLACE) 100 MG capsule GIVE ONE CAPSULE BY MOUTH TWICE DAILY 62 capsule 11   • estradiol (ESTRACE) 0.5 MG tablet GIVE ONE TABLET (0.5MG) BY MOUTH ONCE DAILY **NOTE DOSE/STRENGTH* 30 tablet 11   • FLUoxetine (PROzac) 40 MG capsule Take 1 capsule by mouth Daily. 31 capsule 11   • fluticasone (FLONASE) 50 MCG/ACT nasal spray INSTILL 2 SPRAYS IN EACH NOSTRIL ONCE DAILY FOR SEASONAL ALLERGIES **REORDER WHEN NEEDED-NOT A CYCLE FILL MED** 16 g 11   • guaiFENesin (MUCINEX) 600 MG 12 hr tablet Take 600 mg by mouth 2 (Two) Times a Day As Needed for Cough or Congestion.     • levETIRAcetam (KEPPRA) 500 MG tablet GIVE ONE TABLET BY MOUTH TWICE DAILY 60 tablet 11   • linaclotide (Linzess) 145 MCG capsule capsule Take 1 capsule by mouth Daily. 30 capsule 11   • loratadine (Claritin) 10 MG tablet Take 1 tablet by mouth Daily. 90 tablet 3   • metoclopramide (REGLAN) 10 MG tablet GIVE ONE TABLET BY MOUTH ONCE DAILY FOR GASTROESOPHAGEAL REFLUX 31 tablet 11   • Misc. Devices (WHEELCHAIR) misc As directed     • Misc. Devices (Wrist Brace) misc 1 Device Daily. 1 each 0   • omeprazole (priLOSEC) 20 MG capsule GIVE ONE CAPSULE BY MOUTH ONCE DAILY FOR STOMACH ACID 31 capsule 11   • ondansetron ODT (ZOFRAN-ODT) 8 MG disintegrating tablet Place 8 mg on the tongue Every 8 (Eight) Hours As Needed for Nausea.     • tolterodine LA (DETROL LA) 4 MG 24 hr  "capsule      • albuterol (PROVENTIL) (2.5 MG/3ML) 0.083% nebulizer solution Take 2.5 mg by nebulization Every 4 (Four) Hours As Needed for Wheezing. 720 mL 3   • amoxicillin-clavulanate (Augmentin) 875-125 MG per tablet Take 1 tablet by mouth 2 (Two) Times a Day. 6 tablet 0   • diphenhydrAMINE-acetaminophen (TYLENOL PM)  MG tablet per tablet Take 1 tablet by mouth At Night As Needed for Sleep.     • BACLOFEN IT Has a baclofen pump     • doxycycline (VIBRAMYCIN) 100 MG capsule Take 100 mg by mouth Daily.       No facility-administered medications prior to visit.       Review of Systems  I have reviewed 12 systems with patient. Findings were negative except what is noted below and/or in history of present illness.    Objective   Visit Vitals  /64   Pulse 68   Resp 18   Ht 152.4 cm (60\")   LMP  (LMP Unknown)   SpO2 96%   BMI 22.47 kg/m²         Physical Exam  Vitals and nursing note reviewed.   Constitutional:       General: She is not in acute distress.     Appearance: She is well-developed.   HENT:      Head: Normocephalic and atraumatic.      Nose: Nose normal.   Eyes:      General:         Right eye: No discharge.         Left eye: No discharge.      Conjunctiva/sclera: Conjunctivae normal.      Pupils: Pupils are equal, round, and reactive to light.   Neck:      Thyroid: No thyromegaly.      Trachea: No tracheal deviation.   Cardiovascular:      Rate and Rhythm: Normal rate and regular rhythm.      Heart sounds: Normal heart sounds. No murmur heard.  Pulmonary:      Effort: Pulmonary effort is normal. No respiratory distress.      Breath sounds: Normal breath sounds. No wheezing or rales.   Chest:      Chest wall: No tenderness.   Abdominal:      General: Bowel sounds are normal. There is no distension.      Palpations: Abdomen is soft. There is no mass.      Tenderness: There is no abdominal tenderness.      Hernia: No hernia is present.   Musculoskeletal:         General: No deformity. Normal range " of motion.      Cervical back: Normal range of motion.      Comments: Spastic quadriplegia with increased tone in all extremities   Lymphadenopathy:      Cervical: No cervical adenopathy.   Skin:     General: Skin is warm and dry.      Coloration: Skin is not pale.      Findings: No rash.   Neurological:      Mental Status: She is alert and oriented to person, place, and time.      Motor: Weakness (Of the upper and lower extremity bilateral) and abnormal muscle tone present.      Coordination: Coordination abnormal.      Gait: Gait abnormal.      Deep Tendon Reflexes: Reflexes are normal and symmetric.      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         Behavior: Behavior normal.         Thought Content: Thought content normal.         Judgment: Judgment normal.       Assessment & Plan   Diagnoses and all orders for this visit:    1. Pyelonephritis (Primary)    2. Diarrhea, unspecified type  -     Clostridioides difficile Toxin, PCR - Stool, Per Rectum; Future    Take probiotic, if diarrhea is not improving in the next 2 to 3 days then need to check stool for C. difficile.  No orders of the defined types were placed in this encounter.      Current outpatient and discharge medications have been reconciled for the patient.  Reviewed by: Greta Stoddard MD      Return if symptoms worsen or fail to improve, for Next scheduled follow up.

## 2022-09-12 ENCOUNTER — LAB (OUTPATIENT)
Dept: LAB | Facility: HOSPITAL | Age: 42
End: 2022-09-12

## 2022-09-12 DIAGNOSIS — G40.A09 ABSENCE SEIZURE: ICD-10-CM

## 2022-09-12 DIAGNOSIS — R19.7 DIARRHEA, UNSPECIFIED TYPE: ICD-10-CM

## 2022-09-12 DIAGNOSIS — Z00.00 ANNUAL PHYSICAL EXAM: ICD-10-CM

## 2022-09-12 DIAGNOSIS — G80.0 SPASTIC QUADRIPLEGIC CEREBRAL PALSY: Chronic | ICD-10-CM

## 2022-09-12 LAB — C DIFF TOX GENS STL QL NAA+PROBE: NEGATIVE

## 2022-09-12 PROCEDURE — 87493 C DIFF AMPLIFIED PROBE: CPT

## 2022-09-14 ENCOUNTER — TELEPHONE (OUTPATIENT)
Dept: FAMILY MEDICINE CLINIC | Facility: CLINIC | Age: 42
End: 2022-09-14

## 2022-09-14 NOTE — TELEPHONE ENCOUNTER
Lamar,     I called the family to let them know that [facility] states its to early for her insurance to pay for a new lift.  (1 every 5 years, last one 12/21/2017)    Pato said go ahead and send the new Rx to [facility] and they will pay out of pocket if they have to.   So please go ahead and resend to [facility]    YADIRA Clemens

## 2022-09-14 NOTE — TELEPHONE ENCOUNTER
[employer] [facility] called stating they would not be able to fill the order for the Geovanna Lift. Amrita said patient is only allowed 1 every 5 years and MsYao Baltazar last one was ordered on 12/21/2017    [facility]  [phone]

## 2022-09-14 NOTE — TELEPHONE ENCOUNTER
Pato (Kiley's Caregiver) is asking for a new rx for a Geovanna Lift to be sent to [facility].  Said, she has called before regarding this.

## 2022-09-20 ENCOUNTER — LAB (OUTPATIENT)
Dept: LAB | Facility: HOSPITAL | Age: 42
End: 2022-09-20

## 2022-09-20 DIAGNOSIS — G40.A09 ABSENCE SEIZURE: ICD-10-CM

## 2022-09-20 DIAGNOSIS — Z11.59 NEED FOR HEPATITIS C SCREENING TEST: ICD-10-CM

## 2022-09-20 DIAGNOSIS — G80.0 SPASTIC QUADRIPLEGIC CEREBRAL PALSY: ICD-10-CM

## 2022-09-20 DIAGNOSIS — Z00.00 ANNUAL PHYSICAL EXAM: ICD-10-CM

## 2022-09-20 PROCEDURE — 86803 HEPATITIS C AB TEST: CPT

## 2022-09-20 PROCEDURE — 80053 COMPREHEN METABOLIC PANEL: CPT

## 2022-09-20 PROCEDURE — 85025 COMPLETE CBC W/AUTO DIFF WBC: CPT

## 2022-09-20 PROCEDURE — 36415 COLL VENOUS BLD VENIPUNCTURE: CPT

## 2022-09-20 PROCEDURE — 80061 LIPID PANEL: CPT

## 2022-09-21 LAB
ALBUMIN SERPL-MCNC: 4.3 G/DL (ref 3.5–5.2)
ALBUMIN/GLOB SERPL: 1.7 G/DL
ALP SERPL-CCNC: 90 U/L (ref 39–117)
ALT SERPL W P-5'-P-CCNC: 11 U/L (ref 1–33)
ANION GAP SERPL CALCULATED.3IONS-SCNC: 14 MMOL/L (ref 5–15)
AST SERPL-CCNC: 24 U/L (ref 1–32)
BASOPHILS # BLD AUTO: 0.14 10*3/MM3 (ref 0–0.2)
BASOPHILS NFR BLD AUTO: 1.9 % (ref 0–1.5)
BILIRUB SERPL-MCNC: 0.2 MG/DL (ref 0–1.2)
BUN SERPL-MCNC: 10 MG/DL (ref 6–20)
BUN/CREAT SERPL: 15.9 (ref 7–25)
CALCIUM SPEC-SCNC: 9.6 MG/DL (ref 8.6–10.5)
CHLORIDE SERPL-SCNC: 100 MMOL/L (ref 98–107)
CHOLEST SERPL-MCNC: 158 MG/DL (ref 0–200)
CO2 SERPL-SCNC: 24 MMOL/L (ref 22–29)
CREAT SERPL-MCNC: 0.63 MG/DL (ref 0.57–1)
DEPRECATED RDW RBC AUTO: 41.1 FL (ref 37–54)
EGFRCR SERPLBLD CKD-EPI 2021: 113.7 ML/MIN/1.73
EOSINOPHIL # BLD AUTO: 0.71 10*3/MM3 (ref 0–0.4)
EOSINOPHIL NFR BLD AUTO: 9.7 % (ref 0.3–6.2)
ERYTHROCYTE [DISTWIDTH] IN BLOOD BY AUTOMATED COUNT: 13.2 % (ref 12.3–15.4)
GLOBULIN UR ELPH-MCNC: 2.6 GM/DL
GLUCOSE SERPL-MCNC: 59 MG/DL (ref 65–99)
HCT VFR BLD AUTO: 40.3 % (ref 34–46.6)
HCV AB SER DONR QL: NORMAL
HDLC SERPL-MCNC: 52 MG/DL (ref 40–60)
HGB BLD-MCNC: 13 G/DL (ref 12–15.9)
IMM GRANULOCYTES # BLD AUTO: 0.02 10*3/MM3 (ref 0–0.05)
IMM GRANULOCYTES NFR BLD AUTO: 0.3 % (ref 0–0.5)
LDLC SERPL CALC-MCNC: 88 MG/DL (ref 0–100)
LDLC/HDLC SERPL: 1.67 {RATIO}
LYMPHOCYTES # BLD AUTO: 1.83 10*3/MM3 (ref 0.7–3.1)
LYMPHOCYTES NFR BLD AUTO: 25 % (ref 19.6–45.3)
MCH RBC QN AUTO: 27.8 PG (ref 26.6–33)
MCHC RBC AUTO-ENTMCNC: 32.3 G/DL (ref 31.5–35.7)
MCV RBC AUTO: 86.3 FL (ref 79–97)
MONOCYTES # BLD AUTO: 0.57 10*3/MM3 (ref 0.1–0.9)
MONOCYTES NFR BLD AUTO: 7.8 % (ref 5–12)
NEUTROPHILS NFR BLD AUTO: 4.05 10*3/MM3 (ref 1.7–7)
NEUTROPHILS NFR BLD AUTO: 55.3 % (ref 42.7–76)
NRBC BLD AUTO-RTO: 0 /100 WBC (ref 0–0.2)
PLATELET # BLD AUTO: 376 10*3/MM3 (ref 140–450)
PMV BLD AUTO: 10.8 FL (ref 6–12)
POTASSIUM SERPL-SCNC: 5 MMOL/L (ref 3.5–5.2)
PROT SERPL-MCNC: 6.9 G/DL (ref 6–8.5)
RBC # BLD AUTO: 4.67 10*6/MM3 (ref 3.77–5.28)
SODIUM SERPL-SCNC: 138 MMOL/L (ref 136–145)
TRIGL SERPL-MCNC: 97 MG/DL (ref 0–150)
VLDLC SERPL-MCNC: 18 MG/DL (ref 5–40)
WBC NRBC COR # BLD: 7.32 10*3/MM3 (ref 3.4–10.8)

## 2022-09-23 ENCOUNTER — APPOINTMENT (OUTPATIENT)
Dept: GENERAL RADIOLOGY | Facility: HOSPITAL | Age: 42
End: 2022-09-23

## 2022-09-23 ENCOUNTER — HOSPITAL ENCOUNTER (EMERGENCY)
Facility: HOSPITAL | Age: 42
Discharge: HOME | End: 2022-09-23
Attending: STUDENT IN AN ORGANIZED HEALTH CARE EDUCATION/TRAINING PROGRAM | Admitting: STUDENT IN AN ORGANIZED HEALTH CARE EDUCATION/TRAINING PROGRAM

## 2022-09-23 ENCOUNTER — TELEPHONE (OUTPATIENT)
Dept: FAMILY MEDICINE CLINIC | Facility: CLINIC | Age: 42
End: 2022-09-23

## 2022-09-23 VITALS
HEIGHT: 60 IN | TEMPERATURE: 97.6 F | OXYGEN SATURATION: 96 % | BODY MASS INDEX: 25.52 KG/M2 | RESPIRATION RATE: 16 BRPM | DIASTOLIC BLOOD PRESSURE: 93 MMHG | SYSTOLIC BLOOD PRESSURE: 147 MMHG | HEART RATE: 72 BPM | WEIGHT: 130 LBS

## 2022-09-23 DIAGNOSIS — M25.551 RIGHT HIP PAIN: Primary | ICD-10-CM

## 2022-09-23 PROCEDURE — 99283 EMERGENCY DEPT VISIT LOW MDM: CPT

## 2022-09-23 PROCEDURE — 73552 X-RAY EXAM OF FEMUR 2/>: CPT

## 2022-09-23 PROCEDURE — 73502 X-RAY EXAM HIP UNI 2-3 VIEWS: CPT

## 2022-09-23 RX ORDER — HYDROCODONE BITARTRATE AND ACETAMINOPHEN 5; 325 MG/1; MG/1
1 TABLET ORAL ONCE
Status: COMPLETED | OUTPATIENT
Start: 2022-09-23 | End: 2022-09-23

## 2022-09-23 RX ORDER — HYDROCODONE BITARTRATE AND ACETAMINOPHEN 5; 325 MG/1; MG/1
1 TABLET ORAL EVERY 6 HOURS PRN
Qty: 12 TABLET | Refills: 0 | Status: SHIPPED | OUTPATIENT
Start: 2022-09-23 | End: 2022-12-13

## 2022-09-23 RX ADMIN — HYDROCODONE BITARTRATE AND ACETAMINOPHEN 1 TABLET: 5; 325 TABLET ORAL at 13:07

## 2022-09-23 NOTE — DISCHARGE INSTRUCTIONS
Return back to the ER if your symptoms worsen or change in presentation. Follow up with Dr Stoddard as scheduled next week.

## 2022-09-23 NOTE — ED NOTES
Pt has cerebral palsy and uses electric wheelchair.  Caretaker reports pt has been complaining of pain to her R hip x3-4 days.  Pt and caretaker unsure of how injury occurred.

## 2022-09-23 NOTE — ED PROVIDER NOTES
Subjective   History of Present Illness  Patient presents to the ER with c/o right hip pain that started several days ago. Patient denies known injury. Patient is wheelchair bound due to cerebral palsy and uses a lift at home for transfers. Care taker states pain was worse yesterday. Pain increases to palpation of the region or movement of the leg. No back pain, no abdominal pain. Does not radiate anywhere.         Review of Systems   Musculoskeletal:        Right hip pain       Past Medical History:   Diagnosis Date   • Acute vulvitis     Candida   • Allergy     Unspecified, initial encounter   • Amblyopia     OS   • Astigmatism    • Cerebral palsy (HCC)    • Cheilosis    • Common cold    • Contusion    • Depressive disorder    • Diarrhea    • Encounter for general adult medical examination without abnormal findings    • Encounter for routine adult health examination     Adult health examination      • Fever    • Gastroesophageal reflux disease    • Generalized abdominal pain    • Indeterminate colitis    • Infected insect bite    • Myopia    • Spastic quadriplegic cerebral palsy (HCC)    • Trouble walking     Walking disability      • Urinary incontinence    • Urinary tract infectious disease    • Wears glasses    • Worried well        Allergies   Allergen Reactions   • Atropine Unknown (See Comments)     Unknown reaction per patient   • Ciprofloxacin Other (See Comments)     seizure   • Dairy Aid [Lactase] Other (See Comments)     abd pain   • Sulfa Antibiotics Itching   • Avelox [Moxifloxacin Hcl] Other (See Comments)     Concerned may cause seizure   • Factive [Gemifloxacin] Other (See Comments)     Concerned may cause seizure   • Floxin Otic [Ofloxacin] Other (See Comments)     Concerned may cause seizure   • Levaquin [Levofloxacin] Other (See Comments)     Concerned may cause seizure       Past Surgical History:   Procedure Laterality Date   • COLONOSCOPY N/A 03/28/2012    hemorrhoids   • COLONOSCOPY N/A  "4/23/2018    Procedure: COLONOSCOPY--look TI, bx, hx colitis;  Surgeon: Sreedhar Kirkpatrick MD;  Location: [facility] ENDOSCOPY;  Service: Gastroenterology   • COLONOSCOPY N/A 6/7/2019    Procedure: COLONOSCOPY;  Surgeon: Sreedhar Kirkpatrick MD;  Location: [facility] ENDOSCOPY;  Service: Gastroenterology   • CYSTOSCOPY N/A 4/19/2019    Procedure: CYSTOSCOPY;  Surgeon: Cecilio Acuña MD;  Location: [facility] OR;  Service: Urology   • ENDOSCOPY N/A 03/28/2012    gastritis   • INJECTION OF MEDICATION  02/23/2016    Kenalog   • TOTAL ABDOMINAL HYSTERECTOMY WITH SALPINGO OOPHORECTOMY N/A 03/24/2005   • UPPER GASTROINTESTINAL ENDOSCOPY  03/28/2012       Family History   Problem Relation Age of Onset   • Coronary artery disease Other    • Diabetes Other    • Hypertension Other    • Stroke Other        Social History     Socioeconomic History   • Marital status: Single   Tobacco Use   • Smoking status: Never Smoker   • Smokeless tobacco: Never Used   Substance and Sexual Activity   • Alcohol use: No   • Drug use: No   • Sexual activity: Defer           Objective    /82   Pulse 72   Temp 97.6 °F (36.4 °C) (Oral)   Resp 16   Ht 152.4 cm (60\")   Wt 59 kg (130 lb)   LMP  (LMP Unknown)   SpO2 93%   BMI 25.39 kg/m²     Physical Exam  Constitutional:       General: She is not in acute distress.     Appearance: She is not ill-appearing.   Cardiovascular:      Rate and Rhythm: Normal rate and regular rhythm.      Pulses: Normal pulses.   Pulmonary:      Effort: Pulmonary effort is normal.      Breath sounds: Normal breath sounds.   Abdominal:      General: Bowel sounds are normal.      Palpations: Abdomen is soft.      Tenderness: There is no abdominal tenderness.   Musculoskeletal:         General: Tenderness present. No swelling, deformity or signs of injury.      Comments: ROM at patient's baseline. Tenderness to right hip with palpation, no deformities.    Skin:     General: Skin is warm and dry.      Capillary Refill: Capillary " refill takes less than 2 seconds.   Neurological:      Mental Status: She is alert and oriented to person, place, and time.   Psychiatric:         Mood and Affect: Mood normal.         Behavior: Behavior normal.         Procedures  XR Femur 2 View Right    Result Date: 9/23/2022  Procedure: Right femur four views Reason for exam: Pain. FINDINGS: Bony structures of the right femur are normal. There is no evidence of fracture or dislocation. Soft tissue structures are normal.     Normal right femur. Electronically signed by:  Frankie Gilmore MD  9/23/2022 12:33 PM CDT Workstation: [id number]    XR Hip With or Without Pelvis 2 - 3 View Right    Result Date: 9/23/2022  Procedure: AP pelvis and right hip two views Reason for exam: Pain. FINDINGS: Mechanical pain pump device overlies and obscures the right upper iliac wing with intrathecal catheter extending to the lumbar spinal canal region with this catheter then extending beyond the superior margin of this film. Visualized bony structures of the AP pelvis and right hip are normal. There is no evidence of fracture or dislocation. Soft tissue structures are normal.     1.  Mechanical pain pump device overlies and obscures the right upper iliac wing with intrathecal catheter extending to the lumbar spinal canal region with this catheter then extending beyond the superior margin of this film. 2.  Visualized bony structures of the AP pelvis and right hip are normal. Electronically signed by:  Frankie Gilmore MD  9/23/2022 12:37 PM CDT Workstation: [id number]           ED Course  ED Course as of 09/23/22 1349   Fri Sep 23, 2022   1148 Patient denies desire for pain medication at this time.  [SH]   1343 Patient states pain medication has helped her pain. Offered CT of hip at this time and patient and caregiver states will just follow up with PCP next week as scheduled. Will send home some pain medication to use as needed.   [SH]   [phone] Can report reviewed. [SH]       ED Course User Index  [SH] Roxanna Lo APRN                                           [facility]    Final diagnoses:   Right hip pain       ED Disposition  ED Disposition     ED Disposition   Discharge    Condition   Stable    Comment   --             Greta Stoddard MD  [facility] [address]  [facility] [address] [phone]    Schedule an appointment as soon as possible for a visit   ER follow up         Medication List      New Prescriptions    HYDROcodone-acetaminophen 5-325 MG per tablet  Commonly known as: NORCO  Take 1 tablet by mouth Every 6 (Six) Hours As Needed for Moderate Pain or Severe Pain.           Where to Get Your Medications      These medications were sent to [id number] - [place], [place] - [address] - [phone]  - [phone] [address] [address]    Phone: [phone]   · HYDROcodone-acetaminophen 5-325 MG per tablet          Roxanna Lo APRN  09/23/22 [id number]

## 2022-09-23 NOTE — TELEPHONE ENCOUNTER
I spoke with the coordinator about this and she said she needed a note added to Kiley's chart note stating she uses her wheelchair every day and that it needs repairs.

## 2022-09-23 NOTE — TELEPHONE ENCOUNTER
I explained to the coordinator that Dr Stoddard was out of office but would be back on Monday. She said she would call back Monday to follow up on conversation

## 2022-09-28 ENCOUNTER — OFFICE VISIT (OUTPATIENT)
Dept: FAMILY MEDICINE CLINIC | Facility: CLINIC | Age: 42
End: 2022-09-28

## 2022-09-28 VITALS
RESPIRATION RATE: 18 BRPM | SYSTOLIC BLOOD PRESSURE: 132 MMHG | WEIGHT: 163 LBS | HEART RATE: 89 BPM | OXYGEN SATURATION: 97 % | HEIGHT: 60 IN | BODY MASS INDEX: 32 KG/M2 | DIASTOLIC BLOOD PRESSURE: 78 MMHG

## 2022-09-28 DIAGNOSIS — G80.0 SPASTIC QUADRIPLEGIC CEREBRAL PALSY: Chronic | ICD-10-CM

## 2022-09-28 DIAGNOSIS — Z00.00 ANNUAL PHYSICAL EXAM: Primary | ICD-10-CM

## 2022-09-28 DIAGNOSIS — F32.5 MAJOR DEPRESSIVE DISORDER WITH SINGLE EPISODE, IN FULL REMISSION: Chronic | ICD-10-CM

## 2022-09-28 DIAGNOSIS — Z23 NEED FOR VACCINATION: ICD-10-CM

## 2022-09-28 DIAGNOSIS — G40.A09 ABSENCE SEIZURE: Chronic | ICD-10-CM

## 2022-09-28 PROCEDURE — 90686 IIV4 VACC NO PRSV 0.5 ML IM: CPT | Performed by: GENERAL PRACTICE

## 2022-09-28 PROCEDURE — 99214 OFFICE O/P EST MOD 30 MIN: CPT | Performed by: GENERAL PRACTICE

## 2022-09-28 PROCEDURE — G0008 ADMIN INFLUENZA VIRUS VAC: HCPCS | Performed by: GENERAL PRACTICE

## 2022-09-28 RX ORDER — FLUCONAZOLE 200 MG/1
200 TABLET ORAL DAILY
Qty: 5 TABLET | Refills: 0 | Status: SHIPPED | OUTPATIENT
Start: 2022-09-28 | End: 2022-12-13 | Stop reason: SDUPTHER

## 2022-10-03 ENCOUNTER — TELEPHONE (OUTPATIENT)
Dept: FAMILY MEDICINE CLINIC | Facility: CLINIC | Age: 42
End: 2022-10-03

## 2022-10-03 NOTE — TELEPHONE ENCOUNTER
Someone from [employer] called and said that Kiley needs repairs on her wheelchair. They need an order faxed and they said that it needs to say that she is still using on a daily basis and needs repair.    [facility] in [place]   Fax is [phone].

## 2022-10-07 DIAGNOSIS — F32.5 MAJOR DEPRESSIVE DISORDER WITH SINGLE EPISODE, IN FULL REMISSION: ICD-10-CM

## 2022-10-07 RX ORDER — FLUOXETINE HYDROCHLORIDE 40 MG/1
CAPSULE ORAL
Qty: 31 CAPSULE | Refills: 11 | Status: SHIPPED | OUTPATIENT
Start: 2022-10-07

## 2022-10-07 RX ORDER — METOCLOPRAMIDE 10 MG/1
TABLET ORAL
Qty: 31 TABLET | Refills: 11 | Status: SHIPPED | OUTPATIENT
Start: 2022-10-07

## 2022-11-13 LAB
QT INTERVAL: 316 MS
QTC INTERVAL: 429 MS

## 2022-12-05 DIAGNOSIS — J30.1 SEASONAL ALLERGIC RHINITIS DUE TO POLLEN: ICD-10-CM

## 2022-12-05 RX ORDER — FLUTICASONE PROPIONATE 50 MCG
SPRAY, SUSPENSION (ML) NASAL
Qty: 16 G | Refills: 11 | Status: SHIPPED | OUTPATIENT
Start: 2022-12-05 | End: 2023-02-02

## 2022-12-05 NOTE — TELEPHONE ENCOUNTER
Incoming Refill Request      Medication requested (name and dose):   fluticasone (FLONASE) 50 MCG/ACT nasal spray    Pharmacy where request should be sent:   [facility]    Additional details provided by patient:   none  Best call back number:   [phone]    Does the patient have less than a 3 day supply:  [x] Yes  [] No    Oleg Griffin Rep  12/05/22, 13:36 CST

## 2022-12-07 RX ORDER — DOCUSATE SODIUM 100 MG/1
CAPSULE, LIQUID FILLED ORAL
Qty: 62 CAPSULE | Refills: 11 | Status: SHIPPED | OUTPATIENT
Start: 2022-12-07

## 2022-12-07 RX ORDER — CHOLECALCIFEROL (VITAMIN D3) 25 MCG
TABLET ORAL
Qty: 31 TABLET | Refills: 11 | Status: SHIPPED | OUTPATIENT
Start: 2022-12-07

## 2022-12-13 ENCOUNTER — OFFICE VISIT (OUTPATIENT)
Dept: FAMILY MEDICINE CLINIC | Facility: CLINIC | Age: 42
End: 2022-12-13

## 2022-12-13 VITALS
HEART RATE: 112 BPM | HEIGHT: 60 IN | RESPIRATION RATE: 20 BRPM | BODY MASS INDEX: 31.83 KG/M2 | SYSTOLIC BLOOD PRESSURE: 122 MMHG | DIASTOLIC BLOOD PRESSURE: 80 MMHG | OXYGEN SATURATION: 99 %

## 2022-12-13 DIAGNOSIS — N89.8 VAGINAL ITCHING: ICD-10-CM

## 2022-12-13 DIAGNOSIS — F33.1 MODERATE EPISODE OF RECURRENT MAJOR DEPRESSIVE DISORDER: Primary | ICD-10-CM

## 2022-12-13 PROCEDURE — 99213 OFFICE O/P EST LOW 20 MIN: CPT | Performed by: GENERAL PRACTICE

## 2022-12-13 RX ORDER — FLUCONAZOLE 200 MG/1
200 TABLET ORAL DAILY
Qty: 5 TABLET | Refills: 0 | Status: SHIPPED | OUTPATIENT
Start: 2022-12-13

## 2022-12-13 RX ORDER — DOXYCYCLINE HYCLATE 100 MG/1
CAPSULE ORAL
COMMUNITY
Start: 2022-11-21

## 2022-12-13 RX ORDER — NYSTATIN 100000 U/G
1 CREAM TOPICAL 2 TIMES DAILY
Qty: 30 G | Refills: 2 | Status: SHIPPED | OUTPATIENT
Start: 2022-12-13 | End: 2023-03-01 | Stop reason: SDUPTHER

## 2022-12-13 NOTE — PROGRESS NOTES
Subjective   Kiley Ledesma is a 42 y.o. female.   Chief Complaint   Patient presents with   • Vaginal Itching     History of Present Illness     Has been having some vaginal itching. Also has been scratching right arm. Thinks it is from stress over her mom not getting along with her paternal GM. Has had encopresis also.     The following portions of the patient's history were reviewed and updated as appropriate: allergies, current medications, past social history and problem list.    Outpatient Medications Prior to Visit   Medication Sig Dispense Refill   • acetaminophen (TYLENOL) 325 MG tablet GIVE TWO (2) TABLETS BY MOUTH EVERY 6 HOURS AS NEEDED FOR MILD PAIN. 60 tablet 11   • buPROPion SR (WELLBUTRIN SR) 100 MG 12 hr tablet GIVE ONE TABLET BY MOUTH EVERY MORNING 31 tablet 11   • busPIRone (BUSPAR) 10 MG tablet GIVE ONE TABLET BY MOUTH TWICE DAILY 60 tablet 11   • Cholecalciferol 25 MCG (1000 UT) capsule Take 1 capsule by mouth Daily. 30 capsule 11   • cholecalciferol 25 MCG tablet TAKE 1 TABLET BY MOUTH ONCE DAILY 31 tablet 11   • diphenhydrAMINE-acetaminophen (TYLENOL PM)  MG tablet per tablet Take 1 tablet by mouth At Night As Needed for Sleep.     • diphenhydrAMINE-acetaminophen (TYLENOL PM)  MG tablet per tablet Take 1 tablet by mouth At Night As Needed for Sleep.     • docusate sodium (COLACE) 100 MG capsule GIVE ONE CAPSULE BY MOUTH TWICE DAILY 62 capsule 11   • doxycycline (VIBRAMYCIN) 100 MG capsule      • estradiol (ESTRACE) 0.5 MG tablet GIVE ONE TABLET (0.5MG) BY MOUTH ONCE DAILY **NOTE DOSE/STRENGTH* 30 tablet 11   • FLUoxetine (PROzac) 40 MG capsule GIVE ONE CAPSULE BY MOUTH ONCE DAILY FOR ANXIETY DISORDER 31 capsule 11   • fluticasone (FLONASE) 50 MCG/ACT nasal spray Use 2 sprays in each nostril daily for seasonal allegries 16 g 11   • guaiFENesin (MUCINEX) 600 MG 12 hr tablet Take 600 mg by mouth 2 (Two) Times a Day As Needed for Cough or Congestion.     • levETIRAcetam (KEPPRA)  "500 MG tablet GIVE ONE TABLET BY MOUTH TWICE DAILY 60 tablet 11   • linaclotide (Linzess) 145 MCG capsule capsule Take 1 capsule by mouth Daily. 30 capsule 11   • loratadine (Claritin) 10 MG tablet Take 1 tablet by mouth Daily. 90 tablet 3   • metoclopramide (REGLAN) 10 MG tablet GIVE ONE TABLET BY MOUTH ONCE DAILY FOR GASTROESOPHAGEAL REFLUX 31 tablet 11   • Misc. Devices (WHEELCHAIR) misc As directed     • Misc. Devices (Wrist Brace) misc 1 Device Daily. 1 each 0   • omeprazole (priLOSEC) 20 MG capsule GIVE ONE CAPSULE BY MOUTH ONCE DAILY FOR STOMACH ACID 31 capsule 11   • ondansetron ODT (ZOFRAN-ODT) 8 MG disintegrating tablet Place 8 mg on the tongue Every 8 (Eight) Hours As Needed for Nausea.     • tolterodine LA (DETROL LA) 4 MG 24 hr capsule      • fluconazole (Diflucan) 200 MG tablet Take 1 tablet by mouth Daily. 5 tablet 0   • HYDROcodone-acetaminophen (NORCO) 5-325 MG per tablet Take 1 tablet by mouth Every 6 (Six) Hours As Needed for Moderate Pain or Severe Pain. 12 tablet 0     No facility-administered medications prior to visit.       Review of Systems  I have reviewed 12 systems with patient. Findings were negative except what is noted below and/or in history of present illness.     Objective   Visit Vitals  /80   Pulse 112   Resp 20   Ht 152.4 cm (60\")   LMP  (LMP Unknown)   SpO2 99%   BMI 31.83 kg/m²     Physical Exam  Vitals and nursing note reviewed.   Constitutional:       General: She is not in acute distress.     Appearance: She is well-developed.   HENT:      Head: Normocephalic and atraumatic.      Nose: Nose normal.   Eyes:      General:         Right eye: No discharge.         Left eye: No discharge.      Conjunctiva/sclera: Conjunctivae normal.      Pupils: Pupils are equal, round, and reactive to light.   Neck:      Thyroid: No thyromegaly.      Trachea: No tracheal deviation.   Cardiovascular:      Rate and Rhythm: Normal rate and regular rhythm.      Heart sounds: Normal heart " sounds. No murmur heard.  Pulmonary:      Effort: Pulmonary effort is normal. No respiratory distress.      Breath sounds: Normal breath sounds. No wheezing or rales.   Abdominal:      General: Bowel sounds are normal. There is no distension.      Palpations: Abdomen is soft. There is no mass.      Tenderness: There is no abdominal tenderness.      Hernia: No hernia is present.   Musculoskeletal:         General: No deformity. Normal range of motion.      Comments: Spastic quadriplegia with increased tone in all extremities   Lymphadenopathy:      Cervical: No cervical adenopathy.   Skin:     General: Skin is warm and dry.          Neurological:      Mental Status: She is alert and oriented to person, place, and time.      Motor: Weakness (Of the upper and lower extremity bilateral) and abnormal muscle tone present.      Coordination: Coordination abnormal.      Gait: Gait abnormal.      Deep Tendon Reflexes:      Reflex Scores:       Patellar reflexes are 3+ on the right side and 3+ on the left side.     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         Behavior: Behavior normal.         Thought Content: Thought content normal.         Judgment: Judgment normal.       Notes brought forward are reviewed and updated if indicated.     Assessment & Plan   Problems Addressed this Visit    None  Visit Diagnoses     Moderate episode of recurrent major depressive disorder (HCC)    -  Primary    Vaginal itching        Relevant Medications    fluconazole (Diflucan) 200 MG tablet    nystatin (MYCOSTATIN) 160890 UNIT/GM cream      Diagnoses       Codes Comments    Moderate episode of recurrent major depressive disorder (HCC)    -  Primary ICD-10-CM: F33.1  ICD-9-CM: 296.32     Vaginal itching     ICD-10-CM: N89.8  ICD-9-CM: 698.1          Counseled re reaction to family issues. Will receive counseling from [place]. Will discuss with  her mother to avoid further distress. Recheck if not improving.     New Medications Ordered This Visit   Medications   • fluconazole (Diflucan) 200 MG tablet     Sig: Take 1 tablet by mouth Daily.     Dispense:  5 tablet     Refill:  0   • nystatin (MYCOSTATIN) [id number] UNIT/GM cream     Sig: Apply 1 application topically to the appropriate area as directed 2 (Two) Times a Day.     Dispense:  30 g     Refill:  2     No follow-ups on file.        This document has been electronically signed by Greta Stoddard MD on December 13, 2022 17:38 CST

## 2023-01-06 ENCOUNTER — TELEPHONE (OUTPATIENT)
Dept: FAMILY MEDICINE CLINIC | Facility: CLINIC | Age: 43
End: 2023-01-06
Payer: MEDICARE

## 2023-01-06 NOTE — TELEPHONE ENCOUNTER
Pt needs script for lion chair lift     Goes through [facility] medical     Direct line medical coordinator [phone]

## 2023-01-09 RX ORDER — OMEPRAZOLE 20 MG/1
CAPSULE, DELAYED RELEASE ORAL
Qty: 31 CAPSULE | Refills: 11 | Status: SHIPPED | OUTPATIENT
Start: 2023-01-09

## 2023-02-02 DIAGNOSIS — J30.1 SEASONAL ALLERGIC RHINITIS DUE TO POLLEN: ICD-10-CM

## 2023-02-02 RX ORDER — FLUTICASONE PROPIONATE 50 MCG
SPRAY, SUSPENSION (ML) NASAL
Qty: 16 G | Refills: 11 | Status: SHIPPED | OUTPATIENT
Start: 2023-02-02

## 2023-02-02 NOTE — TELEPHONE ENCOUNTER
Incoming Refill Request      Medication requested (name and dose):  fluticasone (FLONASE) 50 MCG/ACT nasal spray    Pharmacy where request should be sent:   [facility] [address] [address] [phone] [place] [phone]     Additional details provided by patient:asks that we send to [facility]     Best call back number: [phone]    Does the patient have less than a 3 day supply:  [x] Yes  [] No    Oleg Horne Rep  02/02/23, 08:44 CST

## 2023-03-01 DIAGNOSIS — N89.8 VAGINAL ITCHING: ICD-10-CM

## 2023-03-01 NOTE — TELEPHONE ENCOUNTER
Incoming Refill Request  {Tip  DIRECTIONS Type Rx details below. Pend Rx from patient's med list if dosage and other details match request. Jump to Medication Section:23    Medication requested (name and dose): Nystatin, Linzess and Triple Antibiotic     Pharmacy where request should be sent: [facility] [place]    Additional details provided by patient: none    Best call back number: [phone]    Does the patient have less than a 3 day supply:  [] Yes  [x] No    Malika Jimenez  03/01/23, 12:46 CST

## 2023-03-02 RX ORDER — NYSTATIN 100000 U/G
1 CREAM TOPICAL 2 TIMES DAILY
Qty: 30 G | Refills: 2 | Status: SHIPPED | OUTPATIENT
Start: 2023-03-02

## 2023-03-02 RX ORDER — LEVETIRACETAM 500 MG/1
500 TABLET ORAL 2 TIMES DAILY
Qty: 60 TABLET | Refills: 11 | Status: SHIPPED | OUTPATIENT
Start: 2023-03-02 | End: 2023-03-28 | Stop reason: SDUPTHER

## 2023-03-08 ENCOUNTER — HOSPITAL ENCOUNTER (EMERGENCY)
Facility: HOSPITAL | Age: 43
Discharge: HOME | End: 2023-03-08
Attending: EMERGENCY MEDICINE | Admitting: EMERGENCY MEDICINE
Payer: MEDICARE

## 2023-03-08 ENCOUNTER — APPOINTMENT (OUTPATIENT)
Dept: CT IMAGING | Facility: HOSPITAL | Age: 43
End: 2023-03-08
Payer: MEDICARE

## 2023-03-08 VITALS
HEART RATE: 70 BPM | WEIGHT: 163 LBS | HEIGHT: 60 IN | RESPIRATION RATE: 18 BRPM | SYSTOLIC BLOOD PRESSURE: 140 MMHG | OXYGEN SATURATION: 97 % | TEMPERATURE: 98.5 F | DIASTOLIC BLOOD PRESSURE: 81 MMHG | BODY MASS INDEX: 32 KG/M2

## 2023-03-08 DIAGNOSIS — N30.90 CYSTITIS: ICD-10-CM

## 2023-03-08 DIAGNOSIS — R10.30 LOWER ABDOMINAL PAIN: Primary | ICD-10-CM

## 2023-03-08 LAB
ALBUMIN SERPL-MCNC: 4 G/DL (ref 3.5–5.2)
ALBUMIN/GLOB SERPL: 1.4 G/DL
ALP SERPL-CCNC: 103 U/L (ref 39–117)
ALT SERPL W P-5'-P-CCNC: 17 U/L (ref 1–33)
ANION GAP SERPL CALCULATED.3IONS-SCNC: 11 MMOL/L (ref 5–15)
AST SERPL-CCNC: 16 U/L (ref 1–32)
BACTERIA UR QL AUTO: ABNORMAL /HPF
BASOPHILS # BLD AUTO: 0.04 10*3/MM3 (ref 0–0.2)
BASOPHILS NFR BLD AUTO: 0.5 % (ref 0–1.5)
BILIRUB SERPL-MCNC: <0.2 MG/DL (ref 0–1.2)
BILIRUB UR QL STRIP: NEGATIVE
BUN SERPL-MCNC: 20 MG/DL (ref 6–20)
BUN/CREAT SERPL: 36.4 (ref 7–25)
CALCIUM SPEC-SCNC: 9.2 MG/DL (ref 8.6–10.5)
CHLORIDE SERPL-SCNC: 103 MMOL/L (ref 98–107)
CLARITY UR: CLEAR
CO2 SERPL-SCNC: 24 MMOL/L (ref 22–29)
COLOR UR: YELLOW
CREAT SERPL-MCNC: 0.55 MG/DL (ref 0.57–1)
DEPRECATED RDW RBC AUTO: 45 FL (ref 37–54)
EGFRCR SERPLBLD CKD-EPI 2021: 116.8 ML/MIN/1.73
EOSINOPHIL # BLD AUTO: 0.31 10*3/MM3 (ref 0–0.4)
EOSINOPHIL NFR BLD AUTO: 4.2 % (ref 0.3–6.2)
ERYTHROCYTE [DISTWIDTH] IN BLOOD BY AUTOMATED COUNT: 14 % (ref 12.3–15.4)
GLOBULIN UR ELPH-MCNC: 2.8 GM/DL
GLUCOSE SERPL-MCNC: 139 MG/DL (ref 65–99)
GLUCOSE UR STRIP-MCNC: NEGATIVE MG/DL
HCT VFR BLD AUTO: 40.4 % (ref 34–46.6)
HGB BLD-MCNC: 12.5 G/DL (ref 12–15.9)
HGB UR QL STRIP.AUTO: ABNORMAL
HYALINE CASTS UR QL AUTO: ABNORMAL /LPF
IMM GRANULOCYTES # BLD AUTO: 0.02 10*3/MM3 (ref 0–0.05)
IMM GRANULOCYTES NFR BLD AUTO: 0.3 % (ref 0–0.5)
KETONES UR QL STRIP: NEGATIVE
LEUKOCYTE ESTERASE UR QL STRIP.AUTO: ABNORMAL
LIPASE SERPL-CCNC: 47 U/L (ref 13–60)
LYMPHOCYTES # BLD AUTO: 2.26 10*3/MM3 (ref 0.7–3.1)
LYMPHOCYTES NFR BLD AUTO: 30.3 % (ref 19.6–45.3)
MCH RBC QN AUTO: 27.6 PG (ref 26.6–33)
MCHC RBC AUTO-ENTMCNC: 30.9 G/DL (ref 31.5–35.7)
MCV RBC AUTO: 89.2 FL (ref 79–97)
MONOCYTES # BLD AUTO: 0.72 10*3/MM3 (ref 0.1–0.9)
MONOCYTES NFR BLD AUTO: 9.7 % (ref 5–12)
NEUTROPHILS NFR BLD AUTO: 4.1 10*3/MM3 (ref 1.7–7)
NEUTROPHILS NFR BLD AUTO: 55 % (ref 42.7–76)
NITRITE UR QL STRIP: NEGATIVE
NRBC BLD AUTO-RTO: 0 /100 WBC (ref 0–0.2)
PH UR STRIP.AUTO: 5.5 [PH] (ref 5–9)
PLATELET # BLD AUTO: 256 10*3/MM3 (ref 140–450)
PMV BLD AUTO: 9.5 FL (ref 6–12)
POTASSIUM SERPL-SCNC: 4.5 MMOL/L (ref 3.5–5.2)
PROT SERPL-MCNC: 6.8 G/DL (ref 6–8.5)
PROT UR QL STRIP: NEGATIVE
RBC # BLD AUTO: 4.53 10*6/MM3 (ref 3.77–5.28)
RBC # UR STRIP: ABNORMAL /HPF
REF LAB TEST METHOD: ABNORMAL
SODIUM SERPL-SCNC: 138 MMOL/L (ref 136–145)
SP GR UR STRIP: 1.01 (ref 1–1.03)
SQUAMOUS #/AREA URNS HPF: ABNORMAL /HPF
UROBILINOGEN UR QL STRIP: ABNORMAL
WBC # UR STRIP: ABNORMAL /HPF
WBC NRBC COR # BLD: 7.45 10*3/MM3 (ref 3.4–10.8)

## 2023-03-08 PROCEDURE — 96360 HYDRATION IV INFUSION INIT: CPT

## 2023-03-08 PROCEDURE — 96361 HYDRATE IV INFUSION ADD-ON: CPT

## 2023-03-08 PROCEDURE — 81001 URINALYSIS AUTO W/SCOPE: CPT | Performed by: EMERGENCY MEDICINE

## 2023-03-08 PROCEDURE — 51798 US URINE CAPACITY MEASURE: CPT

## 2023-03-08 PROCEDURE — 99283 EMERGENCY DEPT VISIT LOW MDM: CPT

## 2023-03-08 PROCEDURE — 80053 COMPREHEN METABOLIC PANEL: CPT | Performed by: EMERGENCY MEDICINE

## 2023-03-08 PROCEDURE — 83690 ASSAY OF LIPASE: CPT | Performed by: EMERGENCY MEDICINE

## 2023-03-08 PROCEDURE — 74176 CT ABD & PELVIS W/O CONTRAST: CPT

## 2023-03-08 PROCEDURE — 85025 COMPLETE CBC W/AUTO DIFF WBC: CPT | Performed by: EMERGENCY MEDICINE

## 2023-03-08 RX ORDER — CHOLECALCIFEROL (VITAMIN D3) 125 MCG
3000 CAPSULE ORAL
COMMUNITY
End: 2023-03-28 | Stop reason: SDUPTHER

## 2023-03-08 RX ORDER — ARIPIPRAZOLE 5 MG/1
TABLET ORAL
COMMUNITY
Start: 2023-01-13

## 2023-03-08 RX ORDER — SODIUM CHLORIDE, SODIUM LACTATE, POTASSIUM CHLORIDE, CALCIUM CHLORIDE 600; 310; 30; 20 MG/100ML; MG/100ML; MG/100ML; MG/100ML
125 INJECTION, SOLUTION INTRAVENOUS CONTINUOUS
Status: DISCONTINUED | OUTPATIENT
Start: 2023-03-08 | End: 2023-03-09 | Stop reason: HOSPADM

## 2023-03-08 RX ORDER — PHENAZOPYRIDINE HYDROCHLORIDE 100 MG/1
100 TABLET, FILM COATED ORAL 3 TIMES DAILY PRN
Qty: 6 TABLET | Refills: 0 | Status: SHIPPED | OUTPATIENT
Start: 2023-03-08

## 2023-03-08 RX ORDER — NITROFURANTOIN 25; 75 MG/1; MG/1
100 CAPSULE ORAL 2 TIMES DAILY
Qty: 10 CAPSULE | Refills: 0 | Status: SHIPPED | OUTPATIENT
Start: 2023-03-08 | End: 2023-03-13

## 2023-03-08 RX ORDER — SODIUM CHLORIDE 0.9 % (FLUSH) 0.9 %
10 SYRINGE (ML) INJECTION AS NEEDED
Status: DISCONTINUED | OUTPATIENT
Start: 2023-03-08 | End: 2023-03-09 | Stop reason: HOSPADM

## 2023-03-08 RX ADMIN — SODIUM CHLORIDE, POTASSIUM CHLORIDE, SODIUM LACTATE AND CALCIUM CHLORIDE 125 ML/HR: 600; 310; 30; 20 INJECTION, SOLUTION INTRAVENOUS at 20:49

## 2023-03-09 NOTE — ED PROVIDER NOTES
Subjective   History of Present Illness  43yo female pmh significant mdd/cerebral palsy/hysterectomy presents ED c/o 2d hx diffuse lower abdominal pain, unable to characterize/radiating thru to back/associated urinary urgency, dysuria.  ROS neg fever/chills/n/v/d/diarrhea.    History provided by:  Patient  Abdominal Pain  Pain location:  Suprapubic  Associated symptoms: dysuria        Review of Systems   Constitutional: Negative.    HENT: Negative.    Eyes: Negative for redness.   Respiratory: Negative.    Cardiovascular: Negative.    Gastrointestinal: Positive for abdominal pain.   Genitourinary: Positive for dysuria and urgency.   Musculoskeletal: Positive for back pain.   Skin: Negative.    Allergic/Immunologic: Negative for immunocompromised state.   All other systems reviewed and are negative.      Past Medical History:   Diagnosis Date   • Acute vulvitis     Candida   • Allergy     Unspecified, initial encounter   • Amblyopia     OS   • Astigmatism    • Cerebral palsy (HCC)    • Cheilosis    • Common cold    • Contusion    • Depressive disorder    • Diarrhea    • Encounter for general adult medical examination without abnormal findings    • Encounter for routine adult health examination     Adult health examination      • Fever    • Gastroesophageal reflux disease    • Generalized abdominal pain    • Indeterminate colitis    • Infected insect bite    • Myopia    • Spastic quadriplegic cerebral palsy (HCC)    • Trouble walking     Walking disability      • Urinary incontinence    • Urinary tract infectious disease    • Wears glasses    • Worried well        Allergies   Allergen Reactions   • Atropine Unknown (See Comments)     Unknown reaction per patient   • Ciprofloxacin Other (See Comments)     seizure   • Dairy Aid [Tilactase] Other (See Comments)     abd pain   • Sulfa Antibiotics Itching   • Avelox [Moxifloxacin Hcl] Other (See Comments)     Concerned may cause seizure   • Factive [Gemifloxacin] Other  (See Comments)     Concerned may cause seizure   • Floxin Otic [Ofloxacin] Other (See Comments)     Concerned may cause seizure   • Levaquin [Levofloxacin] Other (See Comments)     Concerned may cause seizure       Past Surgical History:   Procedure Laterality Date   • COLONOSCOPY N/A 03/28/2012    hemorrhoids   • COLONOSCOPY N/A 4/23/2018    Procedure: COLONOSCOPY--look TI, bx, hx colitis;  Surgeon: Sreedhar Kirkpatrick MD;  Location: [facility] ENDOSCOPY;  Service: Gastroenterology   • COLONOSCOPY N/A 6/7/2019    Procedure: COLONOSCOPY;  Surgeon: Sreedhar Kirkpatrick MD;  Location: [facility] ENDOSCOPY;  Service: Gastroenterology   • CYSTOSCOPY N/A 4/19/2019    Procedure: CYSTOSCOPY;  Surgeon: Cecilio Acuña MD;  Location: [facility] OR;  Service: Urology   • ENDOSCOPY N/A 03/28/2012    gastritis   • INJECTION OF MEDICATION  02/23/2016    Kenalog   • TOTAL ABDOMINAL HYSTERECTOMY WITH SALPINGO OOPHORECTOMY N/A 03/24/2005   • UPPER GASTROINTESTINAL ENDOSCOPY  03/28/2012       Family History   Problem Relation Age of Onset   • Coronary artery disease Other    • Diabetes Other    • Hypertension Other    • Stroke Other        Social History     Socioeconomic History   • Marital status: Single   Tobacco Use   • Smoking status: Never   • Smokeless tobacco: Never   Substance and Sexual Activity   • Alcohol use: No   • Drug use: No   • Sexual activity: Defer           Objective   Physical Exam  Vitals and nursing note reviewed.   Constitutional:       Appearance: Normal appearance.   HENT:      Head: Normocephalic and atraumatic.      Right Ear: External ear normal.      Left Ear: External ear normal.      Mouth/Throat:      Mouth: Mucous membranes are moist.   Eyes:      Conjunctiva/sclera: Conjunctivae normal.      Pupils: Pupils are equal, round, and reactive to light.   Cardiovascular:      Rate and Rhythm: Normal rate and regular rhythm.      Pulses: Normal pulses.      Heart sounds: Normal heart sounds. No murmur heard.    No friction  rub. No gallop.   Pulmonary:      Effort: Pulmonary effort is normal.      Breath sounds: Normal breath sounds. No wheezing, rhonchi or rales.   Abdominal:      General: Abdomen is flat.      Palpations: Abdomen is soft.      Tenderness: There is abdominal tenderness in the right lower quadrant, suprapubic area and left lower quadrant. There is no guarding or rebound. Negative signs include Perkins's sign and McBurney's sign.       Musculoskeletal:      Cervical back: Normal range of motion and neck supple. No rigidity.      Right lower leg: No edema.      Left lower leg: No edema.   Lymphadenopathy:      Cervical: No cervical adenopathy.   Skin:     General: Skin is warm and dry.   Neurological:      Mental Status: She is alert and oriented to person, place, and time. Mental status is at baseline.         Procedures           ED Course      Labs Reviewed   COMPREHENSIVE METABOLIC PANEL - Abnormal; Notable for the following components:       Result Value    Glucose 139 (*)     Creatinine 0.55 (*)     BUN/Creatinine Ratio 36.4 (*)     All other components within normal limits    Narrative:     GFR Normal >60  Chronic Kidney Disease <60  Kidney Failure <15     URINALYSIS W/ MICROSCOPIC IF INDICATED (NO CULTURE) - Abnormal; Notable for the following components:    Blood, UA Moderate (2+) (*)     Leuk Esterase, UA Trace (*)     All other components within normal limits   CBC WITH AUTO DIFFERENTIAL - Abnormal; Notable for the following components:    MCHC 30.9 (*)     All other components within normal limits   URINALYSIS, MICROSCOPIC ONLY - Abnormal; Notable for the following components:    RBC, UA 6-12 (*)     All other components within normal limits   LIPASE - Normal   RAINBOW DRAW    Narrative:     The following orders were created for panel order [place] Draw.  Procedure                               Abnormality         Status                     ---------                               -----------         ------                      [facility] - [facility][[id number]]                                                              Light Blue Top[[id number]]                                                                Please view results for these tests on the individual orders.   CBC AND DIFFERENTIAL    Narrative:     The following orders were created for panel order CBC & Differential.  Procedure                               Abnormality         Status                     ---------                               -----------         ------                     CBC Auto Differential[[id number]]        Abnormal            Final result                 Please view results for these tests on the individual orders.   [facility] - [facility]   LIGHT BLUE TOP     CT Abdomen Pelvis Without Contrast    Result Date: 3/8/2023  Narrative: EXAM: CT ABDOMEN PELVIS WITHOUT IV CONTRAST ORDERING PROVIDER: CHIN MENA CLINICAL HISTORY: Abdominal pain, acute (Ped 0-17y) COMPARISON: 9/1/2022 TECHNIQUE: CT abdomen and pelvis performed without IV or oral contrast, reformatted in the sagittal and coronal planes. This examination was performed according to our departmental dose optimization program which includes automated exposure control, adjustment of the MA and kV according to patient size, and/or use of iterative reconstruction technique. FINDINGS: BASILAR CHEST: No consolidation, nodule or effusion. LIVER: No mass, enlargement or abnormal density. BILIARY TRACT: Unremarkable gallbladder. SPLEEN: No mass or enlargement. PANCREAS: No mass or inflammatory process. Normal pancreatic duct ADRENAL GLANDS: Unremarkable. No mass. URINARY SYSTEM: Kidneys are within normal in size. No hydronephrosis, or mass. Normal ureters.  Bladder is normal without mass or stone. Bilateral scattered nonobstructing nephrolithiasis with largest focus on the right measuring up to 5.2 mm. GI TRACT: No mass, dilation, or wall thickening.  No diverticula.  No hernia.  Appendix is normal.   REPRODUCTIVE SYSTEM: Unremarkable PERITONEAL SPACE:No free air, free fluid, mass or adenopathy. RETROPERITONEAL SPACE:  No adenopathy, mass, aneurysm or significant vascular abnormality. BONES AND EXTRA-ABDOMINAL SOFT TISSUES: Unremarkable.  No inguinal adenopathy or hernia. A spinal stimulator is in place within subcutaneous layer of the abdominal wall in the right lower quadrant anteriorly.     Impression: Bilateral scattered nonobstructing nephrolithiasis with largest focus on the right measuring up to 5.2 mm. A spinal stimulator is in place within subcutaneous layer of the abdominal wall in the right lower quadrant anteriorly. Appendix is normal. No evidence of obstructive uropathy on either side. No evidence of bowel obstruction or perienteric inflammation. Electronically signed by:  Cong Ga MD  3/8/2023 9:46 PM CST Workstation: [id number]                                         Medical Decision Making  Labs/radiographic studies reviewed. CT abd/pelvis: negative acute abnormality/appendix normal.  CBC/CMP/lipase: normal.  UA: microscopic hematuria.  No evidence bowel obstruction/peritonitis.  Will treat empirically for UTI/cystitis with macrobid 100mg bid x5d/pyridium 100mg tid prn.  pmd followup.  DC precautions provided.    Cystitis: acute illness or injury  Lower abdominal pain: acute illness or injury  Amount and/or Complexity of Data Reviewed  Labs: ordered.  Radiology: ordered.      Risk  Prescription drug management.          Final diagnoses:   Lower abdominal pain   Cystitis       ED Disposition  ED Disposition     ED Disposition   Discharge    Condition   Good    Comment   --             Greta Stoddard MD  [address]  [address]  [facility] [id number] [phone]    In 1 day           Medication List      New Prescriptions    nitrofurantoin (macrocrystal-monohydrate) 100 MG capsule  Commonly known as: MACROBID  Take 1 capsule by mouth 2 (Two) Times a Day for 5 days.      phenazopyridine 100 MG tablet  Commonly known as: PYRIDIUM  Take 1 tablet by mouth 3 (Three) Times a Day As Needed for Bladder Spasms.           Where to Get Your Medications      These medications were sent to [id number] - [place], [place] - [address] [place] [address] AT [facility] [address] - [phone] PH - [phone] [address] [address]    Phone: [phone]   · nitrofurantoin (macrocrystal-monohydrate) 100 MG capsule  · phenazopyridine 100 MG tablet          Vidal Neely MD  03/08/23 3580

## 2023-03-28 DIAGNOSIS — J30.1 SEASONAL ALLERGIC RHINITIS DUE TO POLLEN: Chronic | ICD-10-CM

## 2023-03-28 RX ORDER — CHOLECALCIFEROL (VITAMIN D3) 125 MCG
3000 CAPSULE ORAL
Qty: 90 TABLET | Refills: 1 | Status: SHIPPED | OUTPATIENT
Start: 2023-03-28

## 2023-03-28 RX ORDER — LEVETIRACETAM 500 MG/1
500 TABLET ORAL 2 TIMES DAILY
Qty: 60 TABLET | Refills: 11 | Status: SHIPPED | OUTPATIENT
Start: 2023-03-28

## 2023-03-28 RX ORDER — LORATADINE 10 MG/1
10 TABLET ORAL DAILY
Qty: 90 TABLET | Refills: 3 | Status: SHIPPED | OUTPATIENT
Start: 2023-03-28

## 2023-03-28 NOTE — TELEPHONE ENCOUNTER
Incoming Refill Request      Medication requested (name and dose): lactase (LACTAID) 3000 units tablet  levETIRAcetam (KEPPRA) 500 MG tablet  loratadine (Claritin) 10 MG tablet    Pharmacy where request should be sent: [facility] in [place], [place]    Additional details provided by patient:     Best call back number: [phone]    Does the patient have less than a 3 day supply:  [x] Yes  [] No    Oleg Sherman Rep  03/28/23, 10:19 CDT

## 2023-04-10 ENCOUNTER — APPOINTMENT (OUTPATIENT)
Dept: CT IMAGING | Facility: HOSPITAL | Age: 43
End: 2023-04-10
Payer: MEDICARE

## 2023-04-10 ENCOUNTER — HOSPITAL ENCOUNTER (EMERGENCY)
Facility: HOSPITAL | Age: 43
Discharge: HOME | End: 2023-04-10
Attending: FAMILY MEDICINE | Admitting: FAMILY MEDICINE
Payer: MEDICARE

## 2023-04-10 ENCOUNTER — APPOINTMENT (OUTPATIENT)
Dept: GENERAL RADIOLOGY | Facility: HOSPITAL | Age: 43
End: 2023-04-10
Payer: MEDICARE

## 2023-04-10 VITALS
DIASTOLIC BLOOD PRESSURE: 60 MMHG | RESPIRATION RATE: 16 BRPM | SYSTOLIC BLOOD PRESSURE: 115 MMHG | HEART RATE: 73 BPM | OXYGEN SATURATION: 96 % | TEMPERATURE: 98.4 F

## 2023-04-10 DIAGNOSIS — N39.0 URINARY TRACT INFECTION WITH HEMATURIA, SITE UNSPECIFIED: ICD-10-CM

## 2023-04-10 DIAGNOSIS — R56.9 SEIZURE: Primary | ICD-10-CM

## 2023-04-10 DIAGNOSIS — R31.9 URINARY TRACT INFECTION WITH HEMATURIA, SITE UNSPECIFIED: ICD-10-CM

## 2023-04-10 LAB
ALBUMIN SERPL-MCNC: 3.9 G/DL (ref 3.5–5.2)
ALBUMIN/GLOB SERPL: 1.4 G/DL
ALP SERPL-CCNC: 113 U/L (ref 39–117)
ALT SERPL W P-5'-P-CCNC: 12 U/L (ref 1–33)
ANION GAP SERPL CALCULATED.3IONS-SCNC: 12 MMOL/L (ref 5–15)
AST SERPL-CCNC: 13 U/L (ref 1–32)
BACTERIA UR QL AUTO: ABNORMAL /HPF
BILIRUB SERPL-MCNC: 0.2 MG/DL (ref 0–1.2)
BILIRUB UR QL STRIP: NEGATIVE
BUN SERPL-MCNC: 16 MG/DL (ref 6–20)
BUN/CREAT SERPL: 28.1 (ref 7–25)
CALCIUM SPEC-SCNC: 9.5 MG/DL (ref 8.6–10.5)
CHLORIDE SERPL-SCNC: 101 MMOL/L (ref 98–107)
CK SERPL-CCNC: 44 U/L (ref 20–180)
CLARITY UR: CLEAR
CO2 SERPL-SCNC: 25 MMOL/L (ref 22–29)
COLOR UR: YELLOW
CREAT SERPL-MCNC: 0.57 MG/DL (ref 0.57–1)
EGFRCR SERPLBLD CKD-EPI 2021: 115.8 ML/MIN/1.73
GLOBULIN UR ELPH-MCNC: 2.8 GM/DL
GLUCOSE SERPL-MCNC: 133 MG/DL (ref 65–99)
GLUCOSE UR STRIP-MCNC: NEGATIVE MG/DL
HGB UR QL STRIP.AUTO: ABNORMAL
HYALINE CASTS UR QL AUTO: ABNORMAL /LPF
INR PPP: 0.92 (ref 0.8–1.2)
KETONES UR QL STRIP: NEGATIVE
LEUKOCYTE ESTERASE UR QL STRIP.AUTO: ABNORMAL
MAGNESIUM SERPL-MCNC: 2.2 MG/DL (ref 1.6–2.6)
NITRITE UR QL STRIP: NEGATIVE
NT-PROBNP SERPL-MCNC: <36 PG/ML (ref 0–450)
PH UR STRIP.AUTO: 6 [PH] (ref 5–9)
POTASSIUM SERPL-SCNC: 4.4 MMOL/L (ref 3.5–5.2)
PROT SERPL-MCNC: 6.7 G/DL (ref 6–8.5)
PROT UR QL STRIP: NEGATIVE
PROTHROMBIN TIME: 12.2 SECONDS (ref 11.1–15.3)
QT INTERVAL: 382 MS
QTC INTERVAL: 432 MS
RBC # UR STRIP: ABNORMAL /HPF
REF LAB TEST METHOD: ABNORMAL
SODIUM SERPL-SCNC: 138 MMOL/L (ref 136–145)
SP GR UR STRIP: 1.02 (ref 1–1.03)
SQUAMOUS #/AREA URNS HPF: ABNORMAL /HPF
TROPONIN T SERPL HS-MCNC: 9 NG/L
UROBILINOGEN UR QL STRIP: ABNORMAL
WBC # UR STRIP: ABNORMAL /HPF

## 2023-04-10 PROCEDURE — 70450 CT HEAD/BRAIN W/O DYE: CPT

## 2023-04-10 PROCEDURE — 99284 EMERGENCY DEPT VISIT MOD MDM: CPT

## 2023-04-10 PROCEDURE — 85025 COMPLETE CBC W/AUTO DIFF WBC: CPT | Performed by: FAMILY MEDICINE

## 2023-04-10 PROCEDURE — 81001 URINALYSIS AUTO W/SCOPE: CPT | Performed by: FAMILY MEDICINE

## 2023-04-10 PROCEDURE — 25510000001 IOPAMIDOL PER 1 ML: Performed by: FAMILY MEDICINE

## 2023-04-10 PROCEDURE — 82550 ASSAY OF CK (CPK): CPT | Performed by: FAMILY MEDICINE

## 2023-04-10 PROCEDURE — 85610 PROTHROMBIN TIME: CPT | Performed by: FAMILY MEDICINE

## 2023-04-10 PROCEDURE — 80053 COMPREHEN METABOLIC PANEL: CPT | Performed by: FAMILY MEDICINE

## 2023-04-10 PROCEDURE — 84484 ASSAY OF TROPONIN QUANT: CPT | Performed by: FAMILY MEDICINE

## 2023-04-10 PROCEDURE — 70496 CT ANGIOGRAPHY HEAD: CPT

## 2023-04-10 PROCEDURE — 93005 ELECTROCARDIOGRAM TRACING: CPT | Performed by: FAMILY MEDICINE

## 2023-04-10 PROCEDURE — 36415 COLL VENOUS BLD VENIPUNCTURE: CPT

## 2023-04-10 PROCEDURE — 70498 CT ANGIOGRAPHY NECK: CPT

## 2023-04-10 PROCEDURE — 83880 ASSAY OF NATRIURETIC PEPTIDE: CPT | Performed by: FAMILY MEDICINE

## 2023-04-10 PROCEDURE — 83735 ASSAY OF MAGNESIUM: CPT | Performed by: FAMILY MEDICINE

## 2023-04-10 PROCEDURE — 71045 X-RAY EXAM CHEST 1 VIEW: CPT

## 2023-04-10 RX ORDER — CEPHALEXIN 500 MG/1
500 CAPSULE ORAL ONCE
Status: COMPLETED | OUTPATIENT
Start: 2023-04-10 | End: 2023-04-10

## 2023-04-10 RX ORDER — CEPHALEXIN 500 MG/1
500 CAPSULE ORAL 3 TIMES DAILY
Qty: 21 CAPSULE | Refills: 0 | Status: SHIPPED | OUTPATIENT
Start: 2023-04-10

## 2023-04-10 RX ADMIN — IOPAMIDOL 90 ML: 755 INJECTION, SOLUTION INTRAVENOUS at 18:48

## 2023-04-10 RX ADMIN — CEPHALEXIN 500 MG: 500 CAPSULE ORAL at 20:39

## 2023-04-10 NOTE — ED NOTES
Patient reports having absence seizures; family reports to EMS that patient experienced sudden onset facial droop and speech difficulties; last known well approximately 1700

## 2023-04-11 LAB
BASOPHILS # BLD AUTO: NORMAL 10*3/UL
BASOPHILS NFR BLD AUTO: NORMAL %
EOSINOPHIL # BLD AUTO: NORMAL 10*3/UL
EOSINOPHIL NFR BLD AUTO: NORMAL %
ERYTHROCYTE [DISTWIDTH] IN BLOOD BY AUTOMATED COUNT: NORMAL %
HCT VFR BLD AUTO: NORMAL %
HGB BLD-MCNC: NORMAL G/DL
LYMPHOCYTES # BLD AUTO: NORMAL 10*3/UL
LYMPHOCYTES NFR BLD AUTO: NORMAL %
MCH RBC QN AUTO: NORMAL PG
MCHC RBC AUTO-ENTMCNC: NORMAL G/DL
MCV RBC AUTO: NORMAL FL
MONOCYTES # BLD AUTO: NORMAL 10*3/UL
MONOCYTES NFR BLD AUTO: NORMAL %
NEUTROPHILS NFR BLD AUTO: NORMAL %
NEUTROPHILS NFR BLD AUTO: NORMAL %
PLATELET # BLD AUTO: NORMAL 10*3/UL
RBC # BLD AUTO: NORMAL 10*6/UL
WBC NRBC COR # BLD: NORMAL 10*3/UL

## 2023-04-11 NOTE — ED PROVIDER NOTES
Subjective   History of Present Illness  Patient presents to the emergency department for seizure evaluation.  According to the patient's caregiver, she had an absence seizure at home today with right-sided facial droop and slurred speech that lasted roughly 3 minutes right after the patient's seizure and resolved spontaneously.  Patient also had an additional seizure 2 days ago.  She is currently at her baseline self with no new deficits.      Seizures  Seizure type:  Petit mal  Initial focality:  None  Episode characteristics: confusion    Return to baseline: yes    Severity:  Mild  Duration:  3 minutes  Timing:  Once  Number of seizures this episode:  1  Recent head injury:  No recent head injuries  PTA treatment:  None  History of seizures: yes        Review of Systems   Constitutional: Negative for appetite change, chills and diaphoresis.   HENT: Negative for ear discharge, nosebleeds, sinus pressure and trouble swallowing.    Eyes: Negative for discharge and redness.   Respiratory: Negative for apnea and chest tightness.    Endocrine: Negative for polyuria.   Genitourinary: Negative for dysuria, frequency and urgency.   Musculoskeletal: Negative for neck pain.   Skin: Negative for color change.   Allergic/Immunologic: Negative for immunocompromised state.   Neurological: Positive for seizures. Negative for dizziness, syncope, weakness and light-headedness.   Hematological: Negative for adenopathy. Does not bruise/bleed easily.   Psychiatric/Behavioral: Negative for behavioral problems and confusion.   All other systems reviewed and are negative.      Past Medical History:   Diagnosis Date   • Acute vulvitis     Candida   • Allergy     Unspecified, initial encounter   • Amblyopia     OS   • Astigmatism    • Cerebral palsy    • Cheilosis    • Common cold    • Contusion    • Depressive disorder    • Diarrhea    • Encounter for general adult medical examination without abnormal findings    • Encounter for routine  adult health examination     Adult health examination      • Fever    • Gastroesophageal reflux disease    • Generalized abdominal pain    • Indeterminate colitis    • Infected insect bite    • Myopia    • Spastic quadriplegic cerebral palsy    • Trouble walking     Walking disability      • Urinary incontinence    • Urinary tract infectious disease    • Wears glasses    • Worried well        Allergies   Allergen Reactions   • Atropine Unknown (See Comments)     Unknown reaction per patient   • Ciprofloxacin Other (See Comments)     seizure   • Dairy Aid [Tilactase] Other (See Comments)     abd pain   • Sulfa Antibiotics Itching   • Avelox [Moxifloxacin Hcl] Other (See Comments)     Concerned may cause seizure   • Factive [Gemifloxacin] Other (See Comments)     Concerned may cause seizure   • Floxin Otic [Ofloxacin] Other (See Comments)     Concerned may cause seizure   • Levaquin [Levofloxacin] Other (See Comments)     Concerned may cause seizure       Past Surgical History:   Procedure Laterality Date   • COLONOSCOPY N/A 03/28/2012    hemorrhoids   • COLONOSCOPY N/A 4/23/2018    Procedure: COLONOSCOPY--look TI, bx, hx colitis;  Surgeon: Sreedhar Kirkpatrick MD;  Location: [facility] ENDOSCOPY;  Service: Gastroenterology   • COLONOSCOPY N/A 6/7/2019    Procedure: COLONOSCOPY;  Surgeon: Sreedhar Kirkpatrick MD;  Location: [facility] ENDOSCOPY;  Service: Gastroenterology   • CYSTOSCOPY N/A 4/19/2019    Procedure: CYSTOSCOPY;  Surgeon: Cecilio Acuña MD;  Location: [facility] OR;  Service: Urology   • ENDOSCOPY N/A 03/28/2012    gastritis   • INJECTION OF MEDICATION  02/23/2016    Kenalog   • TOTAL ABDOMINAL HYSTERECTOMY WITH SALPINGO OOPHORECTOMY N/A 03/24/2005   • UPPER GASTROINTESTINAL ENDOSCOPY  03/28/2012       Family History   Problem Relation Age of Onset   • Coronary artery disease Other    • Diabetes Other    • Hypertension Other    • Stroke Other        Social History     Socioeconomic History   • Marital status: Single    Tobacco Use   • Smoking status: Never   • Smokeless tobacco: Never   Substance and Sexual Activity   • Alcohol use: No   • Drug use: No   • Sexual activity: Defer           Objective   Physical Exam  Vitals and nursing note reviewed.   Constitutional:       Appearance: She is well-developed.   HENT:      Head: Normocephalic and atraumatic.      Nose: Nose normal.   Eyes:      General: No scleral icterus.        Right eye: No discharge.         Left eye: No discharge.      Conjunctiva/sclera: Conjunctivae normal.      Pupils: Pupils are equal, round, and reactive to light.   Neck:      Trachea: No tracheal deviation.   Cardiovascular:      Rate and Rhythm: Normal rate and regular rhythm.      Heart sounds: Normal heart sounds. No murmur heard.  Pulmonary:      Effort: Pulmonary effort is normal. No respiratory distress.      Breath sounds: Normal breath sounds. No stridor. No wheezing or rales.   Abdominal:      General: Bowel sounds are normal. There is no distension.      Palpations: Abdomen is soft. There is no mass.      Tenderness: There is no abdominal tenderness. There is no guarding or rebound.   Musculoskeletal:      Cervical back: Normal range of motion and neck supple.   Skin:     General: Skin is warm and dry.      Findings: No erythema or rash.   Neurological:      Mental Status: She is alert and oriented to person, place, and time.      Coordination: Coordination normal.   Psychiatric:         Behavior: Behavior normal.         Thought Content: Thought content normal.         ECG 12 Lead      Date/Time: 4/10/2023 8:21 PM  Performed by: Valentino Sepulveda MD  Authorized by: Valentino Sepulveda MD   Interpreted by physician  Rhythm: sinus rhythm  BPM: 77  ST Segments: ST segments normal                   ED Course  ED Course as of 04/10/23 2038   Mon Apr 10, 2023   2035 Findings were discussed with on-call neurologist, Dr. Yu, who states patient does not require additional work-up and may be  discharged home with neurology follow up and medication management.  [CB]      ED Course User Index  [CB] Valentino Sepulveda MD              Labs Reviewed   COMPREHENSIVE METABOLIC PANEL - Abnormal; Notable for the following components:       Result Value    Glucose 133 (*)     BUN/Creatinine Ratio 28.1 (*)     All other components within normal limits    Narrative:     GFR Normal >60  Chronic Kidney Disease <60  Kidney Failure <15     URINALYSIS W/ CULTURE IF INDICATED - Abnormal; Notable for the following components:    Blood, UA Trace (*)     Leuk Esterase, UA Moderate (2+) (*)     All other components within normal limits    Narrative:     In absence of clinical symptoms, the presence of pyuria, bacteria, and/or nitrites on the urinalysis result does not correlate with infection.   URINALYSIS, MICROSCOPIC ONLY - Abnormal; Notable for the following components:    RBC, UA 3-5 (*)     Bacteria, UA 1+ (*)     Squamous Epithelial Cells, UA 6-12 (*)     All other components within normal limits   PROTIME-INR - Normal    Narrative:     Therapeutic range for most indications is 2.0-3.0 INR,  or 2.5-3.5 for mechanical heart valves.   BNP (IN-HOUSE) - Normal    Narrative:     Among patients with dyspnea, NT-proBNP is highly sensitive for the detection of acute congestive heart failure. In addition NT-proBNP of <300 pg/ml effectively rules out acute congestive heart failure with 99% negative predictive value.     TROPONIN - Normal    Narrative:     High Sensitive Troponin T Reference Range:  <10.0 ng/L- Negative Female for AMI  <15.0 ng/L- Negative Male for AMI  >=10 - Abnormal Female indicating possible myocardial injury.  >=15 - Abnormal Male indicating possible myocardial injury.   Clinicians would have to utilize clinical acumen, EKG, Troponin, and serial changes to determine if it is an Acute Myocardial Infarction or myocardial injury due to an underlying chronic condition.        CK - Normal   MAGNESIUM - Normal    CBC WITH AUTO DIFFERENTIAL   HIGH SENSITIVITIY TROPONIN T 2HR   CBC AND DIFFERENTIAL    Narrative:     The following orders were created for panel order CBC & Differential.  Procedure                               Abnormality         Status                     ---------                               -----------         ------                     CBC Auto Differential[[id number]]                            In process                   Please view results for these tests on the individual orders.       CT Angiogram Head w AI Analysis of LVO   Preliminary Result   No occlusion or flow-limiting stenosis.      Nodular left thyroid.  This could be further evaluated with an outpatient   ultrasound.            CT Angiogram Neck   Preliminary Result   No occlusion or flow-limiting stenosis.      Nodular left thyroid.  This could be further evaluated with an outpatient   ultrasound.            CT Head Without Contrast Stroke Protocol   Preliminary Result   No acute cortical infarct or acute hemorrhage.      Indeterminate moderate bilateral periventricular low attenuation.  Differentials   include microangiopathy and demyelinating condition.      Mild bifrontal atrophy.            XR Chest 1 View                                               MDM    Final diagnoses:   Seizure   Urinary tract infection with hematuria, site unspecified       ED Disposition  ED Disposition     ED Disposition   Discharge    Condition   Stable    Comment   --             Greta Stoddard MD  [address]  [address]  [place] [id number] [phone]    In 2 days  Urine culture Loyd Patel MD  [address] [address] [phone]    Schedule an appointment as soon as possible for a visit            Medication List      New Prescriptions    cephalexin 500 MG capsule  Commonly known as: KEFLEX  Take 1 capsule by mouth 3 (Three) Times a Day.           Where to Get Your Medications       These medications were sent to [facility][id number] - [place], [place] - [address] [facility] AT [facility] [address]  [address] - [phone]  - [phone] FX  [address] [facility] [address]    Phone: [phone]   · cephalexin 500 MG capsule         Valentino Sepulveda MD  04/10/23 2035    Valentino Sepulveda MD  04/10/23 2038

## 2023-04-11 NOTE — CONSULTS
Teleneurology Consultation Note - Phone:     Date of Consultation: 04/10/2023 19:38 CDT  Reason for consult: Seizure  Location: ED  Date/Time Telestroke doctor on phone: 04/10/2023 19:38 CDT  Diagnosis: Seizure    Patient Demographics:  Age: 42  Gender: Female    Assessment:  likely seizures and post ictal changes, now baseline.    Recommendation:  -follow up with neurology for seizures  -increase keppra to 1000 mg BID    Follow-up recommendations: Teleneurology team is signing off. Please call us at [facility] # [phone] and press 1 for emergent and press 2 for non-emergent if you have any questions.    Updated ED provider with recommendations at: 04/10/2023 20:04 CDT      History of Presenting Illness:  42 female with cerebral palsy and hx of seizures, presented with seizures, reported had facial droop slurring after seizures. Now is baseline. No further new deficits noted.  Allergies: Unknown        Pertinent medication prior to arrival:    NIHSS:  Initial assessment: 04/10/2023 20:03 CDT    Personal review of CNS Imaging:  CTH performed? Yes: no findings  CTA head and neck performed? No: Symptoms are not consistent with ischemic stroke      Teleneurology patient verification & consent    I, Dr. Leeroy Yu, was consulted about this patient on 04/10/2023, 19:38 CDT for discussion over the phone regarding management of the patient's care via a provider to provider discussion for 15 minutes which includes reviewing medical records, reviewing laboratory data, radiology, personal review of CNS images and other diagnostic tests as well as clinical documentation. My location was [place]. The recommendations are based on information I received from the consulting provider.

## 2023-05-05 LAB
QT INTERVAL: 382 MS
QTC INTERVAL: 432 MS

## 2023-05-05 RX ORDER — LACTASE 3000 UNIT
TABLET ORAL
Qty: 93 TABLET | Refills: 11 | Status: SHIPPED | OUTPATIENT
Start: 2023-05-05

## 2023-06-13 ENCOUNTER — HOSPITAL ENCOUNTER (EMERGENCY)
Facility: HOSPITAL | Age: 43
Discharge: HOME | End: 2023-06-13
Attending: EMERGENCY MEDICINE | Admitting: EMERGENCY MEDICINE
Payer: MEDICARE

## 2023-06-13 ENCOUNTER — APPOINTMENT (OUTPATIENT)
Dept: CT IMAGING | Facility: HOSPITAL | Age: 43
End: 2023-06-13
Payer: MEDICARE

## 2023-06-13 VITALS
OXYGEN SATURATION: 99 % | WEIGHT: 163 LBS | BODY MASS INDEX: 32 KG/M2 | HEIGHT: 60 IN | RESPIRATION RATE: 18 BRPM | SYSTOLIC BLOOD PRESSURE: 146 MMHG | DIASTOLIC BLOOD PRESSURE: 90 MMHG | TEMPERATURE: 97.2 F | HEART RATE: 83 BPM

## 2023-06-13 DIAGNOSIS — R10.2 SUPRAPUBIC PAIN: Primary | ICD-10-CM

## 2023-06-13 LAB
ALBUMIN SERPL-MCNC: 3.8 G/DL (ref 3.5–5.2)
ALBUMIN/GLOB SERPL: 1.4 G/DL
ALP SERPL-CCNC: 104 U/L (ref 39–117)
ALT SERPL W P-5'-P-CCNC: 17 U/L (ref 1–33)
ANION GAP SERPL CALCULATED.3IONS-SCNC: 11 MMOL/L (ref 5–15)
AST SERPL-CCNC: 15 U/L (ref 1–32)
BACTERIA UR QL AUTO: ABNORMAL /HPF
BASOPHILS # BLD AUTO: 0.14 10*3/MM3 (ref 0–0.2)
BASOPHILS NFR BLD AUTO: 1.3 % (ref 0–1.5)
BILIRUB SERPL-MCNC: <0.2 MG/DL (ref 0–1.2)
BILIRUB UR QL STRIP: NEGATIVE
BUN SERPL-MCNC: 14 MG/DL (ref 6–20)
BUN/CREAT SERPL: 25.9 (ref 7–25)
CALCIUM SPEC-SCNC: 8.8 MG/DL (ref 8.6–10.5)
CHLORIDE SERPL-SCNC: 106 MMOL/L (ref 98–107)
CLARITY UR: CLEAR
CO2 SERPL-SCNC: 24 MMOL/L (ref 22–29)
COLOR UR: YELLOW
CREAT SERPL-MCNC: 0.54 MG/DL (ref 0.57–1)
DEPRECATED RDW RBC AUTO: 42.8 FL (ref 37–54)
EGFRCR SERPLBLD CKD-EPI 2021: 117.3 ML/MIN/1.73
EOSINOPHIL # BLD AUTO: 0.44 10*3/MM3 (ref 0–0.4)
EOSINOPHIL NFR BLD AUTO: 4.2 % (ref 0.3–6.2)
ERYTHROCYTE [DISTWIDTH] IN BLOOD BY AUTOMATED COUNT: 13.3 % (ref 12.3–15.4)
GLOBULIN UR ELPH-MCNC: 2.8 GM/DL
GLUCOSE SERPL-MCNC: 83 MG/DL (ref 65–99)
GLUCOSE UR STRIP-MCNC: NEGATIVE MG/DL
HCT VFR BLD AUTO: 41.7 % (ref 34–46.6)
HGB BLD-MCNC: 13.6 G/DL (ref 12–15.9)
HGB UR QL STRIP.AUTO: NEGATIVE
HYALINE CASTS UR QL AUTO: ABNORMAL /LPF
IMM GRANULOCYTES # BLD AUTO: 0.04 10*3/MM3 (ref 0–0.05)
IMM GRANULOCYTES NFR BLD AUTO: 0.4 % (ref 0–0.5)
KETONES UR QL STRIP: NEGATIVE
LEUKOCYTE ESTERASE UR QL STRIP.AUTO: ABNORMAL
LYMPHOCYTES # BLD AUTO: 2.26 10*3/MM3 (ref 0.7–3.1)
LYMPHOCYTES NFR BLD AUTO: 21.5 % (ref 19.6–45.3)
MCH RBC QN AUTO: 28.8 PG (ref 26.6–33)
MCHC RBC AUTO-ENTMCNC: 32.6 G/DL (ref 31.5–35.7)
MCV RBC AUTO: 88.2 FL (ref 79–97)
MONOCYTES # BLD AUTO: 0.96 10*3/MM3 (ref 0.1–0.9)
MONOCYTES NFR BLD AUTO: 9.1 % (ref 5–12)
NEUTROPHILS NFR BLD AUTO: 6.67 10*3/MM3 (ref 1.7–7)
NEUTROPHILS NFR BLD AUTO: 63.5 % (ref 42.7–76)
NITRITE UR QL STRIP: NEGATIVE
NRBC BLD AUTO-RTO: 0 /100 WBC (ref 0–0.2)
PH UR STRIP.AUTO: 6.5 [PH] (ref 5–9)
PLAT MORPH BLD: NORMAL
PLATELET # BLD AUTO: 265 10*3/MM3 (ref 140–450)
PMV BLD AUTO: 10 FL (ref 6–12)
POTASSIUM SERPL-SCNC: 4.8 MMOL/L (ref 3.5–5.2)
PROT SERPL-MCNC: 6.6 G/DL (ref 6–8.5)
PROT UR QL STRIP: NEGATIVE
RBC # BLD AUTO: 4.73 10*6/MM3 (ref 3.77–5.28)
RBC # UR STRIP: ABNORMAL /HPF
RBC MORPH BLD: NORMAL
REF LAB TEST METHOD: ABNORMAL
SODIUM SERPL-SCNC: 141 MMOL/L (ref 136–145)
SP GR UR STRIP: 1.01 (ref 1–1.03)
SQUAMOUS #/AREA URNS HPF: ABNORMAL /HPF
UROBILINOGEN UR QL STRIP: ABNORMAL
WBC # UR STRIP: ABNORMAL /HPF
WBC MORPH BLD: NORMAL
WBC NRBC COR # BLD: 10.51 10*3/MM3 (ref 3.4–10.8)

## 2023-06-13 PROCEDURE — 96374 THER/PROPH/DIAG INJ IV PUSH: CPT

## 2023-06-13 PROCEDURE — 85007 BL SMEAR W/DIFF WBC COUNT: CPT | Performed by: NURSE PRACTITIONER

## 2023-06-13 PROCEDURE — 36415 COLL VENOUS BLD VENIPUNCTURE: CPT

## 2023-06-13 PROCEDURE — 99283 EMERGENCY DEPT VISIT LOW MDM: CPT

## 2023-06-13 PROCEDURE — 96375 TX/PRO/DX INJ NEW DRUG ADDON: CPT

## 2023-06-13 PROCEDURE — 25010000002 ONDANSETRON PER 1 MG: Performed by: NURSE PRACTITIONER

## 2023-06-13 PROCEDURE — 74176 CT ABD & PELVIS W/O CONTRAST: CPT

## 2023-06-13 PROCEDURE — 25010000002 KETOROLAC TROMETHAMINE PER 15 MG: Performed by: NURSE PRACTITIONER

## 2023-06-13 PROCEDURE — 85025 COMPLETE CBC W/AUTO DIFF WBC: CPT | Performed by: NURSE PRACTITIONER

## 2023-06-13 PROCEDURE — 81001 URINALYSIS AUTO W/SCOPE: CPT | Performed by: NURSE PRACTITIONER

## 2023-06-13 PROCEDURE — 80053 COMPREHEN METABOLIC PANEL: CPT | Performed by: NURSE PRACTITIONER

## 2023-06-13 RX ORDER — ONDANSETRON 2 MG/ML
4 INJECTION INTRAMUSCULAR; INTRAVENOUS ONCE
Status: COMPLETED | OUTPATIENT
Start: 2023-06-13 | End: 2023-06-13

## 2023-06-13 RX ORDER — KETOROLAC TROMETHAMINE 15 MG/ML
15 INJECTION, SOLUTION INTRAMUSCULAR; INTRAVENOUS ONCE
Status: COMPLETED | OUTPATIENT
Start: 2023-06-13 | End: 2023-06-13

## 2023-06-13 RX ORDER — SODIUM CHLORIDE 0.9 % (FLUSH) 0.9 %
10 SYRINGE (ML) INJECTION AS NEEDED
Status: DISCONTINUED | OUTPATIENT
Start: 2023-06-13 | End: 2023-06-13 | Stop reason: HOSPADM

## 2023-06-13 RX ADMIN — ONDANSETRON 4 MG: 2 INJECTION INTRAMUSCULAR; INTRAVENOUS at 18:03

## 2023-06-13 RX ADMIN — KETOROLAC TROMETHAMINE 15 MG: 15 INJECTION, SOLUTION INTRAMUSCULAR; INTRAVENOUS at 18:03

## 2023-06-13 RX ADMIN — SODIUM CHLORIDE 1000 ML: 9 INJECTION, SOLUTION INTRAVENOUS at 18:03

## 2023-06-14 NOTE — DISCHARGE INSTRUCTIONS
Your work-up today did not show that you have a urinary tract infection or any kind of kidney stones.  Follow-up with your primary care provider for reevaluation if your pain continues.  Return back to the ER as needed.

## 2023-06-14 NOTE — ED PROVIDER NOTES
Subjective   History of Present Illness  Patient presents to the ER with complaints of burning with urination, low back pain, suprapubic pain, urgency, and frequency for approximately 2 days.  Patient states she has had a decreased urine output since Saturday.     Review of Systems   Constitutional:  Negative for chills and fever.   HENT: Negative.     Gastrointestinal:  Positive for abdominal pain. Negative for diarrhea, nausea and vomiting.   Genitourinary:  Positive for dysuria, frequency and urgency.   Musculoskeletal: Negative.    Skin: Negative.    Psychiatric/Behavioral: Negative.       Past Medical History:   Diagnosis Date    Acute vulvitis     Candida    Allergy     Unspecified, initial encounter    Amblyopia     OS    Astigmatism     Cerebral palsy     Cheilosis     Common cold     Contusion     Depressive disorder     Diarrhea     Encounter for general adult medical examination without abnormal findings     Encounter for routine adult health examination     Adult health examination       Fever     Gastroesophageal reflux disease     Generalized abdominal pain     Indeterminate colitis     Infected insect bite     Myopia     Spastic quadriplegic cerebral palsy     Trouble walking     Walking disability       Urinary incontinence     Urinary tract infectious disease     Wears glasses     Worried well        Allergies   Allergen Reactions    Atropine Unknown (See Comments)     Unknown reaction per patient    Ciprofloxacin Other (See Comments)     seizure    Dairy Aid [Tilactase] Other (See Comments)     abd pain    Sulfa Antibiotics Itching    Avelox [Moxifloxacin Hcl] Other (See Comments)     Concerned may cause seizure    Factive [Gemifloxacin] Other (See Comments)     Concerned may cause seizure    Floxin Otic [Ofloxacin] Other (See Comments)     Concerned may cause seizure    Levaquin [Levofloxacin] Other (See Comments)     Concerned may cause seizure       Past Surgical History:   Procedure Laterality  "Date    COLONOSCOPY N/A 03/28/2012    hemorrhoids    COLONOSCOPY N/A 4/23/2018    Procedure: COLONOSCOPY--look TI, bx, hx colitis;  Surgeon: Sreedhar Kirkpatrick MD;  Location: [facility] ENDOSCOPY;  Service: Gastroenterology    COLONOSCOPY N/A 6/7/2019    Procedure: COLONOSCOPY;  Surgeon: Sreedhar Kirkpatrick MD;  Location: [facility] ENDOSCOPY;  Service: Gastroenterology    CYSTOSCOPY N/A 4/19/2019    Procedure: CYSTOSCOPY;  Surgeon: Cecilio Acuña MD;  Location: [facility] OR;  Service: Urology    ENDOSCOPY N/A 03/28/2012    gastritis    INJECTION OF MEDICATION  02/23/2016    Kenalog    TOTAL ABDOMINAL HYSTERECTOMY WITH SALPINGO OOPHORECTOMY N/A 03/24/2005    UPPER GASTROINTESTINAL ENDOSCOPY  03/28/2012       Family History   Problem Relation Age of Onset    Coronary artery disease Other     Diabetes Other     Hypertension Other     Stroke Other        Social History     Socioeconomic History    Marital status: Single   Tobacco Use    Smoking status: Never    Smokeless tobacco: Never   Substance and Sexual Activity    Alcohol use: No    Drug use: No    Sexual activity: Defer           Objective   /90 (BP Location: Left arm, Patient Position: Lying)   Pulse 83   Temp 97.2 °F (36.2 °C) (Oral)   Resp 18   Ht 152.4 cm (60\")   Wt 73.9 kg (163 lb)   LMP  (LMP Unknown)   SpO2 99%   BMI 31.83 kg/m²     Physical Exam  Vitals and nursing note reviewed.   Constitutional:       General: She is not in acute distress.     Appearance: She is well-developed.   HENT:      Head: Normocephalic and atraumatic.      Right Ear: External ear normal.      Left Ear: External ear normal.      Mouth/Throat:      Mouth: Mucous membranes are moist.   Eyes:      Extraocular Movements: Extraocular movements intact.      Conjunctiva/sclera: Conjunctivae normal.   Cardiovascular:      Rate and Rhythm: Normal rate and regular rhythm.      Heart sounds: Normal heart sounds. No murmur heard.  Pulmonary:      Effort: Pulmonary effort is normal. No " respiratory distress.      Breath sounds: Normal breath sounds. No wheezing.   Abdominal:      General: Bowel sounds are normal. There is no distension.      Palpations: Abdomen is soft.      Tenderness: There is no abdominal tenderness in the suprapubic area. There is no right CVA tenderness or left CVA tenderness.          Comments: Tenderness to palpation   Musculoskeletal:         General: Normal range of motion.      Cervical back: Normal range of motion and neck supple.   Skin:     General: Skin is warm and dry.      Capillary Refill: Capillary refill takes less than 2 seconds.   Neurological:      Mental Status: She is alert and oriented to person, place, and time.      Coordination: Coordination normal.   Psychiatric:         Behavior: Behavior normal.       Procedures  Results for orders placed or performed during the hospital encounter of 06/13/23   Urinalysis With Culture If Indicated - Urine, Clean Catch    Specimen: Urine, Clean Catch   Result Value Ref Range    Color, UA Yellow Yellow, Straw, Dark Yellow, Keli    Appearance, UA Clear Clear    pH, UA 6.5 5.0 - 9.0    Specific [place], UA 1.011 1.003 - 1.030    Glucose, UA Negative Negative    Ketones, UA Negative Negative    Bilirubin, UA Negative Negative    Blood, UA Negative Negative    Protein, UA Negative Negative    Leuk Esterase, UA Trace (A) Negative    Nitrite, UA Negative Negative    Urobilinogen, UA 0.2 E.U./dL 0.2 - 1.0 E.U./dL   Urinalysis, Microscopic Only - Urine, Clean Catch    Specimen: Urine, Clean Catch   Result Value Ref Range    RBC, UA 0-2 (A) None Seen /HPF    WBC, UA 0-2 None Seen, 0-2, 3-5 /HPF    Bacteria, UA None Seen None Seen /HPF    Squamous Epithelial Cells, UA 0-2 None Seen, 0-2 /HPF    Hyaline Casts, UA None Seen None Seen /LPF    Methodology Automated Microscopy    Comprehensive Metabolic Panel    Specimen: Blood   Result Value Ref Range    Glucose 83 65 - 99 mg/dL    BUN 14 6 - 20 mg/dL    Creatinine 0.54 (L) 0.57 -  1.00 mg/dL    Sodium 141 136 - 145 mmol/L    Potassium 4.8 3.5 - 5.2 mmol/L    Chloride 106 98 - 107 mmol/L    CO2 24.0 22.0 - 29.0 mmol/L    Calcium 8.8 8.6 - 10.5 mg/dL    Total Protein 6.6 6.0 - 8.5 g/dL    Albumin 3.8 3.5 - 5.2 g/dL    ALT (SGPT) 17 1 - 33 U/L    AST (SGOT) 15 1 - 32 U/L    Alkaline Phosphatase 104 39 - 117 U/L    Total Bilirubin <0.2 0.0 - 1.2 mg/dL    Globulin 2.8 gm/dL    A/G Ratio 1.4 g/dL    BUN/Creatinine Ratio 25.9 (H) 7.0 - 25.0    Anion Gap 11.0 5.0 - 15.0 mmol/L    eGFR 117.3 >60.0 mL/min/1.73   CBC Auto Differential    Specimen: Blood   Result Value Ref Range    WBC 10.51 3.40 - 10.80 10*3/mm3    RBC 4.73 3.77 - 5.28 10*6/mm3    Hemoglobin 13.6 12.0 - 15.9 g/dL    Hematocrit 41.7 34.0 - 46.6 %    MCV 88.2 79.0 - 97.0 fL    MCH 28.8 26.6 - 33.0 pg    MCHC 32.6 31.5 - 35.7 g/dL    RDW 13.3 12.3 - 15.4 %    RDW-SD 42.8 37.0 - 54.0 fl    MPV 10.0 6.0 - 12.0 fL    Platelets 265 140 - 450 10*3/mm3    Neutrophil % 63.5 42.7 - 76.0 %    Lymphocyte % 21.5 19.6 - 45.3 %    Monocyte % 9.1 5.0 - 12.0 %    Eosinophil % 4.2 0.3 - 6.2 %    Basophil % 1.3 0.0 - 1.5 %    Immature Grans % 0.4 0.0 - 0.5 %    Neutrophils, Absolute 6.67 1.70 - 7.00 10*3/mm3    Lymphocytes, Absolute 2.26 0.70 - 3.10 10*3/mm3    Monocytes, Absolute 0.96 (H) 0.10 - 0.90 10*3/mm3    Eosinophils, Absolute 0.44 (H) 0.00 - 0.40 10*3/mm3    Basophils, Absolute 0.14 0.00 - 0.20 10*3/mm3    Immature Grans, Absolute 0.04 0.00 - 0.05 10*3/mm3    nRBC 0.0 0.0 - 0.2 /100 WBC   Scan Slide    Specimen: Blood   Result Value Ref Range    RBC Morphology Normal Normal    WBC Morphology Normal Normal    Platelet Morphology Normal Normal     CT Abdomen Pelvis Without Contrast    Result Date: 6/13/2023  Narrative: INDICATION: Abdominal pain. TECHNIQUE: Axial images from the level of the diaphragms through the pelvis were performed followed by 2D multiplanar reformats. FINDINGS: Lung bases are clear.  Images through the abdomen and pelvis  demonstrate nephrolithiasis bilaterally.  No ureteral calculus or hydronephrosis.  There is a pain pump over the right lower quadrant producing streak artifact. SUMMARY: Nephrolithiasis.  No acute abnormalities demonstrated.            ED Course  ED Course as of 06/13/23 2033 Tue Jun 13, 2023 1900 Pending CMP to be recollected. Nursing aware [SH]   2003 Work up reviewed with patient. Advised to follow up with PCP.  [SH]      ED Course User Index  [SH] Roxanna Lo APRN                                           Medical Decision Making  Patient presents to the ER with complaints of suprapubic pain.  Urinalysis does not show UTI.  CT scan does not show any kind of kidney stones or acute findings within the abdominal region.  Work-up was discussed with patient and caregiver.  Advised to follow-up with her primary care provider for further evaluation if symptoms continue.    Amount and/or Complexity of Data Reviewed  Labs: ordered.  Radiology: ordered.    Risk  Prescription drug management.        Final diagnoses:   Suprapubic pain       ED Disposition  ED Disposition       ED Disposition   Discharge    Condition   Stable    Comment   --               Greta Stoddard MD  [address] [place] [address] [address]  [facility] [address] [place] [id number] [phone]    Schedule an appointment as soon as possible for a visit   ER follow-up         Medication List      No changes were made to your prescriptions during this visit.            Roxanna Lo APRN  06/13/23 2033

## 2023-08-09 DIAGNOSIS — F41.9 ANXIETY: Chronic | ICD-10-CM

## 2023-08-09 RX ORDER — ESTRADIOL 0.5 MG/1
TABLET ORAL
Qty: 31 TABLET | Refills: 11 | Status: SHIPPED | OUTPATIENT
Start: 2023-08-09

## 2023-08-09 RX ORDER — BUSPIRONE HYDROCHLORIDE 10 MG/1
TABLET ORAL
Qty: 62 TABLET | Refills: 11 | Status: SHIPPED | OUTPATIENT
Start: 2023-08-09

## 2023-08-23 RX ORDER — LEVETIRACETAM 750 MG/1
750 TABLET ORAL 2 TIMES DAILY
Qty: 60 TABLET | Refills: 1 | Status: SHIPPED | OUTPATIENT
Start: 2023-08-23

## 2023-08-23 NOTE — TELEPHONE ENCOUNTER
Incoming Refill Request      Medication requested (name and dose): levETIRAcetam (Keppra) 750 MG tablet     Pharmacy where request should be sent: [facility]    Additional details provided by patient: NONE    Best call back number: [phone]    Does the patient have less than a 3 day supply:  [x] Yes  [] No    Oleg Carroll Rep  08/23/23, 13:29 CDT

## 2023-09-12 ENCOUNTER — TELEPHONE (OUTPATIENT)
Dept: FAMILY MEDICINE CLINIC | Facility: CLINIC | Age: 43
End: 2023-09-12
Payer: MEDICARE

## 2023-09-12 NOTE — TELEPHONE ENCOUNTER
Pato Arreola called wanting to know if Dr. Stoddard could send a Rx for wipes to [facility] Fax # [phone].    Pato Arreola  [facility] # [phone]

## 2023-09-13 NOTE — TELEPHONE ENCOUNTER
These are Adult Wipes, 120 wipes per pack, they use 1 pack a week. Roughly 17 wipes per day.   1 box has 4 packs for a month supply.    YADIRA Clemens

## (undated) DEVICE — DRAINBAG,ANTI-REFLUX TOWER,L/F,2000ML,LL: Brand: MEDLINE

## (undated) DEVICE — CANN SMPL SOFTECH BIFLO ETCO2 A/M 7FT

## (undated) DEVICE — CATHETER,FOLEY,100%SILICONE,16FR,10ML,LF: Brand: MEDLINE

## (undated) DEVICE — GLV SURG NEOLON 2G PF LF 6.5 STRL

## (undated) DEVICE — SINGLE-USE BIOPSY FORCEPS: Brand: RADIAL JAW 4

## (undated) DEVICE — GLV SURG NEOLON 2G PF LF 7.5 STRL

## (undated) DEVICE — PK CYSTO LF 60

## (undated) DEVICE — SOL PVPI SPRY BETADINE 3OZ

## (undated) DEVICE — SOL IRR NACL 0.9PCT 3000ML